# Patient Record
Sex: MALE | Race: WHITE | NOT HISPANIC OR LATINO | Employment: OTHER | URBAN - METROPOLITAN AREA
[De-identification: names, ages, dates, MRNs, and addresses within clinical notes are randomized per-mention and may not be internally consistent; named-entity substitution may affect disease eponyms.]

---

## 2017-07-02 ENCOUNTER — HOSPITAL ENCOUNTER (EMERGENCY)
Facility: HOSPITAL | Age: 67
Discharge: HOME | End: 2017-07-02
Admitting: EMERGENCY MEDICINE
Payer: MEDICARE

## 2017-07-02 VITALS
OXYGEN SATURATION: 96 % | HEART RATE: 89 BPM | WEIGHT: 167 LBS | SYSTOLIC BLOOD PRESSURE: 136 MMHG | DIASTOLIC BLOOD PRESSURE: 78 MMHG | BODY MASS INDEX: 23.91 KG/M2 | HEIGHT: 70 IN | RESPIRATION RATE: 18 BRPM

## 2017-07-02 DIAGNOSIS — L20.9 ATOPIC DERMATITIS: Primary | ICD-10-CM

## 2017-07-02 PROCEDURE — 99282 EMERGENCY DEPT VISIT SF MDM: CPT

## 2017-07-02 RX ORDER — DIAPER,BRIEF,INFANT-TODD,DISP
1 EACH MISCELLANEOUS 3 TIMES DAILY PRN
Qty: 30 G | Refills: 0 | Status: SHIPPED | OUTPATIENT
Start: 2017-07-02 | End: 2018-08-31

## 2018-08-31 ENCOUNTER — HOSPITAL ENCOUNTER (EMERGENCY)
Facility: HOSPITAL | Age: 68
Discharge: HOME | End: 2018-08-31
Attending: EMERGENCY MEDICINE | Admitting: EMERGENCY MEDICINE
Payer: MEDICARE

## 2018-08-31 VITALS
BODY MASS INDEX: 26.83 KG/M2 | SYSTOLIC BLOOD PRESSURE: 112 MMHG | DIASTOLIC BLOOD PRESSURE: 71 MMHG | HEIGHT: 70 IN | HEART RATE: 76 BPM | OXYGEN SATURATION: 94 % | RESPIRATION RATE: 18 BRPM | TEMPERATURE: 97.6 F | WEIGHT: 187.39 LBS

## 2018-08-31 DIAGNOSIS — L72.0 RUPTURED SEBACEOUS CYST: Primary | ICD-10-CM

## 2018-08-31 PROCEDURE — 87186 SC STD MICRODIL/AGAR DIL: CPT | Performed by: PHYSICIAN ASSISTANT

## 2018-08-31 PROCEDURE — 87147 CULTURE TYPE IMMUNOLOGIC: CPT | Performed by: PHYSICIAN ASSISTANT

## 2018-08-31 PROCEDURE — 87205 SMEAR GRAM STAIN: CPT | Performed by: PHYSICIAN ASSISTANT

## 2018-08-31 PROCEDURE — 87070 CULTURE OTHR SPECIMN AEROBIC: CPT | Performed by: PHYSICIAN ASSISTANT

## 2018-08-31 PROCEDURE — 99283 EMERGENCY DEPT VISIT LOW MDM: CPT

## 2018-08-31 RX ORDER — OMEPRAZOLE 40 MG/1
40 CAPSULE, DELAYED RELEASE ORAL 2 TIMES DAILY
COMMUNITY
End: 2021-12-02 | Stop reason: SDUPTHER

## 2018-08-31 RX ORDER — ROSUVASTATIN CALCIUM 40 MG/1
45 TABLET, COATED ORAL EVERY EVENING
COMMUNITY
End: 2021-12-02 | Stop reason: SDUPTHER

## 2018-08-31 RX ORDER — FERROUS SULFATE 325(65) MG
325 TABLET ORAL 2 TIMES DAILY WITH MEALS
COMMUNITY

## 2018-08-31 RX ORDER — LEFLUNOMIDE 20 MG/1
20 TABLET ORAL DAILY
COMMUNITY
End: 2019-04-03 | Stop reason: HOSPADM

## 2018-08-31 RX ORDER — CEPHALEXIN 500 MG/1
500 CAPSULE ORAL 3 TIMES DAILY
Qty: 21 CAPSULE | Refills: 0 | Status: SHIPPED | OUTPATIENT
Start: 2018-08-31 | End: 2018-09-07

## 2018-08-31 RX ORDER — FOLIC ACID 1 MG/1
TABLET ORAL DAILY
COMMUNITY
End: 2021-05-13

## 2018-08-31 RX ORDER — ISOSORBIDE DINITRATE 30 MG/1
30 TABLET ORAL EVERY EVENING
COMMUNITY
End: 2019-04-03 | Stop reason: HOSPADM

## 2018-08-31 RX ORDER — BACITRACIN, NEOMYCIN, POLYMYXIN B 400; 3.5; 5 [USP'U]/G; MG/G; [USP'U]/G
1 OINTMENT TOPICAL ONCE
Status: COMPLETED | OUTPATIENT
Start: 2018-08-31 | End: 2018-08-31

## 2018-08-31 RX ORDER — MULTIVIT WITH MINERALS/LUTEIN
250 TABLET ORAL 2 TIMES DAILY
COMMUNITY

## 2018-08-31 RX ORDER — FENOFIBRATE 48 MG/1
45 TABLET, COATED ORAL EVERY EVENING
COMMUNITY
End: 2021-10-26

## 2018-08-31 RX ORDER — TAMSULOSIN HYDROCHLORIDE 0.4 MG/1
0.4 CAPSULE ORAL
COMMUNITY
End: 2021-05-13

## 2018-08-31 RX ORDER — ASPIRIN 81 MG/1
81 TABLET, CHEWABLE ORAL DAILY
COMMUNITY
End: 2019-04-03 | Stop reason: HOSPADM

## 2018-08-31 RX ORDER — ATENOLOL 50 MG/1
50 TABLET ORAL 2 TIMES DAILY
Status: ON HOLD | COMMUNITY
End: 2019-04-03 | Stop reason: SDUPTHER

## 2018-08-31 RX ORDER — ISOSORBIDE DINITRATE 10 MG/1
120 TABLET ORAL
COMMUNITY
End: 2018-11-21 | Stop reason: HOSPADM

## 2018-08-31 RX ORDER — AMLODIPINE BESYLATE 5 MG/1
5 TABLET ORAL DAILY
COMMUNITY
End: 2019-04-03 | Stop reason: HOSPADM

## 2018-08-31 RX ADMIN — BACITRACIN, NEOMYCIN, POLYMYXIN B 1 SMALL APPLICATION: 400; 3.5; 5 OINTMENT TOPICAL at 11:34

## 2018-08-31 NOTE — DISCHARGE INSTRUCTIONS
Epidermal Inclusion Cysts   WHAT YOU NEED TO KNOW:   Epidermal inclusion cysts are the most common skin cysts in adults  These cysts are usually round, firm lumps filled with a cheese-like material called keratin  They are also called epidermoid, keratin, or sebaceous cysts  They can be found almost anywhere on your body  The cysts are most common on the face, back, neck, chest, and around your ears  They can be caused by blocked hair follicle and oil gland ducts in your skin  Epidermal inclusion cysts may grow slowly but are not cancerous  DISCHARGE INSTRUCTIONS:   Follow up with your healthcare provider as directed:  Write down your questions so you remember to ask them at your visits  Medicines:   · Antibiotics  may be given to treat or prevent an infection  · Take your medicine as directed  Contact your healthcare provider if you think your medicine is not helping or if you have side effects  Tell him of her if you are allergic to any medicine  Keep a list of the medicines, vitamins, and herbs you take  Include the amounts, and when and why you take them  Bring the list or the pill bottles to follow-up visits  Carry your medicine list with you in case of an emergency  Contact your healthcare provider if:   · Your cyst becomes swollen, red, and painful  · Your cyst is large and leads to trouble moving or a deformed area  · You have questions or concerns about your condition or care  © 2017 [facility] Information is for End User's use only and may not be sold, redistributed or otherwise used for commercial purposes  All illustrations and images included in CareNotes® are the copyrighted property of A D A M [employer] Inc  or [employer]  The above information is an  only  It is not intended as medical advice for individual conditions or treatments   Talk to your doctor, nurse or pharmacist before following any medical regimen to see if it is safe and effective for you

## 2018-08-31 NOTE — ED PROVIDER NOTES
History  Chief Complaint   Patient presents with    Abscess     Pt reports to ED w open abscess  79year old male with past medical history significant for hypertension, diabetes and rheumatoid arthritis presents to ED with chief complaint of draining abscess  Onset of symptoms reported as 2 day ago  Location of symptoms reported as right arm  Quality is reported as draining abscess  Severity is reported as moderate  Associated symptoms:  Denies paralysis, paraesthesias or weakness to right upper extremity, denies fevers or chills, denies nausea or vomiting, denies right shoulder, wrist or hand pain  Modifiers: nothing has been tried for treatment of symptoms  Context: patient reports he has had a "cyst" on his arm for many years  He reports recently the area became red and eventually opened up and started draining purulent material   Medical summary: reviewed past visits via [facility], no prior visit to this ED  History provided by:  Patient   used: No    Abscess   Associated symptoms: no fatigue, no fever, no headaches, no nausea and no vomiting        Prior to Admission Medications   Prescriptions Last Dose Informant Patient Reported? Taking?    amLODIPine (NORVASC) 5 mg tablet  Self Yes Yes   Sig: Take 5 mg by mouth daily   ascorbic acid (VITAMIN C) 250 mg tablet  Self Yes Yes   Sig: Take 250 mg by mouth 2 (two) times a day   aspirin 81 mg chewable tablet  Self Yes Yes   Sig: Chew 81 mg daily   atenolol (TENORMIN) 50 mg tablet  Self Yes Yes   Sig: Take 50 mg by mouth 2 (two) times a day   fenofibrate (TRICOR) 48 mg tablet  Self Yes Yes   Sig: Take 45 mg by mouth every evening   ferrous sulfate 325 (65 Fe) mg tablet  Self Yes Yes   Sig: Take 325 mg by mouth 2 (two) times a day with meals   folic acid (FOLVITE) 1 mg tablet  Self Yes Yes   Sig: Take by mouth daily   insulin degludec (TRESIBA FLEXTOUCH) 100 units/mL injection pen  Self Yes Yes   Sig: Inject 46 Units under the skin daily in the early morning   isosorbide dinitrate (ISORDIL) 10 mg tablet  Self Yes Yes   Sig: Take 120 mg by mouth daily in the early morning   isosorbide dinitrate (ISORDIL) 30 mg tablet  Self Yes Yes   Sig: Take 30 mg by mouth every evening   leflunomide (ARAVA) 20 MG tablet  Self Yes Yes   Sig: Take 20 mg by mouth daily   metFORMIN (GLUCOPHAGE) 1000 MG tablet  Self Yes Yes   Sig: Take 1,000 mg by mouth 2 (two) times a day with meals   methotrexate 2 5 mg tablet  Self Yes Yes   Sig: Take 12 5 mg by mouth once a week   omeprazole (PriLOSEC) 40 MG capsule  Self Yes Yes   Sig: Take 40 mg by mouth 2 (two) times a day   rosuvastatin (CRESTOR) 40 MG tablet  Self Yes Yes   Sig: Take 45 mg by mouth every evening   sitaGLIPtin (JANUVIA) 50 mg tablet  Self Yes Yes   Sig: Take 50 mg by mouth daily   tamsulosin (FLOMAX) 0 4 mg  Self Yes Yes   Sig: Take 0 4 mg by mouth daily in the early morning   umeclidinium-vilanterol (ANORO ELLIPTA) 62 5-25 MCG/INH inhaler  Self Yes Yes   Sig: Inhale 1 puff daily      Facility-Administered Medications: None       Past Medical History:   Diagnosis Date    Diabetes mellitus ([facility] )     Hypertension     Lyme disease     Rheumatoid arthritis ([facility] )        History reviewed  No pertinent surgical history  History reviewed  No pertinent family history  I have reviewed and agree with the history as documented  Social History   Substance Use Topics    Smoking status: Current Every Day Smoker     Packs/day: 0 50     Years: 50 00     Types: Cigarettes    Smokeless tobacco: Not on file    Alcohol use No        Review of Systems   Constitutional: Negative for activity change, appetite change, chills, diaphoresis, fatigue, fever and unexpected weight change     HENT: Negative for congestion, dental problem, drooling, ear discharge, ear pain, facial swelling, hearing loss, mouth sores, nosebleeds, postnasal drip, rhinorrhea, sinus pain, sinus pressure, sneezing, sore throat, tinnitus, trouble swallowing and voice change  Eyes: Negative for photophobia, pain, discharge, redness, itching and visual disturbance  Respiratory: Negative for cough, chest tightness, shortness of breath and wheezing  Cardiovascular: Negative for chest pain, palpitations and leg swelling  Gastrointestinal: Negative for abdominal distention, abdominal pain, anal bleeding, blood in stool, constipation, diarrhea, nausea and vomiting  Endocrine: Negative for cold intolerance, heat intolerance, polydipsia, polyphagia and polyuria  Genitourinary: Negative for difficulty urinating, dysuria, flank pain, frequency, hematuria and urgency  Musculoskeletal: Negative for arthralgias, back pain, gait problem, joint swelling, myalgias, neck pain and neck stiffness  Skin: Positive for wound  Negative for color change, pallor and rash  Allergic/Immunologic: Negative for environmental allergies, food allergies and immunocompromised state  Neurological: Negative for dizziness, tremors, seizures, syncope, facial asymmetry, speech difficulty, weakness, light-headedness, numbness and headaches  Hematological: Negative for adenopathy  Does not bruise/bleed easily  Psychiatric/Behavioral: Negative for agitation, confusion and hallucinations  The patient is not nervous/anxious  All other systems reviewed and are negative  Physical Exam  Physical Exam   Constitutional: He is oriented to person, place, and time  He appears well-developed and well-nourished  No distress  /71 (BP Location: Left arm)   Pulse 76   Temp 97 6 °F (36 4 °C) (Oral)   Resp 18   Ht 5' 10" (1 778 m)   Wt 85 kg (187 lb 6 3 oz)   SpO2 94%   BMI 26 89 kg/m²    HENT:   Head: Normocephalic and atraumatic  Right Ear: External ear normal    Left Ear: External ear normal    Nose: Nose normal    Mouth/Throat: Oropharynx is clear and moist  No oropharyngeal exudate     Eyes: Conjunctivae and EOM are normal  Pupils are equal, round, and reactive to light  Right eye exhibits no discharge  Left eye exhibits no discharge  No scleral icterus  Neck: Normal range of motion  Neck supple  No tracheal deviation present  No thyromegaly present  Cardiovascular: Normal rate, regular rhythm and intact distal pulses  Pulmonary/Chest: Effort normal and breath sounds normal  No stridor  No respiratory distress  He has no wheezes  He has no rales  He exhibits no tenderness  Abdominal: Soft  Bowel sounds are normal  He exhibits no distension and no mass  There is no tenderness  There is no rebound and no guarding  Musculoskeletal: Normal range of motion  He exhibits no edema, tenderness or deformity  Lymphadenopathy:     He has no cervical adenopathy  Neurological: He is alert and oriented to person, place, and time  He displays normal reflexes  No cranial nerve deficit or sensory deficit  He exhibits normal muscle tone  Coordination normal    Skin: Skin is warm and dry  Capillary refill takes less than 2 seconds  No rash noted  He is not diaphoretic  No erythema  No pallor  There is a 1 5 cm diameter circular area of raised erythema, fluctuant, draining purulent material   No linear or lymphangitic streaking  Appears consistent with ruptured sebaceous cyst      Psychiatric: He has a normal mood and affect  His behavior is normal  Judgment and thought content normal    Nursing note and vitals reviewed        Vital Signs  ED Triage Vitals [08/31/18 0950]   Temperature Pulse Respirations Blood Pressure SpO2   97 6 °F (36 4 °C) 76 18 112/71 94 %      Temp Source Heart Rate Source Patient Position - Orthostatic VS BP Location FiO2 (%)   Oral Monitor Lying Left arm --      Pain Score       --           Vitals:    08/31/18 0950   BP: 112/71   Pulse: 76   Patient Position - Orthostatic VS: Lying       Visual Acuity      ED Medications  Medications   neomycin-bacitracin-polymyxin b (NEOSPORIN) ointment 1 small application (1 small application Topical Given 8/31/18 1134)       Diagnostic Studies  Results Reviewed     Procedure Component Value Units Date/Time    Wound culture and Gram stain [[id number]]  (Abnormal)  (Susceptibility) Collected:  08/31/18 1135    Lab Status:  Final result Specimen:  Wound from Arm, Right Updated:  09/03/18 0816     Wound Culture 1+ Growth of Staphylococcus aureus (A)      1+ Growth of      Gram Stain Result No Polys or Bacteria seen    Susceptibility      Staphylococcus aureus     JEFFERY    Ampicillin ($$) >8 00 ug/ml Resistant    Cefazolin ($) <=4 00 ug/ml Susceptible    Clindamycin ($) 0 50 ug/ml Susceptible    Erythromycin ($$$$) 0 50 ug/ml Susceptible    Gentamicin ($$) <=1 ug/ml Susceptible    Oxacillin 0 50 ug/ml Susceptible    Tetracycline 8 ug/ml Intermediate    Trimethoprim + Sulfamethoxazole ($$$) <=0 5/9 5 ug/ml Susceptible    Vancomycin ($) 2 00 ug/ml Susceptible                                No orders to display              Procedures  Procedures       Phone Contacts  ED Phone Contact    ED Course           Identification of Seniors at Risk      Most Recent Value   (ISAR) Identification of Seniors at Risk   Before the illness or injury that brought you to the Emergency, did you need someone to help you on a regular basis? 0 Filed at: 08/31/2018 0955   In the last 24 hours, have you needed more help than usual?  0 Filed at: 08/31/2018 7820   Have you been hospitalized for one or more nights during the past 6 months? 0 Filed at: 08/31/2018 0955   In general, do you see well?  0 Filed at: 08/31/2018 0955   In general, do you have serious problems with your memory? 0 Filed at: 08/31/2018 6245   Do you take more than three different medications every day? 1 Filed at: 08/31/2018 0955   ISAR Score  1 Filed at: 08/31/2018 4394                          MDM  Number of Diagnoses or Management Options  Ruptured sebaceous cyst: new and requires workup  Diagnosis management comments: Patient with draining abscess present to right arm    Small amount of purulent material from wound sent for wound culture  Wound is already draining  Patient has appointment next week with general surgeon for drainage of cyst   Will cover with antibiotics for mrsa  Discussed follow up with pcp and general surgery as previously scheduled  Discussed antibiotics as directed  Reviewed reasons to return to ed  Patient verbalized understanding of diagnosis and agreement with discharge plan of care as well as understanding of reasons to return to ed  Amount and/or Complexity of Data Reviewed  Obtain history from someone other than the patient: yes (Family member)  Review and summarize past medical records: yes    Patient Progress  Patient progress: stable    CritCare Time    Disposition  Final diagnoses:   Ruptured sebaceous cyst     Time reflects when diagnosis was documented in both MDM as applicable and the Disposition within this note     Time User Action Codes Description Comment    8/31/2018 11:18 AM Fiona Silva Add [L72 0] Ruptured sebaceous cyst       ED Disposition     ED Disposition Condition Comment    Discharge  [facility] Hand discharge to home/self care      Condition at discharge: Stable        Follow-up Information     Follow up With Specialties Details Why Contact Info Additional Information    Ted Bernal MD Family Medicine Call in 1 day for further evaluation of symptoms [address]  [address] [facility]        Orly Ledesma MD  Call in 1 day for further evaluation of symptoms [address]  [address] [facility] Emergency Department Emergency Medicine Go to If symptoms worsen [address] [facility] [address] ED, [address] [place], [place], [address]          Discharge Medication List as of 8/31/2018 11:22 AM      START taking these medications    Details   cephalexin (KEFLEX) 500 mg capsule Take 1 capsule (500 mg total) by mouth 3 (three) times a day for 7 days, Starting Fri 8/31/2018, Until Fri 9/7/2018, Print      mupirocin (BACTROBAN) 2 % ointment Apply topically 3 (three) times a day, Starting Fri 8/31/2018, Print         CONTINUE these medications which have NOT CHANGED    Details   amLODIPine (NORVASC) 5 mg tablet Take 5 mg by mouth daily, Historical Med      ascorbic acid (VITAMIN C) 250 mg tablet Take 250 mg by mouth 2 (two) times a day, Historical Med      aspirin 81 mg chewable tablet Chew 81 mg daily, Historical Med      atenolol (TENORMIN) 50 mg tablet Take 50 mg by mouth 2 (two) times a day, Historical Med      fenofibrate (TRICOR) 48 mg tablet Take 45 mg by mouth every evening, Historical Med      ferrous sulfate 325 (65 Fe) mg tablet Take 325 mg by mouth 2 (two) times a day with meals, Historical Med      folic acid (FOLVITE) 1 mg tablet Take by mouth daily, Historical Med      insulin degludec (TRESIBA FLEXTOUCH) 100 units/mL injection pen Inject 46 Units under the skin daily in the early morning, Historical Med      !! isosorbide dinitrate (ISORDIL) 10 mg tablet Take 120 mg by mouth daily in the early morning, Historical Med      !! isosorbide dinitrate (ISORDIL) 30 mg tablet Take 30 mg by mouth every evening, Historical Med      leflunomide (ARAVA) 20 MG tablet Take 20 mg by mouth daily, Historical Med      metFORMIN (GLUCOPHAGE) 1000 MG tablet Take 1,000 mg by mouth 2 (two) times a day with meals, Historical Med      methotrexate 2 5 mg tablet Take 12 5 mg by mouth once a week, Historical Med      omeprazole (PriLOSEC) 40 MG capsule Take 40 mg by mouth 2 (two) times a day, Historical Med      rosuvastatin (CRESTOR) 40 MG tablet Take 45 mg by mouth every evening, Historical Med      sitaGLIPtin (JANUVIA) 50 mg tablet Take 50 mg by mouth daily, Historical Med      tamsulosin (FLOMAX) 0 4 mg Take 0 4 mg by mouth daily in the early morning, Historical Med umeclidinium-vilanterol (ANORO ELLIPTA) 62 5-25 MCG/INH inhaler Inhale 1 puff daily, Historical Med       !! - Potential duplicate medications found  Please discuss with provider  No discharge procedures on file      ED Provider  Electronically Signed by           Stephanie Boyer PA-C  09/03/18 0981

## 2018-09-03 LAB
BACTERIA WND AEROBE CULT: ABNORMAL
BACTERIA WND AEROBE CULT: ABNORMAL
GRAM STN SPEC: ABNORMAL

## 2018-11-16 ENCOUNTER — HOSPITAL ENCOUNTER (INPATIENT)
Facility: HOSPITAL | Age: 68
LOS: 5 days | Discharge: HOME | DRG: 617 | End: 2018-11-21
Attending: EMERGENCY MEDICINE | Admitting: INTERNAL MEDICINE
Payer: MEDICARE

## 2018-11-16 ENCOUNTER — APPOINTMENT (EMERGENCY)
Dept: RADIOLOGY | Facility: HOSPITAL | Age: 68
DRG: 617 | End: 2018-11-16
Payer: MEDICARE

## 2018-11-16 DIAGNOSIS — E86.0 DEHYDRATION: ICD-10-CM

## 2018-11-16 DIAGNOSIS — N17.9 ACUTE KIDNEY INJURY (HCC): ICD-10-CM

## 2018-11-16 DIAGNOSIS — C34.90 LUNG CANCER (HCC): ICD-10-CM

## 2018-11-16 DIAGNOSIS — R19.7 DIARRHEA: ICD-10-CM

## 2018-11-16 DIAGNOSIS — L03.039 CELLULITIS, TOE: ICD-10-CM

## 2018-11-16 DIAGNOSIS — I73.9 PAD (PERIPHERAL ARTERY DISEASE) (HCC): ICD-10-CM

## 2018-11-16 DIAGNOSIS — I95.9 HYPOTENSION: ICD-10-CM

## 2018-11-16 DIAGNOSIS — L03.032 CELLULITIS OF TOE OF LEFT FOOT: ICD-10-CM

## 2018-11-16 DIAGNOSIS — A41.9 SEPSIS (HCC): Primary | ICD-10-CM

## 2018-11-16 LAB
ALBUMIN SERPL BCP-MCNC: 2.5 G/DL (ref 3.5–5)
ALP SERPL-CCNC: 79 U/L (ref 46–116)
ALT SERPL W P-5'-P-CCNC: 19 U/L (ref 12–78)
ANION GAP SERPL CALCULATED.3IONS-SCNC: 17 MMOL/L (ref 4–13)
APTT PPP: 41 SECONDS (ref 26–38)
AST SERPL W P-5'-P-CCNC: 22 U/L (ref 5–45)
BASOPHILS # BLD MANUAL: 0 THOUSAND/UL (ref 0–0.1)
BASOPHILS NFR MAR MANUAL: 0 % (ref 0–1)
BILIRUB SERPL-MCNC: 0.2 MG/DL (ref 0.2–1)
BUN SERPL-MCNC: 22 MG/DL (ref 5–25)
CALCIUM SERPL-MCNC: 8 MG/DL (ref 8.3–10.1)
CHLORIDE SERPL-SCNC: 107 MMOL/L (ref 100–108)
CO2 SERPL-SCNC: 17 MMOL/L (ref 21–32)
CREAT SERPL-MCNC: 1.85 MG/DL (ref 0.6–1.3)
EOSINOPHIL # BLD MANUAL: 0 THOUSAND/UL (ref 0–0.4)
EOSINOPHIL NFR BLD MANUAL: 0 % (ref 0–6)
ERYTHROCYTE [DISTWIDTH] IN BLOOD BY AUTOMATED COUNT: 17.2 % (ref 11.6–15.1)
GFR SERPL CREATININE-BSD FRML MDRD: 37 ML/MIN/1.73SQ M
GLUCOSE SERPL-MCNC: 175 MG/DL (ref 65–140)
HCT VFR BLD AUTO: 32.8 % (ref 36.5–49.3)
HGB BLD-MCNC: 10.4 G/DL (ref 12–17)
INR PPP: 1.79 (ref 0.86–1.17)
LACTATE SERPL-SCNC: 2.3 MMOL/L (ref 0.5–2)
LYMPHOCYTES # BLD AUTO: 0.32 THOUSAND/UL (ref 0.6–4.47)
LYMPHOCYTES # BLD AUTO: 3 % (ref 14–44)
MCH RBC QN AUTO: 30.5 PG (ref 26.8–34.3)
MCHC RBC AUTO-ENTMCNC: 31.7 G/DL (ref 31.4–37.4)
MCV RBC AUTO: 96 FL (ref 82–98)
METAMYELOCYTES NFR BLD MANUAL: 1 % (ref 0–1)
MONOCYTES # BLD AUTO: 0.86 THOUSAND/UL (ref 0–1.22)
MONOCYTES NFR BLD: 8 % (ref 4–12)
NEUTROPHILS # BLD MANUAL: 9.47 THOUSAND/UL (ref 1.85–7.62)
NEUTS BAND NFR BLD MANUAL: 9 % (ref 0–8)
NEUTS SEG NFR BLD AUTO: 79 % (ref 43–75)
NRBC BLD AUTO-RTO: 0 /100 WBCS
OVALOCYTES BLD QL SMEAR: PRESENT
PLATELET # BLD AUTO: 106 THOUSANDS/UL (ref 149–390)
PLATELET BLD QL SMEAR: ABNORMAL
PMV BLD AUTO: 11.4 FL (ref 8.9–12.7)
POTASSIUM SERPL-SCNC: 3.7 MMOL/L (ref 3.5–5.3)
PROT SERPL-MCNC: 7 G/DL (ref 6.4–8.2)
PROTHROMBIN TIME: 20.5 SECONDS (ref 11.8–14.2)
RBC # BLD AUTO: 3.41 MILLION/UL (ref 3.88–5.62)
SODIUM SERPL-SCNC: 141 MMOL/L (ref 136–145)
TOTAL CELLS COUNTED SPEC: 100
TROPONIN I SERPL-MCNC: <0.02 NG/ML
WBC # BLD AUTO: 10.76 THOUSAND/UL (ref 4.31–10.16)

## 2018-11-16 PROCEDURE — 83605 ASSAY OF LACTIC ACID: CPT | Performed by: EMERGENCY MEDICINE

## 2018-11-16 PROCEDURE — 84484 ASSAY OF TROPONIN QUANT: CPT | Performed by: EMERGENCY MEDICINE

## 2018-11-16 PROCEDURE — 1123F ACP DISCUSS/DSCN MKR DOCD: CPT | Performed by: PATHOLOGY

## 2018-11-16 PROCEDURE — 85730 THROMBOPLASTIN TIME PARTIAL: CPT | Performed by: EMERGENCY MEDICINE

## 2018-11-16 PROCEDURE — 71046 X-RAY EXAM CHEST 2 VIEWS: CPT

## 2018-11-16 PROCEDURE — 99285 EMERGENCY DEPT VISIT HI MDM: CPT

## 2018-11-16 PROCEDURE — 85007 BL SMEAR W/DIFF WBC COUNT: CPT | Performed by: EMERGENCY MEDICINE

## 2018-11-16 PROCEDURE — 85027 COMPLETE CBC AUTOMATED: CPT | Performed by: EMERGENCY MEDICINE

## 2018-11-16 PROCEDURE — 93005 ELECTROCARDIOGRAM TRACING: CPT

## 2018-11-16 PROCEDURE — 73660 X-RAY EXAM OF TOE(S): CPT

## 2018-11-16 PROCEDURE — 96360 HYDRATION IV INFUSION INIT: CPT

## 2018-11-16 PROCEDURE — 80053 COMPREHEN METABOLIC PANEL: CPT | Performed by: EMERGENCY MEDICINE

## 2018-11-16 PROCEDURE — 87040 BLOOD CULTURE FOR BACTERIA: CPT | Performed by: EMERGENCY MEDICINE

## 2018-11-16 PROCEDURE — 36415 COLL VENOUS BLD VENIPUNCTURE: CPT | Performed by: EMERGENCY MEDICINE

## 2018-11-16 PROCEDURE — 85610 PROTHROMBIN TIME: CPT | Performed by: EMERGENCY MEDICINE

## 2018-11-16 PROCEDURE — 96365 THER/PROPH/DIAG IV INF INIT: CPT

## 2018-11-16 RX ADMIN — SODIUM CHLORIDE 1000 ML: 0.9 INJECTION, SOLUTION INTRAVENOUS at 22:55

## 2018-11-16 RX ADMIN — CEFEPIME HYDROCHLORIDE 2000 MG: 2 INJECTION, POWDER, FOR SOLUTION INTRAVENOUS at 23:27

## 2018-11-17 ENCOUNTER — APPOINTMENT (INPATIENT)
Dept: ULTRASOUND IMAGING | Facility: HOSPITAL | Age: 68
DRG: 617 | End: 2018-11-17
Payer: MEDICARE

## 2018-11-17 ENCOUNTER — TELEPHONE (OUTPATIENT)
Dept: OTHER | Facility: OTHER | Age: 68
End: 2018-11-17

## 2018-11-17 PROBLEM — L03.90 CELLULITIS: Status: ACTIVE | Noted: 2018-11-17

## 2018-11-17 PROBLEM — I10 HTN (HYPERTENSION): Status: ACTIVE | Noted: 2018-11-17

## 2018-11-17 PROBLEM — N17.9 AKI (ACUTE KIDNEY INJURY) (HCC): Status: ACTIVE | Noted: 2018-11-17

## 2018-11-17 PROBLEM — E87.29 HIGH ANION GAP METABOLIC ACIDOSIS: Status: ACTIVE | Noted: 2018-11-17

## 2018-11-17 PROBLEM — E83.42 HYPOMAGNESEMIA: Status: ACTIVE | Noted: 2018-11-17

## 2018-11-17 PROBLEM — E11.9 DIABETES MELLITUS (HCC): Status: ACTIVE | Noted: 2018-11-17

## 2018-11-17 PROBLEM — D64.9 ANEMIA: Status: ACTIVE | Noted: 2018-11-17

## 2018-11-17 PROBLEM — E87.2 HIGH ANION GAP METABOLIC ACIDOSIS: Status: ACTIVE | Noted: 2018-11-17

## 2018-11-17 LAB
ALBUMIN SERPL BCP-MCNC: 2.2 G/DL (ref 3.5–5)
ALP SERPL-CCNC: 64 U/L (ref 46–116)
ALT SERPL W P-5'-P-CCNC: 14 U/L (ref 12–78)
AMORPH URATE CRY URNS QL MICRO: ABNORMAL /HPF
ANION GAP SERPL CALCULATED.3IONS-SCNC: 14 MMOL/L (ref 4–13)
ANION GAP SERPL CALCULATED.3IONS-SCNC: 14 MMOL/L (ref 4–13)
AST SERPL W P-5'-P-CCNC: 19 U/L (ref 5–45)
BACTERIA UR QL AUTO: ABNORMAL /HPF
BILIRUB SERPL-MCNC: 0.3 MG/DL (ref 0.2–1)
BILIRUB UR QL STRIP: NEGATIVE
BUN SERPL-MCNC: 21 MG/DL (ref 5–25)
BUN SERPL-MCNC: 22 MG/DL (ref 5–25)
CALCIUM SERPL-MCNC: 7.1 MG/DL (ref 8.3–10.1)
CALCIUM SERPL-MCNC: 7.4 MG/DL (ref 8.3–10.1)
CHLORIDE SERPL-SCNC: 106 MMOL/L (ref 100–108)
CHLORIDE SERPL-SCNC: 109 MMOL/L (ref 100–108)
CLARITY UR: ABNORMAL
CO2 SERPL-SCNC: 17 MMOL/L (ref 21–32)
CO2 SERPL-SCNC: 17 MMOL/L (ref 21–32)
COLOR UR: YELLOW
CREAT SERPL-MCNC: 1.8 MG/DL (ref 0.6–1.3)
CREAT SERPL-MCNC: 1.95 MG/DL (ref 0.6–1.3)
CREAT UR-MCNC: 106 MG/DL
ERYTHROCYTE [DISTWIDTH] IN BLOOD BY AUTOMATED COUNT: 17.1 % (ref 11.6–15.1)
FINE GRAN CASTS URNS QL MICRO: ABNORMAL /LPF
GFR SERPL CREATININE-BSD FRML MDRD: 35 ML/MIN/1.73SQ M
GFR SERPL CREATININE-BSD FRML MDRD: 38 ML/MIN/1.73SQ M
GLUCOSE SERPL-MCNC: 106 MG/DL (ref 65–140)
GLUCOSE SERPL-MCNC: 109 MG/DL (ref 65–140)
GLUCOSE SERPL-MCNC: 150 MG/DL (ref 65–140)
GLUCOSE SERPL-MCNC: 62 MG/DL (ref 65–140)
GLUCOSE SERPL-MCNC: 81 MG/DL (ref 65–140)
GLUCOSE SERPL-MCNC: 92 MG/DL (ref 65–140)
GLUCOSE UR STRIP-MCNC: NEGATIVE MG/DL
HCT VFR BLD AUTO: 31.3 % (ref 36.5–49.3)
HGB BLD-MCNC: 9.8 G/DL (ref 12–17)
HGB UR QL STRIP.AUTO: NEGATIVE
HYALINE CASTS #/AREA URNS LPF: ABNORMAL /LPF
KETONES UR STRIP-MCNC: ABNORMAL MG/DL
LACTATE SERPL-SCNC: 0.8 MMOL/L (ref 0.5–2)
LACTATE SERPL-SCNC: 1.6 MMOL/L (ref 0.5–2)
LEUKOCYTE ESTERASE UR QL STRIP: NEGATIVE
MAGNESIUM SERPL-MCNC: 1 MG/DL (ref 1.6–2.6)
MCH RBC QN AUTO: 30.4 PG (ref 26.8–34.3)
MCHC RBC AUTO-ENTMCNC: 31.3 G/DL (ref 31.4–37.4)
MCV RBC AUTO: 97 FL (ref 82–98)
MICROALBUMIN UR-MCNC: 64.9 MG/L (ref 0–20)
MICROALBUMIN/CREAT 24H UR: 61 MG/G CREATININE (ref 0–30)
MUCOUS THREADS UR QL AUTO: ABNORMAL
NITRITE UR QL STRIP: NEGATIVE
NON-SQ EPI CELLS URNS QL MICRO: ABNORMAL /HPF
PH UR STRIP.AUTO: 5.5 [PH] (ref 4.5–8)
PLATELET # BLD AUTO: 84 THOUSANDS/UL (ref 149–390)
PLATELET # BLD AUTO: 98 THOUSANDS/UL (ref 149–390)
PMV BLD AUTO: 11 FL (ref 8.9–12.7)
PMV BLD AUTO: 11.2 FL (ref 8.9–12.7)
POTASSIUM SERPL-SCNC: 3.5 MMOL/L (ref 3.5–5.3)
POTASSIUM SERPL-SCNC: 4 MMOL/L (ref 3.5–5.3)
PROCALCITONIN SERPL-MCNC: 0.22 NG/ML
PROT SERPL-MCNC: 6.2 G/DL (ref 6.4–8.2)
PROT UR STRIP-MCNC: ABNORMAL MG/DL
RBC # BLD AUTO: 3.22 MILLION/UL (ref 3.88–5.62)
RBC #/AREA URNS AUTO: ABNORMAL /HPF
SODIUM 24H UR-SCNC: 52 MOL/L
SODIUM SERPL-SCNC: 137 MMOL/L (ref 136–145)
SODIUM SERPL-SCNC: 140 MMOL/L (ref 136–145)
SP GR UR STRIP.AUTO: >=1.03 (ref 1–1.03)
UROBILINOGEN UR QL STRIP.AUTO: 0.2 E.U./DL
UUN 24H UR-MCNC: 490 MG/DL
WBC # BLD AUTO: 7.71 THOUSAND/UL (ref 4.31–10.16)
WBC #/AREA URNS AUTO: ABNORMAL /HPF

## 2018-11-17 PROCEDURE — 85049 AUTOMATED PLATELET COUNT: CPT | Performed by: INTERNAL MEDICINE

## 2018-11-17 PROCEDURE — 87186 SC STD MICRODIL/AGAR DIL: CPT | Performed by: INTERNAL MEDICINE

## 2018-11-17 PROCEDURE — 85027 COMPLETE CBC AUTOMATED: CPT | Performed by: INTERNAL MEDICINE

## 2018-11-17 PROCEDURE — 99223 1ST HOSP IP/OBS HIGH 75: CPT | Performed by: INTERNAL MEDICINE

## 2018-11-17 PROCEDURE — 83735 ASSAY OF MAGNESIUM: CPT | Performed by: INTERNAL MEDICINE

## 2018-11-17 PROCEDURE — 87205 SMEAR GRAM STAIN: CPT | Performed by: INTERNAL MEDICINE

## 2018-11-17 PROCEDURE — 82570 ASSAY OF URINE CREATININE: CPT | Performed by: INTERNAL MEDICINE

## 2018-11-17 PROCEDURE — 84300 ASSAY OF URINE SODIUM: CPT | Performed by: INTERNAL MEDICINE

## 2018-11-17 PROCEDURE — 80053 COMPREHEN METABOLIC PANEL: CPT | Performed by: INTERNAL MEDICINE

## 2018-11-17 PROCEDURE — 84540 ASSAY OF URINE/UREA-N: CPT | Performed by: INTERNAL MEDICINE

## 2018-11-17 PROCEDURE — 99222 1ST HOSP IP/OBS MODERATE 55: CPT | Performed by: NURSE PRACTITIONER

## 2018-11-17 PROCEDURE — 81001 URINALYSIS AUTO W/SCOPE: CPT | Performed by: EMERGENCY MEDICINE

## 2018-11-17 PROCEDURE — 84145 PROCALCITONIN (PCT): CPT | Performed by: INTERNAL MEDICINE

## 2018-11-17 PROCEDURE — 93923 UPR/LXTR ART STDY 3+ LVLS: CPT

## 2018-11-17 PROCEDURE — 87147 CULTURE TYPE IMMUNOLOGIC: CPT | Performed by: INTERNAL MEDICINE

## 2018-11-17 PROCEDURE — 80048 BASIC METABOLIC PNL TOTAL CA: CPT | Performed by: INTERNAL MEDICINE

## 2018-11-17 PROCEDURE — 82948 REAGENT STRIP/BLOOD GLUCOSE: CPT

## 2018-11-17 PROCEDURE — 83605 ASSAY OF LACTIC ACID: CPT | Performed by: INTERNAL MEDICINE

## 2018-11-17 PROCEDURE — 87070 CULTURE OTHR SPECIMN AEROBIC: CPT | Performed by: INTERNAL MEDICINE

## 2018-11-17 PROCEDURE — 82043 UR ALBUMIN QUANTITATIVE: CPT | Performed by: INTERNAL MEDICINE

## 2018-11-17 PROCEDURE — 87493 C DIFF AMPLIFIED PROBE: CPT | Performed by: INTERNAL MEDICINE

## 2018-11-17 RX ORDER — FERROUS SULFATE 325(65) MG
325 TABLET ORAL 2 TIMES DAILY WITH MEALS
Status: DISCONTINUED | OUTPATIENT
Start: 2018-11-17 | End: 2018-11-21 | Stop reason: HOSPADM

## 2018-11-17 RX ORDER — ISOSORBIDE DINITRATE 10 MG/1
30 TABLET ORAL EVERY EVENING
Status: DISCONTINUED | OUTPATIENT
Start: 2018-11-17 | End: 2018-11-21 | Stop reason: HOSPADM

## 2018-11-17 RX ORDER — HEPARIN SODIUM 5000 [USP'U]/ML
5000 INJECTION, SOLUTION INTRAVENOUS; SUBCUTANEOUS EVERY 8 HOURS SCHEDULED
Status: DISCONTINUED | OUTPATIENT
Start: 2018-11-17 | End: 2018-11-20

## 2018-11-17 RX ORDER — ASCORBIC ACID 500 MG
250 TABLET ORAL DAILY
Status: DISCONTINUED | OUTPATIENT
Start: 2018-11-17 | End: 2018-11-21 | Stop reason: HOSPADM

## 2018-11-17 RX ORDER — ATENOLOL 50 MG/1
50 TABLET ORAL 2 TIMES DAILY
Status: DISCONTINUED | OUTPATIENT
Start: 2018-11-17 | End: 2018-11-17

## 2018-11-17 RX ORDER — FENOFIBRATE 48 MG/1
45 TABLET, COATED ORAL EVERY EVENING
Status: DISCONTINUED | OUTPATIENT
Start: 2018-11-17 | End: 2018-11-21 | Stop reason: HOSPADM

## 2018-11-17 RX ORDER — FENOFIBRATE 48 MG/1
45 TABLET, COATED ORAL EVERY EVENING
Status: DISCONTINUED | OUTPATIENT
Start: 2018-11-17 | End: 2018-11-17

## 2018-11-17 RX ORDER — AMLODIPINE BESYLATE 5 MG/1
5 TABLET ORAL DAILY
Status: DISCONTINUED | OUTPATIENT
Start: 2018-11-17 | End: 2018-11-17

## 2018-11-17 RX ORDER — MAGNESIUM SULFATE HEPTAHYDRATE 40 MG/ML
2 INJECTION, SOLUTION INTRAVENOUS
Status: COMPLETED | OUTPATIENT
Start: 2018-11-17 | End: 2018-11-17

## 2018-11-17 RX ORDER — FOLIC ACID 1 MG/1
1 TABLET ORAL DAILY
Status: DISCONTINUED | OUTPATIENT
Start: 2018-11-17 | End: 2018-11-21 | Stop reason: HOSPADM

## 2018-11-17 RX ORDER — LEFLUNOMIDE 20 MG/1
20 TABLET ORAL DAILY
Status: DISCONTINUED | OUTPATIENT
Start: 2018-11-17 | End: 2018-11-21 | Stop reason: HOSPADM

## 2018-11-17 RX ORDER — ATORVASTATIN CALCIUM 40 MG/1
40 TABLET, FILM COATED ORAL
Status: DISCONTINUED | OUTPATIENT
Start: 2018-11-17 | End: 2018-11-21 | Stop reason: HOSPADM

## 2018-11-17 RX ORDER — TAMSULOSIN HYDROCHLORIDE 0.4 MG/1
0.4 CAPSULE ORAL
Status: DISCONTINUED | OUTPATIENT
Start: 2018-11-17 | End: 2018-11-21 | Stop reason: HOSPADM

## 2018-11-17 RX ORDER — ATENOLOL 25 MG/1
25 TABLET ORAL 2 TIMES DAILY
Status: DISCONTINUED | OUTPATIENT
Start: 2018-11-17 | End: 2018-11-21 | Stop reason: HOSPADM

## 2018-11-17 RX ORDER — PANTOPRAZOLE SODIUM 40 MG/1
40 TABLET, DELAYED RELEASE ORAL
Status: DISCONTINUED | OUTPATIENT
Start: 2018-11-17 | End: 2018-11-21 | Stop reason: HOSPADM

## 2018-11-17 RX ORDER — ISOSORBIDE DINITRATE 10 MG/1
30 TABLET ORAL EVERY EVENING
Status: DISCONTINUED | OUTPATIENT
Start: 2018-11-17 | End: 2018-11-17

## 2018-11-17 RX ORDER — ASPIRIN 81 MG/1
81 TABLET, CHEWABLE ORAL DAILY
Status: DISCONTINUED | OUTPATIENT
Start: 2018-11-17 | End: 2018-11-21 | Stop reason: HOSPADM

## 2018-11-17 RX ORDER — SODIUM CHLORIDE 9 MG/ML
75 INJECTION, SOLUTION INTRAVENOUS CONTINUOUS
Status: DISCONTINUED | OUTPATIENT
Start: 2018-11-17 | End: 2018-11-18

## 2018-11-17 RX ADMIN — OXYCODONE HYDROCHLORIDE AND ACETAMINOPHEN 250 MG: 500 TABLET ORAL at 09:05

## 2018-11-17 RX ADMIN — MAGNESIUM SULFATE HEPTAHYDRATE 2 G: 40 INJECTION, SOLUTION INTRAVENOUS at 16:11

## 2018-11-17 RX ADMIN — VANCOMYCIN HYDROCHLORIDE 750 MG: 750 INJECTION, SOLUTION INTRAVENOUS at 14:12

## 2018-11-17 RX ADMIN — ASPIRIN 81 MG 81 MG: 81 TABLET ORAL at 09:07

## 2018-11-17 RX ADMIN — ATENOLOL 25 MG: 25 TABLET ORAL at 09:03

## 2018-11-17 RX ADMIN — FERROUS SULFATE TAB 325 MG (65 MG ELEMENTAL FE) 325 MG: 325 (65 FE) TAB at 09:07

## 2018-11-17 RX ADMIN — VANCOMYCIN HYDROCHLORIDE 750 MG: 750 INJECTION, SOLUTION INTRAVENOUS at 23:08

## 2018-11-17 RX ADMIN — HEPARIN SODIUM 5000 UNITS: 5000 INJECTION, SOLUTION INTRAVENOUS; SUBCUTANEOUS at 14:12

## 2018-11-17 RX ADMIN — TAMSULOSIN HYDROCHLORIDE 0.4 MG: 0.4 CAPSULE ORAL at 05:52

## 2018-11-17 RX ADMIN — PANTOPRAZOLE SODIUM 40 MG: 40 TABLET, DELAYED RELEASE ORAL at 05:52

## 2018-11-17 RX ADMIN — FERROUS SULFATE TAB 325 MG (65 MG ELEMENTAL FE) 325 MG: 325 (65 FE) TAB at 17:41

## 2018-11-17 RX ADMIN — ATORVASTATIN CALCIUM 40 MG: 40 TABLET, FILM COATED ORAL at 17:42

## 2018-11-17 RX ADMIN — SODIUM CHLORIDE 75 ML/HR: 0.9 INJECTION, SOLUTION INTRAVENOUS at 02:25

## 2018-11-17 RX ADMIN — FENOFIBRATE 48 MG: 48 TABLET, FILM COATED ORAL at 20:40

## 2018-11-17 RX ADMIN — HEPARIN SODIUM 5000 UNITS: 5000 INJECTION, SOLUTION INTRAVENOUS; SUBCUTANEOUS at 23:00

## 2018-11-17 RX ADMIN — HEPARIN SODIUM 5000 UNITS: 5000 INJECTION, SOLUTION INTRAVENOUS; SUBCUTANEOUS at 05:52

## 2018-11-17 RX ADMIN — MAGNESIUM SULFATE HEPTAHYDRATE 2 G: 40 INJECTION, SOLUTION INTRAVENOUS at 14:30

## 2018-11-17 RX ADMIN — SODIUM CHLORIDE 75 ML/HR: 0.9 INJECTION, SOLUTION INTRAVENOUS at 14:37

## 2018-11-17 RX ADMIN — ISOSORBIDE DINITRATE 30 MG: 10 TABLET ORAL at 20:40

## 2018-11-17 RX ADMIN — VANCOMYCIN HYDROCHLORIDE 1250 MG: 1 INJECTION, POWDER, LYOPHILIZED, FOR SOLUTION INTRAVENOUS at 00:25

## 2018-11-17 NOTE — ASSESSMENT & PLAN NOTE
PRADEEP creatinine 1 85 with baseline 1 0  -lactic acidosis and metabolic anion gap on admission likely related to cellulitis/sepsis being managed by primary team  -Cr 1 8 today, primary team managing

## 2018-11-17 NOTE — CONSULTS
NEPHROLOGY CONSULTATION NOTE    Patient: Sheri Roque               Sex: male          DOA: 11/16/2018 10:12 PM   YOB: 1950        Age:  79 y o         LOS:  LOS: 1 day     REFERRING PHYSICIAN: Lauren Alberto MD      REASON FOR THE REFERRAL / CONSULTATION:  Further management of PRADEEP    DATE OF CONSULTATION / SERVICE: 11/17/2018    ADMISSION DIAGNOSIS: Cellulitis     CHIEF COMPLAINT     I have infection on my left foot    HPI     This is a 42-year-old male with a past medical history of diabetes type 2, hypertension, came into the ER with swollen left 1st toe  During admission patient was also noticed to have elevated creatinine of 1 8 and Nephrology were consulted for further management of PRADEEP  Upon review of old medical record patient's previously known baseline creatinine is around 0 9-1 0  Patient said that he has noticed to have swollen left 1st toe 1 week back and has seen PCP will has given him Augmentin and clindamycin but patient has developed diarrhea and decided to come to ER for further evaluation  Currently patient is receiving vancomycin and cefepime  Patient also have underlying diabetes type 2 and his last known Hb A1c was 7 5 (9/25/2018) currently patient is taking insulin  Patient also have hypertension which seems to be under well control on current antihypertensive regimen  Patient was also earlier seen by vascular surgical team and I have personally reviewed their note with the recommendations  Currently patient denies nausea, vomiting, headache, dizziness, abdominal pain, constipation or rash  PAST MEDICAL HISTORY     Past Medical History:   Diagnosis Date    Diabetes mellitus ([facility] [address] )     Hypertension     Lyme disease     [facility]        PAST SURGICAL HISTORY     History reviewed  No pertinent surgical history      ALLERGIES     Allergies   Allergen Reactions    Ace Inhibitors Swelling    Angiotensin Receptor Blockers Other (See Comments)     Elevated K+    Plaquenil [Hydroxychloroquine] Swelling     Tongue swelling       SOCIAL HISTORY     History   Alcohol Use No     History   Drug Use No     History   Smoking Status    Former Smoker    Packs/day: 0 50    Years: 50 00    Types: Cigarettes   Smokeless Tobacco    Never Used       FAMILY HISTORY     History reviewed  No pertinent family history      CURRENT MEDICATIONS       Current Facility-Administered Medications:     ascorbic acid (VITAMIN C) tablet 250 mg, 250 mg, Oral, Daily, Clarisa Lau MD, 250 mg at 11/17/18 0905    aspirin chewable tablet 81 mg, 81 mg, Oral, Daily, Clarisa Lau MD, 81 mg at 11/17/18 0907    atenolol (TENORMIN) tablet 25 mg, 25 mg, Oral, BID, Clarisa Lau MD, 25 mg at 11/17/18 0464    atorvastatin (LIPITOR) tablet 40 mg, 40 mg, Oral, Daily With Lissett MD Lia    fenofibrate (TRICOR) tablet 48 mg, 48 mg, Oral, QPM, Clarisa Lau MD    ferrous sulfate tablet 325 mg, 325 mg, Oral, BID With Meals, Clarisa Lau MD, 325 mg at 24/42/78 5393    folic acid (FOLVITE) tablet 1 mg, 1 mg, Oral, Daily, Clarisa Lau MD    heparin (porcine) subcutaneous injection 5,000 Units, 5,000 Units, Subcutaneous, Q8H [address], 5,000 Units at 11/17/18 0552 **AND** Platelet count, , , Once, Clarisa Lau MD    insulin lispro (HumaLOG) 100 units/mL subcutaneous injection 1-5 Units, 1-5 Units, Subcutaneous, TID AC **AND** Fingerstick Glucose (POCT), , , TID AC, Clarisa Lau MD    insulin lispro (HumaLOG) 100 units/mL subcutaneous injection 1-5 Units, 1-5 Units, Subcutaneous, HS, Clarisa Lau MD    isosorbide dinitrate (ISORDIL) tablet 30 mg, 30 mg, Oral, QPM, Clarisa Lau MD    leflunomide (ARAVA) tablet 20 mg, 20 mg, Oral, Daily, Clarisa Lau MD    pantoprazole (PROTONIX) EC tablet 40 mg, 40 mg, Oral, Early Morning, Clarisa Lau MD, 40 mg at 11/17/18 0552    sodium chloride 0 9 % infusion, 75 mL/hr, Intravenous, Continuous, Yasmine Mulling Karel Cortez MD, Last Rate: 75 mL/hr at 11/17/18 0225, 75 mL/hr at 11/17/18 0225    tamsulosin [facility]) capsule 0 4 mg, 0 4 mg, Oral, Early Morning, Luisa Ramírez MD, 0 4 mg at 11/17/18 0552    umeclidinium-vilanterol (ANORO ELLIPTA) 62 5-25 MCG/INH inhaler 1 puff, 1 puff, Inhalation, Daily, Luisa Ramírez MD    vancomycin (VANCOCIN) IVPB (premix) 750 mg, 10 mg/kg, Intravenous, Q12H, Luisa Ramírez MD    REVIEW OF SYSTEMS     Complete 10 points of review of systems were obtained and discussed in length with patient today  Complete 10 points of review of systems were negative/unremarkable except mentioned in the HPI section  OBJECTIVE     Current Weight: Weight - Scale: 81 7 kg (180 lb 1 9 oz)  Vitals:    11/17/18 1100   BP: 98/50   Pulse: 95   Resp: 18   Temp: 98 3 °F (36 8 °C)   SpO2: 90%     Body mass index is 25 48 kg/m²  Intake/Output Summary (Last 24 hours) at 11/17/18 1358  Last data filed at 11/17/18 0807   Gross per 24 hour   Intake             1010 ml   Output              150 ml   Net              860 ml       PHYSICAL EXAMINATION     Physical Exam   Constitutional: He is oriented to person, place, and time  No distress  HENT:   Head: Atraumatic  Eyes: Conjunctivae are normal  No scleral icterus  Neck: Normal range of motion  Neck supple  No JVD present  No tracheal deviation present  No thyromegaly present  Cardiovascular: Normal rate  Pulmonary/Chest: No respiratory distress  He has no wheezes  Abdominal: Soft  He exhibits no distension  Lymphadenopathy:     He has no cervical adenopathy  Neurological: He is alert and oriented to person, place, and time  Skin: Skin is warm  No cyanosis  Psychiatric: He has a normal mood and affect   His behavior is normal          LAB RESULTS          Results from last 7 days  Lab Units 11/17/18  0517 11/17/18  0213 11/16/18  2254   WBC Thousand/uL 7 71  --  10 76*   HEMOGLOBIN g/dL 9 8*  --  10 4*   HEMATOCRIT % 31 3*  --  32 8*   PLATELETS Thousands/uL 84* 98* 106*   POTASSIUM mmol/L 4 0 3 5 3 7   CHLORIDE mmol/L 109* 106 107   CO2 mmol/L 17* 17* 17*   BUN mg/dL 21 22 22   CREATININE mg/dL 1 80* 1 95* 1 85*   EGFR ml/min/1 73sq m 38 35 37   CALCIUM mg/dL 7 1* 7 4* 8 0*   MAGNESIUM mg/dL 1 0*  --   --        I have personally reviewed the old medical records and patient's previously known baseline creatinine level is ~ 0 9-1 0    RADIOLOGY RESULTS       Results for orders placed during the hospital encounter of 11/16/18   XR chest 2 views    Narrative CHEST     INDICATION:   hypotension  Chemotherapy for lung cancer    COMPARISON:  CT thorax dated 10/12/2010    EXAM PERFORMED/VIEWS:  XR CHEST PA & LATERAL      FINDINGS:    Right-sided Mediport is seen which terminates in the distal SVC  No pneumothorax is seen  Several pulmonary nodules are seen in the right lower lung field, the largest measures approximately 8 mm in size  Additional pulmonary nodules are seen scattered in the bilateral lungs more prominent in the upper lung fields,   the largest measures approximately 1 4 cm on the left and 1 0 cm on the right  A linear opacity in the right middle lobe best appreciated on the lateral view could represent atelectasis versus airspace disease such as pneumonia  The lungs otherwise   appear grossly clear  Heart is not enlarged  The aorta is calcified and tortuous  Mediastinal contours otherwise appear unremarkable  Old appearing right-sided rib fractures noted superiorly  Old fracture of the midshaft of the right clavicle redemonstrated  Visualized bones otherwise appear intact  [place] screws of the right humeral head are incidentally noted and appear intact as   well      Impression Multiple pulmonary nodules as described, correlates with the patient's history of lung cancer  Right-sided Mediport in place      Linear opacity in the right middle lobe best appreciated on the lateral view could represent atelectasis versus airspace disease such as pneumonia depending on the clinical setting  Other findings as above  Workstation performed: [id number]       12 lead EKG done on 11/16/2018 showed sinus rhythm with ventricular rate 97/min  PLAN / RECOMMENDATIONS      1  PRADEEP  Present on admission, multifactorial and suspected due to intravascular volume depletion on top of ATN in the setting of foot infection  Upon review of old medical record patient's baseline creatinine level is around 0 9-1 0  Current creatinine is 1 8  Urine analysis done on admission showed specific gravity > 1 030 with hyaline and fine granular casts  Patient is not in volume overload state and will plan to continue IV fluid today  Plan to check urine lytes for further evaluation  Plan to recheck renal function with AM labs  Consider avoidance of IV contrast and NSAIDs  Plan to check serial bladder scan to rule out urinary retention  2  Hypertension  Essential, currently blood pressure is under well control and plan to monitor hypertension with atenolol 25 mg PO BID along with isosorbide 30 mg PO daily  3  Hypomagnesemia  Current magnesium is 1 0 which is below the goal   Plan to give magnesium sulfate 4 g IV today  Recheck magnesium level with AM labs  3  Anemia  Multifactorial with current hemoglobin of 9 8  No active signs of bleeding seen  Monitor hemoglobin level today and consider blood transfusion if hemoglobin level drops below 7  Thank you for the consultation to participate in patient's care  I have personally discussed my plan with the referring physician  Andrew Cristobal MD  Nephrology  11/17/2018        Portions of the record may have been created with voice recognition software  Occasional wrong word or "sound a like" substitutions may have occurred due to the inherent limitations of voice recognition software  Read the chart carefully and recognize, using context, where substitutions have occurred

## 2018-11-17 NOTE — SEPSIS NOTE
Sepsis Note   Sandie Izaguirre 79 y o  male MRN: [id number]  Unit/Bed#: ED 21 Encounter: [id number]            Initial Sepsis Screening     Row Name 11/16/18 1220                Is the patient's history suggestive of a new or worsening infection? (!)  Yes (Proceed)  -TC        Suspected source of infection wound infection  -TC        Are two or more of the following signs & symptoms of infection both present and new to the patient? No  -TC        Indicate SIRS criteria          If the answer is yes to both questions, suspicion of sepsis is present          If severe sepsis is present AND tissue hypoperfusion perists in the hour after fluid resuscitation or lactate > 4, the patient meets criteria for SEPTIC SHOCK          Are any of the following organ dysfunction criteria present within 6 hours of suspected infection and SIRS criteria that are NOT considered to be chronic conditions?         Organ dysfunction          Date of presentation of severe sepsis          Time of presentation of severe sepsis          Tissue hypoperfusion persists in the hour after crystalloid fluid administration, evidenced, by either:          Was hypotension present within one hour of the conclusion of crystalloid fluid administration?           Date of presentation of septic shock          Time of presentation of septic shock            User Key  (r) = Recorded By, (t) = Taken By, (c) = Cosigned By    [address] Name Provider Jamal Culver MD Physician

## 2018-11-17 NOTE — ASSESSMENT & PLAN NOTE
Continue with vancomycin  Carl area of erythema  Discussed with nursing   Consult podiatry  Arterial dopplers

## 2018-11-17 NOTE — ED PROVIDER NOTES
History  Chief Complaint   Patient presents with    Diarrhea     pt c/o diarrhea for the past three days after being on an antibiotic for a foot infection  Diarrhea and weakness x 1d  Started clindamycin 2d ago for L toe redness and presumed infection, on chemo for lung CA, last dose last week  No fever  Weakness generalized, worsened w diarrhea, no alleviating factors  No other symptoms; denies chest pain, cough, abd pain, rash, and chills  L toe redness unchanged since initiation of clinda  EMS reports pt with hypotension on scene, SBP in 80's, improved to 90's during transport after approx 500cc normal saline  Prior to Admission Medications   Prescriptions Last Dose Informant Patient Reported? Taking?    amLODIPine (NORVASC) 5 mg tablet  Self Yes No   Sig: Take 5 mg by mouth daily   ascorbic acid (VITAMIN C) 250 mg tablet  Self Yes No   Sig: Take 250 mg by mouth 2 (two) times a day   aspirin 81 mg chewable tablet  Self Yes No   Sig: Chew 81 mg daily   atenolol (TENORMIN) 50 mg tablet  Self Yes No   Sig: Take 50 mg by mouth 2 (two) times a day   fenofibrate (TRICOR) 48 mg tablet  Self Yes No   Sig: Take 45 mg by mouth every evening   ferrous sulfate 325 (65 Fe) mg tablet  Self Yes No   Sig: Take 325 mg by mouth 2 (two) times a day with meals   folic acid (FOLVITE) 1 mg tablet  Self Yes No   Sig: Take by mouth daily   insulin degludec (TRESIBA FLEXTOUCH) 100 units/mL injection pen  Self Yes No   Sig: Inject 46 Units under the skin daily in the early morning   isosorbide dinitrate (ISORDIL) 10 mg tablet  Self Yes No   Sig: Take 120 mg by mouth daily in the early morning   isosorbide dinitrate (ISORDIL) 30 mg tablet  Self Yes No   Sig: Take 30 mg by mouth every evening   leflunomide (ARAVA) 20 MG tablet  Self Yes No   Sig: Take 20 mg by mouth daily   metFORMIN (GLUCOPHAGE) 1000 MG tablet  Self Yes No   Sig: Take 1,000 mg by mouth 2 (two) times a day with meals   methotrexate 2 5 mg tablet  Self Yes No   Sig: Take 12 5 mg by mouth once a week   mupirocin (BACTROBAN) 2 % ointment   No No   Sig: Apply topically 3 (three) times a day   omeprazole (PriLOSEC) 40 MG capsule  Self Yes No   Sig: Take 40 mg by mouth 2 (two) times a day   rosuvastatin (CRESTOR) 40 MG tablet  Self Yes No   Sig: Take 45 mg by mouth every evening   sitaGLIPtin (JANUVIA) 50 mg tablet  Self Yes No   Sig: Take 50 mg by mouth daily   tamsulosin (FLOMAX) 0 4 mg  Self Yes No   Sig: Take 0 4 mg by mouth daily in the early morning   umeclidinium-vilanterol (ANORO ELLIPTA) 62 5-25 MCG/INH inhaler  Self Yes No   Sig: Inhale 1 puff daily      Facility-Administered Medications: None       Past Medical History:   Diagnosis Date    Diabetes mellitus ([facility] )     Hypertension     Lyme disease     Rheumatoid arthritis ([facility] )        History reviewed  No pertinent surgical history  History reviewed  No pertinent family history  I have reviewed and agree with the history as documented  Social History   Substance Use Topics    Smoking status: Former Smoker     Packs/day: 0 50     Years: 50 00     Types: Cigarettes    Smokeless tobacco: Never Used    Alcohol use No        Review of Systems   Constitutional: Positive for fatigue  Negative for fever  HENT: Negative  Eyes: Negative  Respiratory: Negative  Cardiovascular: Negative  Gastrointestinal: Negative  Genitourinary: Negative  Musculoskeletal: Negative  Skin: Positive for wound  Negative for color change and pallor  Allergic/Immunologic: Negative  Neurological: Negative  Hematological: Negative  Physical Exam  Physical Exam   Constitutional: He is oriented to person, place, and time  He appears well-developed and well-nourished  No distress  HENT:   Head: Normocephalic and atraumatic  Eyes: Pupils are equal, round, and reactive to light  EOM are normal    Neck: Normal range of motion  Neck supple  No JVD present     Cardiovascular: Normal rate, regular rhythm, normal heart sounds and intact distal pulses  Exam reveals no gallop and no friction rub  No murmur heard  Pulmonary/Chest: No stridor  Abdominal: Soft  He exhibits no distension and no mass  There is no tenderness  There is no rebound and no guarding  Musculoskeletal: Normal range of motion  He exhibits no edema, tenderness or deformity  Neurological: He is oriented to person, place, and time  No cranial nerve deficit or sensory deficit  He exhibits normal muscle tone  Coordination normal    Skin: Skin is warm and dry  Capillary refill takes less than 2 seconds  He is not diaphoretic  There is pallor  Nursing note and vitals reviewed        Vital Signs  ED Triage Vitals [11/16/18 2217]   Temperature Pulse Respirations Blood Pressure SpO2   97 9 °F (36 6 °C) 104 15 140/90 92 %      Temp Source Heart Rate Source Patient Position - Orthostatic VS BP Location FiO2 (%)   Oral Monitor Sitting Right arm --      Pain Score       No Pain           Vitals:    11/16/18 2345 11/17/18 0000 11/17/18 0015 11/17/18 0110   BP: 96/57 96/60 90/55 101/60   Pulse: 97 96 93 95   Patient Position - Orthostatic VS:    Lying       Visual Acuity      ED Medications  Medications   ascorbic acid (VITAMIN C) tablet 250 mg (not administered)   aspirin chewable tablet 81 mg (not administered)   fenofibrate (TRICOR) tablet 48 mg (not administered)   ferrous sulfate tablet 325 mg (not administered)   folic acid (FOLVITE) tablet 1 mg (not administered)   isosorbide dinitrate (ISORDIL) tablet 30 mg (not administered)   leflunomide (ARAVA) tablet 20 mg (not administered)   atorvastatin (LIPITOR) tablet 40 mg (not administered)   pantoprazole (PROTONIX) EC tablet 40 mg (not administered)   tamsulosin (FLOMAX) capsule 0 4 mg (not administered)   umeclidinium-vilanterol (ANORO ELLIPTA) 62 5-25 MCG/INH inhaler 1 puff (not administered)   heparin (porcine) subcutaneous injection 5,000 Units (not administered)   vancomycin (VANCOCIN) IVPB (premix) 750 mg (not administered)   insulin lispro (HumaLOG) 100 units/mL subcutaneous injection 1-5 Units (not administered)   insulin lispro (HumaLOG) 100 units/mL subcutaneous injection 1-5 Units (not administered)   sodium chloride 0 9 % infusion (not administered)   atenolol (TENORMIN) tablet 25 mg (not administered)   sodium chloride 0 9 % bolus 1,000 mL (0 mL Intravenous Stopped 11/17/18 0020)   cefepime (MAXIPIME) 2,000 mg in dextrose 5 % 50 mL IVPB (0 mg Intravenous Stopped 11/17/18 0020)   vancomycin (VANCOCIN) 1,250 mg in sodium chloride 0 9 % 250 mL IVPB (1,250 mg Intravenous New Bag 11/17/18 0025)       Diagnostic Studies  Results Reviewed     Procedure Component Value Units Date/Time    Procalcitonin [[id number]]     Lab Status:  No result Specimen:  Blood     Basic metabolic panel [[id number]]     Lab Status:  No result Specimen:  Blood     Lactic acid, plasma [[id number]]     Lab Status:  No result Specimen:  Blood     Lactic acid, plasma [[id number]]     Lab Status:  No result Specimen:  Blood     CBC and differential [[id number]]  (Abnormal) Collected:  11/16/18 2254    Lab Status:  Final result Specimen:  Blood from Arm, Left Updated:  11/16/18 2325     WBC 10 76 (H) Thousand/uL      RBC 3 41 (L) Million/uL      Hemoglobin 10 4 (L) g/dL      Hematocrit 32 8 (L) %      MCV 96 fL      MCH 30 5 pg      MCHC 31 7 g/dL      RDW 17 2 (H) %      MPV 11 4 fL      Platelets 271 (L) Thousands/uL      nRBC 0 /100 WBCs     Narrative: This is an appended report  These results have been appended to a previously verified report  Lactic acid, plasma [[id number]]  (Abnormal) Collected:  11/16/18 2254    Lab Status:  Final result Specimen:  Blood from Arm, Left Updated:  11/16/18 2324     LACTIC ACID 2 3 (HH) mmol/L     Narrative:         Result may be elevated if tourniquet was used during collection      Troponin I [[id number]]  (Normal) Collected:  11/16/18 2254    Lab Status:  Final result Specimen:  Blood from Arm, Left Updated:  11/16/18 2322     Troponin I <0 02 ng/mL     Comprehensive metabolic panel [[id number]]  (Abnormal) Collected:  11/16/18 2254    Lab Status:  Final result Specimen:  Blood from Arm, Left Updated:  11/16/18 2318     Sodium 141 mmol/L      Potassium 3 7 mmol/L      Chloride 107 mmol/L      CO2 17 (L) mmol/L      ANION GAP 17 (H) mmol/L      BUN 22 mg/dL      Creatinine 1 85 (H) mg/dL      Glucose 175 (H) mg/dL      Calcium 8 0 (L) mg/dL      AST 22 U/L      ALT 19 U/L      Alkaline Phosphatase 79 U/L      Total Protein 7 0 g/dL      Albumin 2 5 (L) g/dL      Total Bilirubin 0 20 mg/dL      eGFR 37 ml/min/1 73sq m     Narrative:         National Kid[employer] Disease Education Program recommendations are as follows:  GFR calculation is accurate only with a steady state creatinine  Chronic Kidney disease less than 60 ml/min/1 73 sq  meters  Kidney failure less than 15 ml/min/1 73 sq  meters  Protime-INR [[id number]]  (Abnormal) Collected:  11/16/18 2254    Lab Status:  Final result Specimen:  Blood from Arm, Left Updated:  11/16/18 2316     Protime 20 5 (H) seconds      INR 1 79 (H)    APTT [[id number]]  (Abnormal) Collected:  11/16/18 2254    Lab Status:  Final result Specimen:  Blood from Arm, Left Updated:  11/16/18 2316     PTT 41 (H) seconds     Blood culture #1 [[id number]] Collected:  11/16/18 2244    Lab Status: In process Specimen:  Blood from Arm, Left Updated:  11/16/18 2259    Blood culture #2 [[id number]] Collected:  11/16/18 2254    Lab Status: In process Specimen:  Blood from Arm, Left Updated:  11/16/18 2259    UA w Reflex to Microscopic w Reflex to Culture [[id number]]     Lab Status:  No result Specimen:  Urine from Urine, Clean Catch                  XR chest 2 views   Final Result by Minda Duenas DO (11/16 2313)      Multiple pulmonary nodules as described, correlates with the patient's history of lung cancer  Right-sided Mediport in place        Linear opacity in the right middle lobe best appreciated on the lateral view could represent atelectasis versus airspace disease such as pneumonia depending on the clinical setting  Other findings as above  Workstation performed: [id number]         XR toe great min 2 views LEFT   Final Result by Niki Zuniga DO (11/16 0806)   FINDINGS/IMPRESSION:      There is no acute fracture or dislocation  Bones appear intact  Soft tissue swelling of the 1st digit, consider edema versus cellulitis depending on the clinical setting            No acute osseous abnormality              Workstation performed: [id number]         VAS lower limb arterial duplex, complete bilateral    (Results Pending)              Procedures  ECG 12 Lead Documentation  Date/Time: 11/16/2018 11:08 PM  Performed by: Jamey Baker  Authorized by: Carleen HERNANDEZ     Indications / Diagnosis:  Hypotension  ECG reviewed by me, the ED Provider: yes    Patient location:  ED  Previous ECG:     Previous ECG:  Compared to current    Comparison ECG info:  Feb 28 1994    Similarity:  No change  Interpretation:     Interpretation: normal    Rate:     ECG rate:  97    ECG rate assessment: normal    Rhythm:     Rhythm: sinus rhythm    Ectopy:     Ectopy: none    QRS:     QRS axis:  Normal  ST segments:     ST segments:  Non-specific    CriticalCare Time  Performed by: Jamey Baker  Authorized by: Carleen HERNANDEZ     Critical care provider statement:     Critical care time (minutes):  35    Critical care time was exclusive of:  Separately billable procedures and treating other patients    Critical care was necessary to treat or prevent imminent or life-threatening deterioration of the following conditions:  Renal failure, sepsis and shock    Critical care was time spent personally by me on the following activities:  Blood draw for specimens, obtaining history from patient or surrogate, development of treatment plan with patient or surrogate, evaluation of patient's response to treatment, examination of patient, review of old charts, re-evaluation of patient's condition, ordering and review of radiographic studies, ordering and review of laboratory studies and ordering and performing treatments and interventions           Phone Contacts  ED Phone Contact    ED Course  ED Course as of Nov 17 0156 Fri Nov 16, 2018   2303 11:04 PM reexamined, subjectively feels well but now BP 90s over 50's  HR remains in 90s  Will empirically give abx over concern for possible sepsis  1394 11:56 PM reexamined, SBP 94, remains well appearing  Lactic 2 3  Appears appropriate for floor admission at this time given improvement in BP after fluids  Initial Sepsis Screening     Row Name 11/17/18 0008 11/16/18 8619             Is the patient's history suggestive of a new or worsening infection? (!)  Yes (Proceed)  -EP (!)  Yes (Proceed)  -TC       Suspected source of infection soft tissue  -EP wound infection  -TC       Are two or more of the following signs & symptoms of infection both present and new to the patient? No  -EP No  -TC       Indicate SIRS criteria           If the answer is yes to both questions, suspicion of sepsis is present  [de-identified]         If severe sepsis is present AND tissue hypoperfusion perists in the hour after fluid resuscitation or lactate > 4, the patient meets criteria for SEPTIC SHOCK           Are any of the following organ dysfunction criteria present within 6 hours of suspected infection and SIRS criteria that are NOT considered to be chronic conditions? (!)  Yes  -EP         Organ dysfunction Lactate > 2 0 mmol/L  -EP         Date of presentation of severe sepsis           Time of presentation of severe sepsis           Tissue hypoperfusion persists in the hour after crystalloid fluid administration, evidenced, by either:           Was hypotension present within one hour of the conclusion of crystalloid fluid administration?   [facility]       Date of presentation of septic shock           Time of presentation of septic shock             User Key  (r) = Recorded By, (t) = Taken By, (c) = Cosigned By    [address] Name Provider Rui Hardwick MD Physician    Neftali Naidu MD Physician                  [facility]  The patient presented with a condition in which there was a high probability of imminent or life-threatening deterioration, and critical care services (excluding separately billable procedures) totalled 30-74 minutes  Disposition  Final diagnoses:   Sepsis ([facility] 75 )   Cellulitis, toe   Hypotension   Lung cancer ([facility] 75 )   Acute kidney injury ([place] [address] )   Dehydration   Diarrhea     Time reflects when diagnosis was documented in both MDM as applicable and the Disposition within this note     Time User Action Codes Description Comment    11/17/2018 12:00 AM Kyle Sago Add [A41 9] Sepsis ([facility] 75 )     11/17/2018 12:01 AM Turner, Shekhar Add [L03 039] Cellulitis, toe     11/17/2018 12:01 AM Turner, Shekhar Add [I95 9] Hypotension     11/17/2018 12:01 AM Turner, Shekhar Add [C34 90] Lung cancer ([place] [address] )     11/17/2018 12:01 AM Turner, Laren Boaz Add [N17 9] Acute kidney injury ([place] [address] )     11/17/2018 12:01 AM Turner, Shekhar Add [E86 0] Dehydration     11/17/2018 12:01 AM Turner, Laren Boaz Add [R19 7] Diarrhea       ED Disposition     ED Disposition Condition Comment    Admit  Case was discussed with Dr Don Carnes and the patient's admission status was agreed to be Admission Status: inpatient status to the service of Dr Don Carnes           Follow-up Information    None         Current Discharge Medication List      CONTINUE these medications which have NOT CHANGED    Details   amLODIPine (NORVASC) 5 mg tablet Take 5 mg by mouth daily      ascorbic acid (VITAMIN C) 250 mg tablet Take 250 mg by mouth 2 (two) times a day      aspirin 81 mg chewable tablet Chew 81 mg daily      atenolol (TENORMIN) 50 mg tablet Take 50 mg by mouth 2 (two) times a day      fenofibrate (TRICOR) 48 mg tablet Take 45 mg by mouth every evening      ferrous sulfate 325 (65 Fe) mg tablet Take 325 mg by mouth 2 (two) times a day with meals      folic acid (FOLVITE) 1 mg tablet Take by mouth daily      insulin degludec (TRESIBA FLEXTOUCH) 100 units/mL injection pen Inject 46 Units under the skin daily in the early morning      !! isosorbide dinitrate (ISORDIL) 10 mg tablet Take 120 mg by mouth daily in the early morning      !! isosorbide dinitrate (ISORDIL) 30 mg tablet Take 30 mg by mouth every evening      leflunomide (ARAVA) 20 MG tablet Take 20 mg by mouth daily      metFORMIN (GLUCOPHAGE) 1000 MG tablet Take 1,000 mg by mouth 2 (two) times a day with meals      methotrexate 2 5 mg tablet Take 12 5 mg by mouth once a week      mupirocin (BACTROBAN) 2 % ointment Apply topically 3 (three) times a day  Qty: 22 g, Refills: 0    Associated Diagnoses: Ruptured sebaceous cyst      omeprazole (PriLOSEC) 40 MG capsule Take 40 mg by mouth 2 (two) times a day      rosuvastatin (CRESTOR) 40 MG tablet Take 45 mg by mouth every evening      sitaGLIPtin (JANUVIA) 50 mg tablet Take 50 mg by mouth daily      tamsulosin (FLOMAX) 0 4 mg Take 0 4 mg by mouth daily in the early morning      umeclidinium-vilanterol (ANORO ELLIPTA) 62 5-25 MCG/INH inhaler Inhale 1 puff daily       ! ! - Potential duplicate medications found  Please discuss with provider  No discharge procedures on file      ED Provider  Electronically Signed by           Bobbi Omer MD  11/17/18 6122

## 2018-11-17 NOTE — TREATMENT PLAN
51-year-old male with past medical history of lung cancer status post chemo 2 weeks ago, diabetes, hypertension, Chronic obstructive pulmonary disease presenting with swelling and erythema around his left big toe  Also has diarrhea  C diff ordered and pending  Lower extremity arterial Dopplers ordered and pending

## 2018-11-17 NOTE — CONSULTS
ConsultTabby Corona 1950, 79 y o  male MRN: [id number]    Unit/Bed#: -01 Encounter: [id number]    Primary Care Provider: Kendrick Su MD   Date and time admitted to hospital: 11/16/2018 10:12 PM      Inpatient consult to Vascular Surgery  Consult performed by: Crow Mustafa ordered by: Martin Rausch          * Cellulitis   Assessment & Plan    Left hallux cellulitis  -patient reports swelling, redness x3 days; he was seen at [facility] placed on doxycycline and clindamycin now with associated diarrhea  Patient currently undergoing chemotherapy for lung cancer, also history of rheumatoid arthritis, HTN, diabetes   -there was concern for ulceration due to diabetic foot ulcer, vascular disease process, therefore vascular surgery was consulted  -patient reports he had 1 coronary artery balloon angioplasty but otherwise has no other atherosclerotic disease/interventions  -he has been on aspirin and Lipitor    Recommendations:  -SLIM ordered LEAD for baseline  -this is unlikely arterial in nature as patient has positive palpable 2+ pulses to DP/PT/femoral bilaterally  He denies any atherosclerotic disease to his lower extremities  He denies any claudication symptoms or rest pain  He is at high risk for infection due to his chemotherapy and immunosuppressive medications; patient denies any wounds or trauma to the left hallux     -Will need to be followed by Podiatry     -Primary team managing his antibiotics with improvement to his lactic acidosis and WBC count 7 71 today  Patient denies any fever, chills, chest pain, shortness of breath   -continue aspirin and statin  -will assess lower extremity arterial duplex and if there is any significant arterial disease will continue to follow  Otherwise thank you for allowing us to participate in the care of Mr Kaela Valle        Diabetes mellitus ([facility] )   Assessment & Plan    No results found for: HGBA1C    Recent Labs      11/17/18 6482   POCGLU  109       Blood Sugar Average: Last 72 hrs:  (P) 109     -continue tight glycemic control to aid in wound healing  -management per primary team       PRADEEP (acute kidney injury) ([facility] )   Assessment & Plan    PRADEEP creatinine 1 85 with baseline 1 0  -lactic acidosis and metabolic anion gap on admission likely related to cellulitis/sepsis being managed by primary team  -Cr 1 8 today, primary team managing       Consult Note - Vascular Surgery     Consulting Service: SLIM    Chief Complaint: left hallux ulceration    HPI: Toña Lopez is a 79 y o  male who has past medical history of HTN, RA, DM, lung cancer currently undergoing chemotherapy, former smoker who quit less than 1 year ago, presented to [facility] 3 days ago for left toe swelling and erythema who was placed on doxycycline and clindamycin  He now presents to [facility] with continued ulceration to the left hallux, swelling, redness, drainage as well as diarrhea from his antibiotic therapy  Patient denies any fever, chills, chest pain, shortness of breath  He denies any pain to the leg and foot  He denies any atherosclerotic disease other than 1 event with a coronary angioplasty many years ago  He is at risk for atherosclerotic disease due to his smoking history and hypertension  He has been maintained on aspirin and Lipitor  Patient is motor and sensory intact, denies any neuropathy, denies any trauma to his foot  He denies any symptoms of claudication or rest pain        Review of Systems:  General: positive for  - fatigue  Cardiovascular: no chest pain or dyspnea on exertion  Respiratory: no cough, shortness of breath, or wheezing  Gastrointestinal: no abdominal pain, change in bowel habits, or black or bloody stools  Genitourinary ROS: no dysuria, trouble voiding, or hematuria  Musculoskeletal ROS: negative  Neurological ROS: no TIA or stroke symptoms  Hematological and Lymphatic ROS: negative  Dermatological ROS: positive for Left hallux red, swollen, drainage  Psychological ROS: negative  Ophthalmic ROS: negative  ENT ROS: negative    Past Medical History:  Past Medical History:   Diagnosis Date    Diabetes mellitus ([facility] )     Hypertension     Lyme disease     Rheumatoid arthritis ([facility] )        Past Surgical History:  History reviewed  No pertinent surgical history  Social History:  History   Alcohol Use No     History   Drug Use No     History   Smoking Status    Former Smoker    Packs/day: 0 50    Years: 50 00    Types: Cigarettes   Smokeless Tobacco    Never Used       Family History:  History reviewed  No pertinent family history  Allergies:   Allergies   Allergen Reactions    Ace Inhibitors Swelling    Angiotensin Receptor Blockers Other (See Comments)     Elevated K+    Plaquenil [Hydroxychloroquine] Swelling     Tongue swelling       Medications:  Current Facility-Administered Medications   Medication Dose Route Frequency    ascorbic acid (VITAMIN C) tablet 250 mg  250 mg Oral Daily    aspirin chewable tablet 81 mg  81 mg Oral Daily    atenolol (TENORMIN) tablet 25 mg  25 mg Oral BID    atorvastatin (LIPITOR) tablet 40 mg  40 mg Oral Daily With Dinner    fenofibrate (TRICOR) tablet 48 mg  48 mg Oral QPM    ferrous sulfate tablet 325 mg  325 mg Oral BID With Meals    folic acid (FOLVITE) tablet 1 mg  1 mg Oral Daily    heparin (porcine) subcutaneous injection 5,000 Units  5,000 Units Subcutaneous Q8H [address]    insulin lispro (HumaLOG) 100 units/mL subcutaneous injection 1-5 Units  1-5 Units Subcutaneous TID AC    insulin lispro (HumaLOG) 100 units/mL subcutaneous injection 1-5 Units  1-5 Units Subcutaneous HS    isosorbide dinitrate (ISORDIL) tablet 30 mg  30 mg Oral QPM    leflunomide (ARAVA) tablet 20 mg  20 mg Oral Daily    pantoprazole (PROTONIX) EC tablet 40 mg  40 mg Oral Early Morning    sodium chloride 0 9 % infusion  75 mL/hr Intravenous Continuous    tamsulosin (FLOMAX) capsule 0 4 mg  0 4 mg Oral Early Morning    umeclidinium-vilanterol (ANORO ELLIPTA) 62 5-25 MCG/INH inhaler 1 puff  1 puff Inhalation Daily    vancomycin (VANCOCIN) IVPB (premix) 750 mg  10 mg/kg Intravenous Q12H       Vitals:  Vitals:    11/17/18 0000 11/17/18 0015 11/17/18 0110 11/17/18 0700   BP: 96/60 90/55 101/60 100/54   BP Location:   Left arm Left arm   Pulse: 96 93 95 87   Resp: 18 18 18 18   Temp:   98 °F (36 7 °C) 98 °F (36 7 °C)   TempSrc:   Oral Oral   SpO2: 95% 96% 95% 90%   Weight:       Height:   5' 10 5" (1 791 m)        I/Os:  I/O last 3 completed shifts:   In: 1000 [I V :1000]  Out: -   I/O this shift:  In: 10 [I V :10]  Out: 150 [Urine:150]    Lab Results and Cultures:   CBC with diff:   Lab Results   Component Value Date    WBC 7 71 11/17/2018    HGB 9 8 (L) 11/17/2018    HCT 31 3 (L) 11/17/2018    MCV 97 11/17/2018    PLT 84 (L) 11/17/2018    MCH 30 4 11/17/2018    MCHC 31 3 (L) 11/17/2018    RDW 17 1 (H) 11/17/2018    MPV 11 0 11/17/2018    NRBC 0 11/16/2018   ,   BMP/CMP:  Lab Results   Component Value Date    K 4 0 11/17/2018     (H) 11/17/2018    CO2 17 (L) 11/17/2018    BUN 21 11/17/2018    CREATININE 1 80 (H) 11/17/2018    CALCIUM 7 1 (L) 11/17/2018    AST 19 11/17/2018    ALT 14 11/17/2018    ALKPHOS 64 11/17/2018    EGFR 38 11/17/2018   ,   Lipid Panel: No results found for: CHOL,   Coags:   Lab Results   Component Value Date    PTT 41 (H) 11/16/2018    INR 1 79 (H) 11/16/2018   ,     Blood Culture: No results found for: BLOODCX,   Urinalysis:   Lab Results   Component Value Date    COLORU Yellow 11/17/2018    CLARITYU Slightly Cloudy 11/17/2018    SPECGRAV >=1 030 11/17/2018    PHUR 5 5 11/17/2018    LEUKOCYTESUR Negative 11/17/2018    NITRITE Negative 11/17/2018    GLUCOSEU Negative 11/17/2018    KETONESU Trace (A) 11/17/2018    BILIRUBINUR Negative 11/17/2018    BLOODU Negative 11/17/2018   ,   Urine Culture: No results found for: URINECX,   Wound Culure:   Lab Results   Component Value Date    WOUNDCULT 1+ Growth of Staphylococcus aureus (A) 08/31/2018    WOUNDCULT 1+ Growth of  08/31/2018       Imaging:  LEADs pending    11/16/18 Xray Left Toe:  FINDINGS/IMPRESSION:     There is no acute fracture or dislocation  Bones appear intact      Soft tissue swelling of the 1st digit, consider edema versus cellulitis depending on the clinical setting    Physical Exam:    General appearance: alert and oriented, in no acute distress, cooperative, fatigued and no distress  Head: Normocephalic, without obvious abnormality, atraumatic  Eyes: PERRL, EOMIs  Neck: no adenopathy, no carotid bruit, no JVD, supple, symmetrical, trachea midline and thyroid not enlarged, symmetric, no tenderness/mass/nodules  Lungs: diminished breath sounds and rhonchi  Chest wall: no tenderness  Heart: regular rate and rhythm, S1, S2 normal, no murmur, click, rub or gallop  Abdomen: soft, non-tender; bowel sounds normal; no masses,  no organomegaly  Extremities: extremities normal, warm and well-perfused; no cyanosis, clubbing, or edema  Skin: Left hallux edema, erythema, drainage  He also has multiple scabs to the left lower extremity  He has positive palpable 2+ pulses to the left and right DP/PT/femoral   No edema  Motor and sensory intact    Neurologic: Grossly normal    Wound/Incision:  Left hallux with erythema, swelling, dressing intact    Pulse exam:  Radial: Right: 2+ Left[de-identified] 2+  Femoral: Right: 2+ Left: 2+  Popliteal: Right: 1+ Left: 1+  DP: Right: 2+ Left: 2+  PT: Right: 2+ Left: 2+        JEROME Cuevas  11/17/2018  [facility]  [phone]

## 2018-11-17 NOTE — H&P
H&P- Azeb Pinedo 1950, 79 y o  male MRN: [id number]    Unit/Bed#: -01 Encounter: [id number]    Primary Care Provider: Tavon Bradley MD   Date and time admitted to hospital: 11/16/2018 10:12 PM        High anion gap metabolic acidosis   Assessment & Plan    Lactic acid is 2 8  Placed order for repeat and q2h  Still pending  Also with PRADEEP  Will follow up on repeat BMP  PRADEEP (acute kidney injury) ([facility] )   Assessment & Plan    Creatinine is 1 85  Baseline appears to be 1 0  Likely 2/2 sepsis/cellulitis  Will continue to follow  IVF  * Cellulitis   Assessment & Plan    Continue with vancomycin  Carl area of erythema  Discussed with nursing   Consult podiatry  Arterial dopplers  VTE Prophylaxis: Heparin  / sequential compression device   Code Status: Full Code  POLST: There is no POLST form on file for this patient (pre-hospital)  Discussion with family: Not discussed with family  Discussed with family  He would like them updated in am      Anticipated Length of Stay:  Patient will be admitted on an Inpatient basis with an anticipated length of stay of  More than 2 midnights  Justification for Hospital Stay: cellulitis, failed outpatient treatment  Total Time for Visit, including Counseling / Coordination of Care: 45 minutes  Greater than 50% of this total time spent on direct patient counseling and coordination of care  Chief Complaint:     Red left big toe  History of Present Illness:    Azeb Pinedo is a 79 y o  male who presents with he read swollen bake left toe with weeping wounds  The patient 1st noticed swelling and erythema around his left big toe 3 days ago  At that time he went into the emergency department and [facility] and was given doxycycline and clindamycin  He did not notice any improvement with this and so he comes into the hospital today      Furthermore the patient notes diarrhea which started 2 days ago after he started taking the antibiotics provided in the ED  The patient specifically denies any fevers chills and at this point even denies any pain in his left lower extremity  He is a lifelong smoker who quit less than a year ago but is not aware of any peripheral vascular disease  He does have type 2 diabetes and is currently undergoing chemotherapy for lung cancer  Of note the patient is not neutropenic and not febrile at this time  He does present to the emergency department with an elevated lactate as well as an anion gap metabolic acidosis and acute kidney injury  His creatinine currently is 1 85 with a baseline around 1  Review of Systems:    Review of Systems   Constitutional: Positive for fatigue (since chemotherapy  )  Negative for activity change, appetite change, chills, diaphoresis, fever and unexpected weight change  HENT: Negative  Eyes: Negative  Respiratory: Negative  Gastrointestinal: Positive for diarrhea  Negative for abdominal distention, abdominal pain, anal bleeding, blood in stool, constipation, nausea, rectal pain and vomiting  Endocrine: Negative  Genitourinary: Negative  Negative for difficulty urinating, dysuria, enuresis, flank pain, frequency and genital sores  Musculoskeletal: Positive for joint swelling (left big toe  )  Negative for arthralgias, back pain, gait problem, myalgias, neck pain and neck stiffness  Skin: Positive for color change and wound  Negative for pallor and rash  Allergic/Immunologic: Negative  Neurological: Negative for dizziness, tremors, seizures, syncope, facial asymmetry, speech difficulty, weakness, light-headedness, numbness and headaches  Hematological: Negative for adenopathy  Does not bruise/bleed easily  Psychiatric/Behavioral: Negative for agitation, behavioral problems, confusion, decreased concentration, dysphoric mood, hallucinations, self-injury, sleep disturbance and suicidal ideas   The patient is not nervous/anxious and is not hyperactive  Past Medical and Surgical History:     Past Medical History:   Diagnosis Date    Diabetes mellitus ([facility] )     Hypertension     Lyme disease     Rheumatoid arthritis ([facility] )        History reviewed  No pertinent surgical history  Meds/Allergies:    Prior to Admission medications    Medication Sig Start Date End Date Taking?  Authorizing Provider   amLODIPine (NORVASC) 5 mg tablet Take 5 mg by mouth daily    Historical Provider, MD   ascorbic acid (VITAMIN C) 250 mg tablet Take 250 mg by mouth 2 (two) times a day    Historical Provider, MD   aspirin 81 mg chewable tablet Chew 81 mg daily    Historical Provider, MD   atenolol (TENORMIN) 50 mg tablet Take 50 mg by mouth 2 (two) times a day    Historical Provider, MD   fenofibrate (TRICOR) 48 mg tablet Take 45 mg by mouth every evening    Historical Provider, MD   ferrous sulfate 325 (65 Fe) mg tablet Take 325 mg by mouth 2 (two) times a day with meals    Historical Provider, MD   folic acid (FOLVITE) 1 mg tablet Take by mouth daily    Historical Provider, MD   insulin degludec (TRESIBA FLEXTOUCH) 100 units/mL injection pen Inject 46 Units under the skin daily in the early morning    Historical Provider, MD   isosorbide dinitrate (ISORDIL) 10 mg tablet Take 120 mg by mouth daily in the early morning    Historical Provider, MD   isosorbide dinitrate (ISORDIL) 30 mg tablet Take 30 mg by mouth every evening    Historical Provider, MD   leflunomide (ARAVA) 20 MG tablet Take 20 mg by mouth daily    Historical Provider, MD   metFORMIN (GLUCOPHAGE) 1000 MG tablet Take 1,000 mg by mouth 2 (two) times a day with meals    Historical Provider, MD   methotrexate 2 5 mg tablet Take 12 5 mg by mouth once a week    Historical Provider, MD   mupirocin (BACTROBAN) 2 % ointment Apply topically 3 (three) times a day 8/31/18   George Mann PA-C   omeprazole (PriLOSEC) 40 MG capsule Take 40 mg by mouth 2 (two) times a day    Historical Provider, MD   rosuvastatin (CRESTOR) 40 MG tablet Take 45 mg by mouth every evening    Historical Provider, MD   sitaGLIPtin (JANUVIA) 50 mg tablet Take 50 mg by mouth daily    Historical Provider, MD   tamsulosin (FLOMAX) 0 4 mg Take 0 4 mg by mouth daily in the early morning    Historical Provider, MD   umeclidinium-vilanterol (ANORO ELLIPTA) 62 5-25 MCG/INH inhaler Inhale 1 puff daily    Historical Provider, MD     I have reviewed home medications with patient personally  Allergies: Allergies   Allergen Reactions    Ace Inhibitors Swelling    Angiotensin Receptor Blockers Other (See Comments)     Elevated K+    Plaquenil [Hydroxychloroquine] Swelling     Tongue swelling       Social History:     Marital Status: /Civil Union   Occupation: retired  Patient Pre-hospital Living Situation: lives with wife and daughter  Patient Pre-hospital Level of Mobility: fully mobile  Patient Pre-hospital Diet Restrictions: none  Substance Use History:   History   Alcohol Use No     History   Smoking Status    Former Smoker    Packs/day: 0 50    Years: 50 00    Types: Cigarettes   Smokeless Tobacco    Never Used     History   Drug Use No       Family History:    History reviewed  No pertinent family history  Physical Exam:     Vitals:   Blood Pressure: 101/60 (11/17/18 0110)  Pulse: 95 (11/17/18 0110)  Temperature: 98 °F (36 7 °C) (11/17/18 0110)  Temp Source: Oral (11/17/18 0110)  Respirations: 18 (11/17/18 0110)  Height: 5' 10 5" (179 1 cm) (11/17/18 0110)  Weight - Scale: 81 7 kg (180 lb 1 9 oz) (11/16/18 2217)  SpO2: 95 % (11/17/18 0110)    Physical Exam   Constitutional: He is oriented to person, place, and time  No distress  Elderly frail  HENT:   Mouth/Throat: No oropharyngeal exudate  Eyes: Pupils are equal, round, and reactive to light  Right eye exhibits no discharge  Left eye exhibits no discharge  No scleral icterus  Neck: Normal range of motion  No JVD present   No tracheal deviation present  No thyromegaly present  Cardiovascular: Normal rate  Exam reveals no gallop and no friction rub  No murmur heard  Pulmonary/Chest: No respiratory distress  He has no wheezes  He has no rales  He exhibits no tenderness  Abdominal: Soft  He exhibits no distension and no mass  There is no tenderness  There is no rebound and no guarding  Musculoskeletal: He exhibits no edema, tenderness or deformity  Erythema of left big toe  DP and TP pulse present and palpable in both lower extremities  Lymphadenopathy:     He has no cervical adenopathy  Neurological: He is alert and oriented to person, place, and time  No cranial nerve deficit  Coordination normal    Skin: Skin is warm  No rash noted  He is not diaphoretic  No erythema  No pallor  Psychiatric: He has a normal mood and affect  Additional Data:     Lab Results: I have personally reviewed pertinent reports  Results from last 7 days  Lab Units 11/16/18  2254   WBC Thousand/uL 10 76*   HEMOGLOBIN g/dL 10 4*   HEMATOCRIT % 32 8*   PLATELETS Thousands/uL 106*   BANDS PCT % 9*   LYMPHO PCT % 3*   MONO PCT % 8   EOS PCT % 0       Results from last 7 days  Lab Units 11/16/18  2254   SODIUM mmol/L 141   POTASSIUM mmol/L 3 7   CHLORIDE mmol/L 107   CO2 mmol/L 17*   BUN mg/dL 22   CREATININE mg/dL 1 85*   ANION GAP mmol/L 17*   CALCIUM mg/dL 8 0*   ALBUMIN g/dL 2 5*   TOTAL BILIRUBIN mg/dL 0 20   ALK PHOS U/L 79   ALT U/L 19   AST U/L 22   GLUCOSE RANDOM mg/dL 175*       Results from last 7 days  Lab Units 11/16/18  2254   INR  1 79*               Results from last 7 days  Lab Units 11/16/18  2254   LACTIC ACID mmol/L 2 3*       Imaging: I have personally reviewed pertinent reports  XR chest 2 views   Final Result by Boby Milligan DO (11/16 9095)      Multiple pulmonary nodules as described, correlates with the patient's history of lung cancer  Right-sided Mediport in place        Linear opacity in the right middle lobe best appreciated on the lateral view could represent atelectasis versus airspace disease such as pneumonia depending on the clinical setting  Other findings as above  Workstation performed: [id number]         XR toe great min 2 views LEFT   Final Result by Deanna Ozuna DO (11/16 5753)   FINDINGS/IMPRESSION:      There is no acute fracture or dislocation  Bones appear intact  Soft tissue swelling of the 1st digit, consider edema versus cellulitis depending on the clinical setting            No acute osseous abnormality  Workstation performed: [id number]         VAS lower limb arterial duplex, complete bilateral    (Results Pending)       EKG, Pathology, and Other Studies Reviewed on Admission:   · EKG: sinus rhythm  No acute ischemia  Allscripts / Epic Records Reviewed: Yes     ** Please Note: This note has been constructed using a voice recognition system   **

## 2018-11-17 NOTE — ASSESSMENT & PLAN NOTE
Lactic acid is 2 8  Placed order for repeat and q2h  Still pending  Also with PRADEEP  Will follow up on repeat BMP

## 2018-11-17 NOTE — ASSESSMENT & PLAN NOTE
Creatinine is 1 85  Baseline appears to be 1 0  Likely 2/2 sepsis/cellulitis  Will continue to follow  IVF

## 2018-11-17 NOTE — TELEPHONE ENCOUNTER
[facility] ([facility]  CONFIDENTIALTY NOTICE: This fax transmission is intended only for the addressee  It contains information that is legally privileged,  confidential or otherwise protected from use or disclosure  If you are not the intended recipient, you are strictly prohibited from reviewing,  disclosing, copying using or disseminating any of this information or taking any action in reliance on or regarding this information  If you have  received this fax in error, please notify us immediately by telephone so that we can arrange for its return to us  Page: 1  2  Call Id: [id number]  Health Call  Standard Call Report  Health Call  [facility]  [facility]  Gender: Male  : (approximated)  Age:  Return Phone  Number: [phone] (Home)  Address:  City/State/Zip:  Practice Name: [address] [place] [address] St:  Physician:  [facility] Name:  Relationship To  Patient:  Return Phone Number: Unavailable  Presenting Problem: ROUTINE / Mirian Dark /  Sherre Goon  Service Type: Consults  Charged Service 1: [address] [facility] [address] Name and  Number:  Nurse Assessment  Nurse: Farhat Malave Date/Time: 2018 10:30:13  Type of assessment required:  ---Consult  DateTime called Ayaz Escudero Name  ---[phone] @ [phone] / Parvez Greene of appointment:  ---Routine  Facility/Unit/Room Number/Unit Phone Number  ---[facility] / [facility] / 246 / [phone]  Practice consulted:  ---[facility]  Requesting physician:  ---Dr Elinor Vergara  Patient's name//Medical Record Number  ---[place] / [id number] [phone] /[id number]  Admitting diagnosis/Reason for consult  ---Cellulitis / Peripheral Artery Disease  Physician Notified/DateTime:  ---Dr Verito Rebollar / 2018 @ [phone]  [facility]  CONFIDENTIALTY NOTICE: This fax transmission is intended only for the addressee   It contains information that is legally privileged,  confidential or otherwise protected from use or disclosure  If you are not the intended recipient, you are strictly prohibited from reviewing,  disclosing, copying using or disseminating any of this information or taking any action in reliance on or regarding this information  If you have  received this fax in error, please notify us immediately by telephone so that we can arrange for its return to us  Page: 2 of 2  Call Id: [id number]  Protocols  Protocol Title Nurse Date/Time  Disp   Time Disposition Final User  11/17/2018 10:37:14 Close Yes Jose Nevarez

## 2018-11-17 NOTE — ASSESSMENT & PLAN NOTE
Left hallux cellulitis  -patient reports swelling, redness x3 days; he was seen at [facility] placed on doxycycline and clindamycin now with associated diarrhea  Patient currently undergoing chemotherapy for lung cancer, also history of rheumatoid arthritis, HTN, diabetes   -there was concern for ulceration due to diabetic foot ulcer, vascular disease process, therefore vascular surgery was consulted  -patient reports he had 1 coronary artery balloon angioplasty but otherwise has no other atherosclerotic disease/interventions  -he has been on aspirin and Lipitor    Recommendations:  -SLIM ordered LEAD for baseline  -this is unlikely arterial in nature as patient has positive palpable 2+ pulses to DP/PT/femoral bilaterally  He denies any atherosclerotic disease to his lower extremities  He denies any claudication symptoms or rest pain  He is at high risk for infection due to his chemotherapy and immunosuppressive medications; patient denies any wounds or trauma to the left hallux     -Will need to be followed by Podiatry     -Primary team managing his antibiotics with improvement to his lactic acidosis and WBC count 7 71 today  Patient denies any fever, chills, chest pain, shortness of breath   -continue aspirin and statin  -will assess lower extremity arterial duplex and if there is any significant arterial disease will continue to follow  Otherwise thank you for allowing us to participate in the care of Mr Luan Leggett

## 2018-11-17 NOTE — PLAN OF CARE
DISCHARGE PLANNING     Discharge to home or other facility with appropriate resources Progressing        INFECTION - ADULT     Absence or prevention of progression during hospitalization Progressing     Absence of fever/infection during neutropenic period Progressing        METABOLIC, FLUID AND ELECTROLYTES - ADULT     Electrolytes maintained within normal limits Progressing     Fluid balance maintained Progressing     Glucose maintained within target range Progressing        PAIN - ADULT     Verbalizes/displays adequate comfort level or baseline comfort level Progressing        Potential for Falls     Patient will remain free of falls Progressing        SAFETY ADULT     Maintain or return to baseline ADL function Progressing     Maintain or return mobility status to optimal level Progressing        SKIN/TISSUE INTEGRITY - ADULT     Skin integrity remains intact Progressing     Incision(s), wounds(s) or drain site(s) healing without S/S of infection Progressing     Oral mucous membranes remain intact Progressing

## 2018-11-17 NOTE — ASSESSMENT & PLAN NOTE
No results found for: HGBA1C    Recent Labs      11/17/18   0623   POCGLU  109       Blood Sugar Average: Last 72 hrs:  (P) 109     -continue tight glycemic control to aid in wound healing  -management per primary team

## 2018-11-18 ENCOUNTER — APPOINTMENT (INPATIENT)
Dept: MRI IMAGING | Facility: HOSPITAL | Age: 68
DRG: 617 | End: 2018-11-18
Payer: MEDICARE

## 2018-11-18 LAB
ANION GAP SERPL CALCULATED.3IONS-SCNC: 14 MMOL/L (ref 4–13)
ANISOCYTOSIS BLD QL SMEAR: PRESENT
ATRIAL RATE: 97 BPM
BASOPHILS # BLD MANUAL: 0.07 THOUSAND/UL (ref 0–0.1)
BASOPHILS NFR MAR MANUAL: 1 % (ref 0–1)
BUN SERPL-MCNC: 17 MG/DL (ref 5–25)
C DIFF TOX GENS STL QL NAA+PROBE: NORMAL
CALCIUM SERPL-MCNC: 7.1 MG/DL (ref 8.3–10.1)
CHLORIDE SERPL-SCNC: 110 MMOL/L (ref 100–108)
CK SERPL-CCNC: 62 U/L (ref 39–308)
CO2 SERPL-SCNC: 18 MMOL/L (ref 21–32)
CREAT SERPL-MCNC: 1.04 MG/DL (ref 0.6–1.3)
EOSINOPHIL # BLD MANUAL: 0.07 THOUSAND/UL (ref 0–0.4)
EOSINOPHIL NFR BLD MANUAL: 1 % (ref 0–6)
ERYTHROCYTE [DISTWIDTH] IN BLOOD BY AUTOMATED COUNT: 17.2 % (ref 11.6–15.1)
GFR SERPL CREATININE-BSD FRML MDRD: 74 ML/MIN/1.73SQ M
GLUCOSE SERPL-MCNC: 158 MG/DL (ref 65–140)
GLUCOSE SERPL-MCNC: 174 MG/DL (ref 65–140)
GLUCOSE SERPL-MCNC: 180 MG/DL (ref 65–140)
GLUCOSE SERPL-MCNC: 88 MG/DL (ref 65–140)
GLUCOSE SERPL-MCNC: 97 MG/DL (ref 65–140)
HCT VFR BLD AUTO: 27.9 % (ref 36.5–49.3)
HGB BLD-MCNC: 9.1 G/DL (ref 12–17)
LYMPHOCYTES # BLD AUTO: 0.66 THOUSAND/UL (ref 0.6–4.47)
LYMPHOCYTES # BLD AUTO: 10 % (ref 14–44)
MAGNESIUM SERPL-MCNC: 1.7 MG/DL (ref 1.6–2.6)
MCH RBC QN AUTO: 30.6 PG (ref 26.8–34.3)
MCHC RBC AUTO-ENTMCNC: 32.6 G/DL (ref 31.4–37.4)
MCV RBC AUTO: 94 FL (ref 82–98)
MONOCYTES # BLD AUTO: 0.39 THOUSAND/UL (ref 0–1.22)
MONOCYTES NFR BLD: 6 % (ref 4–12)
NEUTROPHILS # BLD MANUAL: 5.4 THOUSAND/UL (ref 1.85–7.62)
NEUTS BAND NFR BLD MANUAL: 4 % (ref 0–8)
NEUTS SEG NFR BLD AUTO: 78 % (ref 43–75)
NRBC BLD AUTO-RTO: 0 /100 WBCS
NT-PROBNP SERPL-MCNC: 482 PG/ML
P AXIS: 53 DEGREES
PLATELET # BLD AUTO: 53 THOUSANDS/UL (ref 149–390)
PLATELET BLD QL SMEAR: ABNORMAL
PMV BLD AUTO: 10.6 FL (ref 8.9–12.7)
POTASSIUM SERPL-SCNC: 2.7 MMOL/L (ref 3.5–5.3)
PR INTERVAL: 164 MS
QRS AXIS: 54 DEGREES
QRSD INTERVAL: 92 MS
QT INTERVAL: 346 MS
QTC INTERVAL: 439 MS
RBC # BLD AUTO: 2.97 MILLION/UL (ref 3.88–5.62)
SODIUM SERPL-SCNC: 142 MMOL/L (ref 136–145)
T WAVE AXIS: 7 DEGREES
TOTAL CELLS COUNTED SPEC: 100
VANCOMYCIN TROUGH SERPL-MCNC: 13 UG/ML (ref 10–20)
VENTRICULAR RATE: 97 BPM
WBC # BLD AUTO: 6.58 THOUSAND/UL (ref 4.31–10.16)

## 2018-11-18 PROCEDURE — 0HTRXZZ RESECTION OF TOE NAIL, EXTERNAL APPROACH: ICD-10-PCS | Performed by: PODIATRIST

## 2018-11-18 PROCEDURE — 80202 ASSAY OF VANCOMYCIN: CPT | Performed by: INTERNAL MEDICINE

## 2018-11-18 PROCEDURE — 73718 MRI LOWER EXTREMITY W/O DYE: CPT

## 2018-11-18 PROCEDURE — 85007 BL SMEAR W/DIFF WBC COUNT: CPT | Performed by: INTERNAL MEDICINE

## 2018-11-18 PROCEDURE — 93010 ELECTROCARDIOGRAM REPORT: CPT | Performed by: INTERNAL MEDICINE

## 2018-11-18 PROCEDURE — 83880 ASSAY OF NATRIURETIC PEPTIDE: CPT | Performed by: INTERNAL MEDICINE

## 2018-11-18 PROCEDURE — 82550 ASSAY OF CK (CPK): CPT | Performed by: INTERNAL MEDICINE

## 2018-11-18 PROCEDURE — 82948 REAGENT STRIP/BLOOD GLUCOSE: CPT

## 2018-11-18 PROCEDURE — 0HBNXZZ EXCISION OF LEFT FOOT SKIN, EXTERNAL APPROACH: ICD-10-PCS | Performed by: PODIATRIST

## 2018-11-18 PROCEDURE — 99232 SBSQ HOSP IP/OBS MODERATE 35: CPT | Performed by: INTERNAL MEDICINE

## 2018-11-18 PROCEDURE — 85027 COMPLETE CBC AUTOMATED: CPT | Performed by: INTERNAL MEDICINE

## 2018-11-18 PROCEDURE — 93923 UPR/LXTR ART STDY 3+ LVLS: CPT | Performed by: SURGERY

## 2018-11-18 PROCEDURE — 80048 BASIC METABOLIC PNL TOTAL CA: CPT | Performed by: INTERNAL MEDICINE

## 2018-11-18 PROCEDURE — 83735 ASSAY OF MAGNESIUM: CPT | Performed by: INTERNAL MEDICINE

## 2018-11-18 RX ORDER — POTASSIUM CHLORIDE 14.9 MG/ML
20 INJECTION INTRAVENOUS ONCE
Status: COMPLETED | OUTPATIENT
Start: 2018-11-18 | End: 2018-11-18

## 2018-11-18 RX ORDER — METRONIDAZOLE 500 MG/1
500 TABLET ORAL EVERY 8 HOURS SCHEDULED
Status: DISCONTINUED | OUTPATIENT
Start: 2018-11-18 | End: 2018-11-19

## 2018-11-18 RX ORDER — POTASSIUM CHLORIDE 20 MEQ/1
20 TABLET, EXTENDED RELEASE ORAL ONCE
Status: COMPLETED | OUTPATIENT
Start: 2018-11-18 | End: 2018-11-18

## 2018-11-18 RX ORDER — POTASSIUM CHLORIDE 20 MEQ/1
40 TABLET, EXTENDED RELEASE ORAL ONCE
Status: COMPLETED | OUTPATIENT
Start: 2018-11-18 | End: 2018-11-18

## 2018-11-18 RX ADMIN — INSULIN LISPRO 1 UNITS: 100 INJECTION, SOLUTION INTRAVENOUS; SUBCUTANEOUS at 13:06

## 2018-11-18 RX ADMIN — FOLIC ACID 1 MG: 1 TABLET ORAL at 08:15

## 2018-11-18 RX ADMIN — FERROUS SULFATE TAB 325 MG (65 MG ELEMENTAL FE) 325 MG: 325 (65 FE) TAB at 17:58

## 2018-11-18 RX ADMIN — HEPARIN SODIUM 5000 UNITS: 5000 INJECTION, SOLUTION INTRAVENOUS; SUBCUTANEOUS at 05:50

## 2018-11-18 RX ADMIN — INSULIN LISPRO 1 UNITS: 100 INJECTION, SOLUTION INTRAVENOUS; SUBCUTANEOUS at 22:54

## 2018-11-18 RX ADMIN — OXYCODONE HYDROCHLORIDE AND ACETAMINOPHEN 250 MG: 500 TABLET ORAL at 08:15

## 2018-11-18 RX ADMIN — POTASSIUM CHLORIDE 20 MEQ: 200 INJECTION, SOLUTION INTRAVENOUS at 06:43

## 2018-11-18 RX ADMIN — ISOSORBIDE DINITRATE 30 MG: 10 TABLET ORAL at 20:12

## 2018-11-18 RX ADMIN — ATORVASTATIN CALCIUM 40 MG: 40 TABLET, FILM COATED ORAL at 17:58

## 2018-11-18 RX ADMIN — ATENOLOL 25 MG: 25 TABLET ORAL at 17:58

## 2018-11-18 RX ADMIN — FENOFIBRATE 48 MG: 48 TABLET, FILM COATED ORAL at 17:58

## 2018-11-18 RX ADMIN — METRONIDAZOLE 500 MG: 500 TABLET ORAL at 22:53

## 2018-11-18 RX ADMIN — TAMSULOSIN HYDROCHLORIDE 0.4 MG: 0.4 CAPSULE ORAL at 05:50

## 2018-11-18 RX ADMIN — METRONIDAZOLE 500 MG: 500 TABLET ORAL at 11:09

## 2018-11-18 RX ADMIN — VANCOMYCIN HYDROCHLORIDE 750 MG: 750 INJECTION, SOLUTION INTRAVENOUS at 23:25

## 2018-11-18 RX ADMIN — VANCOMYCIN HYDROCHLORIDE 750 MG: 750 INJECTION, SOLUTION INTRAVENOUS at 12:08

## 2018-11-18 RX ADMIN — PANTOPRAZOLE SODIUM 40 MG: 40 TABLET, DELAYED RELEASE ORAL at 05:50

## 2018-11-18 RX ADMIN — FERROUS SULFATE TAB 325 MG (65 MG ELEMENTAL FE) 325 MG: 325 (65 FE) TAB at 08:15

## 2018-11-18 RX ADMIN — ATENOLOL 25 MG: 25 TABLET ORAL at 08:16

## 2018-11-18 RX ADMIN — ASPIRIN 81 MG 81 MG: 81 TABLET ORAL at 08:16

## 2018-11-18 RX ADMIN — POTASSIUM CHLORIDE 40 MEQ: 1500 TABLET, EXTENDED RELEASE ORAL at 12:08

## 2018-11-18 RX ADMIN — POTASSIUM CHLORIDE 20 MEQ: 1500 TABLET, EXTENDED RELEASE ORAL at 06:42

## 2018-11-18 RX ADMIN — INSULIN LISPRO 1 UNITS: 100 INJECTION, SOLUTION INTRAVENOUS; SUBCUTANEOUS at 18:00

## 2018-11-18 RX ADMIN — SODIUM CHLORIDE 75 ML/HR: 0.9 INJECTION, SOLUTION INTRAVENOUS at 05:49

## 2018-11-18 NOTE — CONSULTS
Consultation - [address] [place] [address] 79 y o  male MRN: [id number]  Unit/Bed#: -01 Encounter: [id number]    Assessment/Plan     Assessment:  1) Left foot abscess of great toe  2) Left foot wound great toe  3) Cellulitis of the left great toe    Plan:  1) IV antibiotics as per Medicine and ID  2) The left great toenail is removed, patient tolerated well all VS stable and NVS intact to pre-exam status  3) The left great toe wound is debrided, patient tolerated well all VS stable and NVS intact to pre-exam status  4) Reviewed arterial duplex- good healing potential  5) Ordered MRI to rule out osteomyelitis  6) Dressed with purachol Ag and DSD  7) Will follow     History of Present Illness   HPI:  Martha Ospina is a 79 y o  male who presents with a wound located at left great toe  Patient states that he has been on chemotherapy, developed this cellulitic toe and went to [facility], they gave him antibiotics that he then failed as an outpatient so he came here for admission  Consults    Review of Systems   No f/c/n/v/s  No GERD  No dysuria  No hallucination  No SOB  No CP  No anemia  No weakness  No headache  No post nasal drip  No earache  No diplopia  No sore throat    Historical Information   Past Medical History:   Diagnosis Date    Diabetes mellitus ([facility] )     Hypertension     Lyme disease     Rheumatoid arthritis ([facility] )      History reviewed  No pertinent surgical history  Social History   History   Alcohol Use No     History   Drug Use No     History   Smoking Status    Former Smoker    Packs/day: 0 50    Years: 50 00    Types: Cigarettes   Smokeless Tobacco    Never Used     Family History: History reviewed  No pertinent family history      Meds/Allergies   all current active meds have been reviewed, current meds:   Current Facility-Administered Medications   Medication Dose Route Frequency    ascorbic acid (VITAMIN C) tablet 250 mg  250 mg Oral Daily    aspirin chewable tablet 81 mg  81 mg Oral Daily    atenolol (TENORMIN) tablet 25 mg  25 mg Oral BID    atorvastatin (LIPITOR) tablet 40 mg  40 mg Oral Daily With Dinner    fenofibrate (TRICOR) tablet 48 mg  48 mg Oral QPM    ferrous sulfate tablet 325 mg  325 mg Oral BID With Meals    folic acid (FOLVITE) tablet 1 mg  1 mg Oral Daily    heparin (porcine) subcutaneous injection 5,000 Units  5,000 Units Subcutaneous Q8H [address]    insulin lispro (HumaLOG) 100 units/mL subcutaneous injection 1-5 Units  1-5 Units Subcutaneous TID AC    insulin lispro (HumaLOG) 100 units/mL subcutaneous injection 1-5 Units  1-5 Units Subcutaneous HS    isosorbide dinitrate (ISORDIL) tablet 30 mg  30 mg Oral QPM    leflunomide (ARAVA) tablet 20 mg  20 mg Oral Daily    metroNIDAZOLE (FLAGYL) tablet 500 mg  500 mg Oral Q8H [address]    pantoprazole (PROTONIX) EC tablet 40 mg  40 mg Oral Early Morning    potassium chloride (K-DUR,KLOR-CON) CR tablet 40 mEq  40 mEq Oral Once    tamsulosin (FLOMAX) capsule 0 4 mg  0 4 mg Oral Early Morning    umeclidinium-vilanterol (ANORO ELLIPTA) 62 5-25 MCG/INH inhaler 1 puff  1 puff Inhalation Daily    vancomycin (VANCOCIN) IVPB (premix) 750 mg  10 mg/kg Intravenous Q12H    and PTA meds:   Prior to Admission Medications   Prescriptions Last Dose Informant Patient Reported? Taking?    amLODIPine (NORVASC) 5 mg tablet  Self Yes No   Sig: Take 5 mg by mouth daily   ascorbic acid (VITAMIN C) 250 mg tablet  Self Yes No   Sig: Take 250 mg by mouth 2 (two) times a day   aspirin 81 mg chewable tablet  Self Yes No   Sig: Chew 81 mg daily   atenolol (TENORMIN) 50 mg tablet  Self Yes No   Sig: Take 50 mg by mouth 2 (two) times a day   fenofibrate (TRICOR) 48 mg tablet  Self Yes No   Sig: Take 45 mg by mouth every evening   ferrous sulfate 325 (65 Fe) mg tablet  Self Yes No   Sig: Take 325 mg by mouth 2 (two) times a day with meals   folic acid (FOLVITE) 1 mg tablet  Self Yes No   Sig: Take by mouth daily   insulin degludec (TRESIBA FLEXTOUCH) 100 units/mL injection pen  Self Yes No   Sig: Inject 46 Units under the skin daily in the early morning   isosorbide dinitrate (ISORDIL) 10 mg tablet  Self Yes No   Sig: Take 120 mg by mouth daily in the early morning   isosorbide dinitrate (ISORDIL) 30 mg tablet  Self Yes No   Sig: Take 30 mg by mouth every evening   leflunomide (ARAVA) 20 MG tablet  Self Yes No   Sig: Take 20 mg by mouth daily   metFORMIN (GLUCOPHAGE) 1000 MG tablet  Self Yes No   Sig: Take 1,000 mg by mouth 2 (two) times a day with meals   methotrexate 2 5 mg tablet Not Taking at Unknown time Self Yes No   Sig: Take 12 5 mg by mouth once a week   mupirocin (BACTROBAN) 2 % ointment   No No   Sig: Apply topically 3 (three) times a day   omeprazole (PriLOSEC) 40 MG capsule  Self Yes No   Sig: Take 40 mg by mouth 2 (two) times a day   rosuvastatin (CRESTOR) 40 MG tablet  Self Yes No   Sig: Take 45 mg by mouth every evening   sitaGLIPtin (JANUVIA) 50 mg tablet  Self Yes No   Sig: Take 50 mg by mouth daily   tamsulosin (FLOMAX) 0 4 mg  Self Yes No   Sig: Take 0 4 mg by mouth daily in the early morning   umeclidinium-vilanterol (ANORO ELLIPTA) 62 5-25 MCG/INH inhaler  Self Yes No   Sig: Inhale 1 puff daily      Facility-Administered Medications: None     Allergies   Allergen Reactions    Ace Inhibitors Swelling    Angiotensin Receptor Blockers Other (See Comments)     Elevated K+    Plaquenil [Hydroxychloroquine] Swelling     Tongue swelling       Objective   Vitals: Blood pressure 120/64, pulse 88, temperature 97 8 °F (36 6 °C), temperature source Oral, resp  rate 18, height 5' 10 5" (1 791 m), weight 81 7 kg (180 lb 1 9 oz), SpO2 94 %       Wounds:            Physical Exam   Open wound to the left great toe 0 2 cm x 0 3 cm x 0 1 cm, purulence and fluctuence is present, yellow thickened elongated nails x 10, purulence from medial toenail edge, no POP, no sensation, pulses palpable, pedal hair is present, no malodor, cellulitis to the MPJ, contractures to lesser digits, MMT 5/5, ROM WNL         Arterial Duplex Bilateral Complete    CONCLUSION:  Impression  RIGHT LOWER LIMB  Ankle/Brachial Index: 1 2 which is in the normal range  PPG/PVR Tracings are normal   Triphasic waveforms at the ankle  Metatarsal Pressure 145 mmHg  Great Toe Pressure: 87 mmHg, within normal limits for healing potential  LEFT LOWER LIMB  Ankle/Brachial Index: 1 2  which is in the normal range  PPG/PVR Tracings are normal   Triphasic waveforms at the ankle  Metatarsal Chksopyz317 mmHg  Great Toe Pressure: 97 mmHg, normal limits for healing potential    Xray left great toe   IMPRESSION:  FINDINGS/IMPRESSION:     There is no acute fracture or dislocation    Bones appear intact      Soft tissue swelling of the 1st digit, consider edema versus cellulitis depending on the clinical setting

## 2018-11-18 NOTE — PROGRESS NOTES
NEPHROLOGY PROGRESS NOTE    Patient: Alexandria Darnell               Sex: male          DOA: 11/16/2018 10:12 PM   YOB: 1950        Age:  79 y o         LOS:  LOS: 2 days   11/18/2018    REASON FOR THE CONSULTATION:  Further management of PRADEEP    HPI     This is a 79 y o  male admitted for Cellulitis     SUBJECTIVE     - breathing has remained stable overnight and also found to be nonoliguric  No signs of urinary retention seen based on bladder scan in last 24 hours  Patient denies nausea / vomiting / headache / dizziness / SOB / chest pain today    - Reviewed last 24 hrs events     CURRENT MEDICATIONS       Current Facility-Administered Medications:     ascorbic acid (VITAMIN C) tablet 250 mg, 250 mg, Oral, Daily, Mert Goodman MD, 250 mg at 11/18/18 0815    aspirin chewable tablet 81 mg, 81 mg, Oral, Daily, Mert Goodman MD, 81 mg at 11/18/18 0816    atenolol (TENORMIN) tablet 25 mg, 25 mg, Oral, BID, Mert Goodman MD, 25 mg at 11/18/18 0816    atorvastatin (LIPITOR) tablet 40 mg, 40 mg, Oral, Daily With Yumikogracie Mcgill MD, 40 mg at 11/17/18 1742    fenofibrate (TRICOR) tablet 48 mg, 48 mg, Oral, QPM, Mert Goodman MD, 48 mg at 11/17/18 2040    ferrous sulfate tablet 325 mg, 325 mg, Oral, BID With Meals, Mert Goodman MD, 325 mg at 85/30/53 1851    folic acid (FOLVITE) tablet 1 mg, 1 mg, Oral, Daily, Mert Goodman MD, 1 mg at 11/18/18 0815    heparin (porcine) subcutaneous injection 5,000 Units, 5,000 Units, Subcutaneous, Q8H [facility] intermediate, 5,000 Units at 11/18/18 0550 **AND** Platelet count, , , Once, Mert Goodman MD    insulin lispro (HumaLOG) 100 units/mL subcutaneous injection 1-5 Units, 1-5 Units, Subcutaneous, TID AC, 1 Units at 11/18/18 1306 **AND** Fingerstick Glucose (POCT), , , TID AC, Mert Goodman MD    insulin lispro (HumaLOG) 100 units/mL subcutaneous injection 1-5 Units, 1-5 Units, Subcutaneous, HS, Mert Goodman MD    isosorbide dinitrate (ISORDIL) tablet 30 mg, 30 mg, Oral, QPM, Jennyfer Rivas MD, 30 mg at 11/17/18 2040    leflunomide (ARAVA) tablet 20 mg, 20 mg, Oral, Daily, Jennyfer Rivas MD    metroNIDAZOLE (FLAGYL) tablet 500 mg, 500 mg, Oral, Q8H [facility], Ciro Green MD, 500 mg at 11/18/18 1109    pantoprazole (PROTONIX) EC tablet 40 mg, 40 mg, Oral, Early Morning, Jennyfer Rivas MD, 40 mg at 11/18/18 0550    tamsulosin (FLOMAX) capsule 0 4 mg, 0 4 mg, Oral, Early Morning, Jennyfer Rivas MD, 0 4 mg at 11/18/18 0550    umeclidinium-vilanterol (ANORO ELLIPTA) 62 5-25 MCG/INH inhaler 1 puff, 1 puff, Inhalation, Daily, Jennyfer Rivas MD    vancomycin (VANCOCIN) IVPB (premix) 750 mg, 10 mg/kg, Intravenous, Q12H, Jennyfer Rivas MD, Last Rate: 150 mL/hr at 11/18/18 1208, 750 mg at 11/18/18 1208    OBJECTIVE     Current Weight: Weight - Scale: 81 7 kg (180 lb 1 9 oz)  Vitals:    11/18/18 1100   BP: 119/83   Pulse: 105   Resp: 18   Temp: 98 °F (36 7 °C)   SpO2: 96%     Body mass index is 25 48 kg/m²  Intake/Output Summary (Last 24 hours) at 11/18/18 1306  Last data filed at 11/18/18 0350   Gross per 24 hour   Intake             1065 ml   Output             1425 ml   Net             -360 ml       PHYSICAL EXAMINATION     Physical Exam   Constitutional: He is oriented to person, place, and time  No distress  HENT:   Head: Atraumatic  Eyes: No scleral icterus  Neck: Normal range of motion  Neck supple  No JVD present  No tracheal deviation present  No thyromegaly present  Cardiovascular: Normal rate  Pulmonary/Chest: No respiratory distress  He has no wheezes  Abdominal: Soft  He exhibits no distension  Lymphadenopathy:     He has no cervical adenopathy  Neurological: He is alert and oriented to person, place, and time  Skin: Skin is warm  Psychiatric: He has a normal mood and affect   His behavior is normal          LAB RESULTS       Results from last 7 days  Lab Units 11/18/18  0455 11/17/18  0517 11/17/18  0213 11/16/18  2254   WBC Thousand/uL 6 58 7 71  --  10 76*   HEMOGLOBIN g/dL 9 1* 9 8*  --  10 4*   HEMATOCRIT % 27 9* 31 3*  --  32 8*   PLATELETS Thousands/uL 53* 84* 98* 106*   POTASSIUM mmol/L 2 7* 4 0 3 5 3 7   CHLORIDE mmol/L 110* 109* 106 107   CO2 mmol/L 18* 17* 17* 17*   BUN mg/dL 17 21 22 22   CREATININE mg/dL 1 04 1 80* 1 95* 1 85*   EGFR ml/min/1 73sq m 74 38 35 37   CALCIUM mg/dL 7 1* 7 1* 7 4* 8 0*   MAGNESIUM mg/dL 1 7 1 0*  --   --        I have personally reviewed the old medical records and patient's previously known baseline creatinine level is ~ 0 9-1 0    RADIOLOGY RESULTS      Results for orders placed during the hospital encounter of 11/16/18   XR chest 2 views    Narrative CHEST     INDICATION:   hypotension  Chemotherapy for lung cancer    COMPARISON:  CT thorax dated 10/12/2010    EXAM PERFORMED/VIEWS:  XR CHEST PA & LATERAL      FINDINGS:    Right-sided Mediport is seen which terminates in the distal SVC  No pneumothorax is seen  Several pulmonary nodules are seen in the right lower lung field, the largest measures approximately 8 mm in size  Additional pulmonary nodules are seen scattered in the bilateral lungs more prominent in the upper lung fields,   the largest measures approximately 1 4 cm on the left and 1 0 cm on the right  A linear opacity in the right middle lobe best appreciated on the lateral view could represent atelectasis versus airspace disease such as pneumonia  The lungs otherwise   appear grossly clear  Heart is not enlarged  The aorta is calcified and tortuous  Mediastinal contours otherwise appear unremarkable  Old appearing right-sided rib fractures noted superiorly  Old fracture of the midshaft of the right clavicle redemonstrated  Visualized bones otherwise appear intact  [place] screws of the right humeral head are incidentally noted and appear intact as   well      Impression Multiple pulmonary nodules as described, correlates with the patient's history of lung cancer  Right-sided Mediport in place  Linear opacity in the right middle lobe best appreciated on the lateral view could represent atelectasis versus airspace disease such as pneumonia depending on the clinical setting  Other findings as above  Workstation performed: [id number]         PLAN / RECOMMENDATIONS      1  PRADEEP  Present on admission  Multifactorial and suspected due to intravascular volume depletion on top of ATN in the setting of foot infection  Upon review of old medical record patient's previously known baseline creatinine is between 0 9-1 0  Overnight patient has received IV fluid and his breathing has remained stable and currently no signs of volume overload  Overnight patient's renal function has also improved to current creatinine is 1 04 which is now back to baseline  Advised patient to Encouraged oral fluid intake to ensure his stays well hydrated  2  Hypertension  Essential, overnight patient's blood pressure has remained under well control and continue to monitor hypertension with isosorbide 30 mg PO daily along with atenolol 25 mg PO BID  3  Hypomagnesemia  Multifactorial, received IV magnesium sulfate yesterday and magnesium level has improved to 1 7 which is at goal     4  Anemia  Multifactorial   Overnight hemoglobin level has worsened to 9 1 which can be dilutional in the setting of use of IV fluid  No active signs of bleeding seen  Consider monitoring hemoglobin level while in the hospital     Will sign off today  Defer further management of IV fluid to primary team     Td Barnhart MD  Nephrology  11/18/2018        Portions of the record may have been created with voice recognition software  Occasional wrong word or "sound a like" substitutions may have occurred due to the inherent limitations of voice recognition software  Read the chart carefully and recognize, using context, where substitutions have occurred

## 2018-11-18 NOTE — PLAN OF CARE
Problem: DISCHARGE PLANNING - CARE MANAGEMENT  Goal: Discharge to post-acute care or home with appropriate resources  INTERVENTIONS:  - Conduct assessment to determine patient/family and health care team treatment goals, and need for post-acute services based on payer coverage, community resources, and patient preferences, and barriers to discharge  - Address psychosocial, clinical, and financial barriers to discharge as identified in assessment in conjunction with the patient/family and health care team  - Arrange appropriate level of post-acute services according to patients   needs and preference and payer coverage in collaboration with the physician and health care team  - Communicate with and update the patient/family, physician, and health care team regarding progress on the discharge plan  - Arrange appropriate transportation to post-acute venues  Outcome: Progressing  CM met with pt at bedside  Pt lives with his wife Juan Luis Juárez and daughter in a 2 story house with no JAGDEEP  Pt's bedroom could be upstairs or downstairs, but he currently has no problem navigating steps  He uses no DME's and is independent with ADL's  He has used [facility] for OP/PT  He has never used [facility] services  Dr Edwige Patrick is his PCP  Denies substance abuse or mental health issues  Pt quit smoking 1 month ago  Pt was recently dx with lung cancer and is receiving chemo at the [facility]  He uses [facility] and has no problem with his co-pays  He has an Advanced Directive and his POA is his wife  He is retired and still drives  His wife will transport home when medically cleared  CM discussed d/c needs including [facility] services, but pt does not feel this will be needed at the present time  CM department will continue to follow through hospitalization      CM reviewed discharge planning process including the following: identifying help at home, patient preference for discharge planning needs, pharmacy preference, and availability of treatment team to discuss questions or concerns patient and/or family may have regarding understanding medications and recognizing signs and symptoms once discharged  CM also encouraged patient to follow up with all recommended appointments after discharge  Patient advised of importance for patient and family to participate in managing patients medical well being  CM name and role reviewed  Discharge Checklist reviewed and CM will continue to monitor for progress toward discharge goals in nursing and provider rounds

## 2018-11-18 NOTE — PROGRESS NOTES
[facility] Progress Note  Patient: Lenita Bernheim 79 y o  male   MRN: [id number]  PCP: Xavi Butt MD  Unit/Bed#: [facility] Encounter: [id number]  Date Of Visit: 18    Assessment:    Principal Problem:    Cellulitis  Active Problems:    PRADEEP (acute kidney injury) ([facility] [address] )    High anion gap metabolic acidosis    Diabetes mellitus ([facility] )    HTN (hypertension)    Hypomagnesemia    Anemia      Plan:  Left foot abscess of great toe/cellulitis of great  Continue antibiotics  Monitor wound cultures  Podiatry recommendations appreciated  LEADS- good healing potential  MRI of the foot ordered to rule out osteomyelitis  Continue local wound care    A KI on CKD  Creatinine at baseline    Squamous cell carcinoma of right lung status post chemo  Continue to monitor clinically  Outpatient chemotherapy/oncology follow-up      VTE Pharmacologic Prophylaxis:   Pharmacologic: Heparin  Mechanical VTE Prophylaxis in Place: Yes    Patient Centered Rounds: I have performed bedside rounds with nursing staff today  Discussions with Specialists or Other Care Team Provider:  Nephrology, Podiatry    Education and Discussions with Family / Patient:  Patient    Time Spent for Care: 20 minutes  More than 50% of total time spent on counseling and coordination of care as described above  Current Length of Stay: 2 day(s)    Current Patient Status: Inpatient   Certification Statement: The patient will continue to require additional inpatient hospital stay due to MRI pending    Discharge Plan:  Follow up on MRI    Code Status: Level 1 - Full Code      Subjective:   Patient seen and examined  Subjectively feels better  His pain is better in the foot      Objective:     Vitals:   Temp (24hrs), Av 1 °F (36 7 °C), Min:97 6 °F (36 4 °C), Max:98 7 °F (37 1 °C)    Temp:  [97 6 °F (36 4 °C)-98 7 °F (37 1 °C)] 97 8 °F (36 6 °C)  HR:  [85-95] 88  Resp:  [18] 18  BP: ()/(48-80) 120/64  SpO2:  [90 %-94 %] 94 %  Body mass index is 25 48 kg/m²  Input and Output Summary (last 24 hours): Intake/Output Summary (Last 24 hours) at 11/18/18 0931  Last data filed at 11/18/18 0350   Gross per 24 hour   Intake             1305 ml   Output             1425 ml   Net             -120 ml       Physical Exam:     Alert, oriented x3  Mucous membranes are moist  Lungs are clear to auscultation  Heart sounds are regular S1-S2  Abdomen soft nontender  Extremities right great toe wrapped in surgical bandage    Additional Data:     Labs:      Results from last 7 days  Lab Units 11/18/18  0455   WBC Thousand/uL 6 58   HEMOGLOBIN g/dL 9 1*   HEMATOCRIT % 27 9*   PLATELETS Thousands/uL 53*   LYMPHO PCT % 10*   MONO PCT % 6   EOS PCT % 1       Results from last 7 days  Lab Units 11/18/18  0455 11/17/18  0517   POTASSIUM mmol/L 2 7* 4 0   CHLORIDE mmol/L 110* 109*   CO2 mmol/L 18* 17*   BUN mg/dL 17 21   CREATININE mg/dL 1 04 1 80*   CALCIUM mg/dL 7 1* 7 1*   ALK PHOS U/L  --  64   ALT U/L  --  14   AST U/L  --  19       Results from last 7 days  Lab Units 11/16/18  2254   INR  1 79*       * I Have Reviewed All Lab Data Listed Above  * Additional Pertinent Lab Tests Reviewed:  [facility] Admission Reviewed    Imaging:    Imaging Reports Reviewed Today Include:   Imaging Personally Reviewed by Myself Includes:     Recent Cultures (last 7 days):       Results from last 7 days  Lab Units 11/17/18  0215   GRAM STAIN RESULT  1+ Polys  2+ Gram positive cocci in pairs  Rare Gram negative rods       Last 24 Hours Medication List:     Current Facility-Administered Medications:  ascorbic acid 250 mg Oral Daily Felix Abbott MD    aspirin 81 mg Oral Daily Felix Abbott MD    atenolol 25 mg Oral BID Felix Abbott MD    atorvastatin 40 mg Oral Daily With Vincent MD Finn    fenofibrate 48 mg Oral QPM Felix Abbott MD    ferrous sulfate 325 mg Oral BID With Meals Felix Abbott MD    folic acid 1 mg Oral Daily Zaida Capps Homero Morrison MD    heparin (porcine) 5,000 Units Subcutaneous [facility] Felix Abbott MD    insulin lispro 1-5 Units Subcutaneous TID [facility] Felix Abbott MD    insulin lispro 1-5 Units Subcutaneous HS Felix Abbott MD    isosorbide dinitrate 30 mg Oral QPM Felix Abbott MD    leflunomide 20 mg Oral Daily Felix Abbott MD    metroNIDAZOLE 500 mg Oral [facility] Umu Mccauley MD    pantoprazole 40 mg Oral Early Morning Felix Abbott MD    potassium chloride 40 mEq Oral Once Umu Mccauley MD    tamsulosin 0 4 mg Oral Early Morning Felix bAbott MD    umeclidinium-vilanterol 1 puff Inhalation Daily Felix Abbott MD    vancomycin 10 mg/kg Intravenous Q12H Felix Abbott MD Last Rate: 750 mg (11/17/18 5705)        Today, Patient Was Seen By: Umu Mccauley MD    ** Please Note: Dragon 360 Dictation voice to text software may have been used in the creation of this document   **

## 2018-11-18 NOTE — SOCIAL WORK
CM met with pt at bedside  Pt lives with his wife Courtney White and daughter in a 2 story house with no JAGDEEP  Pt's bedroom could be upstairs or downstairs, but he currently has no problem navigating steps  He uses no DME's and is independent with ADL's  He has used [facility] for [facility]/PT  He [facility] never used [facility]  Dr Kathleen Santana is his PCP  Denies substance abuse or mental health issues  Pt quit smoking 1 month ago  Pt was recently dx with lung cancer and is receiving chemo at the [facility]  He uses [facility] and has no problem with his co-pays  He has an Advanced Directive and his POA is his wife  He is retired and still drives  His wife will transport home when medically cleared  CM discussed d/c needs including [facility] services, but pt does not feel this will be needed at the present time  CM department will continue to follow through hospitalization  CM reviewed discharge planning process including the following: identifying help at home, patient preference for discharge planning needs, pharmacy preference, and availability of treatment team to discuss questions or concerns patient and/or family may have regarding understanding medications and recognizing signs and symptoms once discharged  CM also encouraged patient to follow up with all recommended appointments after discharge  Patient advised of importance for patient and family to participate in managing patients medical well being  CM name and role reviewed  Discharge Checklist reviewed and CM will continue to monitor for progress toward discharge goals in nursing and provider rounds

## 2018-11-19 ENCOUNTER — ANESTHESIA EVENT (INPATIENT)
Dept: PERIOP | Facility: HOSPITAL | Age: 68
DRG: 617 | End: 2018-11-19
Payer: MEDICARE

## 2018-11-19 PROBLEM — L03.039 CELLULITIS, TOE: Status: ACTIVE | Noted: 2018-11-16

## 2018-11-19 LAB
ANION GAP SERPL CALCULATED.3IONS-SCNC: 14 MMOL/L (ref 4–13)
BACTERIA WND AEROBE CULT: ABNORMAL
BASOPHILS # BLD AUTO: 0.03 THOUSANDS/ΜL (ref 0–0.1)
BASOPHILS NFR BLD AUTO: 0 % (ref 0–1)
BUN SERPL-MCNC: 8 MG/DL (ref 5–25)
CALCIUM SERPL-MCNC: 7.1 MG/DL (ref 8.3–10.1)
CHLORIDE SERPL-SCNC: 109 MMOL/L (ref 100–108)
CO2 SERPL-SCNC: 21 MMOL/L (ref 21–32)
CREAT SERPL-MCNC: 0.8 MG/DL (ref 0.6–1.3)
EOSINOPHIL # BLD AUTO: 0.08 THOUSAND/ΜL (ref 0–0.61)
EOSINOPHIL NFR BLD AUTO: 1 % (ref 0–6)
ERYTHROCYTE [DISTWIDTH] IN BLOOD BY AUTOMATED COUNT: 17.1 % (ref 11.6–15.1)
GFR SERPL CREATININE-BSD FRML MDRD: 93 ML/MIN/1.73SQ M
GLUCOSE SERPL-MCNC: 149 MG/DL (ref 65–140)
GLUCOSE SERPL-MCNC: 194 MG/DL (ref 65–140)
GLUCOSE SERPL-MCNC: 197 MG/DL (ref 65–140)
GLUCOSE SERPL-MCNC: 99 MG/DL (ref 65–140)
GLUCOSE SERPL-MCNC: 99 MG/DL (ref 65–140)
GRAM STN SPEC: ABNORMAL
HCT VFR BLD AUTO: 28.3 % (ref 36.5–49.3)
HGB BLD-MCNC: 9.3 G/DL (ref 12–17)
IMM GRANULOCYTES # BLD AUTO: 0.23 THOUSAND/UL (ref 0–0.2)
IMM GRANULOCYTES NFR BLD AUTO: 2 % (ref 0–2)
LYMPHOCYTES # BLD AUTO: 0.56 THOUSANDS/ΜL (ref 0.6–4.47)
LYMPHOCYTES NFR BLD AUTO: 6 % (ref 14–44)
MCH RBC QN AUTO: 30.4 PG (ref 26.8–34.3)
MCHC RBC AUTO-ENTMCNC: 32.9 G/DL (ref 31.4–37.4)
MCV RBC AUTO: 93 FL (ref 82–98)
MONOCYTES # BLD AUTO: 0.8 THOUSAND/ΜL (ref 0.17–1.22)
MONOCYTES NFR BLD AUTO: 8 % (ref 4–12)
NEUTROPHILS # BLD AUTO: 7.81 THOUSANDS/ΜL (ref 1.85–7.62)
NEUTS SEG NFR BLD AUTO: 83 % (ref 43–75)
NRBC BLD AUTO-RTO: 0 /100 WBCS
PLATELET # BLD AUTO: 48 THOUSANDS/UL (ref 149–390)
PMV BLD AUTO: 11.2 FL (ref 8.9–12.7)
POTASSIUM SERPL-SCNC: 2.6 MMOL/L (ref 3.5–5.3)
RBC # BLD AUTO: 3.06 MILLION/UL (ref 3.88–5.62)
SODIUM SERPL-SCNC: 144 MMOL/L (ref 136–145)
WBC # BLD AUTO: 9.51 THOUSAND/UL (ref 4.31–10.16)

## 2018-11-19 PROCEDURE — 99232 SBSQ HOSP IP/OBS MODERATE 35: CPT | Performed by: INTERNAL MEDICINE

## 2018-11-19 PROCEDURE — 85025 COMPLETE CBC W/AUTO DIFF WBC: CPT | Performed by: INTERNAL MEDICINE

## 2018-11-19 PROCEDURE — 97163 PT EVAL HIGH COMPLEX 45 MIN: CPT

## 2018-11-19 PROCEDURE — 99223 1ST HOSP IP/OBS HIGH 75: CPT | Performed by: INTERNAL MEDICINE

## 2018-11-19 PROCEDURE — G8978 MOBILITY CURRENT STATUS: HCPCS

## 2018-11-19 PROCEDURE — 82948 REAGENT STRIP/BLOOD GLUCOSE: CPT

## 2018-11-19 PROCEDURE — G8979 MOBILITY GOAL STATUS: HCPCS

## 2018-11-19 PROCEDURE — 80048 BASIC METABOLIC PNL TOTAL CA: CPT | Performed by: INTERNAL MEDICINE

## 2018-11-19 RX ORDER — LANOLIN ALCOHOL/MO/W.PET/CERES
6 CREAM (GRAM) TOPICAL
Status: DISCONTINUED | OUTPATIENT
Start: 2018-11-19 | End: 2018-11-21 | Stop reason: HOSPADM

## 2018-11-19 RX ORDER — POTASSIUM CHLORIDE 14.9 MG/ML
20 INJECTION INTRAVENOUS
Status: COMPLETED | OUTPATIENT
Start: 2018-11-19 | End: 2018-11-19

## 2018-11-19 RX ORDER — POTASSIUM CHLORIDE 20 MEQ/1
40 TABLET, EXTENDED RELEASE ORAL ONCE
Status: COMPLETED | OUTPATIENT
Start: 2018-11-19 | End: 2018-11-19

## 2018-11-19 RX ORDER — SACCHAROMYCES BOULARDII 250 MG
250 CAPSULE ORAL 2 TIMES DAILY
Status: DISCONTINUED | OUTPATIENT
Start: 2018-11-19 | End: 2018-11-21 | Stop reason: HOSPADM

## 2018-11-19 RX ORDER — POTASSIUM CHLORIDE 29.8 MG/ML
40 INJECTION INTRAVENOUS ONCE
Status: DISCONTINUED | OUTPATIENT
Start: 2018-11-19 | End: 2018-11-19

## 2018-11-19 RX ADMIN — INSULIN LISPRO 1 UNITS: 100 INJECTION, SOLUTION INTRAVENOUS; SUBCUTANEOUS at 17:32

## 2018-11-19 RX ADMIN — INSULIN LISPRO 1 UNITS: 100 INJECTION, SOLUTION INTRAVENOUS; SUBCUTANEOUS at 13:11

## 2018-11-19 RX ADMIN — Medication 250 MG: at 11:17

## 2018-11-19 RX ADMIN — METRONIDAZOLE 500 MG: 500 TABLET ORAL at 13:42

## 2018-11-19 RX ADMIN — FERROUS SULFATE TAB 325 MG (65 MG ELEMENTAL FE) 325 MG: 325 (65 FE) TAB at 17:28

## 2018-11-19 RX ADMIN — FOLIC ACID 1 MG: 1 TABLET ORAL at 08:08

## 2018-11-19 RX ADMIN — MELATONIN TAB 3 MG 6 MG: 3 TAB at 23:04

## 2018-11-19 RX ADMIN — METRONIDAZOLE 500 MG: 500 TABLET ORAL at 06:02

## 2018-11-19 RX ADMIN — FENOFIBRATE 48 MG: 48 TABLET, FILM COATED ORAL at 17:28

## 2018-11-19 RX ADMIN — FERROUS SULFATE TAB 325 MG (65 MG ELEMENTAL FE) 325 MG: 325 (65 FE) TAB at 08:08

## 2018-11-19 RX ADMIN — TAMSULOSIN HYDROCHLORIDE 0.4 MG: 0.4 CAPSULE ORAL at 06:02

## 2018-11-19 RX ADMIN — PANTOPRAZOLE SODIUM 40 MG: 40 TABLET, DELAYED RELEASE ORAL at 06:02

## 2018-11-19 RX ADMIN — ASPIRIN 81 MG 81 MG: 81 TABLET ORAL at 08:08

## 2018-11-19 RX ADMIN — CEFAZOLIN SODIUM 1000 MG: 1 SOLUTION INTRAVENOUS at 23:04

## 2018-11-19 RX ADMIN — OXYCODONE HYDROCHLORIDE AND ACETAMINOPHEN 250 MG: 500 TABLET ORAL at 08:08

## 2018-11-19 RX ADMIN — POTASSIUM CHLORIDE 40 MEQ: 1500 TABLET, EXTENDED RELEASE ORAL at 08:07

## 2018-11-19 RX ADMIN — ATENOLOL 25 MG: 25 TABLET ORAL at 08:08

## 2018-11-19 RX ADMIN — POTASSIUM CHLORIDE 20 MEQ: 14.9 INJECTION, SOLUTION INTRAVENOUS at 11:16

## 2018-11-19 RX ADMIN — VANCOMYCIN HYDROCHLORIDE 750 MG: 750 INJECTION, SOLUTION INTRAVENOUS at 13:41

## 2018-11-19 RX ADMIN — ATENOLOL 25 MG: 25 TABLET ORAL at 17:28

## 2018-11-19 RX ADMIN — ISOSORBIDE DINITRATE 30 MG: 10 TABLET ORAL at 18:26

## 2018-11-19 RX ADMIN — POTASSIUM CHLORIDE 20 MEQ: 14.9 INJECTION, SOLUTION INTRAVENOUS at 08:04

## 2018-11-19 RX ADMIN — ATORVASTATIN CALCIUM 40 MG: 40 TABLET, FILM COATED ORAL at 17:28

## 2018-11-19 RX ADMIN — Medication 250 MG: at 17:28

## 2018-11-19 NOTE — UTILIZATION REVIEW
Initial Clinical Review    Admission: Date/Time/Statement: 11/16/18 @ 2359     Orders Placed This Encounter   Procedures    Inpatient Admission (expected length of stay for this patient is greater than two midnights)     Standing Status:   Standing     Number of Occurrences:   1     Order Specific Question:   Admitting Physician     Answer:   Alphonse Raman     Order Specific Question:   Level of Care     Answer:   Med Surg [16]     Order Specific Question:   Estimated length of stay     Answer:   More than 2 Midnights     Order Specific Question:   Certification     Answer:   I certify that inpatient services are medically necessary for this patient for a duration of greater than two midnights  See H&P and MD Progress Notes for additional information about the patient's course of treatment  ED: Date/Time/Mode of Arrival:   ED Arrival Information     Expected Arrival Acuity Means of Arrival Escorted By Service Admission Type    - 11/16/2018 22:12 Urgent Ambulance [address] [facility] [place] [place] Urgent    Arrival Complaint    -          Chief Complaint:   Chief Complaint   Patient presents with    Diarrhea     pt c/o diarrhea for the past three days after being on an antibiotic for a foot infection  History of Illness: 78 yo male to ED  w/ c/o swelling and erythema l toe w/ weeping wounds x3 days    Seen at [facility] and given doxycyline and clindamycin   + diarrhea since starting abx     ED Vital Signs:   ED Triage Vitals [11/16/18 2217]   Temperature Pulse Respirations Blood Pressure SpO2   97 9 °F (36 6 °C) 104 15 140/90 92 %      Temp Source Heart Rate Source Patient Position - Orthostatic VS BP Location FiO2 (%)   Oral Monitor Sitting Right arm --      Pain Score       No Pain        Wt Readings from Last 1 Encounters:   11/19/18 81 8 kg (180 lb 5 4 oz)       Vital Signs (abnormal): wnl     Abnormal Labs/Diagnostic Test Results: wbc 10 76, H&H  10 4 32 8, plt  106, lactic acid  2 3, co2 17, an gap 17, BUN creat  22 1 85, gluc 175, christi 8 0, pt inr  20 5 1 79, ptt  41  L great toe xray - Soft tissue swelling of the 1st digit, consider edema versus cellulitis depending on the clinical setting  CXR - Multiple pulmonary nodules as described, correlates with the patient's history of lung cancer   Right-sided Mediport in place  EKg- NSR     ED Treatment:   Medication Administration from 11/16/2018 2212 to 11/17/2018 0056       Date/Time Order Dose Route Action Action by Comments     11/17/2018 0020 sodium chloride 0 9 % bolus 1,000 mL 0 mL Intravenous Stopped Madeline Desai RN      11/16/2018 2255 sodium chloride 0 9 % bolus 1,000 mL 1,000 mL Intravenous [address] [facility], [address] [place] [address]      11/17/2018 0020 cefepime (MAXIPIME) 2,000 mg in dextrose 5 % 50 mL IVPB 0 mg Intravenous Stopped Madeline Desai RN      11/16/2018 2327 cefepime (MAXIPIME) 2,000 mg in dextrose 5 % 50 mL IVPB 2,000 mg Intravenous New Bag Madeline Desai RN      11/17/2018 0025 vancomycin (VANCOCIN) 1,250 mg in sodium chloride 0 9 % 250 mL IVPB 1,250 mg Intravenous New Bag Madeline Desai RN           Past Medical/Surgical History: Active Ambulatory Problems     Diagnosis Date Noted    No Active Ambulatory Problems     Past Medical History:   Diagnosis Date    [facility] ([email or web address]     Hypertension     Lyme disease     Rheumatoid arthritis ([place] [address] )        Admitting Diagnosis: Diarrhea [R19 7]  Dehydration [E86 0]  Lung cancer ([place] [address] ) [C34 90]  Cellulitis, toe [B91 188]  Hypotension [I95 9]  [facility] ) [N17 9]  Sepsis ([email or web address] ) [A41 9]    Age/Sex: 79 y o  male    Assessment/Plan: 78 yo male admitted from home w/ cellulitis  Cont vanco , dayami area of erythema , get arterial dopplers / High an gap metabolic acidosis lactic acid 2 8, repeat q2hr , still pending , also PRADEEP  F/u BMP    PRADEEP creat 1 85, baseline 1 0, likely sec to sepsis /cellulitis will f//u and give IVF     Admission Orders:  Scheduled Meds: Current Facility-Administered Medications:  ascorbic acid 250 mg Oral Daily     aspirin 81 mg Oral Daily     atenolol 25 mg Oral BID     atorvastatin 40 mg Oral Daily With Dinner     fenofibrate 48 mg Oral QPM     ferrous sulfate 325 mg Oral BID With Meals     folic acid 1 mg Oral Daily     heparin (porcine) 5,000 Units Subcutaneous Q8H [address]     insulin lispro 1-5 Units Subcutaneous TID AC     insulin lispro 1-5 Units Subcutaneous HS     isosorbide dinitrate 30 mg Oral QPM     leflunomide 20 mg Oral Daily     metroNIDAZOLE 500 mg Oral Q8H TAYLOR     pantoprazole 40 mg Oral Early Morning     potassium chloride 20 mEq Intravenous Q2H  Last Rate: 20 mEq (11/19/18 1116)   saccharomyces boulardii 250 mg Oral BID     tamsulosin 0 4 mg Oral Early Morning     umeclidinium-vilanterol 1 puff Inhalation Daily     vancomycin 10 mg/kg Intravenous Q12H  Last Rate: 750 mg (11/18/18 2045)       NPO   Fingerstick ac and hs   Tele   nephrology , vascular consult   Contact isolation   reg diet   SCD  I&O   Tele   OT PT eval   Podiatry /ID consult   11/19 bmp, cbc  plt  48, H&H 9 3 28 3, K  2 6, cl  109, an gap  14    Podiatry note 11/19  Assessment/Plan:  1) Osteomyelitis left great toe  2) Cellulitis left great toe  3) Plan for amputation of left great toe in the OR tomorrow AM  4) Consent is signed and placed in the chart  - All risks, benefits, complications, alternatives discussed  - No guarantees given or implied      vascular consult  11/17       * Cellulitis   Assessment & Plan     Left hallux cellulitis  -patient reports swelling, redness x3 days; he was seen at [facility] placed on doxycycline and clindamycin now with associated diarrhea    Patient currently undergoing chemotherapy for lung cancer, also history of rheumatoid arthritis, HTN, diabetes   -there was concern for ulceration due to diabetic foot ulcer, vascular disease process, therefore vascular surgery was consulted  -patient reports he had 1 coronary artery balloon angioplasty but otherwise has no other atherosclerotic disease/interventions  -he has been on aspirin and Lipitor     Recommendations:  -SLIM ordered LEAD for baseline  -this is unlikely arterial in nature as patient has positive palpable 2+ pulses to DP/PT/femoral bilaterally  He denies any atherosclerotic disease to his lower extremities  He denies any claudication symptoms or rest pain  He is at high risk for infection due to his chemotherapy and immunosuppressive medications; patient denies any wounds or trauma to the left hallux     -Will need to be followed by Podiatry     -Primary team managing his antibiotics with improvement to his lactic acidosis and WBC count 7 71 today  Patient denies any fever, chills, chest pain, shortness of breath   -continue aspirin and statin  -will assess lower extremity arterial duplex and if there is any significant arterial disease will continue to follow  Otherwise thank you for allowing us to participate in the care of Mr Jessica Marly        Diabetes mellitus [facility])   Assessment & Plan     No results found for: HGBA1C         Recent Labs      11/17/18   0623   POCGLU  109         Blood Sugar Average: Last 72 hrs:  (P) 109      -continue tight glycemic control to aid in wound healing  -management per primary team         PRADEEP (acute kidney injury) ([facility] [address] )   Assessment & Plan     PRADEEP creatinine 1 85 with baseline 1 0  -lactic acidosis and metabolic anion gap on admission likely related to cellulitis/sepsis being managed by primary team  -Cr 1 8 today, primary team managing     Nephrology consult  11/17  1  PRADEEP  Present on admission, multifactorial and suspected due to intravascular volume depletion on top of ATN in the setting of foot infection    Upon review of old medical record patient's baseline creatinine level is around 0 9-1 0  Current creatinine is 1 8  Urine analysis done on admission showed specific gravity > 1 030 with hyaline and fine granular casts  Patient is not in volume overload state and will plan to continue IV fluid today  Plan to check urine lytes for further evaluation  Plan to recheck renal function with AM labs  Consider avoidance of IV contrast and NSAIDs  Plan to check serial bladder scan to rule out urinary retention    2  Hypertension  Essential, currently blood pressure is under well control and plan to monitor hypertension with atenolol 25 mg PO BID along with isosorbide 30 mg PO daily    3  Hypomagnesemia  Current magnesium is 1 0 which is below the goal   Plan to give magnesium sulfate 4 g IV today  Recheck magnesium level with AM labs    3  Anemia  Multifactorial with current hemoglobin of 9 8  No active signs of bleeding seen  Monitor hemoglobin level today and consider blood transfusion if hemoglobin level drops below 7       IM note  11/18  Plan:  Left foot abscess of great toe/cellulitis of great  Continue antibiotics  Monitor wound cultures  Podiatry recommendations appreciated  LEADS- good healing potential  MRI of the foot ordered to rule out osteomyelitis  Continue local wound care   A KI on CKD  Creatinine at baseline   Squamous cell carcinoma of right lung status post chemo  Continue to monitor clinically  Outpatient chemotherapy/oncology follow-up   Current Patient Status: Inpatient   Certification Statement: The patient will continue to require additional inpatient hospital stay due to MRI pending  [address]:  Follow up on MRI    Wound care consult  11/18  1) IV antibiotics as per Medicine and ID  2) The left great toenail is removed, patient tolerated well all VS stable and NVS intact to pre-exam status  3) The left great toe wound is debrided, patient tolerated well all VS stable and NVS intact to pre-exam status  4) Reviewed arterial duplex- good healing potential  5) Ordered MRI to rule out osteomyelitis  6) Dressed with purachol Ag and DSD  7) Will follow

## 2018-11-19 NOTE — PROGRESS NOTES
Progress Note - Flor Laughlin 79 y o  male MRN: [id number]    Unit/Bed#: -Linda Encounter: [id number]      Assessment/Plan:  1) Osteomyelitis left great toe  2) Cellulitis left great toe  3) Plan for amputation of left great toe in the OR tomorrow AM  4) Consent is signed and placed in the chart  - All risks, benefits, complications, alternatives discussed  - No guarantees given or implied      Subjective:   Patient is seen bedside resting comfortably  NAD  Objective:     Vitals: Blood pressure 115/64, pulse 86, temperature 97 6 °F (36 4 °C), temperature source Oral, resp  rate 18, height 5' 10 5" (1 791 m), weight 81 8 kg (180 lb 5 4 oz), SpO2 95 %  ,Body mass index is 25 88 kg/m²        Intake/Output Summary (Last 24 hours) at 11/19/18 1251  Last data filed at 11/18/18 1300   Gross per 24 hour   Intake              240 ml   Output                0 ml   Net              240 ml       Physical Exam:   Open wound to the left great toe 0 2 cm x 0 3 cm x 0 1 cm, purulence and fluctuence is present, yellow thickened elongated nails x 10, purulence from medial toenail edge, pulses palpable, pedal hair is present, no malodor, cellulitis to the MPJ, contractures to lesser digits, MMT 5/5, ROM WNL     Invasive Devices     Peripheral Intravenous Line            Peripheral IV 11/16/18 Left Wrist 2 days              MRI left foot    Marrow edema seen in the distal aspect of the 1st proximal phalanx with associated soft tissue edema at the dorsum suggest osteomyelitis     Dorsal erosive changes seen at the proximal and distal aspect of the 1st distal phalanx along with the erosive and arthritic changes in the midfoot, evaluate for gout     Mild edema seen in the distal aspect of the 1st proximal phalanx likely reactive

## 2018-11-19 NOTE — PHYSICAL THERAPY NOTE
PHYSICAL THERAPY EVALUATION           Patient Name: Marcus Vinson  [id number] Date: 11/19/2018 11/19/18 7817   Note Type   Note type Eval only   Pain Assessment   Pain Assessment No/denies pain   Home Living   Type of [address] Two level  (bedroom on 1st floor; no JAGDEEP; 9 steps to 2nd level)   Bathroom Shower/Tub Tub/shower unit   [employer] Raised   Bathroom Equipment Grab bars in shower;Hand-held shower   [address] (none as per pt report )   Additional Comments bathroom on 1st floor   Prior Function   Level of [place] Independent with ADLs and functional mobility   Lives With Spouse;Daughter  (and grandson)   [address] Help From Family   ADL Assistance Independent   IADLs Independent   Falls in the last 6 months 0   Vocational Retired   Comments (+); I med mgmt; 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    Restrictions/Precautions   Wells Rachid Bearing Precautions Per Order No   Braces or Orthoses (none as per pt report )   Other Precautions Bed Alarm; Fall Risk;Telemetry;Multiple lines   General   Additional Pertinent History Pt reports, "I came in with diarrhea and side effects from chemotherapy " As per medical chart, patient had presented with swelling and erythema in L great toe      Family/Caregiver Present No   Cognition   Overall Cognitive Status WFL   Arousal/Participation Alert   Orientation Level Oriented X4   Memory Within functional limits   Following Commands Follows all commands and directions without difficulty   RUE Assessment   RUE Assessment WFL   LUE Assessment   LUE Assessment WFL   RLE Assessment   RLE Assessment WFL   Strength RLE   R Hip Flexion 4/5   R Knee Extension 4/5   R Ankle Dorsiflexion 4/5   LLE Assessment   LLE Assessment WFL   Strength LLE   L Hip Flexion 4/5   L Knee Extension 4/5   L Ankle Dorsiflexion 4/5   Coordination   Movements are Fluid and Coordinated 1   Sensation WFL   Light Touch   RLE Light Touch Grossly intact   LLE Light Touch Grossly intact   Bed Mobility   Rolling L 6  Modified independent   Supine to Sit 5  Supervision   Additional items HOB elevated; Bedrails; Increased time required   Transfers   Sit to Stand 4  Minimal assistance  (CGA)   Additional items Assist x 1;Bedrails; Increased time required;Verbal cues   Stand to Sit 4  Minimal assistance  (CGA)   Additional items Assist x 1;Bedrails; Increased time required;Verbal cues   Additional Comments Pt utilized side rail in burrell and IV pole periodically for unilateral UE support    Ambulation/Elevation   Gait pattern Short stride;Decreased foot clearance; Inconsistent nuvia; Excessively slow  (slight unsteady gait pattern)   Gait Assistance 4  Minimal assist  (CGA)   Additional items Assist x 1;Verbal cues; Tactile cues   Assistive Device None   Distance 85 feet    Stair Management Assistance Not tested   Balance   Static Sitting Good   Dynamic Sitting Good   Static Standing Fair   Dynamic Standing Fair -   Ambulatory Fair -   Endurance Deficit   Endurance Deficit Yes   Activity Tolerance   Activity Tolerance Patient limited by fatigue   Medical Staff Made Aware Yes, KRYSTAL Watson made aware of therapy outcome/recs    Nurse Made Aware Yes, RN Raymond Lopes verbalized patient appropriate for PT evaluation; made aware of therapy outcome/recs    Assessment   Prognosis Good   Problem List Decreased strength;Decreased endurance; Impaired balance;Decreased mobility   Assessment Pt is 79 y o  male seen for PT evaluation on 11/19/2018 s/p admit to [facility] on 11/16/2018 w/ Cellulitis  PT consulted to assess pt's functional mobility and d/c needs  Order placed for PT eval and tx  Performed at least 2 patient identifiers during session: Name and wristband   Comorbidities affecting pt's physical performance at time of assessment include: cellulitis, PRADEEP, high anion gap metabolic acidosis, diabetes mellitus, HTN, hypomagnesemia and anemia   PTA, pt was independent w/ all functional mobility w/ no assistive device, ambulates unrestricted distances and all terrain, ambulates household distances, has 0 JAGDEEP with 9 steps to 2nd level, lives w/ spouse, daughter and grandson in two  level home and retired  Personal factors affecting pt at time of IE include: lives in two story house, inability to navigate community distances, inability to navigate level surfaces w/o external assistance, unable to perform dynamic tasks in community, inability to perform IADLs and inability to perform ADLs  Please find objective findings from PT assessment regarding body systems outlined above with impairments and limitations including weakness, impaired balance, decreased endurance, gait deviations, decreased activity tolerance, decreased functional mobility tolerance and fall risk, as well as mobility assessment (need for CG assist w/ all phases of mobility when usually ambulating independently, tolerance to only 85 feet of ambulation and need for cueing for mobility technique)  The following objective measures performed on IE also reveal limitations: Barthel Index: 55/100 and Modified [place]: 4 (moderate/severe disability)  Pt's clinical presentation is currently unstable/unpredictable seen in pt's presentation of need for CG assist w/ all phases of mobility when usually mobilizing independently, tolerance to only 85 feet of ambulation, on telemetry monitoring and abnormal lab values   Pt to benefit from continued PT tx to address deficits as defined above and maximize level of functional independent mobility and consistency  From PT/mobility standpoint, recommendation at time of d/c would be Home PT vs  STR pending progress in order to facilitate return to PLOF     Barriers to Discharge Decreased caregiver support   Goals   Patient Goals to return home   STG Expiration Date 11/29/18   Short Term Goal #1 In 7-10 days: Increase bilateral LE strength 1/2 grade to facilitate independent mobility, Perform all bed mobility tasks modified independent to decrease caregiver burden, Perform all transfers modified independent to improve independence, Ambulate > 150 ft  with no assistive device  modified independent w/o LOB and w/ normalized gait pattern 100% of the time, Navigate 9-12 stairs modified independent with unilateral handrail to facilitate return to previous living environment, Increase all balance 1/2 grade to decrease risk for falls, Tolerate 4 hr OOB to faciliate upright tolerance and Improve Barthel Index score to 70 or greater to facilitate independence   Plan   Treatment/Interventions Functional transfer training;LE strengthening/ROM; Elevations; Therapeutic exercise; Endurance training;Patient/family training;Equipment eval/education; Bed mobility;Gait training;Spoke to case management;Spoke to nursing   PT Frequency 5x/wk   Recommendation   Recommendation Other (Comment)  (STR vs  HHPT pending patient progress )   Equipment Recommended (to be determined )   PT - OK to Discharge No   Additional Comments Pt to demonstrate increased consistency and independence with level surface ambulation and navigate stairs prior to discharge    Modified [place] Scale   Modified [place] Scale 4   Barthel Index   Feeding 10   Bathing 0   Grooming Score 5   Dressing Score 5   Bladder Score 10   Bowels Score 10   Toilet Use Score 5   Transfers (Bed/Chair) Score 10   Mobility (Level Surface) Score 0   Stairs Score 0   Barthel Index Score 55   Allison Rojo, PT

## 2018-11-19 NOTE — PLAN OF CARE
Problem: PHYSICAL THERAPY ADULT  Goal: Performs mobility at highest level of function for planned discharge setting  See evaluation for individualized goals  Treatment/Interventions: Functional transfer training, LE strengthening/ROM, Elevations, Therapeutic exercise, Endurance training, Patient/family training, Equipment eval/education, Bed mobility, Gait training, Spoke to case management, Spoke to nursing  Equipment Recommended:  (to be determined )       See flowsheet documentation for full assessment, interventions and recommendations  Prognosis: Good  Problem List: Decreased strength, Decreased endurance, Impaired balance, Decreased mobility  Assessment: Pt is 79 y o  male seen for PT evaluation on 11/19/2018 s/p admit to [facility] on 11/16/2018 w/ Cellulitis  PT consulted to assess pt's functional mobility and d/c needs  Order placed for PT eval and tx  Performed at least 2 patient identifiers during session: Name and wristband  Comorbidities affecting pt's physical performance at time of assessment include: cellulitis, PRADEEP, high anion gap metabolic acidosis, diabetes mellitus, HTN, hypomagnesemia and anemia   PTA, pt was independent w/ all functional mobility w/ no assistive device, ambulates unrestricted distances and all terrain, ambulates household distances, has 0 JAGDEEP with 9 steps to 2nd level, lives w/ spouse, daughter and grandson in two  level home and retired  Personal factors affecting pt at time of IE include: lives in two story house, inability to navigate community distances, inability to navigate level surfaces w/o external assistance, unable to perform dynamic tasks in community, inability to perform IADLs and inability to perform ADLs   Please find objective findings from PT assessment regarding body systems outlined above with impairments and limitations including weakness, impaired balance, decreased endurance, gait deviations, decreased activity tolerance, decreased functional mobility tolerance and fall risk, as well as mobility assessment (need for CG assist w/ all phases of mobility when usually ambulating independently, tolerance to only 85 feet of ambulation and need for cueing for mobility technique)  The following objective measures performed on IE also reveal limitations: Barthel Index: 55/100 and Modified [place]: 4 (moderate/severe disability)  Pt's clinical presentation is currently unstable/unpredictable seen in pt's presentation of need for CG assist w/ all phases of mobility when usually mobilizing independently, tolerance to only 85 feet of ambulation, on telemetry monitoring and abnormal lab values   Pt to benefit from continued PT tx to address deficits as defined above and maximize level of functional independent mobility and consistency  From PT/mobility standpoint, recommendation at time of d/c would be Home PT vs  STR pending progress in order to facilitate return to PLOF  Barriers to Discharge: Decreased caregiver support     Recommendation: Other (Comment) (STR vs  HHPT pending patient progress )     PT - OK to Discharge: No    See flowsheet documentation for full assessment

## 2018-11-19 NOTE — CONSULTS
Consultation - Infectious Disease   Kraolina Ayala 79 y o  male MRN: [id number]  Unit/Bed#: -01 Encounter: [id number]    IMPRESSION & RECOMMENDATIONS:   1  Left great toe cellulitis  Secondary to left great toe diabetic foot ulcer with suspected osteomyelitis  Wound culture was obtained and final results showed MSSA  Patient's blood cultures are negative after 24 hr  The patient is following closely with Podiatry will proceed with a left great toe amputation in the OR tomorrow  Hopefully this will provide surgical cure  Fortunately the patient has remained clinically stable and nontoxic  He is afebrile without leukocytosis  Patient has been on IV vancomycin and Flagyl  Given the results of his culture and sensitivity I recommend we deescalate the patient to IV cefazolin today    -stop IV Flagyl  -stop IV vancomycin  -start IV cefazolin, 2 g q 8 hours  -monitor CBC and BMP  -followup blood cultures  -monitor vitals  -serial skin exams  -continue close follow-up with Podiatry    2  Left great toe diabetic foot ulcer  With surrounding cellulitis  Now with suspected osteomyelitis  I reviewed patient's x-ray of the left great toe from 11/16/2018 which showed soft tissue swelling of the 1st digit representing edema versus cellulitis  Patient follow-up MRI of the left foot on 11/19/2018 which showed marrow edema in the 1st proximal phalanx with associated soft tissue edema at the dorsum which was suggestive of osteomyelitis  There were also erosive changes and arthritic changes in the midfoot  Patient is following with Podiatry and will proceed to have a left great toe amputation in the OR tomorrow   -antibiotic as above  -monitor CBC and BMP  -monitor vitals  -serial skin exams  -care per Podiatry  -continue close follow-up with Podiatry    3  Squamous cell carcinoma of the right lung    Patient with abnormal chest CT in June 2018 which was followed up by a PET scan in August 2018 with confirmed malignancy  Patient's pulmonologist's has referred him to the [facility] Hematology/Oncology office  Patient underwent fine-needle aspiration which showed moderately differentiated squamous cell carcinoma  Patient follows with Dr Raul Parnell and Dr Raz Schroeder  He is currently undergoing chemotherapy, most recent dose was last week  -continue outpatient follow-up at [facility] Hematology/Oncology    4  Acute kidney injury  On chronic CKD stage 3  Possibly pre renal related to patient's sepsis  Patient's creatinine was 1 85 upon admission; this has improved today to 0 8  Patient is following closely with Nephrology  -monitor BMP  -dose adjust antibiotics for renal function as needed  -continue close follow-up with Nephrology    5  Type 2 diabetes mellitus with retinopathy  No hemoglobin A1c available in patient's current medical record  Patient's glucose since admission has ranged between 81 and 194    -blood glucose management per primary service    6  Lactic acidosis  Now resolved  7  Diarrhea  This was likely a side effect to the PO Clindamycin he was using to treat his left great toe  Patient's stool C diff PCR was negative  His diarrhea has now resolved  No additional ID workup at this time  HISTORY OF PRESENT ILLNESS:  Reason for Consult:  Cellulitis of toe  HPI: Fernanda Gray is a 79y o  year old male who presented to the [facility] Emergency Department on 11/16/2018 with diarrhea  He reports he has been taking clindamycin for infection in his left great toe  Now he is experiencing constant diarrhea  Patient is also currently undergoing chemotherapy for lung cancer  Upon arrival to the ED the patient's temperature was 97 9° with a heart rate of 104  Patient's white blood cell count was 10 76  His lactic acid was 2 3  Patient's creatinine was 1 85 with a GFR of 37  Blood cultures were sent    He was taken for chest x-ray which showed pulmonary nodules and a right middle lobe linear opacity representing atelectasis versus pneumonia  He was taken for an x-ray of his left great toe which showed soft tissue swelling which could represent edema versus cellulitis  There was no osseous abnormality seen  Patient was started on IV cefepime and vancomycin  He was admitted for additional medical management  Since his admission the patient has been maintained on IV vancomycin  Flagyl was added as well  Patient's blood cultures have remained negative for over 24 hours  A wound culture was obtained  It is showing MSSA  Patient was assessed by vascular who does not believe there is an arterial component at this time  An arteriogram will be completed to confirm this  Podiatry has been consulted and are concerned for bony involvement  They ordered an MRI of the left foot which showed 1st proximal phalanx edema which is possibly representative of osteomyelitis  There was also erosive arthritic changes throughout the midfoot  Patient's stool was sent for C diff PCR which was negative  We have been asked for formal consult for cellulitis of the toe  Patient's past medical and surgical history significant for hypertension, Lyme disease, type 2 diabetes mellitus, rheumatoid arthritis, lung cancer, atherosclerotic heart disease, CKD stage 3, COPD, carpal tunnel syndrome, thrush, chemotherapy, iron deficiency anemia, elevated PSA, hyperlipidemia, gallbladder stones, Raynaud's disease, diverticulitis, diabetic retinopathy, prostate cancer, right arm mass, abdominal aortic aneurysm, and port placement  Patient has allergy to Ace inhibitors, ARBs, and Plaquenil  REVIEW OF SYSTEMS:  Patient has no acute complaints today but states he is frustrated that tomorrow he needs to have surgery  Reports they are going to amputate his toe  Patient denies nausea, vomiting, diarrhea, dysuria, shortness of breath, cough, chest pain, headaches, dizziness    He is currently not having any pain   A complete 12 point system-based review of systems is otherwise negative  PAST MEDICAL HISTORY:  Past Medical History:   Diagnosis Date    Diabetes mellitus ([facility] )     Hypertension     Lyme disease     Rheumatoid arthritis ([facility] )      History reviewed  No pertinent surgical history  FAMILY HISTORY:  Non-contributory    SOCIAL HISTORY:  Social History   History   Alcohol Use No     History   Drug Use No     History   Smoking Status    Former Smoker    Packs/day: 0 50    Years: 50 00    Types: Cigarettes   Smokeless Tobacco    Never Used     ALLERGIES:  Allergies   Allergen Reactions    Ace Inhibitors Swelling    Angiotensin Receptor Blockers Other (See Comments)     Elevated K+    Plaquenil [Hydroxychloroquine] Swelling     Tongue swelling     MEDICATIONS:  All current active medications have been reviewed      ANTIBIOTICS:  Flagyl 2  Vancomycin 4  Antibiotics 4    PHYSICAL EXAM:  Temp:  [97 6 °F (36 4 °C)-97 7 °F (36 5 °C)] 97 6 °F (36 4 °C)  HR:  [81-97] 86  Resp:  [18] 18  BP: (105-132)/(64-76) 115/64  SpO2:  [94 %-96 %] 95 %  Temp (24hrs), Av 6 °F (36 4 °C), Min:97 6 °F (36 4 °C), Max:97 7 °F (36 5 °C)  Current: Temperature: 97 6 °F (36 4 °C)    Intake/Output Summary (Last 24 hours) at 18 1135  Last data filed at 18 1300   Gross per 24 hour   Intake              240 ml   Output                0 ml   Net              240 ml     General Appearance:  Appearing elderly, nontoxic, and in no distress   Head:  Normocephalic, without obvious abnormality, atraumatic   Eyes:  Conjunctiva pink and sclera anicteric, both eyes   Nose: Nares normal, mucosa normal, no drainage   Throat: Oropharynx moist without lesions   Neck: Supple, symmetrical, no adenopathy, no tenderness/mass/nodules   Lungs:   Clear to auscultation bilaterally, respirations unlabored   Chest Wall:  No tenderness or deformity   Heart:  RRR; no murmur, rub or gallop   Abdomen:   Soft, non-tender, non-distended, positive bowel sounds    Extremities: No cyanosis or clubbing; swelling in the left distal foot, left DP and PT pulses are palpable   Skin: No rashes or lesions  Bulky dressing to the left distal foot is intact, mild breakthrough of yellow drainage   Lymph nodes: Cervical, supraclavicular nodes normal   Neurologic: Alert and oriented times 3, extremity strength 5/5 and symmetric     LABS, IMAGING, & OTHER STUDIES:  Lab Results:  I have personally reviewed pertinent labs  Results from last 7 days  Lab Units 11/19/18  0459 11/18/18 0455 11/17/18  0517   WBC Thousand/uL 9 51 6 58 7 71   HEMOGLOBIN g/dL 9 3* 9 1* 9 8*   PLATELETS Thousands/uL 48* 53* 84*       Results from last 7 days  Lab Units 11/19/18  0459  11/17/18  0517  11/16/18  2254   POTASSIUM mmol/L 2 6*  < > 4 0  < > 3 7   CHLORIDE mmol/L 109*  < > 109*  < > 107   CO2 mmol/L 21  < > 17*  < > 17*   BUN mg/dL 8  < > 21  < > 22   CREATININE mg/dL 0 80  < > 1 80*  < > 1 85*   EGFR ml/min/1 73sq m 93  < > 38  < > 37   CALCIUM mg/dL 7 1*  < > 7 1*  < > 8 0*   AST U/L  --   --  19  --  22   ALT U/L  --   --  14  --  19   ALK PHOS U/L  --   --  64  --  79   < > = values in this interval not displayed  Results from last 7 days  Lab Units 11/17/18  0950 11/17/18  0215 11/16/18  2254 11/16/18  2244   BLOOD CULTURE   --   --  No Growth at 24 hrs  No Growth at 24 hrs  GRAM STAIN RESULT   --  1+ Polys  2+ Gram positive cocci in pairs  Rare Gram negative rods  --   --    WOUND CULTURE   --  3+ Growth of Staphylococcus aureus*  --   --    C DIFF TOXIN B  NEGATIVE for C difficle toxin by PCR    --   --   --      Imaging Studies:   I have personally reviewed pertinent imaging study reports and images in PACS  MRI FOOT/FOREHEAD TOES LEFT WO CONTRAST 11/19/18  FINDINGS:  SUBCUTANEOUS TISSUES: Soft tissue edema seen at the dorsal aspect of the 1st toe    BONES: Flavia Patch is medullary marrow edema seen in the 1st distal phalanx   With associated mild expansion and thinning of the cord   Erosive changes seen at the dorsal and proximal aspect of the 1st proximal phalanx  FIRST MTP JOINT:  Degenerative changes seen at the 1st metatarsophalangeal joint     1st interphalangeal joint; joint effusion noted   T2 hyperintensity seen around the proximal aspect of the 1st distal phalanx adjacent to the area of foot dorsal erosive changes, as seen in image 10 series 5  SESAMOID BONES:  Intact  OTHER ARTICULAR SURFACE:  Arthritic changes are seen in the midfoot  Erosive changes seen in the midfoot in the lateral cuneiform, middle cuneiform and in the cuboid and at the base of the 5th metatarsal  Mild edema seen in the distal aspect of the 1st proximal phalanx probably reactive  PLANTAR FASCIA:  Intact  LISFRANC LIGAMENT:  Intact  FOREFOOT TENDONS: Intact  INTERMETATARSAL REGIONS: No bursitis or Flaherty's neuroma  MUSCULATURE: Intact  Impression:     Marrow edema seen in the distal aspect of the 1st proximal phalanx with associated soft tissue edema at the dorsum suggest osteomyelitis  Dorsal erosive changes seen at the proximal and distal aspect of the 1st distal phalanx along with the erosive and arthritic changes in the midfoot, evaluate for gout     XR TOE GREAT MIN 2 VIEWS 11/16/18  FINDINGS/IMPRESSION:  There is no acute fracture or dislocation   Bones appear intact  Soft tissue swelling of the 1st digit, consider edema versus cellulitis depending on the clinical setting    XR CHEST 2 VIEWS 11/16/18  FINDINGS:    Right-sided Mediport is seen which terminates in the distal SVC    No pneumothorax is seen   Several pulmonary nodules are seen in the right lower lung field, the largest measures approximately 8 mm in size   Additional pulmonary nodules are seen scattered in the bilateral lungs more prominent in the upper lung fields,   the largest measures approximately 1 4 cm on the left and 1 0 cm on the right   A linear opacity in the right middle lobe best appreciated on the lateral view could represent atelectasis versus airspace disease such as pneumonia   The lungs otherwise   appear grossly clear  Heart is not enlarged   The aorta is calcified and tortuous   Mediastinal contours otherwise appear unremarkable  Old appearing right-sided rib fractures noted superiorly   Old fracture of the midshaft of the right clavicle redemonstrated   Visualized bones otherwise appear intact   Cunningham screws of the right humeral head are incidentally noted and appear intact as   well   Impression:     Multiple pulmonary nodules as described, correlates with the patient's history of lung cancer  Right-sided Mediport in place  Linear opacity in the right middle lobe best appreciated on the lateral view could represent atelectasis versus airspace disease such as pneumonia depending on the clinical setting

## 2018-11-19 NOTE — PROGRESS NOTES
[facility] Progress Note  Patient: Aris Camacho 79 y o  male   MRN: [id number]  PCP: Tan Abdullahi MD  Unit/Bed#: [facility] Encounter: [id number]  Date Of Visit: 18    Assessment:    Principal Problem:    Cellulitis  Active Problems:    PRADEEP (acute kidney injury) ([facility] [address] )    High anion gap metabolic acidosis    Diabetes mellitus ([facility] )    HTN (hypertension)    Hypomagnesemia    Anemia      Plan:  Osteomyelitis of left great toe, left toe cellulitis  MRI of the foot reviewed  It showed toe osteomyelitis  Podiatry recommendations appreciated  Patient will be scheduled for toe amputation in OR tomorrow  Continue vancomycin and Flagyl at this time  Id consult pending  Monitor CBC and temps    Chronic kidney disease  Creatinine at baseline  Continue to monitor    Squamous cell carcinoma of right lung status post chemo  Stable  Continue outpatient chemo/oncology follow-up    Diarrhea  C diff negative  Will add probiotics  Replete electrolytes    Hypokalemia Likely secondary to diarrhea  Replete and monitor    VTE Pharmacologic Prophylaxis:   Pharmacologic: Heparin  Mechanical VTE Prophylaxis in Place: Yes    Patient Centered Rounds: I have performed bedside rounds with nursing staff today  Discussions with Specialists or Other Care Team Provider: podiatry, ID    Education and Discussions with Family / Patient: patient    Time Spent for Care: 30 minutes  More than 50% of total time spent on counseling and coordination of care as described above  Current Length of Stay: 3 day(s)    Current Patient Status: Inpatient   Certification Statement: The patient will continue to require additional inpatient hospital stay due to Surgery tomorrow    Discharge Plan:  He will be scheduled for surgery tomorrow    Code Status: Level 1 - Full Code      Subjective:   Patient seen and examined  Denies any acute complaints at this time      Objective:     Vitals:   Temp (24hrs), Av 7 °F (36 5 °C), Min:97 6 °F (36 4 °C), Max:98 °F (36 7 °C)    Temp:  [97 6 °F (36 4 °C)-98 °F (36 7 °C)] 97 6 °F (36 4 °C)  HR:  [] 86  Resp:  [18] 18  BP: (105-132)/(64-83) 115/64  SpO2:  [94 %-96 %] 95 %  Body mass index is 25 88 kg/m²  Input and Output Summary (last 24 hours): Intake/Output Summary (Last 24 hours) at 11/19/18 1004  Last data filed at 11/18/18 1300   Gross per 24 hour   Intake              240 ml   Output                0 ml   Net              240 ml       Physical Exam:     Alert oriented x3  Mucous membranes are moist  Lungs are clear to auscultation  Heart sounds are regular S1-S2  Abdomen soft nontender  Extremities right toe wound wrapped in surgical bandage    Additional Data:     Labs:      Results from last 7 days  Lab Units 11/19/18  0459   WBC Thousand/uL 9 51   HEMOGLOBIN g/dL 9 3*   HEMATOCRIT % 28 3*   PLATELETS Thousands/uL 48*   NEUTROS PCT % 83*   LYMPHS PCT % 6*   MONOS PCT % 8   EOS PCT % 1       Results from last 7 days  Lab Units 11/19/18  0459  11/17/18  0517   POTASSIUM mmol/L 2 6*  < > 4 0   CHLORIDE mmol/L 109*  < > 109*   CO2 mmol/L 21  < > 17*   BUN mg/dL 8  < > 21   CREATININE mg/dL 0 80  < > 1 80*   CALCIUM mg/dL 7 1*  < > 7 1*   ALK PHOS U/L  --   --  64   ALT U/L  --   --  14   AST U/L  --   --  19   < > = values in this interval not displayed  Results from last 7 days  Lab Units 11/16/18  2254   INR  1 79*       * I Have Reviewed All Lab Data Listed Above  * Additional Pertinent Lab Tests Reviewed:  All [place] Admission Reviewed    Imaging:    Imaging Reports Reviewed Today Include:  MRI of the foot  Imaging Personally Reviewed by Myself Includes:     Recent Cultures (last 7 days):      MRI of the    Last 24 Hours Medication List:     Current Facility-Administered Medications:  ascorbic acid 250 mg Oral Daily Juli Mchugh MD    aspirin 81 mg Oral Daily Juli Mchugh MD    atenolol 25 mg Oral BID Juli Mchugh MD    atorvastatin 40 mg Oral Daily With [place] MD Yuliana    fenofibrate 48 mg Oral QPM Bud Marte MD    ferrous sulfate 325 mg Oral BID With Meals Bud Marte MD    folic acid 1 mg Oral Daily uBd Marte MD    heparin (porcine) 5,000 Units Subcutaneous [facility] Bud Marte MD    insulin lispro 1-5 Units Subcutaneous TID Manoj Alberto MD    insulin lispro 1-5 Units Subcutaneous HS Bud Marte MD    isosorbide dinitrate 30 mg Oral QPM Bud Marte MD    leflunomide 20 mg Oral Daily Bud Marte MD    metroNIDAZOLE 500 mg Oral [facility] Jenelle Waddell MD    pantoprazole 40 mg Oral Early Morning Bud aMrte MD    potassium chloride 20 mEq Intravenous Q2H Lori Limon MD Last Rate: 20 mEq (11/19/18 0804)   tamsulosin 0 4 mg Oral Early Morning Bud Marte MD    umeclidinium-vilanterol 1 puff Inhalation Daily Bud Marte MD    vancomycin 10 mg/kg Intravenous Q12H Bud Marte MD Last Rate: 750 mg (11/18/18 2325)        Today, Patient Was Seen By: Jenelle Waddell MD    ** Please Note: Dragon 360 Dictation voice to text software may have been used in the creation of this document   **

## 2018-11-20 ENCOUNTER — APPOINTMENT (INPATIENT)
Dept: RADIOLOGY | Facility: HOSPITAL | Age: 68
DRG: 617 | End: 2018-11-20
Payer: MEDICARE

## 2018-11-20 ENCOUNTER — ANESTHESIA (INPATIENT)
Dept: PERIOP | Facility: HOSPITAL | Age: 68
DRG: 617 | End: 2018-11-20
Payer: MEDICARE

## 2018-11-20 LAB
ABO GROUP BLD: NORMAL
ANION GAP SERPL CALCULATED.3IONS-SCNC: 11 MMOL/L (ref 4–13)
BASOPHILS # BLD AUTO: 0.02 THOUSANDS/ΜL (ref 0–0.1)
BASOPHILS NFR BLD AUTO: 0 % (ref 0–1)
BLD GP AB SCN SERPL QL: NEGATIVE
BUN SERPL-MCNC: 7 MG/DL (ref 5–25)
CALCIUM SERPL-MCNC: 7.4 MG/DL (ref 8.3–10.1)
CHLORIDE SERPL-SCNC: 111 MMOL/L (ref 100–108)
CO2 SERPL-SCNC: 24 MMOL/L (ref 21–32)
CREAT SERPL-MCNC: 0.76 MG/DL (ref 0.6–1.3)
EOSINOPHIL # BLD AUTO: 0.08 THOUSAND/ΜL (ref 0–0.61)
EOSINOPHIL NFR BLD AUTO: 1 % (ref 0–6)
ERYTHROCYTE [DISTWIDTH] IN BLOOD BY AUTOMATED COUNT: 17.2 % (ref 11.6–15.1)
GFR SERPL CREATININE-BSD FRML MDRD: 95 ML/MIN/1.73SQ M
GLUCOSE SERPL-MCNC: 111 MG/DL (ref 65–140)
GLUCOSE SERPL-MCNC: 125 MG/DL (ref 65–140)
GLUCOSE SERPL-MCNC: 334 MG/DL (ref 65–140)
GLUCOSE SERPL-MCNC: 411 MG/DL (ref 65–140)
HCT VFR BLD AUTO: 27.9 % (ref 36.5–49.3)
HGB BLD-MCNC: 9.3 G/DL (ref 12–17)
IMM GRANULOCYTES # BLD AUTO: 0.06 THOUSAND/UL (ref 0–0.2)
IMM GRANULOCYTES NFR BLD AUTO: 1 % (ref 0–2)
LYMPHOCYTES # BLD AUTO: 0.62 THOUSANDS/ΜL (ref 0.6–4.47)
LYMPHOCYTES NFR BLD AUTO: 7 % (ref 14–44)
MCH RBC QN AUTO: 30.7 PG (ref 26.8–34.3)
MCHC RBC AUTO-ENTMCNC: 33.3 G/DL (ref 31.4–37.4)
MCV RBC AUTO: 92 FL (ref 82–98)
MONOCYTES # BLD AUTO: 0.74 THOUSAND/ΜL (ref 0.17–1.22)
MONOCYTES NFR BLD AUTO: 8 % (ref 4–12)
NEUTROPHILS # BLD AUTO: 7.69 THOUSANDS/ΜL (ref 1.85–7.62)
NEUTS SEG NFR BLD AUTO: 83 % (ref 43–75)
NRBC BLD AUTO-RTO: 0 /100 WBCS
PLATELET # BLD AUTO: 37 THOUSANDS/UL (ref 149–390)
PMV BLD AUTO: 9.4 FL (ref 8.9–12.7)
POTASSIUM SERPL-SCNC: 3.1 MMOL/L (ref 3.5–5.3)
RBC # BLD AUTO: 3.03 MILLION/UL (ref 3.88–5.62)
RH BLD: POSITIVE
SODIUM SERPL-SCNC: 146 MMOL/L (ref 136–145)
SPECIMEN EXPIRATION DATE: NORMAL
WBC # BLD AUTO: 9.21 THOUSAND/UL (ref 4.31–10.16)

## 2018-11-20 PROCEDURE — 88300 SURGICAL PATH GROSS: CPT | Performed by: PATHOLOGY

## 2018-11-20 PROCEDURE — 82948 REAGENT STRIP/BLOOD GLUCOSE: CPT

## 2018-11-20 PROCEDURE — 86850 RBC ANTIBODY SCREEN: CPT | Performed by: STUDENT IN AN ORGANIZED HEALTH CARE EDUCATION/TRAINING PROGRAM

## 2018-11-20 PROCEDURE — 99232 SBSQ HOSP IP/OBS MODERATE 35: CPT | Performed by: STUDENT IN AN ORGANIZED HEALTH CARE EDUCATION/TRAINING PROGRAM

## 2018-11-20 PROCEDURE — P9037 PLATE PHERES LEUKOREDU IRRAD: HCPCS

## 2018-11-20 PROCEDURE — 85025 COMPLETE CBC W/AUTO DIFF WBC: CPT | Performed by: INTERNAL MEDICINE

## 2018-11-20 PROCEDURE — 99232 SBSQ HOSP IP/OBS MODERATE 35: CPT | Performed by: INTERNAL MEDICINE

## 2018-11-20 PROCEDURE — 30233R1 TRANSFUSION OF NONAUTOLOGOUS PLATELETS INTO PERIPHERAL VEIN, PERCUTANEOUS APPROACH: ICD-10-PCS | Performed by: STUDENT IN AN ORGANIZED HEALTH CARE EDUCATION/TRAINING PROGRAM

## 2018-11-20 PROCEDURE — 0Y6Q0Z0 DETACHMENT AT LEFT 1ST TOE, COMPLETE, OPEN APPROACH: ICD-10-PCS | Performed by: PODIATRIST

## 2018-11-20 PROCEDURE — 86901 BLOOD TYPING SEROLOGIC RH(D): CPT | Performed by: STUDENT IN AN ORGANIZED HEALTH CARE EDUCATION/TRAINING PROGRAM

## 2018-11-20 PROCEDURE — 86900 BLOOD TYPING SEROLOGIC ABO: CPT | Performed by: STUDENT IN AN ORGANIZED HEALTH CARE EDUCATION/TRAINING PROGRAM

## 2018-11-20 PROCEDURE — 73630 X-RAY EXAM OF FOOT: CPT

## 2018-11-20 PROCEDURE — 80048 BASIC METABOLIC PNL TOTAL CA: CPT | Performed by: INTERNAL MEDICINE

## 2018-11-20 RX ORDER — FENTANYL CITRATE/PF 50 MCG/ML
25 SYRINGE (ML) INJECTION
Status: DISCONTINUED | OUTPATIENT
Start: 2018-11-20 | End: 2018-11-20 | Stop reason: HOSPADM

## 2018-11-20 RX ORDER — POTASSIUM CHLORIDE 14.9 MG/ML
20 INJECTION INTRAVENOUS
Status: ACTIVE | OUTPATIENT
Start: 2018-11-20 | End: 2018-11-20

## 2018-11-20 RX ORDER — SODIUM CHLORIDE, SODIUM LACTATE, POTASSIUM CHLORIDE, CALCIUM CHLORIDE 600; 310; 30; 20 MG/100ML; MG/100ML; MG/100ML; MG/100ML
INJECTION, SOLUTION INTRAVENOUS CONTINUOUS PRN
Status: DISCONTINUED | OUTPATIENT
Start: 2018-11-20 | End: 2018-11-20 | Stop reason: SURG

## 2018-11-20 RX ORDER — PROPOFOL 10 MG/ML
INJECTION, EMULSION INTRAVENOUS CONTINUOUS PRN
Status: DISCONTINUED | OUTPATIENT
Start: 2018-11-20 | End: 2018-11-20 | Stop reason: SURG

## 2018-11-20 RX ORDER — SODIUM CHLORIDE, SODIUM LACTATE, POTASSIUM CHLORIDE, CALCIUM CHLORIDE 600; 310; 30; 20 MG/100ML; MG/100ML; MG/100ML; MG/100ML
50 INJECTION, SOLUTION INTRAVENOUS CONTINUOUS
Status: DISCONTINUED | OUTPATIENT
Start: 2018-11-20 | End: 2018-11-21 | Stop reason: HOSPADM

## 2018-11-20 RX ORDER — PROPOFOL 10 MG/ML
INJECTION, EMULSION INTRAVENOUS AS NEEDED
Status: DISCONTINUED | OUTPATIENT
Start: 2018-11-20 | End: 2018-11-20 | Stop reason: SURG

## 2018-11-20 RX ORDER — FENTANYL CITRATE 50 UG/ML
INJECTION, SOLUTION INTRAMUSCULAR; INTRAVENOUS AS NEEDED
Status: DISCONTINUED | OUTPATIENT
Start: 2018-11-20 | End: 2018-11-20 | Stop reason: SURG

## 2018-11-20 RX ORDER — ZOLPIDEM TARTRATE 5 MG/1
5 TABLET ORAL
Status: DISCONTINUED | OUTPATIENT
Start: 2018-11-20 | End: 2018-11-21 | Stop reason: HOSPADM

## 2018-11-20 RX ADMIN — OXYCODONE HYDROCHLORIDE AND ACETAMINOPHEN 250 MG: 500 TABLET ORAL at 08:13

## 2018-11-20 RX ADMIN — ATENOLOL 25 MG: 25 TABLET ORAL at 18:07

## 2018-11-20 RX ADMIN — FENTANYL CITRATE 50 MCG: 50 INJECTION, SOLUTION INTRAMUSCULAR; INTRAVENOUS at 12:45

## 2018-11-20 RX ADMIN — Medication 250 MG: at 08:12

## 2018-11-20 RX ADMIN — ISOSORBIDE DINITRATE 30 MG: 10 TABLET ORAL at 18:03

## 2018-11-20 RX ADMIN — CEFAZOLIN SODIUM 1000 MG: 1 SOLUTION INTRAVENOUS at 18:01

## 2018-11-20 RX ADMIN — INSULIN LISPRO 4 UNITS: 100 INJECTION, SOLUTION INTRAVENOUS; SUBCUTANEOUS at 19:24

## 2018-11-20 RX ADMIN — CEFAZOLIN SODIUM 1000 MG: 1 SOLUTION INTRAVENOUS at 08:08

## 2018-11-20 RX ADMIN — TAMSULOSIN HYDROCHLORIDE 0.4 MG: 0.4 CAPSULE ORAL at 05:16

## 2018-11-20 RX ADMIN — ATENOLOL 25 MG: 25 TABLET ORAL at 08:12

## 2018-11-20 RX ADMIN — INSULIN LISPRO 3 UNITS: 100 INJECTION, SOLUTION INTRAVENOUS; SUBCUTANEOUS at 22:27

## 2018-11-20 RX ADMIN — Medication 250 MG: at 18:04

## 2018-11-20 RX ADMIN — FOLIC ACID 1 MG: 1 TABLET ORAL at 08:12

## 2018-11-20 RX ADMIN — PROPOFOL 50 MCG/KG/MIN: 10 INJECTION, EMULSION INTRAVENOUS at 12:39

## 2018-11-20 RX ADMIN — PROPOFOL 30 MG: 10 INJECTION, EMULSION INTRAVENOUS at 12:39

## 2018-11-20 RX ADMIN — FENOFIBRATE 48 MG: 48 TABLET, FILM COATED ORAL at 18:04

## 2018-11-20 RX ADMIN — PANTOPRAZOLE SODIUM 40 MG: 40 TABLET, DELAYED RELEASE ORAL at 05:16

## 2018-11-20 RX ADMIN — FERROUS SULFATE TAB 325 MG (65 MG ELEMENTAL FE) 325 MG: 325 (65 FE) TAB at 08:13

## 2018-11-20 RX ADMIN — SODIUM CHLORIDE, SODIUM LACTATE, POTASSIUM CHLORIDE, AND CALCIUM CHLORIDE: .6; .31; .03; .02 INJECTION, SOLUTION INTRAVENOUS at 12:35

## 2018-11-20 RX ADMIN — ATORVASTATIN CALCIUM 40 MG: 40 TABLET, FILM COATED ORAL at 18:03

## 2018-11-20 RX ADMIN — MELATONIN TAB 3 MG 6 MG: 3 TAB at 21:57

## 2018-11-20 RX ADMIN — FERROUS SULFATE TAB 325 MG (65 MG ELEMENTAL FE) 325 MG: 325 (65 FE) TAB at 18:04

## 2018-11-20 RX ADMIN — FENTANYL CITRATE 50 MCG: 50 INJECTION, SOLUTION INTRAMUSCULAR; INTRAVENOUS at 12:39

## 2018-11-20 RX ADMIN — SODIUM CHLORIDE, SODIUM LACTATE, POTASSIUM CHLORIDE, AND CALCIUM CHLORIDE 50 ML/HR: .6; .31; .03; .02 INJECTION, SOLUTION INTRAVENOUS at 17:59

## 2018-11-20 NOTE — UTILIZATION REVIEW
Continued Stay Review    Date: 11/20    Vital Signs: /88 (BP Location: Left arm)   Pulse 88   Temp 98 7 °F (37 1 °C) (Oral)   Resp 18   Ht 5' 10 5" (1 791 m)   Wt 81 6 kg (179 lb 14 3 oz)   SpO2 93%   BMI 25 81 kg/m²     Medications:   Scheduled Meds:   Current Facility-Administered Medications:  ascorbic acid 250 mg Oral Daily     aspirin 81 mg Oral Daily     atenolol 25 mg Oral BID     atorvastatin 40 mg Oral Daily With Dinner     cefazolin 1,000 mg Intravenous Q8H  Last Rate: 1,000 mg (11/20/18 0808)   fenofibrate 48 mg Oral QPM     ferrous sulfate 325 mg Oral BID With Meals     folic acid 1 mg Oral Daily     insulin lispro 1-5 Units Subcutaneous TID AC     insulin lispro 1-5 Units Subcutaneous HS     isosorbide dinitrate 30 mg Oral QPM     lactated ringers 50 mL/hr Intravenous Continuous     leflunomide 20 mg Oral Daily     melatonin 6 mg Oral HS     pantoprazole 40 mg Oral Early Morning     saccharomyces boulardii 250 mg Oral BID     tamsulosin 0 4 mg Oral Early Morning     umeclidinium-vilanterol 1 puff Inhalation Daily       Abnormal Labs/Diagnostic Results: Na   146, K  3 1, cl 111, christi  7 4, H&H   9 3  27 9, plt  37    Age/Sex: 79 y o  male     Discharge Plan: TBD     ID note 11/20  Impression/Plan:  1  Left great toe cellulitis  Secondary to left great toe diabetic foot ulcer with osteomyelitis  Wound culture was obtained and final results showed MSSA   Patient's blood cultures are negative after 48 hr   The patient is following closely with Podiatry will proceed with a left great toe amputation in the OR later today   Hopefully this will provide surgical cure   Fortunately the patient has remained clinically stable and nontoxic   He is afebrile without leukocytosis  Patient is currently on IV cefazolin and is tolerating without difficulty      -continue IV cefazolin  -anticipate short postop course of antibiotics  -monitor CBC and BMP  -followup blood cultures  -monitor vitals  -serial skin exams  -continue close follow-up with Podiatry   2  Left great toe diabetic foot ulcer   With surrounding cellulitis and osteomyelitis  MRI of the left foot on 11/19/2018 showed marrow edema in the 1st proximal phalanx with associated soft tissue edema at the dorsum which was suggestive of osteomyelitis   There were also erosive changes and arthritic changes in the midfoot   Patient is following with Podiatry and will be going to the OR later today for amputation of the left great toe   -antibiotic as above  -monitor CBC and BMP  -monitor vitals  -serial skin exams  -wound care per Podiatry  -continue close follow-up with Podiatry   3  Squamous cell carcinoma of the right lung   Patient with abnormal chest CT in June 2018 which was followed up by a PET scan in August 2018 with confirmed malignancy   Patient's pulmonologist's [facility]  Patient underwent fine-needle aspiration which showed moderately differentiated squamous cell carcinoma   Patient follows with Dr Orin Phillips and Dr Carmen King is currently undergoing chemotherapy, most recent dose was last week  Future chemo sessions are on hold given his acute infection   -[facility]   4  Acute kidney injury  On chronic CKD stage 3  Possibly pre renal related to patient's sepsis  Patient's creatinine was 1 85 upon admission; this has improved today to 0 76   Patient is following closely with Nephrology    -monitor BMP  -dose adjust antibiotics for renal function as needed  -continue close follow-up with Nephrology   5  Type 2 diabetes mellitus with retinopathy   No hemoglobin A1c available in patient's current medical record   Patient's glucose since admission has ranged between 81 and 197    -blood glucose management per primary service   6  Lactic acidosis   Now resolved    7  Diarrhea   This was likely a side effect to the PO Clindamycin he was using to treat his left great toe   Patient's stool C diff PCR was negative   His diarrhea has now resolved   No additional ID workup at this time      Antibiotics:  Cefazolin 2  Antibiotics 5    OP note  11/20  AMPUTATION GREAT TOE (Left)

## 2018-11-20 NOTE — PLAN OF CARE
DISCHARGE PLANNING     Discharge to home or other facility with appropriate resources Progressing        DISCHARGE PLANNING - CARE MANAGEMENT     Discharge to post-acute care or home with appropriate resources Progressing        INFECTION - ADULT     Absence or prevention of progression during hospitalization Progressing     Absence of fever/infection during neutropenic period Progressing        METABOLIC, FLUID AND ELECTROLYTES - ADULT     Electrolytes maintained within normal limits Progressing     Fluid balance maintained Progressing     Glucose maintained within target range Progressing        PAIN - ADULT     Verbalizes/displays adequate comfort level or baseline comfort level Progressing        Potential for Falls     Patient will remain free of falls Progressing        SAFETY ADULT     Maintain or return to baseline ADL function Progressing     Maintain or return mobility status to optimal level Progressing        SKIN/TISSUE INTEGRITY - ADULT     Skin integrity remains intact Progressing     Incision(s), wounds(s) or drain site(s) healing without S/S of infection Progressing     Oral mucous membranes remain intact Progressing

## 2018-11-20 NOTE — PROGRESS NOTES
Progress Note - Infectious Disease   Nahid [place] 79 y o  male MRN: [id number]  Unit/Bed#: -01 Encounter: [id number]      Impression/Plan:  1  Left great toe cellulitis  Secondary to left great toe diabetic foot ulcer with osteomyelitis  Wound culture was obtained and final results showed MSSA  Patient's blood cultures are negative after 48 hr  The patient is following closely with Podiatry will proceed with a left great toe amputation in the OR later today  Hopefully this will provide surgical cure  Fortunately the patient has remained clinically stable and nontoxic  He is afebrile without leukocytosis  Patient is currently on IV cefazolin and is tolerating without difficulty  -continue IV cefazolin  -anticipate short postop course of antibiotics  -monitor CBC and BMP  -followup blood cultures  -monitor vitals  -serial skin exams  -continue close follow-up with Podiatry     2  Left great toe diabetic foot ulcer  With surrounding cellulitis and osteomyelitis  MRI of the left foot on 11/19/2018 showed marrow edema in the 1st proximal phalanx with associated soft tissue edema at the dorsum which was suggestive of osteomyelitis  There were also erosive changes and arthritic changes in the midfoot  Patient is following with Podiatry and will be going to the OR later today for amputation of the left great toe   -antibiotic as above  -monitor CBC and BMP  -monitor vitals  -serial skin exams  -wound care per Podiatry  -continue close follow-up with Podiatry     3  Squamous cell carcinoma of the right lung  Patient with abnormal chest CT in June 2018 which was followed up by a PET scan in August 2018 with confirmed malignancy  Patient's pulmonologist's has referred him to the [facility] Hematology/Oncology office  Patient underwent fine-needle aspiration which showed moderately differentiated squamous cell carcinoma  Patient follows with Dr Richard Hernandez and Dr Luis Enrique Recio    He is currently undergoing chemotherapy, most recent dose was last week  Future chemo sessions are on hold given his acute infection   -continue outpatient follow-up at [facility] Hematology/Oncology     4  Acute kidney injury  On chronic CKD stage 3  Possibly pre renal related to patient's sepsis  Patient's creatinine was 1 85 upon admission; this has improved today to 0 76  Patient is following closely with Nephrology  -monitor BMP  -dose adjust antibiotics for renal function as needed  -continue close follow-up with Nephrology     5  Type 2 diabetes mellitus with retinopathy  No hemoglobin A1c available in patient's current medical record  Patient's glucose since admission has ranged between 81 and 197    -blood glucose management per primary service     6  Lactic acidosis  Now resolved      7  Diarrhea  This was likely a side effect to the PO Clindamycin he was using to treat his left great toe  Patient's stool C diff PCR was negative  His diarrhea has now resolved  No additional ID workup at this time  Antibiotics:  Cefazolin 2  Antibiotics 5    Subjective:  Patient has no acute complaints today  He denies fever, chills, sweats; no nausea, vomiting; reports his diarrhea is better today; no cough, shortness of breath; no pain  Reports that his toe amputation has been scheduled for later today  No new symptoms  Objective:  Vitals:  Temp:  [97 1 °F (36 2 °C)-98 7 °F (37 1 °C)] 97 1 °F (36 2 °C)  HR:  [65-91] 65  Resp:  [17-22] 17  BP: (110-152)/(57-79) 152/79  SpO2:  [93 %-96 %] 93 %  Temp (24hrs), Av 8 °F (36 6 °C), Min:97 1 °F (36 2 °C), Max:98 7 °F (37 1 °C)  Current: Temperature: (!) 97 1 °F (36 2 °C)    Physical Exam:   General Appearance:  Alert, interactive, nontoxic, no acute distress  Eyes: Erythema to the right lower lid   Throat: Oropharynx moist without lesions      Lungs:   Clear to auscultation bilaterally; no wheezes, rhonchi or rales; respirations unlabored   Heart:  RRR; no murmur, rub or gallop   Abdomen:   Soft, non-tender, non-distended, positive bowel sounds  Extremities: No clubbing or cyanosis; trace edema of the left distal foot; left DP and PT pulses remained palpable   Skin: No new rashes or lesions  Bulky dressing intact to left foot, small area of breakthrough yellow drainage noted     Labs, Imaging, & Other studies:   All pertinent labs and imaging studies were personally reviewed    Results from last 7 days  Lab Units 11/20/18  0453 11/19/18  0459 11/18/18  0455   WBC Thousand/uL 9 21 9 51 6 58   HEMOGLOBIN g/dL 9 3* 9 3* 9 1*   PLATELETS Thousands/uL 37* 48* 53*       Results from last 7 days  Lab Units 11/20/18  0453  11/17/18  0517  11/16/18  2254   POTASSIUM mmol/L 3 1*  < > 4 0  < > 3 7   CHLORIDE mmol/L 111*  < > 109*  < > 107   CO2 mmol/L 24  < > 17*  < > 17*   BUN mg/dL 7  < > 21  < > 22   CREATININE mg/dL 0 76  < > 1 80*  < > 1 85*   EGFR ml/min/1 73sq m 95  < > 38  < > 37   CALCIUM mg/dL 7 4*  < > 7 1*  < > 8 0*   AST U/L  --   --  19  --  22   ALT U/L  --   --  14  --  19   ALK PHOS U/L  --   --  64  --  79   < > = values in this interval not displayed  Results from last 7 days  Lab Units 11/17/18  0950 11/17/18  0215 11/16/18  2254 11/16/18  2244   BLOOD CULTURE   --   --  No Growth at 72 hrs  No Growth at 72 hrs     GRAM STAIN RESULT   --  1+ Polys  2+ Gram positive cocci in pairs  Rare Gram negative rods  --   --    WOUND CULTURE   --  3+ Growth of Staphylococcus aureus*  --   --    C DIFF TOXIN B  NEGATIVE for C difficle toxin by PCR    --   --   --

## 2018-11-20 NOTE — ANESTHESIA PREPROCEDURE EVALUATION
Review of Systems/Medical History  Patient summary reviewed  Chart reviewed  No history of anesthetic complications     Cardiovascular  Exercise tolerance (METS): >4,  Hypertension ,    Pulmonary  Smoker ex-smoker  , COPD moderate- medication dependent , No recent URI ,   Comment: Squamous cell Ca of lung, currently receiving chemo (last Thursday)     GI/Hepatic      Comment: Confirmed NPO appropriate          Endo/Other  Diabetes type 2 Insulin,      GYN       Hematology  Anemia ,  Thrombocytopenia (suspect consumptive versus drug induced, s/p platelet transfusion prior to procedure),    Musculoskeletal  Rheumatoid arthritis ,   Arthritis     Neurology      Comment: 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 Psychology   Negative psychology ROS              Physical Exam    Airway    Mallampati score: III  TM Distance: >3 FB  Neck ROM: full     Dental   upper dentures and lower dentures,     Cardiovascular  Rhythm: regular, Rate: abnormal,     Pulmonary  Decreased breath sounds, No wheezes,     Other Findings        Anesthesia Plan  ASA Score- 3     Anesthesia Type- IV sedation with anesthesia with ASA Monitors  Additional Monitors:   Airway Plan:     Comment: I discussed the risks and benefits of IV sedation anesthesia including the possibility of the need to convert to general anesthesia and the potential risk of awareness  The patient was given the opportunity to ask questions, which were answered        Plan Factors-    Induction- intravenous  Postoperative Plan-     Informed Consent- Anesthetic plan and risks discussed with patient and spouse              Lab Results   Component Value Date    WBC 9 21 11/20/2018    HGB 9 3 (L) 11/20/2018    HCT 27 9 (L) 11/20/2018    MCV 92 11/20/2018    PLT 37 (LL) 11/20/2018     Lab Results   Component Value Date    CALCIUM 7 4 (L) 11/20/2018    K 3 1 (L) 11/20/2018    CO2 24 11/20/2018     (H) 11/20/2018    BUN 7 11/20/2018    CREATININE 0 76 11/20/2018     Lab Results Component Value Date    ALT 14 11/17/2018    AST 19 11/17/2018    ALKPHOS 64 11/17/2018     Lab Results   Component Value Date    INR 1 79 (H) 11/16/2018    PROTIME 20 5 (H) 11/16/2018     No results found for: HGBA1C

## 2018-11-21 VITALS
WEIGHT: 179.9 LBS | TEMPERATURE: 98.8 F | BODY MASS INDEX: 25.19 KG/M2 | OXYGEN SATURATION: 94 % | DIASTOLIC BLOOD PRESSURE: 78 MMHG | RESPIRATION RATE: 18 BRPM | HEART RATE: 84 BPM | SYSTOLIC BLOOD PRESSURE: 163 MMHG | HEIGHT: 71 IN

## 2018-11-21 PROBLEM — E83.42 HYPOMAGNESEMIA: Status: RESOLVED | Noted: 2018-11-17 | Resolved: 2018-11-21

## 2018-11-21 PROBLEM — E87.29 HIGH ANION GAP METABOLIC ACIDOSIS: Status: RESOLVED | Noted: 2018-11-17 | Resolved: 2018-11-21

## 2018-11-21 PROBLEM — E87.2 HIGH ANION GAP METABOLIC ACIDOSIS: Status: RESOLVED | Noted: 2018-11-17 | Resolved: 2018-11-21

## 2018-11-21 PROBLEM — N17.9 AKI (ACUTE KIDNEY INJURY) (HCC): Status: RESOLVED | Noted: 2018-11-17 | Resolved: 2018-11-21

## 2018-11-21 LAB
ABO GROUP BLD BPU: NORMAL
ABO GROUP BLD BPU: NORMAL
ANION GAP SERPL CALCULATED.3IONS-SCNC: 13 MMOL/L (ref 4–13)
ANION GAP SERPL CALCULATED.3IONS-SCNC: 13 MMOL/L (ref 4–13)
BASOPHILS # BLD AUTO: 0.02 THOUSANDS/ΜL (ref 0–0.1)
BASOPHILS NFR BLD AUTO: 0 % (ref 0–1)
BPU ID: NORMAL
BPU ID: NORMAL
BUN SERPL-MCNC: 7 MG/DL (ref 5–25)
BUN SERPL-MCNC: 8 MG/DL (ref 5–25)
CALCIUM SERPL-MCNC: 7.7 MG/DL (ref 8.3–10.1)
CALCIUM SERPL-MCNC: 7.8 MG/DL (ref 8.3–10.1)
CHLORIDE SERPL-SCNC: 107 MMOL/L (ref 100–108)
CHLORIDE SERPL-SCNC: 108 MMOL/L (ref 100–108)
CO2 SERPL-SCNC: 24 MMOL/L (ref 21–32)
CO2 SERPL-SCNC: 25 MMOL/L (ref 21–32)
CREAT SERPL-MCNC: 0.75 MG/DL (ref 0.6–1.3)
CREAT SERPL-MCNC: 0.82 MG/DL (ref 0.6–1.3)
EOSINOPHIL # BLD AUTO: 0.08 THOUSAND/ΜL (ref 0–0.61)
EOSINOPHIL NFR BLD AUTO: 1 % (ref 0–6)
ERYTHROCYTE [DISTWIDTH] IN BLOOD BY AUTOMATED COUNT: 17.6 % (ref 11.6–15.1)
GFR SERPL CREATININE-BSD FRML MDRD: 92 ML/MIN/1.73SQ M
GFR SERPL CREATININE-BSD FRML MDRD: 96 ML/MIN/1.73SQ M
GLUCOSE SERPL-MCNC: 153 MG/DL (ref 65–140)
GLUCOSE SERPL-MCNC: 174 MG/DL (ref 65–140)
GLUCOSE SERPL-MCNC: 200 MG/DL (ref 65–140)
GLUCOSE SERPL-MCNC: 311 MG/DL (ref 65–140)
HCT VFR BLD AUTO: 27.8 % (ref 36.5–49.3)
HGB BLD-MCNC: 9.2 G/DL (ref 12–17)
IMM GRANULOCYTES # BLD AUTO: 0.02 THOUSAND/UL (ref 0–0.2)
IMM GRANULOCYTES NFR BLD AUTO: 0 % (ref 0–2)
LYMPHOCYTES # BLD AUTO: 0.66 THOUSANDS/ΜL (ref 0.6–4.47)
LYMPHOCYTES NFR BLD AUTO: 10 % (ref 14–44)
MCH RBC QN AUTO: 30.3 PG (ref 26.8–34.3)
MCHC RBC AUTO-ENTMCNC: 33.1 G/DL (ref 31.4–37.4)
MCV RBC AUTO: 91 FL (ref 82–98)
MONOCYTES # BLD AUTO: 0.82 THOUSAND/ΜL (ref 0.17–1.22)
MONOCYTES NFR BLD AUTO: 13 % (ref 4–12)
NEUTROPHILS # BLD AUTO: 4.93 THOUSANDS/ΜL (ref 1.85–7.62)
NEUTS SEG NFR BLD AUTO: 76 % (ref 43–75)
NRBC BLD AUTO-RTO: 0 /100 WBCS
PLATELET # BLD AUTO: 102 THOUSANDS/UL (ref 149–390)
PMV BLD AUTO: 10.6 FL (ref 8.9–12.7)
POTASSIUM SERPL-SCNC: 2.8 MMOL/L (ref 3.5–5.3)
POTASSIUM SERPL-SCNC: 3.3 MMOL/L (ref 3.5–5.3)
RBC # BLD AUTO: 3.04 MILLION/UL (ref 3.88–5.62)
SODIUM SERPL-SCNC: 145 MMOL/L (ref 136–145)
SODIUM SERPL-SCNC: 145 MMOL/L (ref 136–145)
UNIT DISPENSE STATUS: NORMAL
UNIT DISPENSE STATUS: NORMAL
UNIT PRODUCT CODE: NORMAL
UNIT PRODUCT CODE: NORMAL
UNIT RH: NORMAL
UNIT RH: NORMAL
WBC # BLD AUTO: 6.53 THOUSAND/UL (ref 4.31–10.16)

## 2018-11-21 PROCEDURE — 99239 HOSP IP/OBS DSCHRG MGMT >30: CPT | Performed by: STUDENT IN AN ORGANIZED HEALTH CARE EDUCATION/TRAINING PROGRAM

## 2018-11-21 PROCEDURE — 80048 BASIC METABOLIC PNL TOTAL CA: CPT | Performed by: INTERNAL MEDICINE

## 2018-11-21 PROCEDURE — 80048 BASIC METABOLIC PNL TOTAL CA: CPT | Performed by: STUDENT IN AN ORGANIZED HEALTH CARE EDUCATION/TRAINING PROGRAM

## 2018-11-21 PROCEDURE — 99232 SBSQ HOSP IP/OBS MODERATE 35: CPT | Performed by: INTERNAL MEDICINE

## 2018-11-21 PROCEDURE — 85025 COMPLETE CBC W/AUTO DIFF WBC: CPT | Performed by: INTERNAL MEDICINE

## 2018-11-21 PROCEDURE — 82948 REAGENT STRIP/BLOOD GLUCOSE: CPT

## 2018-11-21 RX ORDER — CEPHALEXIN 500 MG/1
500 CAPSULE ORAL EVERY 6 HOURS SCHEDULED
Qty: 28 CAPSULE | Refills: 0 | Status: SHIPPED | OUTPATIENT
Start: 2018-11-21 | End: 2018-11-28

## 2018-11-21 RX ORDER — POTASSIUM CHLORIDE 20 MEQ/1
40 TABLET, EXTENDED RELEASE ORAL ONCE
Status: COMPLETED | OUTPATIENT
Start: 2018-11-21 | End: 2018-11-21

## 2018-11-21 RX ORDER — POTASSIUM CHLORIDE 14.9 MG/ML
20 INJECTION INTRAVENOUS
Status: COMPLETED | OUTPATIENT
Start: 2018-11-21 | End: 2018-11-21

## 2018-11-21 RX ORDER — POTASSIUM CHLORIDE 20MEQ/15ML
20 LIQUID (ML) ORAL ONCE
Status: COMPLETED | OUTPATIENT
Start: 2018-11-21 | End: 2018-11-21

## 2018-11-21 RX ORDER — POTASSIUM CHLORIDE 29.8 MG/ML
40 INJECTION INTRAVENOUS ONCE
Status: DISCONTINUED | OUTPATIENT
Start: 2018-11-21 | End: 2018-11-21

## 2018-11-21 RX ORDER — POTASSIUM CHLORIDE 20 MEQ/1
20 TABLET, EXTENDED RELEASE ORAL 2 TIMES DAILY
Qty: 5 TABLET | Refills: 0 | Status: SHIPPED | OUTPATIENT
Start: 2018-11-21 | End: 2021-05-13

## 2018-11-21 RX ADMIN — ATORVASTATIN CALCIUM 40 MG: 40 TABLET, FILM COATED ORAL at 16:37

## 2018-11-21 RX ADMIN — PANTOPRAZOLE SODIUM 40 MG: 40 TABLET, DELAYED RELEASE ORAL at 05:20

## 2018-11-21 RX ADMIN — OXYCODONE HYDROCHLORIDE AND ACETAMINOPHEN 250 MG: 500 TABLET ORAL at 08:19

## 2018-11-21 RX ADMIN — INSULIN LISPRO 3 UNITS: 100 INJECTION, SOLUTION INTRAVENOUS; SUBCUTANEOUS at 11:41

## 2018-11-21 RX ADMIN — FERROUS SULFATE TAB 325 MG (65 MG ELEMENTAL FE) 325 MG: 325 (65 FE) TAB at 08:20

## 2018-11-21 RX ADMIN — ZOLPIDEM TARTRATE 5 MG: 5 TABLET, FILM COATED ORAL at 00:06

## 2018-11-21 RX ADMIN — CEFAZOLIN SODIUM 1000 MG: 1 SOLUTION INTRAVENOUS at 00:06

## 2018-11-21 RX ADMIN — POTASSIUM CHLORIDE 20 MEQ: 20 SOLUTION ORAL at 15:25

## 2018-11-21 RX ADMIN — POTASSIUM CHLORIDE 20 MEQ: 14.9 INJECTION, SOLUTION INTRAVENOUS at 08:17

## 2018-11-21 RX ADMIN — ATENOLOL 25 MG: 25 TABLET ORAL at 08:19

## 2018-11-21 RX ADMIN — POTASSIUM CHLORIDE 20 MEQ: 14.9 INJECTION, SOLUTION INTRAVENOUS at 06:30

## 2018-11-21 RX ADMIN — TAMSULOSIN HYDROCHLORIDE 0.4 MG: 0.4 CAPSULE ORAL at 05:20

## 2018-11-21 RX ADMIN — ASPIRIN 81 MG 81 MG: 81 TABLET ORAL at 08:20

## 2018-11-21 RX ADMIN — CEFAZOLIN SODIUM 1000 MG: 1 SOLUTION INTRAVENOUS at 08:18

## 2018-11-21 RX ADMIN — FERROUS SULFATE TAB 325 MG (65 MG ELEMENTAL FE) 325 MG: 325 (65 FE) TAB at 16:37

## 2018-11-21 RX ADMIN — Medication 250 MG: at 08:20

## 2018-11-21 RX ADMIN — INSULIN LISPRO 1 UNITS: 100 INJECTION, SOLUTION INTRAVENOUS; SUBCUTANEOUS at 08:21

## 2018-11-21 RX ADMIN — FOLIC ACID 1 MG: 1 TABLET ORAL at 08:20

## 2018-11-21 RX ADMIN — POTASSIUM CHLORIDE 40 MEQ: 1500 TABLET, EXTENDED RELEASE ORAL at 06:29

## 2018-11-21 NOTE — ASSESSMENT & PLAN NOTE
No results found for: HGBA1C    Recent Labs      11/20/18   0637  11/20/18   1119  11/20/18   1308  11/20/18   1914   POCGLU  125  125  111  411*       Blood Sugar Average: Last 72 hrs:  (P) 147 75     Continue current meds

## 2018-11-21 NOTE — ASSESSMENT & PLAN NOTE
No results found for: HGBA1C    Recent Labs      11/20/18   1914  11/20/18   2205  11/21/18   0622  11/21/18   1118   POCGLU  411*  334*  200*  311*       Blood Sugar Average: Last 72 hrs:  (P) 191     Continue current meds

## 2018-11-21 NOTE — DISCHARGE SUMMARY
Discharge- Sheri Roque 1950, 79 y o  male MRN: [id number]    Unit/Bed#: -Linda Encounter: [id number]    Primary Care Provider: Madhavi Jordan MD   Date and time admitted to hospital: 11/16/2018 10:12 PM        * Cellulitis   Assessment & Plan    Left great toe osteomyelitis status post amputation 11/20/18  Podiatry and ID recommendations noted  Continue Keflex to complete 7 day course as per ID recs  Close follow-up with Podiatry after discharge as outpatient  Plan has been discussed with the patient and daughter was present at the bedside at the time of discharge  Both agrees understand the plan  Cellulitis, toe   Assessment & Plan    Plan is above     Anemia   Assessment & Plan    History of anemia  Patient is on iron supplement at home  Currently H&H stable at his baseline  No signs of active bleeding noted  Remained hemodynamically stable  Recommended follow-up with primary care provider as outpatient  HTN (hypertension)   Assessment & Plan    Stable continue current meds     Diabetes mellitus [facility])   Assessment & Plan    No results found for: HGBA1C    Recent Labs      11/20/18   1914  11/20/18   2205  11/21/18   0622  11/21/18   1118   POCGLU  411*  334*  200*  311*       Blood Sugar Average: Last 72 hrs:  (P) 191     Sugar were noted to be uncontrolled palpation  Patient states that as home with his home meds sugars are better controlled  Denies any abdominal pain, nausea, vomiting  Tolerating p o  diet well  Patient states that he has all the supplies to check his sugars at home  Recommended to continue to check fingersticks at home and resume his home diabetes medication  Hypomagnesemiaresolved as of 11/21/2018   Assessment & Plan    Magnesium 1 7  Potassium noted 3 3  Telemonitor noted for normal sinus rhythm  No other events reported  Discharge on p o  supplements for potassium    And recommended follow-up with primary care provider on Friday for BMP and magnesium level      PRADEEP:  Earlier Patient was noted to have elevated creatinine to 1 95 from his baseline  Appears to be PRADEEP in the settings of volume depletion on top of ATN as per Nephrology  Nephrology recommendation noted  Creatinine improved to 0 82 after hydration  Discharging Physician / Practitioner: Asya Haley MD  PCP: Armani Sánchez MD  Admission Date:   Admission Orders     Ordered        11/16/18 2359  Inpatient Admission (expected length of stay for this patient is greater than two midnights)  Once             Discharge Date: 11/21/18    Resolved Problems  Date Reviewed: 11/21/2018          Resolved    PRADEEP (acute kidney injury) ([facility] [address] ) 11/21/2018     Resolved by  Asya Haley MD    High anion gap metabolic acidosis 56/65/0806     Resolved by  Asya Haley MD    Hypomagnesemia 11/21/2018     Resolved by  Asya Haley MD          Consultations During Hospital Stay:  · Infectious Disease, Podiatry, Nephrology  Procedures Performed:     · Status post left great toe amputation  Significant Findings / Test Results:     · Left great to osteomyelitis    Incidental Findings:   · Potassium 3 3, magnesium 1 7, thrombocytopenia    Test Results Pending at Discharge (will require follow up): · None     Outpatient Tests Requested:  · None    Complications:  None    Reason for Admission:  Left 1st toe osteomyelitis    Hospital Course:     Darryl Antony is a 79 y o  male patient who originally presented to the hospital on 11/16/2018 due to her red swollen left toe with weeping wounds  Patient 1st noticed swelling and erythema around his left big toe couple days ago prior to presenting to the ER  He went to [facility] and was treated with doxy and clindamycin  He did not notice any improvement and decided to come to ER at [facility]  Patient denied any fever, chills at the day of the presentation  Patient was a lifelong smoker had quit about 1 year ago    Patient had left big toe amputation on 11/20/2018  Patient was also noted to have thrombocytopenia possibly consumptive in nature prior to procedure Was given 2 units of platelets and platelet has remained stable since  Patient had remained hemodynamically stable  Id recommended to DC on Keflex to complete 7 more days course since procedure  Recommended close follow-up with Podiatry in 1 week after discharge  Patient was also discharged on potassium supplement and strongly encouraged follow-up with primary care provider on Friday to follow up on BMP  Patient's daughter at the bedside at the time of the discharge both understands the follow-up plan and agrees with it  Please see above list of diagnoses and related plan for additional information  Condition at Discharge: good     Discharge Day Visit / Exam:     Subjective:  Patient seen noted acute distress  Patient is eager to go home  Denies fever, chills, abdominal pain, nausea, vomiting, diarrhea  Denies any new complaints  Vitals: Blood Pressure: 163/78 (11/21/18 1100)  Pulse: 84 (11/21/18 1100)  Temperature: 98 8 °F (37 1 °C) (11/21/18 1100)  Temp Source: Oral (11/21/18 1100)  Respirations: 18 (11/21/18 1100)  Height: 5' 10 5" (179 1 cm) (11/17/18 0110)  Weight - Scale: 81 6 kg (179 lb 14 3 oz) (11/20/18 0600)  SpO2: 94 % (11/21/18 1100)  Exam:   Physical Exam   Constitutional: He is oriented to person, place, and time  He appears well-developed and well-nourished  No distress  HENT:   Head: Normocephalic and atraumatic  Eyes: Pupils are equal, round, and reactive to light  EOM are normal  Right eye exhibits no discharge  Left eye exhibits no discharge  Neck: Normal range of motion  Neck supple  No JVD present  Cardiovascular: Normal rate and regular rhythm  Pulmonary/Chest: Effort normal and breath sounds normal  No respiratory distress  He has no wheezes  Abdominal: Soft  Bowel sounds are normal  He exhibits no distension  There is no tenderness  Musculoskeletal: Normal range of motion  He exhibits no edema  Left foot in dressing clean, intact  Neurological: He is alert and oriented to person, place, and time  No cranial nerve deficit  Coordination normal    Skin: No rash noted  Psychiatric: He has a normal mood and affect  His behavior is normal        Discussion with Family:  Daughter at the bedside at the time of the discharge  Understand and agrees with the plan  All questions answered appropriate  Discharge instructions/Information to patient and family:   See after visit summary for information provided to patient and family  Provisions for Follow-Up Care:  See after visit summary for information related to follow-up care and any pertinent home health orders  Disposition:     Home    For Discharges to [facility]:   · Not Applicable to this Patient - Not Applicable to this Patient    Planned Readmission:  None planned  Discharge Statement:  I spent 32 minutes discharging the patient  This time was spent on the day of discharge  I had direct contact with the patient on the day of discharge  Greater than 50% of the total time was spent examining patient, answering all patient questions, arranging and discussing plan of care with patient as well as directly providing post-discharge instructions  Additional time then spent on discharge activities  Discharge Medications:  See after visit summary for reconciled discharge medications provided to patient and family        ** Please Note: This note has been constructed using a voice recognition system **

## 2018-11-21 NOTE — PROGRESS NOTES
Progress Note Daivd Paredes 1950, 79 y o  male MRN: [id number]    Unit/Bed#: -Linda Encounter: [id number]    Primary Care Provider: Rosa Elena Dimas MD   Date and time admitted to hospital: 11/16/2018 10:12 PM        * Cellulitis   Assessment & Plan    Left great toe osteomyelitis status post amputation 11/20/18  Continue current antibiotics  Continue follow with Podiatry recommendations  Continue follow-up with ID recommendations     Cellulitis, toe   Assessment & Plan    Plan is above     Anemia   Assessment & Plan    History of anemia  Patient is on iron supplement at home  Currently H&H stable at his baseline  No signs of active bleeding noted  Remained hemodynamically stable  Continue monitor CBC  Hypomagnesemia   Assessment & Plan    Resolved     HTN (hypertension)   Assessment & Plan    Stable continue current meds     Diabetes mellitus [facility])   Assessment & Plan    No results found for: HGBA1C    Recent Labs      11/20/18   0637  11/20/18   1119  11/20/18   1308  11/20/18   1914   POCGLU  125  125  111  411*       Blood Sugar Average: Last 72 hrs:  (P) 147 75     Continue current meds     High anion gap metabolic acidosisresolved as of 11/21/2018   Assessment & Plan    Lactic acid is 2 8  Placed order for repeat and q2h  Still pending  Also with PRADEEP  Will follow up on repeat BMP  PRADEEP (acute kidney injury) (HCC)resolved as of 11/21/2018   Assessment & Plan    Resolved  Getting back to normal            VTE Pharmacologic Prophylaxis:   Pharmacologic: Heparin  Mechanical VTE Prophylaxis in Place: Yes    Patient Centered Rounds: I have evaluated patient without nursing staff present due to Discussed the plan with the nurse    Discussions with Specialists or Other Care Team Provider:  Podiatry recommendation noted, ID recommendations noted and Nephrology recommendations noted  Education and Discussions with Family / Patient:  Discussed the plan with the patient      Time Spent for Care: 20 minutes  Current Length of Stay: 5 day(s)    Current Patient Status: Inpatient   Certification Statement: The patient will continue to require additional inpatient hospital stay due to Hypokalemia, IV antibiotics    Discharge Plan: Within 24 hours    Code Status: Level 1 - Full Code      Subjective:   Patient seen not in acute distress  States that he is feeling better  Denies any chest pain, palpitation  fever, chills, abdominal pain, nausea, vomiting, diarrhea, hematuria, melena, hematochezia  Denies any new complaints  Objective:     Vitals:   Temp (24hrs), Av 7 °F (37 1 °C), Min:98 5 °F (36 9 °C), Max:98 9 °F (37 2 °C)    Temp:  [98 5 °F (36 9 °C)-98 9 °F (37 2 °C)] 98 8 °F (37 1 °C)  HR:  [84-94] 84  Resp:  [18] 18  BP: (123-163)/(71-81) 163/78  SpO2:  [92 %-94 %] 94 %  Body mass index is 25 81 kg/m²  Input and Output Summary (last 24 hours): Intake/Output Summary (Last 24 hours) at 18 1725  Last data filed at 18 0900   Gross per 24 hour   Intake             1150 ml   Output              900 ml   Net              250 ml       Physical Exam:     Physical Exam   Constitutional: He is oriented to person, place, and time  He appears well-developed and well-nourished  No distress  HENT:   Head: Normocephalic and atraumatic  Eyes: Pupils are equal, round, and reactive to light  EOM are normal  Right eye exhibits no discharge  Left eye exhibits no discharge  Neck: Normal range of motion  Neck supple  No JVD present  Cardiovascular: Normal rate and regular rhythm  Pulmonary/Chest: Effort normal and breath sounds normal  No respiratory distress  He has no wheezes  Abdominal: Soft  Bowel sounds are normal  He exhibits no distension  There is no tenderness  Musculoskeletal: Normal range of motion  He exhibits no edema or tenderness  Status post Left great toe amputation dressing noted intact, clean     Neurological: He is alert and oriented to person, place, and time  No cranial nerve deficit  Skin: No rash noted  Psychiatric: He has a normal mood and affect  His behavior is normal      Additional Data:     Labs:      Results from last 7 days  Lab Units 11/21/18  0515  11/18/18  0455   WBC Thousand/uL 6 53  < > 6 58   HEMOGLOBIN g/dL 9 2*  < > 9 1*   HEMATOCRIT % 27 8*  < > 27 9*   PLATELETS Thousands/uL 102*  < > 53*   BANDS PCT %  --   --  4   NEUTROS PCT % 76*  < >  --    LYMPHS PCT % 10*  < >  --    LYMPHO PCT %  --   --  10*   MONOS PCT % 13*  < >  --    MONO PCT %  --   --  6   EOS PCT % 1  < > 1   < > = values in this interval not displayed  Results from last 7 days  Lab Units 11/21/18  1334  11/17/18  0517   SODIUM mmol/L 145  < > 140   POTASSIUM mmol/L 3 3*  < > 4 0   CHLORIDE mmol/L 107  < > 109*   CO2 mmol/L 25  < > 17*   BUN mg/dL 8  < > 21   CREATININE mg/dL 0 82  < > 1 80*   ANION GAP mmol/L 13  < > 14*   CALCIUM mg/dL 7 8*  < > 7 1*   ALBUMIN g/dL  --   --  2 2*   TOTAL BILIRUBIN mg/dL  --   --  0 30   ALK PHOS U/L  --   --  64   ALT U/L  --   --  14   AST U/L  --   --  19   GLUCOSE RANDOM mg/dL 153*  < > 106   < > = values in this interval not displayed  Results from last 7 days  Lab Units 11/16/18  2254   INR  1 79*       Results from last 7 days  Lab Units 11/21/18  1118 11/21/18  0622 11/20/18  2205 11/20/18  1914 11/20/18  1308 11/20/18  1119 11/20/18  0329 11/19/18  2123 11/19/18  1602 11/19/18  1205 11/19/18  0620 11/18/18  2133   POC GLUCOSE mg/dl 311* 200* 334* 411* 111 125 125 149* 197* 194* 99 158*           Results from last 7 days  Lab Units 11/17/18  0517 11/17/18  0213 11/16/18  2254   LACTIC ACID mmol/L 0 8 1 6 2 3*   PROCALCITONIN ng/ml  --  0 22  --            * I Have Reviewed All Lab Data Listed Above  * Additional Pertinent Lab Tests Reviewed:  All Labs Within Last 24 Hours Reviewed      Recent Cultures (last 7 days):       Results from last 7 days  Lab Units 11/17/18  0950 11/17/18  0215 11/16/18  2254 11/16/18  9401 BLOOD CULTURE   --   --  No Growth After 4 Days  No Growth After 4 Days  GRAM STAIN RESULT   --  1+ Polys  2+ Gram positive cocci in pairs  Rare Gram negative rods  --   --    WOUND CULTURE   --  3+ Growth of Staphylococcus aureus*  --   --    C DIFF TOXIN B  NEGATIVE for C difficle toxin by PCR    --   --   --        Last 24 Hours Medication List:     Current Facility-Administered Medications:  ascorbic acid 250 mg Oral Daily Nila Perez MD    aspirin 81 mg Oral Daily Nila Perez MD    atenolol 25 mg Oral BID Nila Perez MD    atorvastatin 40 mg Oral Daily With Yamini Hodges MD    cefazolin 1,000 mg Intravenous Q8H Jennifer Webster DO Last Rate: 1,000 mg (11/21/18 0818)   fenofibrate 48 mg Oral QPM Nila Perez MD    ferrous sulfate 325 mg Oral BID With Meals Nila Perez MD    folic acid 1 mg Oral Daily Nila Perez MD    insulin lispro 1-5 Units Subcutaneous TID Long Monroy MD    insulin lispro 1-5 Units Subcutaneous HS Nila Perez MD    isosorbide dinitrate 30 mg Oral QPM Nila Perez MD    lactated ringers 50 mL/hr Intravenous Continuous Shirlean Riedel, MD Last Rate: 50 mL/hr (11/20/18 0629)   leflunomide 20 mg Oral Daily iNla Perez MD    melatonin 6 mg Oral HS Jessica Fernandez PA-C    pantoprazole 40 mg Oral Early Morning Nila Perez MD    saccharomyces boulardii 250 mg Oral BID Maurilio Trevino MD    tamsulosin 0 4 mg Oral Early Morning Nila Perez MD    umeclidinium-vilanterol 1 puff Inhalation Daily Nila Perez MD    zolpidem 5 mg Oral HS PRN Randy Winchester MD         Today, Patient Was Seen By: Ladarius Zapata MD    ** Please Note: Dictation voice to text software may have been used in the creation of this document   **

## 2018-11-21 NOTE — DISCHARGE INSTR - AVS FIRST PAGE
Strongly recommended to your primary care doctor in 3 days from discharge  Follow-up with podiatrist Dr Jaskaran Daigle in 1 week  Continue to take antibiotics as prescribed  Potassium was noted to be 3 3  Start taking p o  potassium supplement starting tonight and follow up with BMP to monitor potassium level on Friday with primary care doctor

## 2018-11-21 NOTE — ASSESSMENT & PLAN NOTE
History of anemia  Patient is on iron supplement at home  Currently H&H stable at his baseline  No signs of active bleeding noted  Remained hemodynamically stable  Continue monitor CBC

## 2018-11-21 NOTE — ASSESSMENT & PLAN NOTE
Left great toe osteomyelitis status post amputation 11/20/18  Continue current antibiotics    Continue follow with Podiatry recommendations  Continue follow-up with ID recommendations

## 2018-11-21 NOTE — PROGRESS NOTES
Progress Note - Infectious Disease   Patrick Salmeron 79 y o  male MRN: [id number]  Unit/Bed#: -01 Encounter: [id number]      Impression/Plan:  1  Left great toe cellulitis  Secondary to left great toe diabetic foot ulcer with osteomyelitis  Wound culture was obtained and final results showed MSSA   Patient's blood cultures are negative after 72 hrs   Patient is now status post left great toe amputation on 11/20/2018  Romero Pedraza remains clinically stable and nontoxic   He is afebrile without leukocytosis  He is tolerating antibiotics without difficulty  -continue IV cefazolin for now  -for discharge we will transition to p o  Keflex, 500 mg q 6 hours through 11/27/2018 to complete seven days of postop antibiotic treatment  -monitor CBC and BMP  -followup blood cultures  -monitor vitals  -serial skin exams  -continue close follow-up with Podiatry     2  Left great toe diabetic foot ulcer   With surrounding cellulitis and osteomyelitis  MRI of the left foot on 11/19/2018 showed marrow edema in the 1st proximal phalanx with associated soft tissue edema at the dorsum which was suggestive of osteomyelitis  There were also erosive changes and arthritic changes in the midfoot   Patient is now status post amputation of the left great toe on 11/20/2018 for surgical cure  -serial postop exams  -continue close follow-up with Podiatry     3  Squamous cell carcinoma of the right lung   Patient with abnormal chest CT in June 2018 which was followed up by a PET scan in August 2018 with confirmed malignancy  Patient's pulmonologist's has referred him to the [facility] Hematology/Oncology office  Patient underwent fine-needle aspiration which showed moderately differentiated squamous cell carcinoma   Patient follows with Dr Linda Celaya and Dr Soumya Rain is currently undergoing chemotherapy, most recent dose was last week    Future chemo sessions are on hold given his acute infection   -continue outpatient follow-up at [facility] Hematology/Oncology     4  Acute kidney injury  On chronic CKD stage 3  Possibly pre renal related to patient's sepsis  Patient's creatinine was 1 85 upon admission; this has improved today to 0 75   Patient is following closely with Nephrology  -monitor BMP  -dose adjust antibiotics for renal function as needed  -continue close follow-up with Nephrology     5  Type 2 diabetes mellitus with retinopathy   No hemoglobin A1c available in patient's current medical record   Patient's glucose since admission has ranged between 81 and 200 with elevations noted last night of 411 and 334    -blood glucose management per primary service     6  Lactic acidosis   Now resolved      7  Diarrhea   This was likely a side effect to the PO Clindamycin he was using to treat his left great toe   Patient's stool C diff PCR was negative   His diarrhea has now resolved   No additional ID workup at this time  Antibiotics:  Cefazolin 3  Antibiotics 6  Post op 1    Subjective:  Patient reports that his toe amputation surgery well yesterday  He has no acute complaints and is hoping to go home soon  He reports his dressing has remained intact since surgery  Patient  has no fever, chills, sweats; no nausea, vomiting, diarrhea; no cough, shortness of breath; No new symptoms  Objective:  Vitals:  Temp:  [97 1 °F (36 2 °C)-98 9 °F (37 2 °C)] 98 9 °F (37 2 °C)  HR:  [65-94] 85  Resp:  [17-22] 18  BP: (123-171)/(71-93) 134/79  SpO2:  [92 %-96 %] 92 %  Temp (24hrs), Av 4 °F (36 9 °C), Min:97 1 °F (36 2 °C), Max:98 9 °F (37 2 °C)  Current: Temperature: 98 9 °F (37 2 °C)    Physical Exam:   General Appearance:  Alert, interactive, nontoxic, no acute distress  Eyes: Erythema to right lower lid   Throat: Oropharynx moist without lesions      Lungs:   Clear to auscultation bilaterally; no wheezes, rhonchi or rales; respirations unlabored   Heart:  RRR; no murmur, rub or gallop   Abdomen:   Soft, non-tender, non-distended, positive bowel sounds  Extremities: No clubbing or cyanosis, no edema   Skin: No new rashes or lesions  Bulky left foot surgical dressing is intact, no breakthrough drainage over the surgical site, small area soaked with Betadine on medial portion dressing     Labs, Imaging, & Other studies:   All pertinent labs and imaging studies were personally reviewed    Results from last 7 days  Lab Units 11/21/18  0515 11/20/18  0453 11/19/18  0459   WBC Thousand/uL 6 53 9 21 9 51   HEMOGLOBIN g/dL 9 2* 9 3* 9 3*   PLATELETS Thousands/uL 102* 37* 48*       Results from last 7 days  Lab Units 11/21/18  0515  11/17/18  0517  11/16/18  2254   POTASSIUM mmol/L 2 8*  < > 4 0  < > 3 7   CHLORIDE mmol/L 108  < > 109*  < > 107   CO2 mmol/L 24  < > 17*  < > 17*   BUN mg/dL 7  < > 21  < > 22   CREATININE mg/dL 0 75  < > 1 80*  < > 1 85*   EGFR ml/min/1 73sq m 96  < > 38  < > 37   CALCIUM mg/dL 7 7*  < > 7 1*  < > 8 0*   AST U/L  --   --  19  --  22   ALT U/L  --   --  14  --  19   ALK PHOS U/L  --   --  64  --  79   < > = values in this interval not displayed  Results from last 7 days  Lab Units 11/17/18  0950 11/17/18  0215 11/16/18  2254 11/16/18  2244   BLOOD CULTURE   --   --  No Growth at 72 hrs  No Growth at 72 hrs     GRAM STAIN RESULT   --  1+ Polys  2+ Gram positive cocci in pairs  Rare Gram negative rods  --   --    WOUND CULTURE   --  3+ Growth of Staphylococcus aureus*  --   --    C DIFF TOXIN B  NEGATIVE for C difficle toxin by PCR    --   --   --

## 2018-11-21 NOTE — DISCHARGE INSTRUCTIONS
Diabetes and Your Skin   WHAT YOU NEED TO KNOW:   Diabetes can affect every part of your body, including your skin  Diabetes that is not well controlled can damage blood vessels and nerves  Damage to blood vessels can make it hard for blood to flow to tissues and organs  A lack of blood flow to your skin can cause ulcers that are difficult to heal  Skin ulcers are also called sores  People with diabetes can also have more bacterial skin infections than other people  Most skin conditions can be prevented with good blood sugar control  Skin sores can be hard to heal, or become life or limb-threatening, if not treated early  DISCHARGE INSTRUCTIONS:   Contact your healthcare provider if:   · You get a severe burn or cut  · You have a sore that is painful, warm to the touch, or has redness around it  · Your sore does not get better or seems to get worse  · You have a fever  · Your blood sugar levels continue to be higher than they should be  Prevent skin sores:   · Keep your blood sugars within target range  Your healthcare provider will tell you what your blood sugar levels should be  High blood sugar levels increase your risk for skin infections and poor wound healing  · Keep your skin clean  Do not take hot baths or showers  They can cause your skin to get dry  Do not take a bubble bath if you have dry skin  Use moisturizing soaps and lotion after baths or showers  Do not put lotion between your toes  · Keep your skin from becoming too dry,  especially in cold, dry weather  When you scratch dry, itchy skin, you can cause your skin to be open to infection  Bathe less during cold weather and use lotion to moisturize  Use a humidifier to keep air in your home from being dry  · Keep areas where skin touches skin dry  Use talcum powder in areas such as armpits and groin  You may also need it under your breasts, and between your toes   Moisture in these areas can cause a fungal infection  · Treat cuts immediately  Clean minor cuts with soap and water  Cover them with sterile gauze  © 2017 [employer] [address]  Information is for End User's use only and may not be sold, redistributed or otherwise used for commercial purposes  All illustrations and images included in CareNotes® are the copyrighted property of A D A M , Inc  or [employer]  The above information is an  only  It is not intended as medical advice for individual conditions or treatments  Talk to your doctor, nurse or pharmacist before following any medical regimen to see if it is safe and effective for you  Diabetes and Your Skin   WHAT YOU NEED TO KNOW:   Diabetes can affect every part of your body, including your skin  Diabetes that is not well controlled can damage blood vessels and nerves  Damage to blood vessels can make it hard for blood to flow to tissues and organs  A lack of blood flow to your skin can cause ulcers that are difficult to heal  Skin ulcers are also called sores  People with diabetes can also have more bacterial skin infections than other people  Most skin conditions can be prevented with good blood sugar control  Skin sores can be hard to heal, or become life or limb-threatening, if not treated early  DISCHARGE INSTRUCTIONS:   Contact your healthcare provider if:   · You get a severe burn or cut  · You have a sore that is painful, warm to the touch, or has redness around it  · Your sore does not get better or seems to get worse  · You have a fever  · Your blood sugar levels continue to be higher than they should be  Prevent skin sores:   · Keep your blood sugars within target range  Your healthcare provider will tell you what your blood sugar levels should be  High blood sugar levels increase your risk for skin infections and poor wound healing  · Keep your skin clean  Do not take hot baths or showers   They can cause your skin to get dry  Do not take a bubble bath if you have dry skin  Use moisturizing soaps and lotion after baths or showers  Do not put lotion between your toes  · Keep your skin from becoming too dry,  especially in cold, dry weather  When you scratch dry, itchy skin, you can cause your skin to be open to infection  Bathe less during cold weather and use lotion to moisturize  Use a humidifier to keep air in your home from being dry  · Keep areas where skin touches skin dry  Use talcum powder in areas such as armpits and groin  You may also need it under your breasts, and between your toes  Moisture in these areas can cause a fungal infection  · Treat cuts immediately  Clean minor cuts with soap and water  Cover them with sterile gauze  © 2017 [employer] [place] Information is for End User's use only and may not be sold, redistributed or otherwise used for commercial purposes  All illustrations and images included in CareNotes® are the copyrighted property of [employer]  or Ziggy Smith  The above information is an  only  It is not intended as medical advice for individual conditions or treatments  Talk to your doctor, nurse or pharmacist before following any medical regimen to see if it is safe and effective for you

## 2018-11-22 LAB
BACTERIA BLD CULT: NORMAL
BACTERIA BLD CULT: NORMAL

## 2019-01-25 ENCOUNTER — APPOINTMENT (EMERGENCY)
Dept: RADIOLOGY | Facility: HOSPITAL | Age: 69
End: 2019-01-25
Payer: MEDICARE

## 2019-01-25 ENCOUNTER — HOSPITAL ENCOUNTER (EMERGENCY)
Facility: HOSPITAL | Age: 69
Discharge: HOME | End: 2019-01-25
Attending: EMERGENCY MEDICINE | Admitting: EMERGENCY MEDICINE
Payer: MEDICARE

## 2019-01-25 VITALS
DIASTOLIC BLOOD PRESSURE: 63 MMHG | TEMPERATURE: 98.7 F | HEART RATE: 89 BPM | SYSTOLIC BLOOD PRESSURE: 103 MMHG | OXYGEN SATURATION: 94 % | RESPIRATION RATE: 16 BRPM

## 2019-01-25 DIAGNOSIS — S99.922A INJURY OF LEFT TOE, INITIAL ENCOUNTER: Primary | ICD-10-CM

## 2019-01-25 LAB
ANISOCYTOSIS BLD QL SMEAR: PRESENT
BASOPHILS # BLD MANUAL: 0 THOUSAND/UL (ref 0–0.1)
BASOPHILS NFR MAR MANUAL: 0 % (ref 0–1)
EOSINOPHIL # BLD MANUAL: 0 THOUSAND/UL (ref 0–0.4)
EOSINOPHIL NFR BLD MANUAL: 0 % (ref 0–6)
ERYTHROCYTE [DISTWIDTH] IN BLOOD BY AUTOMATED COUNT: 14.5 % (ref 11.6–15.1)
HCT VFR BLD AUTO: 34.4 % (ref 36.5–49.3)
HGB BLD-MCNC: 11.3 G/DL (ref 12–17)
LYMPHOCYTES # BLD AUTO: 0.31 THOUSAND/UL (ref 0.6–4.47)
LYMPHOCYTES # BLD AUTO: 9 % (ref 14–44)
MCH RBC QN AUTO: 31.7 PG (ref 26.8–34.3)
MCHC RBC AUTO-ENTMCNC: 32.8 G/DL (ref 31.4–37.4)
MCV RBC AUTO: 96 FL (ref 82–98)
MONOCYTES # BLD AUTO: 0.03 THOUSAND/UL (ref 0–1.22)
MONOCYTES NFR BLD: 1 % (ref 4–12)
NEUTROPHILS # BLD MANUAL: 3.11 THOUSAND/UL (ref 1.85–7.62)
NEUTS SEG NFR BLD AUTO: 90 % (ref 43–75)
NRBC BLD AUTO-RTO: 0 /100 WBCS
PLATELET # BLD AUTO: 75 THOUSANDS/UL (ref 149–390)
PLATELET BLD QL SMEAR: ABNORMAL
PMV BLD AUTO: 10.6 FL (ref 8.9–12.7)
POIKILOCYTOSIS BLD QL SMEAR: PRESENT
RBC # BLD AUTO: 3.57 MILLION/UL (ref 3.88–5.62)
TOTAL CELLS COUNTED SPEC: 100
WBC # BLD AUTO: 3.45 THOUSAND/UL (ref 4.31–10.16)

## 2019-01-25 PROCEDURE — 85007 BL SMEAR W/DIFF WBC COUNT: CPT | Performed by: EMERGENCY MEDICINE

## 2019-01-25 PROCEDURE — 85027 COMPLETE CBC AUTOMATED: CPT | Performed by: EMERGENCY MEDICINE

## 2019-01-25 PROCEDURE — 36415 COLL VENOUS BLD VENIPUNCTURE: CPT | Performed by: EMERGENCY MEDICINE

## 2019-01-25 PROCEDURE — 73660 X-RAY EXAM OF TOE(S): CPT

## 2019-01-25 PROCEDURE — 99284 EMERGENCY DEPT VISIT MOD MDM: CPT

## 2019-01-26 NOTE — ED NOTES
Discharged reviewed with pt  Pt verbalized understanding and has no further questions at this time  Assisted patient to vehicle with wheelchair        Ankit Jung RN  01/25/19 7142

## 2019-03-31 ENCOUNTER — HOSPITAL ENCOUNTER (EMERGENCY)
Facility: HOSPITAL | Age: 69
End: 2019-03-31
Attending: EMERGENCY MEDICINE | Admitting: EMERGENCY MEDICINE
Payer: MEDICARE

## 2019-03-31 ENCOUNTER — HOSPITAL ENCOUNTER (INPATIENT)
Facility: HOSPITAL | Age: 69
LOS: 3 days | Discharge: HOME | DRG: 809 | End: 2019-04-03
Attending: INTERNAL MEDICINE | Admitting: INTERNAL MEDICINE
Payer: MEDICARE

## 2019-03-31 ENCOUNTER — APPOINTMENT (EMERGENCY)
Dept: CT IMAGING | Facility: HOSPITAL | Age: 69
End: 2019-03-31
Payer: MEDICARE

## 2019-03-31 VITALS
BODY MASS INDEX: 24.33 KG/M2 | OXYGEN SATURATION: 96 % | TEMPERATURE: 98.2 F | SYSTOLIC BLOOD PRESSURE: 111 MMHG | RESPIRATION RATE: 16 BRPM | WEIGHT: 169.53 LBS | HEART RATE: 94 BPM | DIASTOLIC BLOOD PRESSURE: 57 MMHG

## 2019-03-31 DIAGNOSIS — R55 SYNCOPE: Primary | ICD-10-CM

## 2019-03-31 DIAGNOSIS — C34.91 SQUAMOUS CELL LUNG CANCER, RIGHT (HCC): ICD-10-CM

## 2019-03-31 DIAGNOSIS — D61.818 PANCYTOPENIA (HCC): ICD-10-CM

## 2019-03-31 DIAGNOSIS — B37.0 THRUSH: ICD-10-CM

## 2019-03-31 DIAGNOSIS — I25.10 CAD (CORONARY ARTERY DISEASE): ICD-10-CM

## 2019-03-31 DIAGNOSIS — D61.818 PANCYTOPENIA (HCC): Primary | ICD-10-CM

## 2019-03-31 DIAGNOSIS — E11.9 TYPE 2 DIABETES MELLITUS WITHOUT COMPLICATION, WITH LONG-TERM CURRENT USE OF INSULIN (HCC): ICD-10-CM

## 2019-03-31 DIAGNOSIS — C34.90 LUNG CANCER (HCC): ICD-10-CM

## 2019-03-31 DIAGNOSIS — Z79.4 TYPE 2 DIABETES MELLITUS WITHOUT COMPLICATION, WITH LONG-TERM CURRENT USE OF INSULIN (HCC): ICD-10-CM

## 2019-03-31 PROBLEM — J44.9 COPD (CHRONIC OBSTRUCTIVE PULMONARY DISEASE) (HCC): Chronic | Status: ACTIVE | Noted: 2019-03-31

## 2019-03-31 PROBLEM — D70.9 NEUTROPENIA (HCC): Status: ACTIVE | Noted: 2019-03-31

## 2019-03-31 PROBLEM — K21.9 GERD (GASTROESOPHAGEAL REFLUX DISEASE): Chronic | Status: ACTIVE | Noted: 2019-03-31

## 2019-03-31 PROBLEM — D70.9 NEUTROPENIA (HCC): Chronic | Status: ACTIVE | Noted: 2019-03-31

## 2019-03-31 PROBLEM — M06.9 RHEUMATOID ARTHRITIS (HCC): Status: ACTIVE | Noted: 2019-03-31

## 2019-03-31 PROBLEM — L03.039 CELLULITIS, TOE: Status: RESOLVED | Noted: 2018-11-16 | Resolved: 2019-03-31

## 2019-03-31 PROBLEM — K21.9 GERD (GASTROESOPHAGEAL REFLUX DISEASE): Status: ACTIVE | Noted: 2019-03-31

## 2019-03-31 PROBLEM — I95.9 HYPOTENSION: Status: ACTIVE | Noted: 2019-03-31

## 2019-03-31 PROBLEM — G62.9 NEUROPATHY: Chronic | Status: ACTIVE | Noted: 2019-03-31

## 2019-03-31 PROBLEM — J44.9 COPD (CHRONIC OBSTRUCTIVE PULMONARY DISEASE) (HCC): Status: ACTIVE | Noted: 2019-03-31

## 2019-03-31 PROBLEM — L03.90 CELLULITIS: Status: RESOLVED | Noted: 2018-11-17 | Resolved: 2019-03-31

## 2019-03-31 PROBLEM — M06.9 RHEUMATOID ARTHRITIS (HCC): Chronic | Status: ACTIVE | Noted: 2019-03-31

## 2019-03-31 PROBLEM — E78.5 HYPERLIPIDEMIA: Chronic | Status: ACTIVE | Noted: 2019-03-31

## 2019-03-31 PROBLEM — E78.5 HYPERLIPIDEMIA: Status: ACTIVE | Noted: 2019-03-31

## 2019-03-31 PROBLEM — E87.20 LACTIC ACIDOSIS: Status: ACTIVE | Noted: 2019-03-31

## 2019-03-31 PROBLEM — G62.9 NEUROPATHY: Status: ACTIVE | Noted: 2019-03-31

## 2019-03-31 PROBLEM — I10 HTN (HYPERTENSION): Chronic | Status: ACTIVE | Noted: 2018-11-17

## 2019-03-31 PROBLEM — E87.2 LACTIC ACIDOSIS: Status: ACTIVE | Noted: 2019-03-31

## 2019-03-31 LAB
ABO GROUP BLD: NORMAL
ABO GROUP BLD: NORMAL
ALBUMIN SERPL BCP-MCNC: 2.8 G/DL (ref 3.5–5)
ALP SERPL-CCNC: 48 U/L (ref 46–116)
ALT SERPL W P-5'-P-CCNC: 21 U/L (ref 12–78)
ANION GAP SERPL CALCULATED.3IONS-SCNC: 13 MMOL/L (ref 4–13)
ANISOCYTOSIS BLD QL SMEAR: PRESENT
APTT PPP: 33 SECONDS (ref 26–38)
AST SERPL W P-5'-P-CCNC: 15 U/L (ref 5–45)
ATRIAL RATE: 100 BPM
BASOPHILS # BLD MANUAL: 0 THOUSAND/UL (ref 0–0.1)
BASOPHILS NFR MAR MANUAL: 0 % (ref 0–1)
BILIRUB DIRECT SERPL-MCNC: 0.21 MG/DL (ref 0–0.2)
BILIRUB SERPL-MCNC: 0.5 MG/DL (ref 0.2–1)
BLD GP AB SCN SERPL QL: NEGATIVE
BLD GP AB SCN SERPL QL: NEGATIVE
BUN SERPL-MCNC: 45 MG/DL (ref 5–25)
CALCIUM SERPL-MCNC: 7.6 MG/DL (ref 8.3–10.1)
CHLORIDE SERPL-SCNC: 107 MMOL/L (ref 100–108)
CO2 SERPL-SCNC: 22 MMOL/L (ref 21–32)
CREAT SERPL-MCNC: 1.51 MG/DL (ref 0.6–1.3)
EOSINOPHIL # BLD MANUAL: 0 THOUSAND/UL (ref 0–0.4)
EOSINOPHIL NFR BLD MANUAL: 0 % (ref 0–6)
ERYTHROCYTE [DISTWIDTH] IN BLOOD BY AUTOMATED COUNT: 17.5 % (ref 11.6–15.1)
ERYTHROCYTE [DISTWIDTH] IN BLOOD BY AUTOMATED COUNT: 18.9 % (ref 11.6–15.1)
GFR SERPL CREATININE-BSD FRML MDRD: 47 ML/MIN/1.73SQ M
GLUCOSE SERPL-MCNC: 171 MG/DL (ref 65–140)
GLUCOSE SERPL-MCNC: 182 MG/DL (ref 65–140)
HCT VFR BLD AUTO: 14.1 % (ref 36.5–49.3)
HCT VFR BLD AUTO: 16.3 % (ref 36.5–49.3)
HGB BLD-MCNC: 4.5 G/DL (ref 12–17)
HGB BLD-MCNC: 5.3 G/DL (ref 12–17)
INR PPP: 1.41 (ref 0.86–1.17)
LACTATE SERPL-SCNC: 1.2 MMOL/L (ref 0.5–2)
LACTATE SERPL-SCNC: 1.9 MMOL/L (ref 0.5–2)
LACTATE SERPL-SCNC: 2.8 MMOL/L (ref 0.5–2)
LACTATE SERPL-SCNC: 4.5 MMOL/L (ref 0.5–2)
LYMPHOCYTES # BLD AUTO: 0.38 THOUSAND/UL (ref 0.6–4.47)
LYMPHOCYTES # BLD AUTO: 82 % (ref 14–44)
MCH RBC QN AUTO: 29 PG (ref 26.8–34.3)
MCH RBC QN AUTO: 31.3 PG (ref 26.8–34.3)
MCHC RBC AUTO-ENTMCNC: 31.9 G/DL (ref 31.4–37.4)
MCHC RBC AUTO-ENTMCNC: 32.5 G/DL (ref 31.4–37.4)
MCV RBC AUTO: 89 FL (ref 82–98)
MCV RBC AUTO: 98 FL (ref 82–98)
MONOCYTES # BLD AUTO: 0.02 THOUSAND/UL (ref 0–1.22)
MONOCYTES NFR BLD: 5 % (ref 4–12)
NEUTROPHILS # BLD MANUAL: 0.06 THOUSAND/UL (ref 1.85–7.62)
NEUTS SEG NFR BLD AUTO: 13 % (ref 43–75)
NRBC BLD AUTO-RTO: 0 /100 WBCS
NT-PROBNP SERPL-MCNC: 274 PG/ML
P AXIS: 40 DEGREES
PLATELET # BLD AUTO: 1 THOUSANDS/UL (ref 149–390)
PLATELET # BLD AUTO: 76 THOUSANDS/UL (ref 149–390)
PLATELET BLD QL SMEAR: ABNORMAL
PMV BLD AUTO: 9.9 FL (ref 8.9–12.7)
POIKILOCYTOSIS BLD QL SMEAR: PRESENT
POTASSIUM SERPL-SCNC: 4.1 MMOL/L (ref 3.5–5.3)
PR INTERVAL: 154 MS
PROT SERPL-MCNC: 5.9 G/DL (ref 6.4–8.2)
PROTHROMBIN TIME: 17.1 SECONDS (ref 11.8–14.2)
QRS AXIS: 50 DEGREES
QRSD INTERVAL: 88 MS
QT INTERVAL: 352 MS
QTC INTERVAL: 454 MS
RBC # BLD AUTO: 1.44 MILLION/UL (ref 3.88–5.62)
RBC # BLD AUTO: 1.83 MILLION/UL (ref 3.88–5.62)
RH BLD: POSITIVE
RH BLD: POSITIVE
SODIUM SERPL-SCNC: 142 MMOL/L (ref 136–145)
SPECIMEN EXPIRATION DATE: NORMAL
SPECIMEN EXPIRATION DATE: NORMAL
T WAVE AXIS: 45 DEGREES
TOTAL CELLS COUNTED SPEC: 100
TROPONIN I SERPL-MCNC: <0.02 NG/ML
VENTRICULAR RATE: 100 BPM
WBC # BLD AUTO: 0.46 THOUSAND/UL (ref 4.31–10.16)
WBC # BLD AUTO: 0.53 THOUSAND/UL (ref 4.31–10.16)

## 2019-03-31 PROCEDURE — 85027 COMPLETE CBC AUTOMATED: CPT | Performed by: EMERGENCY MEDICINE

## 2019-03-31 PROCEDURE — 82728 ASSAY OF FERRITIN: CPT | Performed by: INTERNAL MEDICINE

## 2019-03-31 PROCEDURE — 86901 BLOOD TYPING SEROLOGIC RH(D): CPT | Performed by: NURSE PRACTITIONER

## 2019-03-31 PROCEDURE — 83540 ASSAY OF IRON: CPT | Performed by: INTERNAL MEDICINE

## 2019-03-31 PROCEDURE — 80076 HEPATIC FUNCTION PANEL: CPT | Performed by: EMERGENCY MEDICINE

## 2019-03-31 PROCEDURE — 86901 BLOOD TYPING SEROLOGIC RH(D): CPT | Performed by: EMERGENCY MEDICINE

## 2019-03-31 PROCEDURE — 99221 1ST HOSP IP/OBS SF/LOW 40: CPT | Performed by: INTERNAL MEDICINE

## 2019-03-31 PROCEDURE — 87040 BLOOD CULTURE FOR BACTERIA: CPT | Performed by: EMERGENCY MEDICINE

## 2019-03-31 PROCEDURE — 86923 COMPATIBILITY TEST ELECTRIC: CPT

## 2019-03-31 PROCEDURE — 99222 1ST HOSP IP/OBS MODERATE 55: CPT | Performed by: INTERNAL MEDICINE

## 2019-03-31 PROCEDURE — 93005 ELECTROCARDIOGRAM TRACING: CPT

## 2019-03-31 PROCEDURE — 85007 BL SMEAR W/DIFF WBC COUNT: CPT | Performed by: EMERGENCY MEDICINE

## 2019-03-31 PROCEDURE — 36415 COLL VENOUS BLD VENIPUNCTURE: CPT | Performed by: EMERGENCY MEDICINE

## 2019-03-31 PROCEDURE — 85610 PROTHROMBIN TIME: CPT | Performed by: EMERGENCY MEDICINE

## 2019-03-31 PROCEDURE — 86850 RBC ANTIBODY SCREEN: CPT | Performed by: EMERGENCY MEDICINE

## 2019-03-31 PROCEDURE — 83605 ASSAY OF LACTIC ACID: CPT | Performed by: EMERGENCY MEDICINE

## 2019-03-31 PROCEDURE — P9040 RBC LEUKOREDUCED IRRADIATED: HCPCS

## 2019-03-31 PROCEDURE — 86900 BLOOD TYPING SEROLOGIC ABO: CPT | Performed by: NURSE PRACTITIONER

## 2019-03-31 PROCEDURE — 83880 ASSAY OF NATRIURETIC PEPTIDE: CPT | Performed by: EMERGENCY MEDICINE

## 2019-03-31 PROCEDURE — ERR1 ERRONEOUS ENCOUNTER-DISREGARD: Performed by: NURSE PRACTITIONER

## 2019-03-31 PROCEDURE — 82607 VITAMIN B-12: CPT | Performed by: INTERNAL MEDICINE

## 2019-03-31 PROCEDURE — 82948 REAGENT STRIP/BLOOD GLUCOSE: CPT

## 2019-03-31 PROCEDURE — 85730 THROMBOPLASTIN TIME PARTIAL: CPT | Performed by: EMERGENCY MEDICINE

## 2019-03-31 PROCEDURE — 84484 ASSAY OF TROPONIN QUANT: CPT | Performed by: EMERGENCY MEDICINE

## 2019-03-31 PROCEDURE — 86850 RBC ANTIBODY SCREEN: CPT | Performed by: NURSE PRACTITIONER

## 2019-03-31 PROCEDURE — 70450 CT HEAD/BRAIN W/O DYE: CPT

## 2019-03-31 PROCEDURE — 80048 BASIC METABOLIC PNL TOTAL CA: CPT | Performed by: EMERGENCY MEDICINE

## 2019-03-31 PROCEDURE — 96361 HYDRATE IV INFUSION ADD-ON: CPT

## 2019-03-31 PROCEDURE — 83605 ASSAY OF LACTIC ACID: CPT | Performed by: NURSE PRACTITIONER

## 2019-03-31 PROCEDURE — 30233R1 TRANSFUSION OF NONAUTOLOGOUS PLATELETS INTO PERIPHERAL VEIN, PERCUTANEOUS APPROACH: ICD-10-PCS | Performed by: INTERNAL MEDICINE

## 2019-03-31 PROCEDURE — 36430 TRANSFUSION BLD/BLD COMPNT: CPT

## 2019-03-31 PROCEDURE — 85027 COMPLETE CBC AUTOMATED: CPT | Performed by: NURSE PRACTITIONER

## 2019-03-31 PROCEDURE — P9016 RBC LEUKOCYTES REDUCED: HCPCS

## 2019-03-31 PROCEDURE — 86900 BLOOD TYPING SEROLOGIC ABO: CPT | Performed by: EMERGENCY MEDICINE

## 2019-03-31 PROCEDURE — 99291 CRITICAL CARE FIRST HOUR: CPT

## 2019-03-31 PROCEDURE — P9037 PLATE PHERES LEUKOREDU IRRAD: HCPCS

## 2019-03-31 PROCEDURE — 96360 HYDRATION IV INFUSION INIT: CPT

## 2019-03-31 PROCEDURE — 86920 COMPATIBILITY TEST SPIN: CPT

## 2019-03-31 PROCEDURE — 83550 IRON BINDING TEST: CPT | Performed by: INTERNAL MEDICINE

## 2019-03-31 PROCEDURE — 30233N1 TRANSFUSION OF NONAUTOLOGOUS RED BLOOD CELLS INTO PERIPHERAL VEIN, PERCUTANEOUS APPROACH: ICD-10-PCS | Performed by: INTERNAL MEDICINE

## 2019-03-31 PROCEDURE — 93010 ELECTROCARDIOGRAM REPORT: CPT | Performed by: INTERNAL MEDICINE

## 2019-03-31 RX ORDER — FENOFIBRATE 48 MG/1
48 TABLET, COATED ORAL EVERY EVENING
Status: DISCONTINUED | OUTPATIENT
Start: 2019-03-31 | End: 2019-04-03 | Stop reason: HOSPADM

## 2019-03-31 RX ORDER — ATORVASTATIN CALCIUM 40 MG/1
80 TABLET, FILM COATED ORAL
Status: DISCONTINUED | OUTPATIENT
Start: 2019-03-31 | End: 2019-04-03 | Stop reason: HOSPADM

## 2019-03-31 RX ORDER — GABAPENTIN 300 MG/1
600 CAPSULE ORAL 2 TIMES DAILY
COMMUNITY
Start: 2018-11-14 | End: 2021-09-20

## 2019-03-31 RX ORDER — INSULIN GLARGINE 100 [IU]/ML
23 INJECTION, SOLUTION SUBCUTANEOUS
Status: DISCONTINUED | OUTPATIENT
Start: 2019-03-31 | End: 2019-04-02

## 2019-03-31 RX ORDER — BUDESONIDE AND FORMOTEROL FUMARATE DIHYDRATE 160; 4.5 UG/1; UG/1
2 AEROSOL RESPIRATORY (INHALATION) 2 TIMES DAILY
Status: DISCONTINUED | OUTPATIENT
Start: 2019-03-31 | End: 2019-04-03 | Stop reason: HOSPADM

## 2019-03-31 RX ORDER — TAMSULOSIN HYDROCHLORIDE 0.4 MG/1
0.4 CAPSULE ORAL
Status: DISCONTINUED | OUTPATIENT
Start: 2019-04-01 | End: 2019-04-03 | Stop reason: HOSPADM

## 2019-03-31 RX ORDER — ACETAMINOPHEN 325 MG/1
650 TABLET ORAL EVERY 6 HOURS PRN
Status: DISCONTINUED | OUTPATIENT
Start: 2019-03-31 | End: 2019-04-03 | Stop reason: HOSPADM

## 2019-03-31 RX ORDER — ASCORBIC ACID 500 MG
250 TABLET ORAL 2 TIMES DAILY
Status: DISCONTINUED | OUTPATIENT
Start: 2019-03-31 | End: 2019-04-03 | Stop reason: HOSPADM

## 2019-03-31 RX ORDER — FOLIC ACID 1 MG/1
1 TABLET ORAL DAILY
Status: DISCONTINUED | OUTPATIENT
Start: 2019-03-31 | End: 2019-04-03 | Stop reason: HOSPADM

## 2019-03-31 RX ORDER — LEFLUNOMIDE 20 MG/1
20 TABLET ORAL DAILY
Status: DISCONTINUED | OUTPATIENT
Start: 2019-03-31 | End: 2019-04-02

## 2019-03-31 RX ORDER — PANTOPRAZOLE SODIUM 40 MG/1
40 TABLET, DELAYED RELEASE ORAL
Status: DISCONTINUED | OUTPATIENT
Start: 2019-04-01 | End: 2019-04-03 | Stop reason: HOSPADM

## 2019-03-31 RX ORDER — ACYCLOVIR 200 MG/1
200 CAPSULE ORAL EVERY 12 HOURS SCHEDULED
Status: DISCONTINUED | OUTPATIENT
Start: 2019-03-31 | End: 2019-04-03 | Stop reason: HOSPADM

## 2019-03-31 RX ORDER — CLOTRIMAZOLE 10 MG/1
10 LOZENGE ORAL; TOPICAL
Status: DISCONTINUED | OUTPATIENT
Start: 2019-03-31 | End: 2019-04-03 | Stop reason: HOSPADM

## 2019-03-31 RX ORDER — FERROUS SULFATE 325(65) MG
325 TABLET ORAL 2 TIMES DAILY WITH MEALS
Status: DISCONTINUED | OUTPATIENT
Start: 2019-03-31 | End: 2019-04-03 | Stop reason: HOSPADM

## 2019-03-31 RX ADMIN — FENOFIBRATE 48 MG: 48 TABLET ORAL at 17:09

## 2019-03-31 RX ADMIN — BUDESONIDE AND FORMOTEROL FUMARATE DIHYDRATE 2 PUFF: 160; 4.5 AEROSOL RESPIRATORY (INHALATION) at 17:00

## 2019-03-31 RX ADMIN — FOLIC ACID 1 MG: 1 TABLET ORAL at 15:00

## 2019-03-31 RX ADMIN — ATORVASTATIN CALCIUM 80 MG: 40 TABLET, FILM COATED ORAL at 17:10

## 2019-03-31 RX ADMIN — Medication 1 TABLET: at 15:00

## 2019-03-31 RX ADMIN — OXYCODONE HYDROCHLORIDE AND ACETAMINOPHEN 250 MG: 500 TABLET ORAL at 17:10

## 2019-03-31 RX ADMIN — MUPIROCIN: 20 OINTMENT TOPICAL at 16:00

## 2019-03-31 RX ADMIN — FERROUS SULFATE TAB 325 MG (65 MG ELEMENTAL FE) 325 MG: 325 (65 FE) TAB at 17:10

## 2019-03-31 RX ADMIN — MUPIROCIN: 20 OINTMENT TOPICAL at 21:34

## 2019-03-31 RX ADMIN — SODIUM CHLORIDE 1000 ML: 0.9 INJECTION, SOLUTION INTRAVENOUS at 11:39

## 2019-03-31 RX ADMIN — ACYCLOVIR 200 MG: 200 CAPSULE ORAL at 21:27

## 2019-04-01 LAB
ABO GROUP BLD BPU: NORMAL
ALBUMIN SERPL BCP-MCNC: 2.7 G/DL (ref 3.5–5)
ALP SERPL-CCNC: 46 U/L (ref 46–116)
ALT SERPL W P-5'-P-CCNC: 19 U/L (ref 12–78)
ANION GAP SERPL CALCULATED.3IONS-SCNC: 9 MMOL/L (ref 4–13)
AST SERPL W P-5'-P-CCNC: 21 U/L (ref 5–45)
BASOPHILS # BLD AUTO: 0 THOUSANDS/ΜL (ref 0–0.1)
BASOPHILS NFR BLD AUTO: 0 % (ref 0–1)
BILIRUB SERPL-MCNC: 2.6 MG/DL (ref 0.2–1)
BPU ID: NORMAL
BUN SERPL-MCNC: 43 MG/DL (ref 5–25)
CALCIUM SERPL-MCNC: 7.8 MG/DL (ref 8.3–10.1)
CHLORIDE SERPL-SCNC: 107 MMOL/L (ref 100–108)
CO2 SERPL-SCNC: 25 MMOL/L (ref 21–32)
CREAT SERPL-MCNC: 1.02 MG/DL (ref 0.6–1.3)
CROSSMATCH: NORMAL
EOSINOPHIL # BLD AUTO: 0.03 THOUSAND/ΜL (ref 0–0.61)
EOSINOPHIL NFR BLD AUTO: 6 % (ref 0–6)
ERYTHROCYTE [DISTWIDTH] IN BLOOD BY AUTOMATED COUNT: 17.3 % (ref 11.6–15.1)
ERYTHROCYTE [DISTWIDTH] IN BLOOD BY AUTOMATED COUNT: 17.3 % (ref 11.6–15.1)
ERYTHROCYTE [DISTWIDTH] IN BLOOD BY AUTOMATED COUNT: 17.8 % (ref 11.6–15.1)
FERRITIN SERPL-MCNC: 1102 NG/ML (ref 8–388)
GFR SERPL CREATININE-BSD FRML MDRD: 76 ML/MIN/1.73SQ M
GLUCOSE SERPL-MCNC: 136 MG/DL (ref 65–140)
GLUCOSE SERPL-MCNC: 138 MG/DL (ref 65–140)
GLUCOSE SERPL-MCNC: 139 MG/DL (ref 65–140)
GLUCOSE SERPL-MCNC: 202 MG/DL (ref 65–140)
GLUCOSE SERPL-MCNC: 273 MG/DL (ref 65–140)
GLUCOSE SERPL-MCNC: 275 MG/DL (ref 65–140)
HCT VFR BLD AUTO: 22.6 % (ref 36.5–49.3)
HCT VFR BLD AUTO: 22.6 % (ref 36.5–49.3)
HCT VFR BLD AUTO: 22.8 % (ref 36.5–49.3)
HGB BLD-MCNC: 7.9 G/DL (ref 12–17)
HGB BLD-MCNC: 7.9 G/DL (ref 12–17)
HGB BLD-MCNC: 8.1 G/DL (ref 12–17)
IMM GRANULOCYTES # BLD AUTO: 0 THOUSAND/UL (ref 0–0.2)
IMM GRANULOCYTES NFR BLD AUTO: 0 % (ref 0–2)
INR PPP: 1.36 (ref 0.86–1.17)
IRON SATN MFR SERPL: 91 %
IRON SERPL-MCNC: 262 UG/DL (ref 65–175)
LYMPHOCYTES # BLD AUTO: 0.36 THOUSANDS/ΜL (ref 0.6–4.47)
LYMPHOCYTES NFR BLD AUTO: 74 % (ref 14–44)
MCH RBC QN AUTO: 29.7 PG (ref 26.8–34.3)
MCH RBC QN AUTO: 30 PG (ref 26.8–34.3)
MCH RBC QN AUTO: 30.2 PG (ref 26.8–34.3)
MCHC RBC AUTO-ENTMCNC: 34.6 G/DL (ref 31.4–37.4)
MCHC RBC AUTO-ENTMCNC: 35 G/DL (ref 31.4–37.4)
MCHC RBC AUTO-ENTMCNC: 35.8 G/DL (ref 31.4–37.4)
MCV RBC AUTO: 84 FL (ref 82–98)
MCV RBC AUTO: 85 FL (ref 82–98)
MCV RBC AUTO: 87 FL (ref 82–98)
MONOCYTES # BLD AUTO: 0.02 THOUSAND/ΜL (ref 0.17–1.22)
MONOCYTES NFR BLD AUTO: 4 % (ref 4–12)
NEUTROPHILS # BLD AUTO: 0.08 THOUSANDS/ΜL (ref 1.85–7.62)
NEUTS SEG NFR BLD AUTO: 16 % (ref 43–75)
NRBC BLD AUTO-RTO: 0 /100 WBCS
PLATELET # BLD AUTO: 59 THOUSANDS/UL (ref 149–390)
PLATELET # BLD AUTO: 65 THOUSANDS/UL (ref 149–390)
PLATELET # BLD AUTO: 67 THOUSANDS/UL (ref 149–390)
PMV BLD AUTO: 10 FL (ref 8.9–12.7)
PMV BLD AUTO: 10.1 FL (ref 8.9–12.7)
PMV BLD AUTO: 9.9 FL (ref 8.9–12.7)
POTASSIUM SERPL-SCNC: 3.8 MMOL/L (ref 3.5–5.3)
PROT SERPL-MCNC: 5.6 G/DL (ref 6.4–8.2)
PROTHROMBIN TIME: 16.4 SECONDS (ref 11.8–14.2)
RBC # BLD AUTO: 2.62 MILLION/UL (ref 3.88–5.62)
RBC # BLD AUTO: 2.66 MILLION/UL (ref 3.88–5.62)
RBC # BLD AUTO: 2.7 MILLION/UL (ref 3.88–5.62)
SODIUM SERPL-SCNC: 141 MMOL/L (ref 136–145)
TIBC SERPL-MCNC: 287 UG/DL (ref 250–450)
UNIT DISPENSE STATUS: NORMAL
UNIT PRODUCT CODE: NORMAL
UNIT RH: NORMAL
VIT B12 SERPL-MCNC: 1855 PG/ML (ref 100–900)
WBC # BLD AUTO: 0.47 THOUSAND/UL (ref 4.31–10.16)
WBC # BLD AUTO: 0.49 THOUSAND/UL (ref 4.31–10.16)
WBC # BLD AUTO: 0.6 THOUSAND/UL (ref 4.31–10.16)

## 2019-04-01 PROCEDURE — 85025 COMPLETE CBC W/AUTO DIFF WBC: CPT | Performed by: NURSE PRACTITIONER

## 2019-04-01 PROCEDURE — ERR1 ERRONEOUS ENCOUNTER-DISREGARD: Performed by: NURSE PRACTITIONER

## 2019-04-01 PROCEDURE — 80053 COMPREHEN METABOLIC PANEL: CPT | Performed by: NURSE PRACTITIONER

## 2019-04-01 PROCEDURE — 99233 SBSQ HOSP IP/OBS HIGH 50: CPT | Performed by: INTERNAL MEDICINE

## 2019-04-01 PROCEDURE — 99232 SBSQ HOSP IP/OBS MODERATE 35: CPT | Performed by: PHYSICIAN ASSISTANT

## 2019-04-01 PROCEDURE — ERR1 ERRONEOUS ENCOUNTER-DISREGARD: Performed by: INTERNAL MEDICINE

## 2019-04-01 PROCEDURE — 85027 COMPLETE CBC AUTOMATED: CPT | Performed by: NURSE PRACTITIONER

## 2019-04-01 PROCEDURE — 82948 REAGENT STRIP/BLOOD GLUCOSE: CPT

## 2019-04-01 PROCEDURE — 85610 PROTHROMBIN TIME: CPT | Performed by: NURSE PRACTITIONER

## 2019-04-01 RX ADMIN — MUPIROCIN 1 APPLICATION: 20 OINTMENT TOPICAL at 15:57

## 2019-04-01 RX ADMIN — FERROUS SULFATE TAB 325 MG (65 MG ELEMENTAL FE) 325 MG: 325 (65 FE) TAB at 08:45

## 2019-04-01 RX ADMIN — ACYCLOVIR 200 MG: 200 CAPSULE ORAL at 21:07

## 2019-04-01 RX ADMIN — ACYCLOVIR 200 MG: 200 CAPSULE ORAL at 08:46

## 2019-04-01 RX ADMIN — CLOTRIMAZOLE 10 MG: 10 LOZENGE ORAL at 21:08

## 2019-04-01 RX ADMIN — PANTOPRAZOLE SODIUM 40 MG: 40 TABLET, DELAYED RELEASE ORAL at 05:39

## 2019-04-01 RX ADMIN — FENOFIBRATE 48 MG: 48 TABLET ORAL at 17:52

## 2019-04-01 RX ADMIN — CLOTRIMAZOLE 10 MG: 10 LOZENGE ORAL at 11:48

## 2019-04-01 RX ADMIN — OXYCODONE HYDROCHLORIDE AND ACETAMINOPHEN 250 MG: 500 TABLET ORAL at 08:45

## 2019-04-01 RX ADMIN — BUDESONIDE AND FORMOTEROL FUMARATE DIHYDRATE 2 PUFF: 160; 4.5 AEROSOL RESPIRATORY (INHALATION) at 08:45

## 2019-04-01 RX ADMIN — TAMSULOSIN HYDROCHLORIDE 0.4 MG: 0.4 CAPSULE ORAL at 05:39

## 2019-04-01 RX ADMIN — OXYCODONE HYDROCHLORIDE AND ACETAMINOPHEN 250 MG: 500 TABLET ORAL at 17:51

## 2019-04-01 RX ADMIN — BUDESONIDE AND FORMOTEROL FUMARATE DIHYDRATE 2 PUFF: 160; 4.5 AEROSOL RESPIRATORY (INHALATION) at 17:52

## 2019-04-01 RX ADMIN — MUPIROCIN 1 APPLICATION: 20 OINTMENT TOPICAL at 21:07

## 2019-04-01 RX ADMIN — INSULIN LISPRO 4 UNITS: 100 INJECTION, SOLUTION INTRAVENOUS; SUBCUTANEOUS at 21:06

## 2019-04-01 RX ADMIN — CLOTRIMAZOLE 10 MG: 10 LOZENGE ORAL at 08:45

## 2019-04-01 RX ADMIN — INSULIN LISPRO 2 UNITS: 100 INJECTION, SOLUTION INTRAVENOUS; SUBCUTANEOUS at 15:55

## 2019-04-01 RX ADMIN — MUPIROCIN: 20 OINTMENT TOPICAL at 08:45

## 2019-04-01 RX ADMIN — FOLIC ACID 1 MG: 1 TABLET ORAL at 08:45

## 2019-04-01 RX ADMIN — Medication 1 TABLET: at 08:45

## 2019-04-01 RX ADMIN — FERROUS SULFATE TAB 325 MG (65 MG ELEMENTAL FE) 325 MG: 325 (65 FE) TAB at 17:52

## 2019-04-01 RX ADMIN — CLOTRIMAZOLE 10 MG: 10 LOZENGE ORAL at 17:51

## 2019-04-01 RX ADMIN — INSULIN GLARGINE 23 UNITS: 100 INJECTION, SOLUTION SUBCUTANEOUS at 21:07

## 2019-04-02 PROBLEM — Z79.4 TYPE 2 DIABETES MELLITUS, WITH LONG-TERM CURRENT USE OF INSULIN (HCC): Status: ACTIVE | Noted: 2018-11-17

## 2019-04-02 LAB
ANION GAP SERPL CALCULATED.3IONS-SCNC: 11 MMOL/L (ref 4–13)
BASOPHILS # BLD AUTO: 0 THOUSANDS/ΜL (ref 0–0.1)
BASOPHILS NFR BLD AUTO: 0 % (ref 0–1)
BUN SERPL-MCNC: 24 MG/DL (ref 5–25)
CALCIUM SERPL-MCNC: 8 MG/DL (ref 8.3–10.1)
CHLORIDE SERPL-SCNC: 105 MMOL/L (ref 100–108)
CO2 SERPL-SCNC: 24 MMOL/L (ref 21–32)
CREAT SERPL-MCNC: 1.06 MG/DL (ref 0.6–1.3)
EOSINOPHIL # BLD AUTO: 0.04 THOUSAND/ΜL (ref 0–0.61)
EOSINOPHIL NFR BLD AUTO: 9 % (ref 0–6)
ERYTHROCYTE [DISTWIDTH] IN BLOOD BY AUTOMATED COUNT: 17.9 % (ref 11.6–15.1)
GFR SERPL CREATININE-BSD FRML MDRD: 72 ML/MIN/1.73SQ M
GLUCOSE SERPL-MCNC: 117 MG/DL (ref 65–140)
GLUCOSE SERPL-MCNC: 166 MG/DL (ref 65–140)
GLUCOSE SERPL-MCNC: 192 MG/DL (ref 65–140)
GLUCOSE SERPL-MCNC: 197 MG/DL (ref 65–140)
GLUCOSE SERPL-MCNC: 273 MG/DL (ref 65–140)
HCT VFR BLD AUTO: 22.1 % (ref 36.5–49.3)
HGB BLD-MCNC: 7.7 G/DL (ref 12–17)
IMM GRANULOCYTES # BLD AUTO: 0 THOUSAND/UL (ref 0–0.2)
IMM GRANULOCYTES NFR BLD AUTO: 0 % (ref 0–2)
LYMPHOCYTES # BLD AUTO: 0.33 THOUSANDS/ΜL (ref 0.6–4.47)
LYMPHOCYTES NFR BLD AUTO: 74 % (ref 14–44)
MCH RBC QN AUTO: 29.5 PG (ref 26.8–34.3)
MCHC RBC AUTO-ENTMCNC: 34.8 G/DL (ref 31.4–37.4)
MCV RBC AUTO: 85 FL (ref 82–98)
MONOCYTES # BLD AUTO: 0.02 THOUSAND/ΜL (ref 0.17–1.22)
MONOCYTES NFR BLD AUTO: 4 % (ref 4–12)
NEUTROPHILS # BLD AUTO: 0.06 THOUSANDS/ΜL (ref 1.85–7.62)
NEUTS SEG NFR BLD AUTO: 13 % (ref 43–75)
NRBC BLD AUTO-RTO: 0 /100 WBCS
PLATELET # BLD AUTO: 48 THOUSANDS/UL (ref 149–390)
PMV BLD AUTO: 10 FL (ref 8.9–12.7)
POTASSIUM SERPL-SCNC: 3.4 MMOL/L (ref 3.5–5.3)
RBC # BLD AUTO: 2.61 MILLION/UL (ref 3.88–5.62)
SODIUM SERPL-SCNC: 140 MMOL/L (ref 136–145)
WBC # BLD AUTO: 0.45 THOUSAND/UL (ref 4.31–10.16)

## 2019-04-02 PROCEDURE — G8979 MOBILITY GOAL STATUS: HCPCS

## 2019-04-02 PROCEDURE — 97163 PT EVAL HIGH COMPLEX 45 MIN: CPT

## 2019-04-02 PROCEDURE — 82948 REAGENT STRIP/BLOOD GLUCOSE: CPT

## 2019-04-02 PROCEDURE — G8978 MOBILITY CURRENT STATUS: HCPCS

## 2019-04-02 PROCEDURE — 85025 COMPLETE CBC W/AUTO DIFF WBC: CPT | Performed by: INTERNAL MEDICINE

## 2019-04-02 PROCEDURE — 99232 SBSQ HOSP IP/OBS MODERATE 35: CPT | Performed by: INTERNAL MEDICINE

## 2019-04-02 PROCEDURE — 80048 BASIC METABOLIC PNL TOTAL CA: CPT | Performed by: INTERNAL MEDICINE

## 2019-04-02 PROCEDURE — 97116 GAIT TRAINING THERAPY: CPT

## 2019-04-02 RX ORDER — POTASSIUM CHLORIDE 20 MEQ/1
40 TABLET, EXTENDED RELEASE ORAL ONCE
Status: COMPLETED | OUTPATIENT
Start: 2019-04-02 | End: 2019-04-02

## 2019-04-02 RX ORDER — INSULIN GLARGINE 100 [IU]/ML
30 INJECTION, SOLUTION SUBCUTANEOUS
Status: DISCONTINUED | OUTPATIENT
Start: 2019-04-02 | End: 2019-04-03 | Stop reason: HOSPADM

## 2019-04-02 RX ADMIN — PANTOPRAZOLE SODIUM 40 MG: 40 TABLET, DELAYED RELEASE ORAL at 05:21

## 2019-04-02 RX ADMIN — ATORVASTATIN CALCIUM 80 MG: 40 TABLET, FILM COATED ORAL at 17:08

## 2019-04-02 RX ADMIN — OXYCODONE HYDROCHLORIDE AND ACETAMINOPHEN 250 MG: 500 TABLET ORAL at 08:45

## 2019-04-02 RX ADMIN — ACYCLOVIR 200 MG: 200 CAPSULE ORAL at 20:59

## 2019-04-02 RX ADMIN — TAMSULOSIN HYDROCHLORIDE 0.4 MG: 0.4 CAPSULE ORAL at 05:21

## 2019-04-02 RX ADMIN — Medication 1 TABLET: at 08:45

## 2019-04-02 RX ADMIN — MUPIROCIN: 20 OINTMENT TOPICAL at 08:44

## 2019-04-02 RX ADMIN — INSULIN LISPRO 1 UNITS: 100 INJECTION, SOLUTION INTRAVENOUS; SUBCUTANEOUS at 08:44

## 2019-04-02 RX ADMIN — ACYCLOVIR 200 MG: 200 CAPSULE ORAL at 08:45

## 2019-04-02 RX ADMIN — CLOTRIMAZOLE 10 MG: 10 LOZENGE ORAL at 13:27

## 2019-04-02 RX ADMIN — MUPIROCIN: 20 OINTMENT TOPICAL at 21:01

## 2019-04-02 RX ADMIN — BUDESONIDE AND FORMOTEROL FUMARATE DIHYDRATE 2 PUFF: 160; 4.5 AEROSOL RESPIRATORY (INHALATION) at 17:09

## 2019-04-02 RX ADMIN — FERROUS SULFATE TAB 325 MG (65 MG ELEMENTAL FE) 325 MG: 325 (65 FE) TAB at 17:09

## 2019-04-02 RX ADMIN — FOLIC ACID 1 MG: 1 TABLET ORAL at 08:45

## 2019-04-02 RX ADMIN — CLOTRIMAZOLE 10 MG: 10 LOZENGE ORAL at 21:00

## 2019-04-02 RX ADMIN — OXYCODONE HYDROCHLORIDE AND ACETAMINOPHEN 250 MG: 500 TABLET ORAL at 17:08

## 2019-04-02 RX ADMIN — CLOTRIMAZOLE 10 MG: 10 LOZENGE ORAL at 17:08

## 2019-04-02 RX ADMIN — CLOTRIMAZOLE 10 MG: 10 LOZENGE ORAL at 08:45

## 2019-04-02 RX ADMIN — BUDESONIDE AND FORMOTEROL FUMARATE DIHYDRATE 2 PUFF: 160; 4.5 AEROSOL RESPIRATORY (INHALATION) at 08:44

## 2019-04-02 RX ADMIN — INSULIN LISPRO 4 UNITS: 100 INJECTION, SOLUTION INTRAVENOUS; SUBCUTANEOUS at 17:12

## 2019-04-02 RX ADMIN — POTASSIUM CHLORIDE 40 MEQ: 1500 TABLET, EXTENDED RELEASE ORAL at 05:14

## 2019-04-02 RX ADMIN — MUPIROCIN: 20 OINTMENT TOPICAL at 17:09

## 2019-04-02 RX ADMIN — FENOFIBRATE 48 MG: 48 TABLET ORAL at 17:09

## 2019-04-02 RX ADMIN — FERROUS SULFATE TAB 325 MG (65 MG ELEMENTAL FE) 325 MG: 325 (65 FE) TAB at 08:44

## 2019-04-02 RX ADMIN — INSULIN GLARGINE 30 UNITS: 100 INJECTION, SOLUTION SUBCUTANEOUS at 21:01

## 2019-04-03 VITALS
WEIGHT: 169.75 LBS | TEMPERATURE: 98.2 F | SYSTOLIC BLOOD PRESSURE: 129 MMHG | BODY MASS INDEX: 24.3 KG/M2 | OXYGEN SATURATION: 98 % | HEART RATE: 80 BPM | HEIGHT: 70 IN | RESPIRATION RATE: 19 BRPM | DIASTOLIC BLOOD PRESSURE: 78 MMHG

## 2019-04-03 LAB
ANION GAP SERPL CALCULATED.3IONS-SCNC: 11 MMOL/L (ref 4–13)
BASOPHILS # BLD AUTO: 0.01 THOUSANDS/ΜL (ref 0–0.1)
BASOPHILS NFR BLD AUTO: 2 % (ref 0–1)
BUN SERPL-MCNC: 13 MG/DL (ref 5–25)
CALCIUM SERPL-MCNC: 8.3 MG/DL (ref 8.3–10.1)
CHLORIDE SERPL-SCNC: 104 MMOL/L (ref 100–108)
CO2 SERPL-SCNC: 24 MMOL/L (ref 21–32)
CREAT SERPL-MCNC: 0.85 MG/DL (ref 0.6–1.3)
EOSINOPHIL # BLD AUTO: 0.05 THOUSAND/ΜL (ref 0–0.61)
EOSINOPHIL NFR BLD AUTO: 10 % (ref 0–6)
ERYTHROCYTE [DISTWIDTH] IN BLOOD BY AUTOMATED COUNT: 17.2 % (ref 11.6–15.1)
GFR SERPL CREATININE-BSD FRML MDRD: 90 ML/MIN/1.73SQ M
GLUCOSE SERPL-MCNC: 109 MG/DL (ref 65–140)
GLUCOSE SERPL-MCNC: 110 MG/DL (ref 65–140)
GLUCOSE SERPL-MCNC: 256 MG/DL (ref 65–140)
HCT VFR BLD AUTO: 24.3 % (ref 36.5–49.3)
HGB BLD-MCNC: 8.3 G/DL (ref 12–17)
IMM GRANULOCYTES # BLD AUTO: 0 THOUSAND/UL (ref 0–0.2)
IMM GRANULOCYTES NFR BLD AUTO: 0 % (ref 0–2)
LYMPHOCYTES # BLD AUTO: 0.36 THOUSANDS/ΜL (ref 0.6–4.47)
LYMPHOCYTES NFR BLD AUTO: 70 % (ref 14–44)
MCH RBC QN AUTO: 29.4 PG (ref 26.8–34.3)
MCHC RBC AUTO-ENTMCNC: 34.2 G/DL (ref 31.4–37.4)
MCV RBC AUTO: 86 FL (ref 82–98)
MONOCYTES # BLD AUTO: 0.04 THOUSAND/ΜL (ref 0.17–1.22)
MONOCYTES NFR BLD AUTO: 8 % (ref 4–12)
NEUTROPHILS # BLD AUTO: 0.05 THOUSANDS/ΜL (ref 1.85–7.62)
NEUTS SEG NFR BLD AUTO: 10 % (ref 43–75)
NRBC BLD AUTO-RTO: 0 /100 WBCS
PLATELET # BLD AUTO: 42 THOUSANDS/UL (ref 149–390)
PMV BLD AUTO: 9.9 FL (ref 8.9–12.7)
POTASSIUM SERPL-SCNC: 3.6 MMOL/L (ref 3.5–5.3)
RBC # BLD AUTO: 2.82 MILLION/UL (ref 3.88–5.62)
SODIUM SERPL-SCNC: 139 MMOL/L (ref 136–145)
WBC # BLD AUTO: 0.51 THOUSAND/UL (ref 4.31–10.16)

## 2019-04-03 PROCEDURE — 80048 BASIC METABOLIC PNL TOTAL CA: CPT | Performed by: NURSE PRACTITIONER

## 2019-04-03 PROCEDURE — 85025 COMPLETE CBC W/AUTO DIFF WBC: CPT | Performed by: NURSE PRACTITIONER

## 2019-04-03 PROCEDURE — 97116 GAIT TRAINING THERAPY: CPT

## 2019-04-03 PROCEDURE — 99239 HOSP IP/OBS DSCHRG MGMT >30: CPT | Performed by: INTERNAL MEDICINE

## 2019-04-03 PROCEDURE — 82948 REAGENT STRIP/BLOOD GLUCOSE: CPT

## 2019-04-03 PROCEDURE — 97530 THERAPEUTIC ACTIVITIES: CPT

## 2019-04-03 RX ORDER — ATENOLOL 25 MG/1
25 TABLET ORAL DAILY
Qty: 30 TABLET | Refills: 0 | Status: SHIPPED | OUTPATIENT
Start: 2019-04-04 | End: 2021-12-02 | Stop reason: SDUPTHER

## 2019-04-03 RX ORDER — CLOTRIMAZOLE 10 MG/1
10 LOZENGE ORAL; TOPICAL
Qty: 50 TABLET | Refills: 0 | Status: SHIPPED | OUTPATIENT
Start: 2019-04-03 | End: 2019-04-13

## 2019-04-03 RX ORDER — ACYCLOVIR 200 MG/1
200 CAPSULE ORAL EVERY 12 HOURS SCHEDULED
Qty: 60 CAPSULE | Refills: 0 | Status: SHIPPED | OUTPATIENT
Start: 2019-04-03 | End: 2020-02-10

## 2019-04-03 RX ADMIN — ACYCLOVIR 200 MG: 200 CAPSULE ORAL at 09:50

## 2019-04-03 RX ADMIN — OXYCODONE HYDROCHLORIDE AND ACETAMINOPHEN 250 MG: 500 TABLET ORAL at 09:52

## 2019-04-03 RX ADMIN — CLOTRIMAZOLE 10 MG: 10 LOZENGE ORAL at 10:36

## 2019-04-03 RX ADMIN — Medication 1 TABLET: at 09:52

## 2019-04-03 RX ADMIN — BUDESONIDE AND FORMOTEROL FUMARATE DIHYDRATE 2 PUFF: 160; 4.5 AEROSOL RESPIRATORY (INHALATION) at 09:51

## 2019-04-03 RX ADMIN — CLOTRIMAZOLE 10 MG: 10 LOZENGE ORAL at 14:06

## 2019-04-03 RX ADMIN — PANTOPRAZOLE SODIUM 40 MG: 40 TABLET, DELAYED RELEASE ORAL at 06:58

## 2019-04-03 RX ADMIN — CLOTRIMAZOLE 10 MG: 10 LOZENGE ORAL at 06:58

## 2019-04-03 RX ADMIN — TAMSULOSIN HYDROCHLORIDE 0.4 MG: 0.4 CAPSULE ORAL at 06:58

## 2019-04-03 RX ADMIN — FERROUS SULFATE TAB 325 MG (65 MG ELEMENTAL FE) 325 MG: 325 (65 FE) TAB at 06:58

## 2019-04-03 RX ADMIN — FOLIC ACID 1 MG: 1 TABLET ORAL at 09:50

## 2019-04-04 LAB
ABO GROUP BLD BPU: NORMAL
ABO GROUP BLD BPU: NORMAL
BPU ID: NORMAL
BPU ID: NORMAL
CROSSMATCH: NORMAL
CROSSMATCH: NORMAL
UNIT DISPENSE STATUS: NORMAL
UNIT DISPENSE STATUS: NORMAL
UNIT PRODUCT CODE: NORMAL
UNIT PRODUCT CODE: NORMAL
UNIT RH: NORMAL
UNIT RH: NORMAL

## 2019-04-05 LAB
BACTERIA BLD CULT: NORMAL
BACTERIA BLD CULT: NORMAL

## 2020-02-05 ENCOUNTER — HOSPITAL ENCOUNTER (EMERGENCY)
Facility: HOSPITAL | Age: 70
Discharge: HOME | End: 2020-02-05
Attending: EMERGENCY MEDICINE | Admitting: EMERGENCY MEDICINE
Payer: MEDICARE

## 2020-02-05 VITALS
TEMPERATURE: 98.2 F | WEIGHT: 195 LBS | RESPIRATION RATE: 19 BRPM | SYSTOLIC BLOOD PRESSURE: 119 MMHG | BODY MASS INDEX: 27.92 KG/M2 | DIASTOLIC BLOOD PRESSURE: 69 MMHG | HEIGHT: 70 IN | HEART RATE: 65 BPM | OXYGEN SATURATION: 93 %

## 2020-02-05 DIAGNOSIS — L50.9 URTICARIA: Primary | ICD-10-CM

## 2020-02-05 LAB
ALBUMIN SERPL BCP-MCNC: 3.4 G/DL (ref 3.5–5)
ALP SERPL-CCNC: 55 U/L (ref 46–116)
ALT SERPL W P-5'-P-CCNC: 14 U/L (ref 12–78)
ANION GAP SERPL CALCULATED.3IONS-SCNC: 10 MMOL/L (ref 4–13)
AST SERPL W P-5'-P-CCNC: 13 U/L (ref 5–45)
BASOPHILS # BLD AUTO: 0.05 THOUSANDS/ΜL (ref 0–0.1)
BASOPHILS NFR BLD AUTO: 1 % (ref 0–1)
BILIRUB SERPL-MCNC: 0.3 MG/DL (ref 0.2–1)
BUN SERPL-MCNC: 17 MG/DL (ref 5–25)
CALCIUM SERPL-MCNC: 8.7 MG/DL (ref 8.3–10.1)
CHLORIDE SERPL-SCNC: 104 MMOL/L (ref 100–108)
CO2 SERPL-SCNC: 27 MMOL/L (ref 21–32)
CREAT SERPL-MCNC: 1.36 MG/DL (ref 0.6–1.3)
EOSINOPHIL # BLD AUTO: 0.1 THOUSAND/ΜL (ref 0–0.61)
EOSINOPHIL NFR BLD AUTO: 2 % (ref 0–6)
ERYTHROCYTE [DISTWIDTH] IN BLOOD BY AUTOMATED COUNT: 16.8 % (ref 11.6–15.1)
GFR SERPL CREATININE-BSD FRML MDRD: 53 ML/MIN/1.73SQ M
GLUCOSE SERPL-MCNC: 123 MG/DL (ref 65–140)
HCT VFR BLD AUTO: 40.9 % (ref 36.5–49.3)
HGB BLD-MCNC: 13.3 G/DL (ref 12–17)
IMM GRANULOCYTES # BLD AUTO: 0.02 THOUSAND/UL (ref 0–0.2)
IMM GRANULOCYTES NFR BLD AUTO: 0 % (ref 0–2)
LYMPHOCYTES # BLD AUTO: 0.84 THOUSANDS/ΜL (ref 0.6–4.47)
LYMPHOCYTES NFR BLD AUTO: 15 % (ref 14–44)
MCH RBC QN AUTO: 29.3 PG (ref 26.8–34.3)
MCHC RBC AUTO-ENTMCNC: 32.5 G/DL (ref 31.4–37.4)
MCV RBC AUTO: 90 FL (ref 82–98)
MONOCYTES # BLD AUTO: 0.58 THOUSAND/ΜL (ref 0.17–1.22)
MONOCYTES NFR BLD AUTO: 10 % (ref 4–12)
NEUTROPHILS # BLD AUTO: 4.14 THOUSANDS/ΜL (ref 1.85–7.62)
NEUTS SEG NFR BLD AUTO: 72 % (ref 43–75)
NRBC BLD AUTO-RTO: 0 /100 WBCS
PLATELET # BLD AUTO: 172 THOUSANDS/UL (ref 149–390)
PMV BLD AUTO: 9.5 FL (ref 8.9–12.7)
POTASSIUM SERPL-SCNC: 4 MMOL/L (ref 3.5–5.3)
PROT SERPL-MCNC: 7.5 G/DL (ref 6.4–8.2)
RBC # BLD AUTO: 4.54 MILLION/UL (ref 3.88–5.62)
SODIUM SERPL-SCNC: 141 MMOL/L (ref 136–145)
WBC # BLD AUTO: 5.73 THOUSAND/UL (ref 4.31–10.16)

## 2020-02-05 PROCEDURE — 85025 COMPLETE CBC W/AUTO DIFF WBC: CPT | Performed by: PHYSICIAN ASSISTANT

## 2020-02-05 PROCEDURE — 99283 EMERGENCY DEPT VISIT LOW MDM: CPT

## 2020-02-05 PROCEDURE — 36415 COLL VENOUS BLD VENIPUNCTURE: CPT | Performed by: PHYSICIAN ASSISTANT

## 2020-02-05 PROCEDURE — 99283 EMERGENCY DEPT VISIT LOW MDM: CPT | Performed by: PHYSICIAN ASSISTANT

## 2020-02-05 PROCEDURE — 96375 TX/PRO/DX INJ NEW DRUG ADDON: CPT

## 2020-02-05 PROCEDURE — 96374 THER/PROPH/DIAG INJ IV PUSH: CPT

## 2020-02-05 PROCEDURE — 80053 COMPREHEN METABOLIC PANEL: CPT | Performed by: PHYSICIAN ASSISTANT

## 2020-02-05 RX ORDER — METHYLPREDNISOLONE SODIUM SUCCINATE 125 MG/2ML
125 INJECTION, POWDER, LYOPHILIZED, FOR SOLUTION INTRAMUSCULAR; INTRAVENOUS ONCE
Status: COMPLETED | OUTPATIENT
Start: 2020-02-05 | End: 2020-02-05

## 2020-02-05 RX ORDER — DIPHENHYDRAMINE HCL 25 MG
25 TABLET ORAL EVERY 6 HOURS PRN
Qty: 20 TABLET | Refills: 0 | Status: SHIPPED | OUTPATIENT
Start: 2020-02-05 | End: 2021-05-13

## 2020-02-05 RX ORDER — DIPHENHYDRAMINE HYDROCHLORIDE 50 MG/ML
25 INJECTION INTRAMUSCULAR; INTRAVENOUS ONCE
Status: COMPLETED | OUTPATIENT
Start: 2020-02-05 | End: 2020-02-05

## 2020-02-05 RX ORDER — PREDNISONE 1 MG/1
TABLET ORAL
Qty: 21 TABLET | Refills: 0 | Status: SHIPPED | OUTPATIENT
Start: 2020-02-05 | End: 2021-05-13

## 2020-02-05 RX ADMIN — DIPHENHYDRAMINE HYDROCHLORIDE 25 MG: 50 INJECTION, SOLUTION INTRAMUSCULAR; INTRAVENOUS at 08:04

## 2020-02-05 RX ADMIN — METHYLPREDNISOLONE SODIUM SUCCINATE 125 MG: 125 INJECTION, POWDER, FOR SOLUTION INTRAMUSCULAR; INTRAVENOUS at 09:06

## 2020-02-05 NOTE — ED PROVIDER NOTES
History  Chief Complaint   Patient presents with    Rash     Patient reports rash began at 3am this morning  Patient reports rash is on his legs, L arm and shoulder  Patient reports it is itchy  81 y o  male with past medical history significant for HTN, RA, COPD, DM2, and squamous cell lung cancer currently receiving chemotherapy presents to ED with chief complaint of rash  Onset of symptoms reported as 3 am this morning  Location of symptoms reported as arms and legs  Quality is reported as itchy red rash  Severity is reported as moderate  Associated symptoms: denies lip, tongue or facial swelling, denies vomiting, denies cough, sob or wheezing  Positive for itchy red rash  Modifying factors: no known aggravating or alleviating factors  Context:  Patient reports a history of Ace inhibitor angioedema in the past   He denies any new foods, soaps, shampoos, detergents  He denies any new medications  His last chemotherapy infusion was over 3 weeks ago  He has not had similar reactions with these infusions in the past   He denies any environmental exposures that are new  He states that he was in his normal state of health until this morning at approximately 3:00 a m  When he broke out in a itchy red rash to his arms and his legs  He does have a history of pancytopenia  He is scheduled for his next chemotherapy infusion in 2 days  Reviewed past medical history and visits via EPIC: patient last seen in ed on 3/31/2019 for evaluation of pancytopenia          History provided by:  Patient and relative   used: No        Prior to Admission Medications   Prescriptions Last Dose Informant Patient Reported?  Taking?   acyclovir (ZOVIRAX) 200 mg capsule   No No   Sig: Take 1 capsule (200 mg total) by mouth every 12 (twelve) hours for 30 days   ascorbic acid (VITAMIN C) 250 mg tablet  Self Yes No   Sig: Take 250 mg by mouth 2 (two) times a day   atenolol (TENORMIN) 25 mg tablet   No No Sig: Take 1 tablet (25 mg total) by mouth daily   fenofibrate (TRICOR) 48 mg tablet  Self Yes No   Sig: Take 45 mg by mouth every evening   ferrous sulfate 325 (65 Fe) mg tablet  Self Yes No   Sig: Take 325 mg by mouth 2 (two) times a day with meals   folic acid (FOLVITE) 1 mg tablet  Self Yes No   Sig: Take by mouth daily   gabapentin (NEURONTIN) 300 mg capsule   Yes No   Sig: Take 600 mg by mouth 2 (two) times a day   insulin degludec (TRESIBA FLEXTOUCH) 100 units/mL injection pen   No No   Sig: Inject 34 Units under the skin daily at bedtime   omeprazole (PriLOSEC) 40 MG capsule  Self Yes No   Sig: Take 40 mg by mouth 2 (two) times a day   potassium chloride (K-DUR,KLOR-CON) 20 mEq tablet   No No   Sig: Take 1 tablet (20 mEq total) by mouth 2 (two) times a day   rosuvastatin (CRESTOR) 40 MG tablet  Self Yes No   Sig: Take 45 mg by mouth every evening   sitaGLIPtin (JANUVIA) 50 mg tablet  Self Yes No   Sig: Take 50 mg by mouth daily   tamsulosin (FLOMAX) 0 4 mg  Self Yes No   Sig: Take 0 4 mg by mouth daily in the early morning   umeclidinium-vilanterol (ANORO ELLIPTA) 62 5-25 MCG/INH inhaler  Self Yes No   Sig: Inhale 1 puff daily      Facility-Administered Medications: None       Past Medical History:   Diagnosis Date    COPD (chronic obstructive pulmonary disease) (HCC)     Diabetes mellitus (HCC)     Hypertension     Iron deficiency anemia     Lung cancer (HCC)     Lyme disease     Rheumatoid arthritis (HCC)     Rheumatoid arthritis ([facility] [address] )        Past Surgical History:   Procedure Laterality Date    BALLOON ANGIOPLASTY, ARTERY      CLAVICLE SURGERY      TOE AMPUTATION Left 11/20/2018    Procedure: AMPUTATION GREAT TOE;  Surgeon: Niya Huerta DPM;  Location: [facility];  Service: Podiatry       History reviewed  No pertinent family history  I have reviewed and agree with the history as documented      Social History     Tobacco Use    Smoking status: Current Every Day Smoker     Packs/day: 0 20 Years: 50 00     Pack years: 10 00     Types: Cigarettes     Last attempt to quit: 10/20/2018     Years since quittin 2    Smokeless tobacco: Never Used   Substance Use Topics    Alcohol use: No    Drug use: No        Review of Systems   Constitutional: Negative for activity change, appetite change, chills, diaphoresis, fatigue, fever and unexpected weight change  HENT: Negative for congestion, dental problem, drooling, ear discharge, ear pain, facial swelling, hearing loss, mouth sores, nosebleeds, postnasal drip, rhinorrhea, sinus pressure, sinus pain, sneezing, sore throat, tinnitus, trouble swallowing and voice change  Eyes: Negative for photophobia, pain, discharge, redness, itching and visual disturbance  Respiratory: Negative for apnea, cough, choking, chest tightness, shortness of breath, wheezing and stridor  Cardiovascular: Negative for chest pain, palpitations and leg swelling  Gastrointestinal: Negative for abdominal distention, abdominal pain, anal bleeding, blood in stool, constipation, diarrhea, nausea and vomiting  Endocrine: Negative for cold intolerance, heat intolerance, polydipsia, polyphagia and polyuria  Genitourinary: Negative for difficulty urinating, dysuria, flank pain, frequency, hematuria and urgency  Musculoskeletal: Negative for arthralgias, back pain, gait problem, joint swelling, myalgias, neck pain and neck stiffness  Skin: Positive for rash  Negative for color change, pallor and wound  Allergic/Immunologic: Negative for environmental allergies, food allergies and immunocompromised state  Neurological: Negative for dizziness, tremors, seizures, syncope, facial asymmetry, speech difficulty, weakness, light-headedness, numbness and headaches  Hematological: Negative for adenopathy  Does not bruise/bleed easily  Psychiatric/Behavioral: Negative for agitation, confusion and hallucinations  The patient is not nervous/anxious      All other systems reviewed and are negative  Physical Exam  Physical Exam   Constitutional: He is oriented to person, place, and time  He appears well-developed and well-nourished  No distress  /68 (BP Location: Right arm)   Pulse 73   Temp 98 2 °F (36 8 °C) (Oral)   Resp 18   Ht 5' 10" (1 778 m)   Wt 88 5 kg (195 lb)   SpO2 95%   BMI 27 98 kg/m²    HENT:   Head: Normocephalic and atraumatic  Right Ear: External ear normal    Left Ear: External ear normal    Nose: Nose normal    Mouth/Throat: Oropharynx is clear and moist  No oropharyngeal exudate  There is no lip tongue or facial swelling  Mucous membranes are moist and pink  Posterior pharynx widely patent  Eyes: Pupils are equal, round, and reactive to light  Conjunctivae and EOM are normal  Right eye exhibits no discharge  Left eye exhibits no discharge  No scleral icterus  Neck: Normal range of motion  Neck supple  No tracheal deviation present  No thyromegaly present  Cardiovascular: Normal rate, regular rhythm and intact distal pulses  Pulmonary/Chest: Effort normal and breath sounds normal  No stridor  No respiratory distress  He has no wheezes  He has no rales  He exhibits no tenderness  There a subcutaneous port present to right upper chest - clean/dry/intact  Abdominal: Soft  Bowel sounds are normal  He exhibits no distension and no mass  There is no tenderness  There is no rebound and no guarding  Musculoskeletal: Normal range of motion  He exhibits no edema, tenderness or deformity  Lymphadenopathy:     He has no cervical adenopathy  Neurological: He is alert and oriented to person, place, and time  He displays normal reflexes  No cranial nerve deficit or sensory deficit  He exhibits normal muscle tone  Coordination normal    Skin: Skin is warm and dry  Capillary refill takes less than 2 seconds  Rash noted  He is not diaphoretic  No erythema  No pallor     There is a patchy, slightly raised, urticarial, pruritic red rash consisting of multiple small 0 5 cm to 1 5 cm roughly circular lesions that chad with local pressure present to the bilateral upper and lower extremities  Some have coalesced into larger lesions  No pustules, papules or vesicles  No petechiae  Psychiatric: He has a normal mood and affect  His behavior is normal  Judgment and thought content normal    Nursing note and vitals reviewed        Vital Signs  ED Triage Vitals [02/05/20 0636]   Temperature Pulse Respirations Blood Pressure SpO2   98 2 °F (36 8 °C) 73 18 112/68 95 %      Temp Source Heart Rate Source Patient Position - Orthostatic VS BP Location FiO2 (%)   Oral Monitor Lying Right arm --      Pain Score       No Pain           Vitals:    02/05/20 0636 02/05/20 0809 02/05/20 1030   BP: 112/68 117/72 119/69   Pulse: 73 70 65   Patient Position - Orthostatic VS: Lying Sitting Lying         Visual Acuity      ED Medications  Medications   diphenhydrAMINE (BENADRYL) injection 25 mg (25 mg Intravenous Given 2/5/20 0804)   methylPREDNISolone sodium succinate (Solu-MEDROL) injection 125 mg (125 mg Intravenous Given 2/5/20 0906)       Diagnostic Studies  Results Reviewed     Procedure Component Value Units Date/Time    Comprehensive metabolic panel [[id number]]  (Abnormal) Collected:  02/05/20 0808    Lab Status:  Final result Specimen:  Blood from Arm, Right Updated:  02/05/20 0840     Sodium 141 mmol/L      Potassium 4 0 mmol/L      Chloride 104 mmol/L      CO2 27 mmol/L      ANION GAP 10 mmol/L      BUN 17 mg/dL      Creatinine 1 36 mg/dL      Glucose 123 mg/dL      Calcium 8 7 mg/dL      AST 13 U/L      ALT 14 U/L      Alkaline Phosphatase 55 U/L      Total Protein 7 5 g/dL      Albumin 3 4 g/dL      Total Bilirubin 0 30 mg/dL      eGFR 53 ml/min/1 73sq m     Narrative:       [place] guidelines for Chronic Kidney Disease (CKD):     Stage 1 with normal or high GFR (GFR > 90 mL/min/1 73 square meters)    Stage 2 Mild CKD (GFR = 60-89 mL/min/1 73 square meters)    Stage 3A Moderate CKD (GFR = 45-59 mL/min/1 73 square meters)    Stage 3B Moderate CKD (GFR = 30-44 mL/min/1 73 square meters)    Stage 4 Severe CKD (GFR = 15-29 mL/min/1 73 square meters)    Stage 5 End Stage CKD (GFR <15 mL/min/1 73 square meters)  Note: GFR calculation is accurate only with a steady state creatinine    CBC and differential [[id number]]  (Abnormal) Collected:  02/05/20 0808    Lab Status:  Final result Specimen:  Blood from Arm, Right Updated:  02/05/20 0815     WBC 5 73 Thousand/uL      RBC 4 54 Million/uL      Hemoglobin 13 3 g/dL      Hematocrit 40 9 %      MCV 90 fL      MCH 29 3 pg      MCHC 32 5 g/dL      RDW 16 8 %      MPV 9 5 fL      Platelets 706 Thousands/uL      nRBC 0 /100 WBCs      Neutrophils Relative 72 %      Immat GRANS % 0 %      Lymphocytes Relative 15 %      Monocytes Relative 10 %      Eosinophils Relative 2 %      Basophils Relative 1 %      Neutrophils Absolute 4 14 Thousands/µL      Immature Grans Absolute 0 02 Thousand/uL      Lymphocytes Absolute 0 84 Thousands/µL      Monocytes Absolute 0 58 Thousand/µL      Eosinophils Absolute 0 10 Thousand/µL      Basophils Absolute 0 05 Thousands/µL                  No orders to display              Procedures  Procedures         ED Course  ED Course as of Feb 06 0924 Wed Feb 05, 2020   8250 Re-evaluation:  Patient with partial response to Benadryl  The redness is fading and he no longer reports itching but there are still a number of pruritic hives noted  Will add Solu-Medrol and reassess                                    MDM  Number of Diagnoses or Management Options  Urticaria: new and requires workup  Diagnosis management comments: ddx includes but is not limited to scabies, atopic dermatitis, strep infection, herpes zoster/shingles, fungal infection, allergic reaction, contact dermatitis, psoriasis, eczema, lice, flea bites, impetigo, pityriasis, seborrheic dermatitis, consider but doubt porphyria, vasculitis, chicken pox, measles  Rash appears most consistent with allergic or urticarial rash  Will trial Benadryl  Will check CBC and CMP to evaluate platelet count and make sure this is not a petechial rash due to low platelets and also because the patient is due to get blood work for his pending chemotherapy infusion on Friday and will need a complete metabolic panel  Trial Benadryl and if partially effective or ineffective add Solu-Medrol and reassessed  Lab results reviewed  CBC demonstrates normal white blood cell count of 5 7, hemoglobin of 13 3 and hematocrit of 40 9 are normal   No anemia  Platelet count of 738208 is normal no thrombocytopenia  Comprehensive metabolic panel was reviewed, BUN of 17 is normal, creatinine of 1 36 mildly elevated  This is consistent with patient's baseline  He was informed of these results  ED course:  Patient initially received Benadryl with partial relief of symptoms  He reports itching resolved but hives remain  He was given dose of IV Solu-Medrol here in the emergency department and after observation the hives resolved  He felt improved  There was no lip tongue or facial swelling  No wheezing cough or shortness of breath  His symptoms were only cutaneous  Discussed with patient unclear source of urticaria and will need follow-up with allergist for further evaluation  Currently symptoms only cutaneous on a resolved with Benadryl and Solu-Medrol  Will treat with outpatient course of prednisone taper and Benadryl every 6 hours as needed for symptoms  Discussed follow up with primary care physician and allergist in 3-5 days for recheck and further evaluation of symptoms  Instructed patient to contact his oncologist as he has a chemotherapy infusion pending this Friday to make him aware of his current course of prednisone therapy for hives and urticaria  Reviewed reasons to return to ed    Patient verbalized understanding of diagnosis and agreement with discharge plan of care as well as understanding of reasons to return to ed  I have reasonably determine that electronically prescribing a controlled substance would be impractical for the patient to obtain the controlled substance prescribed by electronic prescription or would cause an untimely delay resulting in an adverse impact on the patient's medical condition   Amount and/or Complexity of Data Reviewed  Clinical lab tests: ordered and reviewed  Obtain history from someone other than the patient: yes (family)  Review and summarize past medical records: yes    Patient Progress  Patient progress: improved        Disposition  Final diagnoses:   Urticaria     Time reflects when diagnosis was documented in both MDM as applicable and the Disposition within this note     Time User Action Codes Description Comment    2/5/2020 10:16 AM Beny Mcbride Add [L50 9] Urticaria       ED Disposition     ED Disposition Condition Date/Time Comment    Discharge Stable Wed Feb 5, 2020 10:16 AM Schuyler Urbano discharge to home/self care              Follow-up Information     Follow up With Specialties Details Why Contact Info Additional Information    Mariam Hansen MD Family Medicine Call in 1 day for further evaluation of symptoms [address] [facility] [address] [facility] Emergency Department Emergency Medicine Go to  If symptoms worsen [address] [facility] [address] [phone] [place] [facility], [address] [place], [place], [place] Cartagena MD Allergy Call in 1 day for further evaluation of symptoms [address] [place] [address] [address] [place]  [phone]             Discharge Medication List as of 2/5/2020 10:17 AM      START taking these medications    Details   diphenhydrAMINE (BENADRYL) 25 mg tablet Take 1 tablet (25 mg total) by mouth every 6 (six) hours as needed for itching or allergies for up to 20 doses, Starting Wed 2/5/2020, Print      predniSONE 5 mg tablet 40 mg PO day 1, then 30 mg PO day 2, 20 mg PO day 3, 10 mg PO day 4, 5 mg PO day 5 then stop, Print         CONTINUE these medications which have NOT CHANGED    Details   acyclovir (ZOVIRAX) 200 mg capsule Take 1 capsule (200 mg total) by mouth every 12 (twelve) hours for 30 days, Starting Wed 4/3/2019, Until Fri 5/3/2019, Print      ascorbic acid (VITAMIN C) 250 mg tablet Take 250 mg by mouth 2 (two) times a day, Historical Med      atenolol (TENORMIN) 25 mg tablet Take 1 tablet (25 mg total) by mouth daily, Starting Thu 4/4/2019, Print      fenofibrate (TRICOR) 48 mg tablet Take 45 mg by mouth every evening, Historical Med      ferrous sulfate 325 (65 Fe) mg tablet Take 325 mg by mouth 2 (two) times a day with meals, Historical Med      folic acid (FOLVITE) 1 mg tablet Take by mouth daily, Historical Med      gabapentin (NEURONTIN) 300 mg capsule Take 600 mg by mouth 2 (two) times a day, Starting Wed 11/14/2018, Historical Med      insulin degludec (TRESIBA FLEXTOUCH) 100 units/mL injection pen Inject 34 Units under the skin daily at bedtime, Starting Wed 4/3/2019, Normal      omeprazole (PriLOSEC) 40 MG capsule Take 40 mg by mouth 2 (two) times a day, Historical Med      potassium chloride (K-DUR,KLOR-CON) 20 mEq tablet Take 1 tablet (20 mEq total) by mouth 2 (two) times a day, Starting Wed 11/21/2018, Normal      rosuvastatin (CRESTOR) 40 MG tablet Take 45 mg by mouth every evening, Historical Med      sitaGLIPtin (JANUVIA) 50 mg tablet Take 50 mg by mouth daily, Historical Med      tamsulosin (FLOMAX) 0 4 mg Take 0 4 mg by mouth daily in the early morning, Historical Med      umeclidinium-vilanterol (ANORO ELLIPTA) 62 5-25 MCG/INH inhaler Inhale 1 puff daily, Historical Med           No discharge procedures on file      ED Provider  Electronically Signed by           Wisam Adams PA-C  02/06/20 1120

## 2020-02-10 ENCOUNTER — HOSPITAL ENCOUNTER (EMERGENCY)
Facility: HOSPITAL | Age: 70
Discharge: HOME | End: 2020-02-10
Attending: EMERGENCY MEDICINE
Payer: MEDICARE

## 2020-02-10 VITALS
RESPIRATION RATE: 18 BRPM | BODY MASS INDEX: 27.68 KG/M2 | WEIGHT: 192.9 LBS | DIASTOLIC BLOOD PRESSURE: 66 MMHG | SYSTOLIC BLOOD PRESSURE: 116 MMHG | HEART RATE: 71 BPM | OXYGEN SATURATION: 91 % | TEMPERATURE: 98.3 F

## 2020-02-10 DIAGNOSIS — L50.9 URTICARIA: Primary | ICD-10-CM

## 2020-02-10 PROCEDURE — 99284 EMERGENCY DEPT VISIT MOD MDM: CPT | Performed by: EMERGENCY MEDICINE

## 2020-02-10 PROCEDURE — 99283 EMERGENCY DEPT VISIT LOW MDM: CPT

## 2020-02-10 RX ORDER — HYDROXYZINE 50 MG/1
50 TABLET, FILM COATED ORAL EVERY 6 HOURS
Qty: 20 TABLET | Refills: 0 | Status: SHIPPED | OUTPATIENT
Start: 2020-02-10 | End: 2021-05-13

## 2020-02-10 NOTE — DISCHARGE INSTRUCTIONS
Discuss the addition of Singulair to your medications with your physician  Use atarax for itching instead of Benadryl  This could be a reaction to anything  I can not determine here what you may be allergic to

## 2020-02-10 NOTE — ED PROVIDER NOTES
History  Chief Complaint   Patient presents with    Rash     pt states "I have hives all over and a sore throat" pt was seen here last wednesday for the same, symptoms went away and returned friday  Pt taking prednisolone and benadryl with no relief  78year old male patient presents emergency department for evaluation of urticaria  Patient has been having worsening urticaria for the last several days  The patient has intermittent clearing of the urticaria but this morning when he woke up it was there and causing him discomfort so he came in for evaluation  In reviewing the patient's blood work the patient had no acute abnormalities which were seen  The patient will be started on Atarax rather than Benadryl encouraged follow-up with his primary care physician to review the medications that he is on  History provided by:  Patient   used: No    Rash   Location:  Full body  Severity:  Mild  Onset quality:  Gradual  Timing:  Intermittent  Chronicity:  New  Context: not chemical exposure, not exposure to similar rash, not insect bite/sting, not new detergent/soap and not pollen    Relieved by:  Nothing  Worsened by:  Nothing  Associated symptoms: no hoarse voice, no myalgias and no URI        Prior to Admission Medications   Prescriptions Last Dose Informant Patient Reported?  Taking?   ascorbic acid (VITAMIN C) 250 mg tablet  Self Yes Yes   Sig: Take 250 mg by mouth 2 (two) times a day   atenolol (TENORMIN) 25 mg tablet   No Yes   Sig: Take 1 tablet (25 mg total) by mouth daily   diphenhydrAMINE (BENADRYL) 25 mg tablet   No Yes   Sig: Take 1 tablet (25 mg total) by mouth every 6 (six) hours as needed for itching or allergies for up to 20 doses   fenofibrate (TRICOR) 48 mg tablet  Self Yes Yes   Sig: Take 45 mg by mouth every evening   ferrous sulfate 325 (65 Fe) mg tablet  Self Yes Yes   Sig: Take 325 mg by mouth 2 (two) times a day with meals   folic acid (FOLVITE) 1 mg tablet  Self Yes Yes   Sig: Take by mouth daily   gabapentin (NEURONTIN) 300 mg capsule   Yes Yes   Sig: Take 600 mg by mouth 2 (two) times a day   insulin degludec (TRESIBA FLEXTOUCH) 100 units/mL injection pen   No Yes   Sig: Inject 34 Units under the skin daily at bedtime   Patient taking differently: Inject 64 Units under the skin daily at bedtime    omeprazole (PriLOSEC) 40 MG capsule  Self Yes Yes   Sig: Take 40 mg by mouth 2 (two) times a day   potassium chloride (K-DUR,KLOR-CON) 20 mEq tablet   No Yes   Sig: Take 1 tablet (20 mEq total) by mouth 2 (two) times a day   predniSONE 5 mg tablet   No Yes   Si mg PO day 1, then 30 mg PO day 2, 20 mg PO day 3, 10 mg PO day 4, 5 mg PO day 5 then stop   rosuvastatin (CRESTOR) 40 MG tablet  Self Yes Yes   Sig: Take 45 mg by mouth every evening   sitaGLIPtin (JANUVIA) 50 mg tablet  Self Yes Yes   Sig: Take 50 mg by mouth daily   tamsulosin (FLOMAX) 0 4 mg  Self Yes Yes   Sig: Take 0 4 mg by mouth daily in the early morning   umeclidinium-vilanterol (ANORO ELLIPTA) 62 5-25 MCG/INH inhaler  Self Yes Yes   Sig: Inhale 1 puff daily      Facility-Administered Medications: None       Past Medical History:   Diagnosis Date    COPD (chronic obstructive pulmonary disease) (HCC)     Diabetes mellitus (HCC)     Hypertension     Iron deficiency anemia     [facility] )     Lung cancer ([facility] )     Lyme disease     Rheumatoid arthritis ([facility] )     Rheumatoid arthritis ([facility] )        Past Surgical History:   Procedure Laterality Date    BALLOON ANGIOPLASTY, ARTERY      CLAVICLE SURGERY      TOE AMPUTATION Left 2018    Procedure: AMPUTATION GREAT TOE;  Surgeon: Nery Chandra DPM;  Location: [facility] [place] [facility];  Service: Podiatry       History reviewed  No pertinent family history  I have reviewed and agree with the history as documented      Social History     Tobacco Use    Smoking status: Current Every Day Smoker     Packs/day: 0 20     Years: 50 00     Pack years: 10 00     Types: Cigarettes     Last attempt to quit: 10/20/2018     Years since quittin 3    Smokeless tobacco: Never Used   Substance Use Topics    Alcohol use: No    Drug use: No        Review of Systems   HENT: Negative for hoarse voice  Musculoskeletal: Negative for myalgias  Skin: Positive for rash  All other systems reviewed and are negative  Physical Exam  Physical Exam   Constitutional: He is oriented to person, place, and time  He appears well-developed and well-nourished  No distress  HENT:   Head: Normocephalic and atraumatic  Right Ear: External ear normal    Left Ear: External ear normal    Eyes: Conjunctivae and EOM are normal  Right eye exhibits no discharge  Left eye exhibits no discharge  No scleral icterus  Neck: Normal range of motion  Neck supple  No JVD present  No tracheal deviation present  No thyromegaly present  Cardiovascular: Normal rate and regular rhythm  Pulmonary/Chest: Effort normal and breath sounds normal  No stridor  No respiratory distress  He has no wheezes  He has no rales  Abdominal: Soft  Bowel sounds are normal  He exhibits no distension  There is no tenderness  Musculoskeletal: Normal range of motion  He exhibits no edema, tenderness or deformity  Neurological: He is alert and oriented to person, place, and time  No cranial nerve deficit  Coordination normal    Skin: Skin is warm and dry  Rash noted  He is not diaphoretic  Psychiatric: He has a normal mood and affect  His behavior is normal    Nursing note and vitals reviewed        Vital Signs  ED Triage Vitals [02/10/20 1136]   Temperature Pulse Respirations Blood Pressure SpO2   98 3 °F (36 8 °C) 84 18 102/61 94 %      Temp Source Heart Rate Source Patient Position - Orthostatic VS BP Location FiO2 (%)   Oral Monitor Sitting Left arm --      Pain Score       --           Vitals:    02/10/20 1136 02/10/20 1300 02/10/20 1315   BP: 102/61 116/66    Pulse: 84 73 71   Patient Position - Orthostatic VS: Sitting           Visual Acuity      ED Medications  Medications - No data to display    Diagnostic Studies  Results Reviewed     None                 No orders to display              Procedures  Procedures         ED Course           Identification of Seniors at Risk      Most Recent Value   (ISAR) Identification of Seniors at Risk   Before the illness or injury that brought you to the Emergency, did you need someone to help you on a regular basis? 0 Filed at: 02/10/2020 1140   In the last 24 hours, have you needed more help than usual?  0 Filed at: 02/10/2020 1140   Have you been hospitalized for one or more nights during the past 6 months? 0 Filed at: 02/10/2020 1140   In general, do you see well?  0 Filed at: 02/10/2020 1140   In general, do you have serious problems with your memory? 0 Filed at: 02/10/2020 1140   Do you take more than three different medications every day? 1 Filed at: 02/10/2020 1140   ISAR Score  1 Filed at: 02/10/2020 1140                          [facility]  Number of Diagnoses or Management Options  Urticaria: new and requires workup     Amount and/or Complexity of Data Reviewed  Clinical lab tests: ordered and reviewed  Tests in the radiology section of CPT®: reviewed and ordered  Decide to obtain previous medical records or to obtain history from someone other than the patient: yes  Review and summarize past medical records: yes    Patient Progress  Patient progress: stable        Disposition  Final diagnoses:   Urticaria     Time reflects when diagnosis was documented in both MDM as applicable and the Disposition within this note     Time User Action Codes Description Comment    2/10/2020  1:07 PM Nazia Apodaca Add [L50 9] Urticaria       ED Disposition     ED Disposition Condition Date/Time Comment    Discharge Stable Mon Feb 10, 2020  1:07 PM Kavitha Deluna discharge to home/self care              Follow-up Information     Follow up With Specialties Details Why [address] [facility] Gustavo Hobbs, [address] [place] [address]  [phone]            Discharge Medication List as of 2/10/2020  1:09 PM      START taking these medications    Details   hydrOXYzine HCL (ATARAX) 50 mg tablet Take 1 tablet (50 mg total) by mouth every 6 (six) hours for 10 days, Starting Mon 2/10/2020, Until Thu 2/20/2020, Normal         CONTINUE these medications which have NOT CHANGED    Details   ascorbic acid (VITAMIN C) 250 mg tablet Take 250 mg by mouth 2 (two) times a day, Historical Med      atenolol (TENORMIN) 25 mg tablet Take 1 tablet (25 mg total) by mouth daily, Starting Thu 4/4/2019, Print      diphenhydrAMINE (BENADRYL) 25 mg tablet Take 1 tablet (25 mg total) by mouth every 6 (six) hours as needed for itching or allergies for up to 20 doses, Starting Wed 2/5/2020, Print      fenofibrate (TRICOR) 48 mg tablet Take 45 mg by mouth every evening, Historical Med      ferrous sulfate 325 (65 Fe) mg tablet Take 325 mg by mouth 2 (two) times a day with meals, Historical Med      folic acid (FOLVITE) 1 mg tablet Take by mouth daily, Historical Med      gabapentin (NEURONTIN) 300 mg capsule Take 600 mg by mouth 2 (two) times a day, Starting Wed 11/14/2018, Historical Med      insulin degludec (TRESIBA FLEXTOUCH) 100 units/mL injection pen Inject 34 Units under the skin daily at bedtime, Starting Wed 4/3/2019, Normal      omeprazole (PriLOSEC) 40 MG capsule Take 40 mg by mouth 2 (two) times a day, Historical Med      potassium chloride (K-DUR,KLOR-CON) 20 mEq tablet Take 1 tablet (20 mEq total) by mouth 2 (two) times a day, Starting Wed 11/21/2018, Normal      predniSONE 5 mg tablet 40 mg PO day 1, then 30 mg PO day 2, 20 mg PO day 3, 10 mg PO day 4, 5 mg PO day 5 then stop, Print      rosuvastatin (CRESTOR) 40 MG tablet Take 45 mg by mouth every evening, Historical Med      sitaGLIPtin (JANUVIA) 50 mg tablet Take 50 mg by mouth daily, Historical Med      tamsulosin (FLOMAX) 0 4 mg Take 0 4 mg by mouth daily in the early morning, Historical Med      umeclidinium-vilanterol (ANORO ELLIPTA) 62 5-25 MCG/INH inhaler Inhale 1 puff daily, Historical Med           No discharge procedures on file      ED Provider  Electronically Signed by           Broderick Diaz DO  02/14/20 4791

## 2021-05-13 ENCOUNTER — OFFICE VISIT (OUTPATIENT)
Dept: FAMILY MEDICINE CLINIC | Facility: CLINIC | Age: 71
End: 2021-05-13
Payer: MEDICARE

## 2021-05-13 VITALS
SYSTOLIC BLOOD PRESSURE: 130 MMHG | HEIGHT: 70 IN | RESPIRATION RATE: 14 BRPM | HEART RATE: 94 BPM | DIASTOLIC BLOOD PRESSURE: 70 MMHG | WEIGHT: 180.2 LBS | OXYGEN SATURATION: 97 % | TEMPERATURE: 97.2 F | BODY MASS INDEX: 25.8 KG/M2

## 2021-05-13 DIAGNOSIS — M05.70 RHEUMATOID ARTHRITIS WITH RHEUMATOID FACTOR OF UNSPECIFIED SITE WITHOUT ORGAN OR SYSTEMS INVOLVEMENT (HCC): ICD-10-CM

## 2021-05-13 DIAGNOSIS — Z79.4 TYPE 2 DIABETES MELLITUS WITH STAGE 3A CHRONIC KIDNEY DISEASE, WITH LONG-TERM CURRENT USE OF INSULIN (HCC): ICD-10-CM

## 2021-05-13 DIAGNOSIS — N18.31 TYPE 2 DIABETES MELLITUS WITH STAGE 3A CHRONIC KIDNEY DISEASE, WITH LONG-TERM CURRENT USE OF INSULIN (HCC): ICD-10-CM

## 2021-05-13 DIAGNOSIS — E78.5 HYPERLIPIDEMIA, UNSPECIFIED HYPERLIPIDEMIA TYPE: Chronic | ICD-10-CM

## 2021-05-13 DIAGNOSIS — I10 ESSENTIAL HYPERTENSION: Chronic | ICD-10-CM

## 2021-05-13 DIAGNOSIS — J44.9 CHRONIC OBSTRUCTIVE PULMONARY DISEASE, UNSPECIFIED COPD TYPE (HCC): ICD-10-CM

## 2021-05-13 DIAGNOSIS — Z79.4 TYPE 2 DIABETES MELLITUS WITHOUT COMPLICATION, WITH LONG-TERM CURRENT USE OF INSULIN (HCC): Primary | ICD-10-CM

## 2021-05-13 DIAGNOSIS — C34.11 MALIGNANT NEOPLASM OF UPPER LOBE, RIGHT BRONCHUS OR LUNG (HCC): ICD-10-CM

## 2021-05-13 DIAGNOSIS — E11.22 TYPE 2 DIABETES MELLITUS WITH STAGE 3A CHRONIC KIDNEY DISEASE, WITH LONG-TERM CURRENT USE OF INSULIN (HCC): ICD-10-CM

## 2021-05-13 DIAGNOSIS — E11.9 TYPE 2 DIABETES MELLITUS WITHOUT COMPLICATION, WITH LONG-TERM CURRENT USE OF INSULIN (HCC): Primary | ICD-10-CM

## 2021-05-13 PROBLEM — R55 SYNCOPE: Status: RESOLVED | Noted: 2019-03-31 | Resolved: 2021-05-13

## 2021-05-13 PROCEDURE — 99204 OFFICE O/P NEW MOD 45 MIN: CPT | Performed by: FAMILY MEDICINE

## 2021-05-13 RX ORDER — PEN NEEDLE, DIABETIC 31 GX5/16"
NEEDLE, DISPOSABLE MISCELLANEOUS
COMMUNITY
Start: 2021-05-06 | End: 2022-07-21 | Stop reason: SDUPTHER

## 2021-05-13 RX ORDER — GABAPENTIN 400 MG/1
400 CAPSULE ORAL 2 TIMES DAILY
COMMUNITY
End: 2021-12-02 | Stop reason: SDUPTHER

## 2021-05-13 RX ORDER — ALBUTEROL SULFATE 90 UG/1
2 AEROSOL, METERED RESPIRATORY (INHALATION) EVERY 4 HOURS PRN
COMMUNITY
Start: 2021-02-24 | End: 2022-06-29 | Stop reason: SDUPTHER

## 2021-05-13 RX ORDER — AMLODIPINE BESYLATE 5 MG/1
5 TABLET ORAL DAILY
COMMUNITY
Start: 2021-02-24 | End: 2021-12-02 | Stop reason: SDUPTHER

## 2021-05-13 NOTE — ASSESSMENT & PLAN NOTE
Stable, within parameters to continue current medications, obtain updated chemistries continued care Cardiology

## 2021-05-13 NOTE — ASSESSMENT & PLAN NOTE
Lab Results   Component Value Date    HGBA1C 7 4 (H) 02/22/2021    stable, noted elevated home blood sugars on current device, will obtain updated laboratories chemistries to include hemoglobin A1c reviewed current diabetic regimen, with out reported episodes of hypoglycemia, bring home a journal with next visit   follow-up with Podiatry   schedule diabetic eye exam

## 2021-05-13 NOTE — PROGRESS NOTES
BMI Counseling: Body mass index is 25 86 kg/m²  The BMI is above normal  Nutrition recommendations include decreasing portion sizes, decreasing fast food intake, limiting drinks that contain sugar, moderation in carbohydrate intake, increasing intake of lean protein, reducing intake of saturated and trans fat and reducing intake of cholesterol  Exercise recommendations include moderate physical activity 150 minutes/week and exercising 3-5 times per week  No pharmacotherapy was ordered  Assessment/Plan:     Chronic Problems:  No problem-specific Assessment & Plan notes found for this encounter  Visit Diagnosis:  There are no diagnoses linked to this encounter  Subjective:    Patient ID: Azeb Pinedo is a 73 y o  male      Establish   No longer wishes to go to [place] , lvh   Dm sugars have been running high ,   Has not been taking meal time insulin    neuropathy, secondary to chemo/ radiation treatments , lost a toe on the left yrs ago , secondary to bone infection , podiatry = md hope    diabetic eye exam=  Checks bs qd , home blood sugar seem to be inconsistent with blood work   last home blood sugar 2 00s, last hemoglobin A1c 7 1, denies any hypoglycemic episodes    non-small cell lung cancer initial diagnosis 2018,Patient currently under care Dr Linsey Sosa, recent PET scan, hyper bothered right lower  Long mass, malignancy, multiple pulmonary nodules   Has upcoming follow-up with Oncology for review of continued care     , 4 children, ex-smoker 32 years quit 2003   social alcohol   retired    Chemo completed , radiation  Pet scan yesterday       Past med   Prostate cancer = md canales, psa range 4 5 -6 unchanged   Small cell lung = md sánchez   htn , carotid = md erazo   Chol         Colonoscopy 9/ 20            The following portions of the patient's history were reviewed and updated as appropriate: allergies, current medications, past family history, past medical history, past social history, past surgical history and problem list     Review of Systems   Constitutional: Negative for appetite change, chills, fever and unexpected weight change  HENT: Negative for congestion, dental problem, ear pain, hearing loss, postnasal drip, rhinorrhea, sinus pressure, sinus pain, sneezing, sore throat, tinnitus and voice change  Eyes: Negative for visual disturbance  Respiratory: Negative for apnea, cough, chest tightness and shortness of breath  Cardiovascular: Negative for chest pain, palpitations and leg swelling  Gastrointestinal: Negative for abdominal pain, blood in stool, constipation, diarrhea, nausea and vomiting  Endocrine: Negative for cold intolerance, heat intolerance, polydipsia, polyphagia and polyuria  Genitourinary: Negative for decreased urine volume, difficulty urinating, dysuria, frequency and hematuria  Musculoskeletal: Negative for arthralgias, back pain, gait problem, joint swelling and myalgias  Skin: Negative for color change, rash and wound  Allergic/Immunologic: Negative for environmental allergies and food allergies  Neurological: Negative for dizziness, syncope, weakness, light-headedness, numbness and headaches  Hematological: Negative for adenopathy  Does not bruise/bleed easily  Psychiatric/Behavioral: Negative for sleep disturbance and suicidal ideas  The patient is not nervous/anxious  Patient's shoes and socks removed  Right Foot/Ankle   Right Foot Inspection  Skin Exam: skin normal skin not intact, no dry skin, no warmth, no callus, no erythema, no maceration, no abnormal color, no pre-ulcer, no ulcer and no callus                          Toe Exam: ROM and strength within normal limits  Sensory       Monofilament testing: diminished  Vascular    The right DP pulse is 1+  The right PT pulse is 1+       Left Foot/Ankle  Left Foot Inspection  Skin Exam: skin not intact                                         Sensory Monofilament: absent  Vascular    The left DP pulse is 1+  The left PT pulse is 1+  Assign Risk Category:  Deformity present;  Loss of protective sensation; Weak pulses       Risk: 2    /70   Pulse 94   Temp (!) 97 2 °F (36 2 °C)   Resp 14   Ht 5' 10" (1 778 m)   Wt 81 7 kg (180 lb 3 2 oz)   SpO2 97%   BMI 25 86 kg/m²   Social History     Socioeconomic History    Marital status: /Civil Union     Spouse name: Not on file    Number of children: Not on file    Years of education: Not on file    Highest education level: Not on file   Occupational History    Not on file   Social Needs    Financial resource strain: Not on file    Food insecurity     Worry: Not on file     Inability: Not on file    Transportation needs     Medical: Not on file     Non-medical: Not on file   Tobacco Use    Smoking status: Current Every Day Smoker     Packs/day: 0 20     Years: 50 00     Pack years: 10 00     Types: Cigarettes     Last attempt to quit: 10/20/2018     Years since quittin 5    Smokeless tobacco: Never Used   Substance and Sexual Activity    Alcohol use: No    Drug use: No    Sexual activity: Not on file   Lifestyle    Physical activity     Days per week: Not on file     Minutes per session: Not on file    Stress: Not on file   Relationships    Social connections     Talks on phone: Not on file     Gets together: Not on file     Attends Bahai service: Not on file     Active member of club or organization: Not on file     Attends meetings of clubs or organizations: Not on file     Relationship status: Not on file    Intimate partner violence     Fear of current or ex partner: Not on file     Emotionally abused: Not on file     Physically abused: Not on file     Forced sexual activity: Not on file   Other Topics Concern    Not on file   Social History Narrative    Not on file     Past Medical History:   Diagnosis Date    [facility]     Diabetes mellitus ([facility] )     Hypertension     Iron deficiency anemia     Lung cancer ([facility] )     Lung cancer ([place] [address] )     Lyme disease     Rheumatoid arthritis ([place] [address] )     Rheumatoid arthritis ([place] [address] )      No family history on file    Past Surgical History:   Procedure Laterality Date    BALLOON ANGIOPLASTY, ARTERY      CLAVICLE SURGERY      TOE AMPUTATION Left 11/20/2018    Procedure: AMPUTATION GREAT TOE;  Surgeon: Rae Foster DPM;  Location: [facility] OR;  Service: Podiatry       Current Outpatient Medications:     albuterol (PROVENTIL HFA,VENTOLIN HFA) 90 mcg/act inhaler, Inhale 2 puffs every 4 (four) hours as needed, Disp: , Rfl:     amLODIPine (NORVASC) 5 mg tablet, Take 5 mg by mouth daily, Disp: , Rfl:     ascorbic acid (VITAMIN C) 250 mg tablet, Take 250 mg by mouth 2 (two) times a day, Disp: , Rfl:     atenolol (TENORMIN) 25 mg tablet, Take 1 tablet (25 mg total) by mouth daily, Disp: 30 tablet, Rfl: 0    fenofibrate (TRICOR) 48 mg tablet, Take 45 mg by mouth every evening, Disp: , Rfl:     ferrous sulfate 325 (65 Fe) mg tablet, Take 325 mg by mouth 2 (two) times a day with meals, Disp: , Rfl:     gabapentin (NEURONTIN) 400 mg capsule, Take 400 mg by mouth 2 (two) times a day, Disp: , Rfl:     insulin degludec (TRESIBA FLEXTOUCH) 100 units/mL injection pen, Inject 34 Units under the skin daily at bedtime (Patient taking differently: Inject 64 Units under the skin daily at bedtime ), Disp: 5 pen, Rfl: 0    metFORMIN (GLUCOPHAGE) 1000 MG tablet, Take 1,000 mg by mouth, Disp: , Rfl:     omeprazole (PriLOSEC) 40 MG capsule, Take 40 mg by mouth 2 (two) times a day, Disp: , Rfl:     rosuvastatin (CRESTOR) 40 MG tablet, Take 45 mg by mouth every evening, Disp: , Rfl:     sitaGLIPtin (JANUVIA) 50 mg tablet, Take 50 mg by mouth daily, Disp: , Rfl:     umeclidinium-vilanterol (ANORO ELLIPTA) 62 5-25 MCG/INH inhaler, Inhale 1 puff daily, Disp: , Rfl:     B-D ULTRAFINE III SHORT PEN 31G X 8 MM MISC, , Disp: , Rfl:     gabapentin (NEURONTIN) 300 mg capsule, Take 600 mg by mouth 2 (two) times a day, Disp: , Rfl:     Allergies   Allergen Reactions    Ace Inhibitors Swelling    Angiotensin Receptor Blockers Other (See Comments)     Elevated K+    Clindamycin Diarrhea and Nausea Only    Plaquenil [Hydroxychloroquine] Swelling     Tongue swelling          Lab Review   No visits with results within 6 Month(s) from this visit  Latest known visit with results is:   Admission on 02/05/2020, Discharged on 02/05/2020   Component Date Value    WBC 02/05/2020 5 73     RBC 02/05/2020 4 54     Hemoglobin 02/05/2020 13 3     Hematocrit 02/05/2020 40 9     MCV 02/05/2020 90     MCH 02/05/2020 29 3     MCHC 02/05/2020 32 5     RDW 02/05/2020 16 8*    MPV 02/05/2020 9 5     Platelets 35/93/5512 172     nRBC 02/05/2020 0     Neutrophils Relative 02/05/2020 72     Immat GRANS % 02/05/2020 0     Lymphocytes Relative 02/05/2020 15     Monocytes Relative 02/05/2020 10     Eosinophils Relative 02/05/2020 2     Basophils Relative 02/05/2020 1     Neutrophils Absolute 02/05/2020 4 14     Immature Grans Absolute 02/05/2020 0 02     Lymphocytes Absolute 02/05/2020 0 84     Monocytes Absolute 02/05/2020 0 58     Eosinophils Absolute 02/05/2020 0 10     Basophils Absolute 02/05/2020 0 05     Sodium 02/05/2020 141     Potassium 02/05/2020 4 0     Chloride 02/05/2020 104     CO2 02/05/2020 27     ANION GAP 02/05/2020 10     BUN 02/05/2020 17     Creatinine 02/05/2020 1 36*    Glucose 02/05/2020 123     Calcium 02/05/2020 8 7     AST 02/05/2020 13     ALT 02/05/2020 14     Alkaline Phosphatase 02/05/2020 55     Total Protein 02/05/2020 7 5     Albumin 02/05/2020 3 4*    Total Bilirubin 02/05/2020 0 30     eGFR 02/05/2020 53         Imaging: No results found  Objective:     Physical Exam  Constitutional:       General: He is not in acute distress  Appearance: He is well-developed   He is not ill-appearing  HENT:      Head: Normocephalic and atraumatic  Neck:      Musculoskeletal: Normal range of motion and neck supple  Cardiovascular:      Rate and Rhythm: Normal rate and regular rhythm  Pulses: Pulses are weak  Dorsalis pedis pulses are 1+ on the right side and 1+ on the left side  Posterior tibial pulses are 1+ on the right side and 1+ on the left side  Heart sounds: Normal heart sounds  Pulmonary:      Effort: Pulmonary effort is normal       Breath sounds: Normal breath sounds  Musculoskeletal: Normal range of motion  Feet:      Right foot:      Skin integrity: No ulcer, skin breakdown, erythema, warmth, callus or dry skin  Skin:     General: Skin is warm and dry  Neurological:      Mental Status: He is alert and oriented to person, place, and time  Deep Tendon Reflexes: Reflexes are normal and symmetric  Psychiatric:         Behavior: Behavior normal          Thought Content: Thought content normal          Judgment: Judgment normal            There are no Patient Instructions on file for this visit  JEROME Selby    Portions of the record may have been created with voice recognition software  Occasional wrong word or "sound a like" substitutions may have occurred due to the inherent limitations of voice recognition software  Read the chart carefully and recognize, using context, where substitutions have occurred

## 2021-05-19 ENCOUNTER — TELEPHONE (OUTPATIENT)
Dept: ADMINISTRATIVE | Facility: OTHER | Age: 71
End: 2021-05-19

## 2021-05-19 NOTE — LETTER
Procedure Request Form: Colonoscopy      Date Requested: 21  Patient: Bobo Raveling  Patient : 1950   Referring Provider: Megan Payanaser, CRNP        Date of Procedure ______________________________       The above patient has informed us that they have completed their   most recent Colonoscopy at your facility  Please complete   this form and attach all corresponding procedure reports/results  Comments __________________________________________________________  ____________________________________________________________________  ____________________________________________________________________  ____________________________________________________________________    Facility Completing Procedure _________________________________________    Form Completed By (print name) _______________________________________      Signature __________________________________________________________      These reports are needed for  compliance    Please fax this completed form and a copy of the procedure report to our office located at [place] [address] as soon as possible to [phone] attention Libia Rutledge: Phone [phone]    We thank you for your assistance in treating our mutual patient

## 2021-05-19 NOTE — TELEPHONE ENCOUNTER
----- Message from Michael Ruiz LPN sent at 7/29/9724  1:37 PM EDT -----  05/18/21 1:38 PM    Hello, our patient Lauren Fernandez has had CRC: Colonoscopy completed/performed  Please assist in updating the patient chart by making an External outreach to [address] [place] [address] located in  [facility]   The date of service is 2020    Thank you,  ANNETTE Christensen PG [address] [facility]

## 2021-05-19 NOTE — TELEPHONE ENCOUNTER
Upon review of the In Basket request and the patient's chart, initial outreach has been made via fax, please see Contacts section for details       Thank you  Ronel Villalpando

## 2021-05-24 NOTE — TELEPHONE ENCOUNTER
Upon review of the In Basket request we found the patient did not have the procedure at the specified location  Please reach out to the patient to confirm where he had the colonoscopy  Any additional questions or concerns should be emailed to the Practice Liaisons via [email or web address] email, please do not reply via In Basket      Thank you  Shari Mckeon

## 2021-05-24 NOTE — TELEPHONE ENCOUNTER
As a follow-up, a second attempt has been made for outreach via fax, please see Contacts section for details      Thank you  Moe Looney

## 2021-06-11 ENCOUNTER — APPOINTMENT (OUTPATIENT)
Dept: LAB | Facility: CLINIC | Age: 71
End: 2021-06-11
Payer: MEDICARE

## 2021-06-11 DIAGNOSIS — J44.9 CHRONIC OBSTRUCTIVE PULMONARY DISEASE, UNSPECIFIED COPD TYPE (HCC): ICD-10-CM

## 2021-06-11 DIAGNOSIS — N18.31 TYPE 2 DIABETES MELLITUS WITH STAGE 3A CHRONIC KIDNEY DISEASE, WITH LONG-TERM CURRENT USE OF INSULIN (HCC): ICD-10-CM

## 2021-06-11 DIAGNOSIS — E78.5 HYPERLIPIDEMIA, UNSPECIFIED HYPERLIPIDEMIA TYPE: Chronic | ICD-10-CM

## 2021-06-11 DIAGNOSIS — E11.22 TYPE 2 DIABETES MELLITUS WITH STAGE 3A CHRONIC KIDNEY DISEASE, WITH LONG-TERM CURRENT USE OF INSULIN (HCC): ICD-10-CM

## 2021-06-11 DIAGNOSIS — Z79.4 TYPE 2 DIABETES MELLITUS WITHOUT COMPLICATION, WITH LONG-TERM CURRENT USE OF INSULIN (HCC): ICD-10-CM

## 2021-06-11 DIAGNOSIS — C34.11 MALIGNANT NEOPLASM OF UPPER LOBE, RIGHT BRONCHUS OR LUNG (HCC): ICD-10-CM

## 2021-06-11 DIAGNOSIS — Z79.4 TYPE 2 DIABETES MELLITUS WITH STAGE 3A CHRONIC KIDNEY DISEASE, WITH LONG-TERM CURRENT USE OF INSULIN (HCC): ICD-10-CM

## 2021-06-11 DIAGNOSIS — M05.70 RHEUMATOID ARTHRITIS WITH RHEUMATOID FACTOR OF UNSPECIFIED SITE WITHOUT ORGAN OR SYSTEMS INVOLVEMENT (HCC): ICD-10-CM

## 2021-06-11 DIAGNOSIS — I10 ESSENTIAL HYPERTENSION: Chronic | ICD-10-CM

## 2021-06-11 DIAGNOSIS — E11.9 TYPE 2 DIABETES MELLITUS WITHOUT COMPLICATION, WITH LONG-TERM CURRENT USE OF INSULIN (HCC): ICD-10-CM

## 2021-06-11 LAB
ALBUMIN SERPL BCP-MCNC: 3.4 G/DL (ref 3.5–5)
ALP SERPL-CCNC: 90 U/L (ref 46–116)
ALT SERPL W P-5'-P-CCNC: 18 U/L (ref 12–78)
ANION GAP SERPL CALCULATED.3IONS-SCNC: 5 MMOL/L (ref 4–13)
AST SERPL W P-5'-P-CCNC: 15 U/L (ref 5–45)
BASOPHILS # BLD AUTO: 0.1 THOUSANDS/ΜL (ref 0–0.1)
BASOPHILS NFR BLD AUTO: 1 % (ref 0–1)
BILIRUB SERPL-MCNC: 0.45 MG/DL (ref 0.2–1)
BUN SERPL-MCNC: 25 MG/DL (ref 5–25)
CALCIUM ALBUM COR SERPL-MCNC: 9.9 MG/DL (ref 8.3–10.1)
CALCIUM SERPL-MCNC: 9.4 MG/DL (ref 8.3–10.1)
CHLORIDE SERPL-SCNC: 104 MMOL/L (ref 100–108)
CHOLEST SERPL-MCNC: 123 MG/DL (ref 50–200)
CO2 SERPL-SCNC: 28 MMOL/L (ref 21–32)
CREAT SERPL-MCNC: 1.23 MG/DL (ref 0.6–1.3)
CREAT UR-MCNC: 73.4 MG/DL
EOSINOPHIL # BLD AUTO: 0.67 THOUSAND/ΜL (ref 0–0.61)
EOSINOPHIL NFR BLD AUTO: 7 % (ref 0–6)
ERYTHROCYTE [DISTWIDTH] IN BLOOD BY AUTOMATED COUNT: 13.8 % (ref 11.6–15.1)
EST. AVERAGE GLUCOSE BLD GHB EST-MCNC: 180 MG/DL
GFR SERPL CREATININE-BSD FRML MDRD: 60 ML/MIN/1.73SQ M
GLUCOSE P FAST SERPL-MCNC: 178 MG/DL (ref 65–99)
HBA1C MFR BLD: 7.9 %
HCT VFR BLD AUTO: 42.4 % (ref 36.5–49.3)
HDLC SERPL-MCNC: 25 MG/DL
HGB BLD-MCNC: 13.9 G/DL (ref 12–17)
IMM GRANULOCYTES # BLD AUTO: 0.04 THOUSAND/UL (ref 0–0.2)
IMM GRANULOCYTES NFR BLD AUTO: 0 % (ref 0–2)
LDLC SERPL CALC-MCNC: 68 MG/DL (ref 0–100)
LYMPHOCYTES # BLD AUTO: 0.76 THOUSANDS/ΜL (ref 0.6–4.47)
LYMPHOCYTES NFR BLD AUTO: 8 % (ref 14–44)
MCH RBC QN AUTO: 30 PG (ref 26.8–34.3)
MCHC RBC AUTO-ENTMCNC: 32.8 G/DL (ref 31.4–37.4)
MCV RBC AUTO: 91 FL (ref 82–98)
MICROALBUMIN UR-MCNC: 21.2 MG/L (ref 0–20)
MICROALBUMIN/CREAT 24H UR: 29 MG/G CREATININE (ref 0–30)
MONOCYTES # BLD AUTO: 0.81 THOUSAND/ΜL (ref 0.17–1.22)
MONOCYTES NFR BLD AUTO: 9 % (ref 4–12)
NEUTROPHILS # BLD AUTO: 6.71 THOUSANDS/ΜL (ref 1.85–7.62)
NEUTS SEG NFR BLD AUTO: 75 % (ref 43–75)
NONHDLC SERPL-MCNC: 98 MG/DL
NRBC BLD AUTO-RTO: 0 /100 WBCS
PLATELET # BLD AUTO: 177 THOUSANDS/UL (ref 149–390)
PMV BLD AUTO: 10.1 FL (ref 8.9–12.7)
POTASSIUM SERPL-SCNC: 4.1 MMOL/L (ref 3.5–5.3)
PROT SERPL-MCNC: 7.8 G/DL (ref 6.4–8.2)
RBC # BLD AUTO: 4.64 MILLION/UL (ref 3.88–5.62)
SODIUM SERPL-SCNC: 137 MMOL/L (ref 136–145)
TRIGL SERPL-MCNC: 148 MG/DL
TSH SERPL DL<=0.05 MIU/L-ACNC: <0.007 UIU/ML (ref 0.36–3.74)
WBC # BLD AUTO: 9.09 THOUSAND/UL (ref 4.31–10.16)

## 2021-06-11 PROCEDURE — 85025 COMPLETE CBC W/AUTO DIFF WBC: CPT

## 2021-06-11 PROCEDURE — 83036 HEMOGLOBIN GLYCOSYLATED A1C: CPT

## 2021-06-11 PROCEDURE — 84443 ASSAY THYROID STIM HORMONE: CPT

## 2021-06-11 PROCEDURE — 80053 COMPREHEN METABOLIC PANEL: CPT

## 2021-06-11 PROCEDURE — 36415 COLL VENOUS BLD VENIPUNCTURE: CPT

## 2021-06-11 PROCEDURE — 82570 ASSAY OF URINE CREATININE: CPT

## 2021-06-11 PROCEDURE — 82043 UR ALBUMIN QUANTITATIVE: CPT

## 2021-06-11 PROCEDURE — 80061 LIPID PANEL: CPT

## 2021-06-14 ENCOUNTER — OFFICE VISIT (OUTPATIENT)
Dept: FAMILY MEDICINE CLINIC | Facility: CLINIC | Age: 71
End: 2021-06-14
Payer: MEDICARE

## 2021-06-14 VITALS
BODY MASS INDEX: 25.77 KG/M2 | HEIGHT: 70 IN | OXYGEN SATURATION: 97 % | WEIGHT: 180 LBS | SYSTOLIC BLOOD PRESSURE: 128 MMHG | HEART RATE: 87 BPM | DIASTOLIC BLOOD PRESSURE: 70 MMHG

## 2021-06-14 DIAGNOSIS — Z89.422 ACQUIRED ABSENCE OF OTHER LEFT TOE(S) (HCC): ICD-10-CM

## 2021-06-14 DIAGNOSIS — C34.91 SQUAMOUS CELL LUNG CANCER, RIGHT (HCC): Chronic | ICD-10-CM

## 2021-06-14 DIAGNOSIS — Z79.4 TYPE 2 DIABETES MELLITUS WITH STAGE 3A CHRONIC KIDNEY DISEASE, WITH LONG-TERM CURRENT USE OF INSULIN (HCC): Primary | ICD-10-CM

## 2021-06-14 DIAGNOSIS — M06.9 RHEUMATOID ARTHRITIS, INVOLVING UNSPECIFIED SITE, UNSPECIFIED WHETHER RHEUMATOID FACTOR PRESENT (HCC): Chronic | ICD-10-CM

## 2021-06-14 DIAGNOSIS — Z00.00 MEDICARE ANNUAL WELLNESS VISIT, SUBSEQUENT: ICD-10-CM

## 2021-06-14 DIAGNOSIS — M86.272 SUBACUTE OSTEOMYELITIS OF LEFT FOOT (HCC): ICD-10-CM

## 2021-06-14 DIAGNOSIS — N18.31 TYPE 2 DIABETES MELLITUS WITH STAGE 3A CHRONIC KIDNEY DISEASE, WITH LONG-TERM CURRENT USE OF INSULIN (HCC): Primary | ICD-10-CM

## 2021-06-14 DIAGNOSIS — I71.4 ABDOMINAL AORTIC ANEURYSM (AAA) WITHOUT RUPTURE (HCC): ICD-10-CM

## 2021-06-14 DIAGNOSIS — E11.22 TYPE 2 DIABETES MELLITUS WITH STAGE 3A CHRONIC KIDNEY DISEASE, WITH LONG-TERM CURRENT USE OF INSULIN (HCC): Primary | ICD-10-CM

## 2021-06-14 DIAGNOSIS — E07.9 THYROID DISEASE: ICD-10-CM

## 2021-06-14 DIAGNOSIS — G62.9 NEUROPATHY: Chronic | ICD-10-CM

## 2021-06-14 PROBLEM — I71.40 ABDOMINAL AORTIC ANEURYSM (AAA) WITHOUT RUPTURE: Status: ACTIVE | Noted: 2021-06-14

## 2021-06-14 PROCEDURE — 99214 OFFICE O/P EST MOD 30 MIN: CPT | Performed by: FAMILY MEDICINE

## 2021-06-14 PROCEDURE — 1123F ACP DISCUSS/DSCN MKR DOCD: CPT | Performed by: FAMILY MEDICINE

## 2021-06-14 PROCEDURE — G0439 PPPS, SUBSEQ VISIT: HCPCS | Performed by: FAMILY MEDICINE

## 2021-06-14 NOTE — PROGRESS NOTES
Assessment and Plan:     Problem List Items Addressed This Visit     None           Preventive health issues were discussed with patient, and age appropriate screening tests were ordered as noted in patient's After Visit Summary  Personalized health advice and appropriate referrals for health education or preventive services given if needed, as noted in patient's After Visit Summary  History of Present Illness:     Patient presents for Medicare Annual Wellness visit    Patient Care Team:  JEROME Vivas as PCP - General (Family Medicine)     Problem List:     Patient Active Problem List   Diagnosis    PRADEEP (acute kidney injury) ([place] [address] )    Type 2 diabetes mellitus with stage 3a chronic kidney disease, with long-term current use of insulin ([place] [address] )    HTN (hypertension)    Anemia    Pancytopenia (HCC)    [facility] ([facility] )    Squamous cell lung cancer, right ([facility] )    Neuropathy    Hypotension    Hyperlipidemia    COPD (chronic obstructive pulmonary disease) ([facility] )    GERD (gastroesophageal reflux disease)    Lactic acidosis      Past Medical and Surgical History:     Past Medical History:   Diagnosis Date    COPD (chronic obstructive pulmonary disease) ([facility] )     Diabetes mellitus ([facility] )     Hypertension     Iron deficiency anemia     Lung cancer ([facility] )     Lung cancer ([facility] )     Lyme disease     Rheumatoid arthritis ([facility] )     Rheumatoid arthritis ([facility] )      Past Surgical History:   Procedure Laterality Date    BALLOON ANGIOPLASTY, ARTERY      CLAVICLE SURGERY      TOE AMPUTATION Left 11/20/2018    Procedure: AMPUTATION GREAT TOE;  Surgeon: Chidi Felix DPM;  Location: [facility];  Service: Podiatry      Family History:     No family history on file     Social History:     Social History     Socioeconomic History    Marital status: /Civil Union     Spouse name: None    Number of children: None    Years of education: None    Highest education level: None   Occupational History    None   Tobacco Use    Smoking status: Current Every Day Smoker     Packs/day: 0 20     Years: 50 00     Pack years: 10 00     Types: Cigarettes     Last attempt to quit: 10/20/2018     Years since quittin 6    Smokeless tobacco: Never Used   Substance and Sexual Activity    Alcohol use: No    Drug use: No    Sexual activity: None   Other Topics Concern    None   Social History Narrative    None     Social Determinants of Health     Financial Resource Strain:     Difficulty of Paying Living Expenses:    Food Insecurity:     Worried About Running Out of Food in the Last Year:     Ran Out of Food in the Last Year:    Transportation Needs:     Lack of Transportation (Medical):      Lack of Transportation (Non-Medical):    Physical Activity:     Days of Exercise per Week:     Minutes of Exercise per Session:    Stress:     Feeling of Stress :    Social Connections:     Frequency of Communication with Friends and Family:     Frequency of Social Gatherings with Friends and Family:     Attends Anabaptism Services:     Active Member of Clubs or Organizations:     Attends Club or Organization Meetings:     Marital Status:    Intimate Partner Violence:     Fear of Current or Ex-Partner:     Emotionally Abused:     Physically Abused:     Sexually Abused:       Medications and Allergies:     Current Outpatient Medications   Medication Sig Dispense Refill    albuterol (PROVENTIL HFA,VENTOLIN HFA) 90 mcg/act inhaler Inhale 2 puffs every 4 (four) hours as needed      amLODIPine (NORVASC) 5 mg tablet Take 5 mg by mouth daily      ascorbic acid (VITAMIN C) 250 mg tablet Take 250 mg by mouth 2 (two) times a day      atenolol (TENORMIN) 25 mg tablet Take 1 tablet (25 mg total) by mouth daily 30 tablet 0    B-D ULTRAFINE III SHORT PEN 31G X 8 MM MISC       fenofibrate (TRICOR) 48 mg tablet Take 45 mg by mouth every evening      ferrous sulfate 325 (65 Fe) mg tablet Take 325 mg by mouth 2 (two) times a day with meals      gabapentin (NEURONTIN) 400 mg capsule Take 400 mg by mouth 2 (two) times a day      insulin degludec (TRESIBA FLEXTOUCH) 100 units/mL injection pen Inject 34 Units under the skin daily at bedtime (Patient taking differently: Inject 64 Units under the skin daily at bedtime ) 5 pen 0    metFORMIN (GLUCOPHAGE) 1000 MG tablet Take 1,000 mg by mouth      omeprazole (PriLOSEC) 40 MG capsule Take 40 mg by mouth 2 (two) times a day      rosuvastatin (CRESTOR) 40 MG tablet Take 45 mg by mouth every evening      sitaGLIPtin (JANUVIA) 50 mg tablet Take 50 mg by mouth daily      umeclidinium-vilanterol (ANORO ELLIPTA) 62 5-25 MCG/INH inhaler Inhale 1 puff daily      gabapentin (NEURONTIN) 300 mg capsule Take 600 mg by mouth 2 (two) times a day       No current facility-administered medications for this visit       Allergies   Allergen Reactions    Ace Inhibitors Swelling    Angiotensin Receptor Blockers Other (See Comments)     Elevated K+    Clindamycin Diarrhea and Nausea Only    Plaquenil [Hydroxychloroquine] Swelling     Tongue swelling      Immunizations:     Immunization History   Administered Date(s) Administered    Hep B, adult 11/07/2012    INFLUENZA 12/14/1999, 09/18/2000, 10/09/2000, 10/11/2001, 10/08/2002, 10/10/2003, 09/27/2004, 12/08/2005, 10/30/2007, 10/02/2008, 10/02/2009, 10/18/2010, 09/28/2011, 10/24/2012, 09/25/2015, 10/31/2016, 08/29/2018, 11/07/2018, 10/16/2019, 10/06/2020    Influenza Split High Dose Preservative Free IM 10/27/2017    Pneumococcal Conjugate 13-Valent 03/11/2015    Pneumococcal Polysaccharide PPV23 10/02/2008, 01/04/2019    Tdap 10/24/2012    Zoster 11/07/2012    Zoster Vaccine Recombinant 10/20/2019, 01/02/2020    influenza, injectable, quadrivalent 10/06/2020      Health Maintenance:         Topic Date Due    Hepatitis C Screening  Never done    Colorectal Cancer Screening  Never done         Topic Date Due    COVID-19 Vaccine (1) Never done      Medicare Health Risk Assessment:     Ht 5' 10" (1 778 m)   Wt 81 6 kg (180 lb)   BMI 25 83 kg/m²      Jeannine Powell is here for his Subsequent Wellness visit  Health Risk Assessment:   Patient rates overall health as good  Patient feels that their physical health rating is same  Patient is very satisfied with their life  Eyesight was rated as same  Hearing was rated as same  Patient feels that their emotional and mental health rating is same  Patients states they are never, rarely angry  Patient states they are sometimes unusually tired/fatigued  Pain experienced in the last 7 days has been a lot  Patient's pain rating has been 8/10  Patient states that he has experienced weight loss or gain in last 6 months  Depression Screening:   PHQ-2 Score: 0      Fall Risk Screening: In the past year, patient has experienced: no history of falling in past year      Home Safety:  Patient has trouble with stairs inside or outside of their home  Patient has working smoke alarms and has working carbon monoxide detector  Home safety hazards include: none  Nutrition:   Current diet is Regular  Medications:   Patient is currently taking over-the-counter supplements  OTC medications include: see medication list  Patient is able to manage medications  Activities of Daily Living (ADLs)/Instrumental Activities of Daily Living (IADLs):   Walk and transfer into and out of bed and chair?: Yes  Dress and groom yourself?: Yes    Bathe or shower yourself?: Yes    Feed yourself? Yes  Do your laundry/housekeeping?: Yes  Manage your money, pay your bills and track your expenses?: Yes  Make your own meals?: Yes    Do your own shopping?: Yes    Previous Hospitalizations:   Any hospitalizations or ED visits within the last 12 months?: No      Advance Care Planning:   Living will: Yes    Durable POA for healthcare:  Yes    Advanced directive: Yes      Cognitive Screening:   Provider or family/friend/caregiver concerned regarding cognition?: No    PREVENTIVE SCREENINGS      Cardiovascular Screening:    General: Screening Not Indicated and History Lipid Disorder      Diabetes Screening:     General: Screening Not Indicated and History Diabetes      Colorectal Cancer Screening:     General: Risks and Benefits Discussed and Screening Not Indicated      Prostate Cancer Screening:    General: Risks and Benefits Discussed and Screening Not Indicated      Abdominal Aortic Aneurysm (AAA) Screening:    Risk factors include: age between 73-67 yo and tobacco use        General: Screening Not Indicated and History AAA      Lung Cancer Screening:     General: Screening Not Indicated and History Lung Cancer      Preventive Screening Comments: md winn = cardiology     Screening, Brief Intervention, and Referral to Treatment (SBIRT)    Screening  Typical number of drinks in a day: 0  Typical number of drinks in a week: 0  Interpretation: Low risk drinking behavior      Single Item Drug Screening:  How often have you used an illegal drug (including marijuana) or a prescription medication for non-medical reasons in the past year? never    Single Item Drug Screen Score: 0  Interpretation: Negative screen for possible drug use disorder      JEROME Vicente

## 2021-06-14 NOTE — PROGRESS NOTES
BMI Counseling: Body mass index is 25 83 kg/m²  The BMI is above normal  Nutrition recommendations include decreasing portion sizes, decreasing fast food intake, limiting drinks that contain sugar, moderation in carbohydrate intake, increasing intake of lean protein, reducing intake of saturated and trans fat and reducing intake of cholesterol  Exercise recommendations include moderate physical activity 150 minutes/week and exercising 3-5 times per week  No pharmacotherapy was ordered  Assessment/Plan:     Chronic Problems:  No problem-specific Assessment & Plan notes found for this encounter  Visit Diagnosis:  Diagnoses and all orders for this visit:    Type 2 diabetes mellitus with stage 3a chronic kidney disease, with long-term current use of insulin ([place] [address] )  -     Ambulatory referral to Endocrinology; Future  -     Hemoglobin A1C; Future  -     Comprehensive metabolic panel; Future  -     Lipid panel; Future  -     TSH, 3rd generation; Future    Squamous cell lung cancer, right (HCC)  -     Hemoglobin A1C; Future  -     Comprehensive metabolic panel; Future  -     Lipid panel; Future  -     TSH, 3rd generation; Future    Neuropathy  -     Hemoglobin A1C; Future  -     Comprehensive metabolic panel; Future  -     Lipid panel; Future  -     TSH, 3rd generation; Future    Rheumatoid arthritis, involving unspecified site, unspecified whether rheumatoid factor present ([place] [address] )  -     Hemoglobin A1C; Future  -     Comprehensive metabolic panel; Future  -     Lipid panel; Future  -     TSH, 3rd generation; Future    Medicare annual wellness visit, subsequent  -     Hemoglobin A1C; Future  -     Comprehensive metabolic panel; Future  -     Lipid panel; Future  -     TSH, 3rd generation; Future    Thyroid disease  -     Ambulatory referral to Endocrinology; Future  -     Hemoglobin A1C; Future  -     Comprehensive metabolic panel; Future  -     Lipid panel; Future  -     TSH, 3rd generation;  Future    Acquired absence of other left toe(s) (HCC)  -     Hemoglobin A1C; Future  -     Comprehensive metabolic panel; Future  -     Lipid panel; Future  -     TSH, 3rd generation; Future    Subacute osteomyelitis of left foot (HCC)  -     Hemoglobin A1C; Future  -     Comprehensive metabolic panel; Future  -     Lipid panel; Future  -     TSH, 3rd generation; Future    Abdominal aortic aneurysm (AAA) without rupture (HCC)  -     Hemoglobin A1C; Future  -     Comprehensive metabolic panel; Future  -     Lipid panel; Future  -     TSH, 3rd generation; Future          Subjective:    Patient ID: Anne Nash is a 73 y o  male      PMHx of Rheumatoid Arthritis, 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  Diabetes   tresiba 74 u qd , januvia 50 , metforimin 1 g b i d    presently checking home blood sugars with standard device   denies any hypoglycemic episodes   denies any  polyuria polydipsia, weight remains stable  Started to take the cinnamon and chromium supplements   Thyroid, family hx of over acted  Has never been treated for thyroid , endocrine =    blood pressure, controlled seen by Cardiology Dr murrell        The following portions of the patient's history were reviewed and updated as appropriate: allergies, current medications, past family history, past medical history, past social history, past surgical history and problem list    Hematology Oncology [facility] a work   has neuropathy hands feet presently taking gabapentin 300 mg q 6, additionally  Seen by Rheumatology   prostate cancer, Urology with [facility]    Review of Systems   Constitutional: Negative for appetite change, chills, fever and unexpected weight change  HENT: Negative for congestion, dental problem, ear pain, hearing loss, postnasal drip, rhinorrhea, sinus pressure, sinus pain, sneezing, sore throat, tinnitus and voice change  Eyes: Negative for visual disturbance  Respiratory: Negative for apnea, cough, chest tightness and shortness of breath  Cardiovascular: Negative for chest pain, palpitations and leg swelling  Gastrointestinal: Negative for abdominal pain, blood in stool, constipation, diarrhea, nausea and vomiting  Endocrine: Negative for cold intolerance, heat intolerance, polydipsia, polyphagia and polyuria  Genitourinary: Negative for decreased urine volume, difficulty urinating, dysuria, frequency and hematuria  Musculoskeletal: Negative for arthralgias, back pain, gait problem, joint swelling and myalgias  Skin: Negative for color change, rash and wound  Allergic/Immunologic: Negative for environmental allergies and food allergies  Neurological: Negative for dizziness, syncope, weakness, light-headedness, numbness and headaches  Hematological: Negative for adenopathy  Does not bruise/bleed easily  Psychiatric/Behavioral: Negative for sleep disturbance and suicidal ideas  The patient is not nervous/anxious            /70   Pulse 87   Ht 5' 10" (1 778 m)   Wt 81 6 kg (180 lb)   SpO2 97%   BMI 25 83 kg/m²   Social History     Socioeconomic History    Marital status: /Civil Union     Spouse name: Not on file    Number of children: Not on file    Years of education: Not on file    Highest education level: Not on file   Occupational History    Not on file   Tobacco Use    Smoking status: Former Smoker     Packs/day: 0 20     Years: 50 00     Pack years: 10 00     Types: Cigarettes     Quit date: 10/20/2018     Years since quittin 6    Smokeless tobacco: Never Used   Substance and Sexual Activity    Alcohol use: No    Drug use: No    Sexual activity: Not on file   Other Topics Concern    Not on file   Social History Narrative    Not on file     Social Determinants of Health     Financial Resource Strain:     Difficulty of Paying Living Expenses:    Food Insecurity:     Worried About Running Out of Food in the Last Year:     [address] in the Last Year:    Transportation Needs:     Lack of Transportation (Medical):  Lack of Transportation (Non-Medical):    Physical Activity:     Days of Exercise per Week:     Minutes of Exercise per Session:    Stress:     Feeling of Stress :    Social Connections:     Frequency of Communication with Friends and Family:     Frequency of Social Gatherings with Friends and Family:     Attends Rastafarian Services:     Active Member of Clubs or Organizations:     Attends Club or Organization Meetings:     Marital Status:    Intimate Partner Violence:     Fear of Current or Ex-Partner:     Emotionally Abused:     Physically Abused:     Sexually Abused:      Past Medical History:   Diagnosis Date    COPD (chronic obstructive pulmonary disease) ([place] [address] )     Diabetes mellitus ([place] [address] )     Hypertension     Iron deficiency anemia     Lung cancer ([place] [address] )     Lung cancer ([place] [address] )     Lyme disease     Rheumatoid arthritis ([place] [address] )     Rheumatoid arthritis ([place] [address] )      History reviewed  No pertinent family history    Past Surgical History:   Procedure Laterality Date    BALLOON ANGIOPLASTY, ARTERY      CLAVICLE SURGERY      TOE AMPUTATION Left 11/20/2018    Procedure: AMPUTATION GREAT TOE;  Surgeon: Marilu Mazariegos DPM;  Location: [facility];  Service: Podiatry       Current Outpatient Medications:     albuterol (PROVENTIL HFA,VENTOLIN HFA) 90 mcg/act inhaler, Inhale 2 puffs every 4 (four) hours as needed, Disp: , Rfl:     amLODIPine (NORVASC) 5 mg tablet, Take 5 mg by mouth daily, Disp: , Rfl:     ascorbic acid (VITAMIN C) 250 mg tablet, Take 250 mg by mouth 2 (two) times a day, Disp: , Rfl:     atenolol (TENORMIN) 25 mg tablet, Take 1 tablet (25 mg total) by mouth daily, Disp: 30 tablet, Rfl: 0    B-D ULTRAFINE III SHORT PEN 31G X 8 MM MISC, , Disp: , Rfl:     fenofibrate (TRICOR) 48 mg tablet, Take 45 mg by mouth every evening, Disp: , Rfl:     ferrous sulfate 325 (65 Fe) mg tablet, Take 325 mg by mouth 2 (two) times a day with meals, Disp: , Rfl:     gabapentin (NEURONTIN) 400 mg capsule, Take 400 mg by mouth 2 (two) times a day, Disp: , Rfl:     insulin degludec (TRESIBA FLEXTOUCH) 100 units/mL injection pen, Inject 34 Units under the skin daily at bedtime (Patient taking differently: Inject 64 Units under the skin daily at bedtime ), Disp: 5 pen, Rfl: 0    metFORMIN (GLUCOPHAGE) 1000 MG tablet, Take 1,000 mg by mouth, Disp: , Rfl:     omeprazole (PriLOSEC) 40 MG capsule, Take 40 mg by mouth 2 (two) times a day, Disp: , Rfl:     rosuvastatin (CRESTOR) 40 MG tablet, Take 45 mg by mouth every evening, Disp: , Rfl:     sitaGLIPtin (JANUVIA) 50 mg tablet, Take 50 mg by mouth daily, Disp: , Rfl:     umeclidinium-vilanterol (ANORO ELLIPTA) 62 5-25 MCG/INH inhaler, Inhale 1 puff daily, Disp: , Rfl:     gabapentin (NEURONTIN) 300 mg capsule, Take 600 mg by mouth 2 (two) times a day, Disp: , Rfl:     Allergies   Allergen Reactions    Ace Inhibitors Swelling    Angiotensin Receptor Blockers Other (See Comments)     Elevated K+    Clindamycin Diarrhea and Nausea Only    Plaquenil [Hydroxychloroquine] Swelling     Tongue swelling          Lab Review   Appointment on 06/11/2021   Component Date Value    WBC 06/11/2021 9 09     RBC 06/11/2021 4 64     Hemoglobin 06/11/2021 13 9     Hematocrit 06/11/2021 42 4     MCV 06/11/2021 91     MCH 06/11/2021 30 0     MCHC 06/11/2021 32 8     RDW 06/11/2021 13 8     MPV 06/11/2021 10 1     Platelets 19/75/4904 177     nRBC 06/11/2021 0     Neutrophils Relative 06/11/2021 75     Immat GRANS % 06/11/2021 0     Lymphocytes Relative 06/11/2021 8*    Monocytes Relative 06/11/2021 9     Eosinophils Relative 06/11/2021 7*    Basophils Relative 06/11/2021 1     Neutrophils Absolute 06/11/2021 6 71     Immature Grans Absolute 06/11/2021 0 04     Lymphocytes Absolute 06/11/2021 0 76     Monocytes Absolute 06/11/2021 0 81     Eosinophils Absolute 06/11/2021 0 67*    Basophils Absolute 06/11/2021 0 10     Sodium 06/11/2021 137     Potassium 06/11/2021 4 1     Chloride 06/11/2021 104     CO2 06/11/2021 28     ANION GAP 06/11/2021 5     BUN 06/11/2021 25     Creatinine 06/11/2021 1 23     Glucose, Fasting 06/11/2021 178*    Calcium 06/11/2021 9 4     Corrected Calcium 06/11/2021 9 9     AST 06/11/2021 15     ALT 06/11/2021 18     Alkaline Phosphatase 06/11/2021 90     Total Protein 06/11/2021 7 8     Albumin 06/11/2021 3 4*    Total Bilirubin 06/11/2021 0 45     eGFR 06/11/2021 60     Cholesterol 06/11/2021 123     Triglycerides 06/11/2021 148     HDL, Direct 06/11/2021 25*    LDL Calculated 06/11/2021 68     Non-HDL-Chol (CHOL-HDL) 06/11/2021 98     Creatinine, Ur 06/11/2021 73 4     Microalbum  ,U,Random 06/11/2021 21 2*    Microalb Creat Ratio 06/11/2021 29     TSH 3RD GENERATON 06/11/2021 <0 007*    Hemoglobin A1C 06/11/2021 7 9*    EAG 06/11/2021 180         Imaging: No results found  Objective:     Physical Exam  Constitutional:       General: He is not in acute distress  Appearance: He is well-developed  He is not ill-appearing  HENT:      Head: Normocephalic and atraumatic  Cardiovascular:      Rate and Rhythm: Normal rate and regular rhythm  Heart sounds: Normal heart sounds  Pulmonary:      Effort: Pulmonary effort is normal       Breath sounds: Normal breath sounds  Abdominal:      General: Bowel sounds are normal       Palpations: Abdomen is soft  Musculoskeletal:         General: Normal range of motion  Cervical back: Normal range of motion and neck supple  Skin:     General: Skin is warm and dry     Neurological: Mental Status: He is alert and oriented to person, place, and time  Deep Tendon Reflexes: Reflexes are normal and symmetric  Psychiatric:         Behavior: Behavior normal          Thought Content: Thought content normal          Judgment: Judgment normal            There are no Patient Instructions on file for this visit  JEROME Bowser    Portions of the record may have been created with voice recognition software  Occasional wrong word or "sound a like" substitutions may have occurred due to the inherent limitations of voice recognition software  Read the chart carefully and recognize, using context, where substitutions have occurred

## 2021-06-14 NOTE — ASSESSMENT & PLAN NOTE
Lab Results   Component Value Date    HGBA1C 7 9 (H) 06/11/2021    above goal, reviewed present insulin regimen, to be continued, reinforced safety, negative episodes of hypoglycemia, recommend eval endocrine

## 2021-06-17 ENCOUNTER — TELEPHONE (OUTPATIENT)
Dept: ADMINISTRATIVE | Facility: OTHER | Age: 71
End: 2021-06-17

## 2021-06-17 NOTE — TELEPHONE ENCOUNTER
----- Message from Martin Alfaro LPN sent at 1/04/0981 11:08 AM EDT -----  06/16/21 11:08 AM    Hello, our patient Jenaro Sparrow has had CRC: Colonoscopy completed/performed  Please assist in updating the patient chart by making an External outreach to Dr Thomas Fry  facility located in [place]  The date of service is unknown      Thank you,  [place] Curl, LPN  [id number]

## 2021-06-17 NOTE — LETTER
Procedure Request Form: Colonoscopy      Date Requested: 21  Patient: Og Levon  Patient : 1950   Referring Provider: Serge Meter, CRNP        Date of Procedure ______________________________       The above patient has informed us that they have completed their   most recent Colonoscopy at your facility  Please complete   this form and attach all corresponding procedure reports/results  Comments __________________________________________________________  ____________________________________________________________________  ____________________________________________________________________  ____________________________________________________________________    Facility Completing Procedure _________________________________________    Form Completed By (print name) _______________________________________      Signature __________________________________________________________      These reports are needed for  compliance    Please fax this completed form and a copy of the procedure report to our office located at [place] [address] as soon as possible to [phone] attention Heidy Friend: Phone [phone]    We thank you for your assistance in treating our mutual patient

## 2021-06-17 NOTE — TELEPHONE ENCOUNTER
Upon review of the In Basket request and the patient's chart, initial outreach has been made via fax, please see Contacts section for details       Thank you  Elaine Beavers

## 2021-06-21 DIAGNOSIS — E11.22 TYPE 2 DIABETES MELLITUS WITH STAGE 3A CHRONIC KIDNEY DISEASE, WITH LONG-TERM CURRENT USE OF INSULIN (HCC): ICD-10-CM

## 2021-06-21 DIAGNOSIS — E07.9 THYROID DISEASE: ICD-10-CM

## 2021-06-21 DIAGNOSIS — Z79.4 TYPE 2 DIABETES MELLITUS WITH STAGE 3A CHRONIC KIDNEY DISEASE, WITH LONG-TERM CURRENT USE OF INSULIN (HCC): ICD-10-CM

## 2021-06-21 DIAGNOSIS — Z79.4 TYPE 2 DIABETES MELLITUS WITHOUT COMPLICATION, WITH LONG-TERM CURRENT USE OF INSULIN (HCC): Primary | ICD-10-CM

## 2021-06-21 DIAGNOSIS — N18.31 TYPE 2 DIABETES MELLITUS WITH STAGE 3A CHRONIC KIDNEY DISEASE, WITH LONG-TERM CURRENT USE OF INSULIN (HCC): ICD-10-CM

## 2021-06-21 DIAGNOSIS — E11.9 TYPE 2 DIABETES MELLITUS WITHOUT COMPLICATION, WITH LONG-TERM CURRENT USE OF INSULIN (HCC): Primary | ICD-10-CM

## 2021-06-21 NOTE — TELEPHONE ENCOUNTER
As a follow-up, a second attempt has been made for outreach via fax, please see Contacts section for details      Thank you  Darío Lino

## 2021-07-01 NOTE — TELEPHONE ENCOUNTER
Upon review of the In Basket request we have not received the requested information after three attempts  Any additional questions or concerns should be emailed to the Practice Liaisons via [email or web address] email, please do not reply via In Basket      Thank you  Doris Santana

## 2021-07-01 NOTE — TELEPHONE ENCOUNTER
As a final attempt, a third outreach has been made via telephone call  Please see Contacts section for details  This encounter will be closed and completed by end of day  Should we receive the requested information because of previous outreach attempts, the requested patient's chart will be updated appropriately       Thank you  Kinjal Crandall

## 2021-07-07 NOTE — TELEPHONE ENCOUNTER
Upon review of the In Basket request we received a call from providers office  They have no record of this patient ever seeing Dr Fei Azar  Any additional questions or concerns should be emailed to the Practice Liaisons via [email or web address] email, please do not reply via In Basket      Thank you  Jaoxn Farias

## 2021-08-23 ENCOUNTER — HOSPITAL ENCOUNTER (OUTPATIENT)
Dept: NUCLEAR MEDICINE | Facility: HOSPITAL | Age: 71
Discharge: HOME | End: 2021-08-23
Payer: COMMERCIAL

## 2021-08-23 DIAGNOSIS — E05.90 THYROTOXICOSIS, UNSPECIFIED WITHOUT THYROTOXIC CRISIS OR STORM: ICD-10-CM

## 2021-08-23 PROCEDURE — G1004 CDSM NDSC: HCPCS

## 2021-08-23 PROCEDURE — 78014 THYROID IMAGING W/BLOOD FLOW: CPT

## 2021-08-23 PROCEDURE — A9516 IODINE I-123 SOD IODIDE MIC: HCPCS

## 2021-08-24 ENCOUNTER — HOSPITAL ENCOUNTER (OUTPATIENT)
Dept: NUCLEAR MEDICINE | Facility: HOSPITAL | Age: 71
Discharge: HOME | End: 2021-08-24
Payer: COMMERCIAL

## 2021-09-16 ENCOUNTER — APPOINTMENT (OUTPATIENT)
Dept: LAB | Facility: CLINIC | Age: 71
End: 2021-09-16
Payer: COMMERCIAL

## 2021-09-16 DIAGNOSIS — Z00.00 MEDICARE ANNUAL WELLNESS VISIT, SUBSEQUENT: ICD-10-CM

## 2021-09-16 DIAGNOSIS — C34.91 SQUAMOUS CELL LUNG CANCER, RIGHT (HCC): Chronic | ICD-10-CM

## 2021-09-16 DIAGNOSIS — N18.31 TYPE 2 DIABETES MELLITUS WITH STAGE 3A CHRONIC KIDNEY DISEASE, WITH LONG-TERM CURRENT USE OF INSULIN (HCC): ICD-10-CM

## 2021-09-16 DIAGNOSIS — M86.272 SUBACUTE OSTEOMYELITIS OF LEFT FOOT (HCC): ICD-10-CM

## 2021-09-16 DIAGNOSIS — I71.4 ABDOMINAL AORTIC ANEURYSM (AAA) WITHOUT RUPTURE (HCC): ICD-10-CM

## 2021-09-16 DIAGNOSIS — Z79.4 TYPE 2 DIABETES MELLITUS WITH STAGE 3A CHRONIC KIDNEY DISEASE, WITH LONG-TERM CURRENT USE OF INSULIN (HCC): ICD-10-CM

## 2021-09-16 DIAGNOSIS — E07.9 THYROID DISEASE: ICD-10-CM

## 2021-09-16 DIAGNOSIS — E11.22 TYPE 2 DIABETES MELLITUS WITH STAGE 3A CHRONIC KIDNEY DISEASE, WITH LONG-TERM CURRENT USE OF INSULIN (HCC): ICD-10-CM

## 2021-09-16 DIAGNOSIS — Z89.422 ACQUIRED ABSENCE OF OTHER LEFT TOE(S) (HCC): ICD-10-CM

## 2021-09-16 DIAGNOSIS — G62.9 NEUROPATHY: Chronic | ICD-10-CM

## 2021-09-16 DIAGNOSIS — M06.9 RHEUMATOID ARTHRITIS, INVOLVING UNSPECIFIED SITE, UNSPECIFIED WHETHER RHEUMATOID FACTOR PRESENT (HCC): Chronic | ICD-10-CM

## 2021-09-16 LAB
ALBUMIN SERPL BCP-MCNC: 3.4 G/DL (ref 3.5–5)
ALP SERPL-CCNC: 67 U/L (ref 46–116)
ALT SERPL W P-5'-P-CCNC: 19 U/L (ref 12–78)
ANION GAP SERPL CALCULATED.3IONS-SCNC: 4 MMOL/L (ref 4–13)
AST SERPL W P-5'-P-CCNC: 18 U/L (ref 5–45)
BILIRUB SERPL-MCNC: 1 MG/DL (ref 0.2–1)
BUN SERPL-MCNC: 18 MG/DL (ref 5–25)
CALCIUM ALBUM COR SERPL-MCNC: 9.6 MG/DL (ref 8.3–10.1)
CALCIUM SERPL-MCNC: 9.1 MG/DL (ref 8.3–10.1)
CHLORIDE SERPL-SCNC: 107 MMOL/L (ref 100–108)
CHOLEST SERPL-MCNC: 113 MG/DL (ref 50–200)
CO2 SERPL-SCNC: 27 MMOL/L (ref 21–32)
CREAT SERPL-MCNC: 1.18 MG/DL (ref 0.6–1.3)
EST. AVERAGE GLUCOSE BLD GHB EST-MCNC: 151 MG/DL
GFR SERPL CREATININE-BSD FRML MDRD: 63 ML/MIN/1.73SQ M
GLUCOSE P FAST SERPL-MCNC: 75 MG/DL (ref 65–99)
HBA1C MFR BLD: 6.9 %
HDLC SERPL-MCNC: 25 MG/DL
LDLC SERPL CALC-MCNC: 71 MG/DL (ref 0–100)
NONHDLC SERPL-MCNC: 88 MG/DL
POTASSIUM SERPL-SCNC: 4.1 MMOL/L (ref 3.5–5.3)
PROT SERPL-MCNC: 7.7 G/DL (ref 6.4–8.2)
SODIUM SERPL-SCNC: 138 MMOL/L (ref 136–145)
TRIGL SERPL-MCNC: 84 MG/DL
TSH SERPL DL<=0.05 MIU/L-ACNC: <0.007 UIU/ML (ref 0.36–3.74)

## 2021-09-16 PROCEDURE — 36415 COLL VENOUS BLD VENIPUNCTURE: CPT

## 2021-09-16 PROCEDURE — 83036 HEMOGLOBIN GLYCOSYLATED A1C: CPT

## 2021-09-16 PROCEDURE — 3066F NEPHROPATHY DOC TX: CPT | Performed by: FAMILY MEDICINE

## 2021-09-16 PROCEDURE — 84443 ASSAY THYROID STIM HORMONE: CPT

## 2021-09-16 PROCEDURE — 80061 LIPID PANEL: CPT

## 2021-09-16 PROCEDURE — 3044F HG A1C LEVEL LT 7.0%: CPT | Performed by: FAMILY MEDICINE

## 2021-09-16 PROCEDURE — 80053 COMPREHEN METABOLIC PANEL: CPT

## 2021-09-20 ENCOUNTER — TELEPHONE (OUTPATIENT)
Dept: FAMILY MEDICINE CLINIC | Facility: CLINIC | Age: 71
End: 2021-09-20

## 2021-09-20 ENCOUNTER — OFFICE VISIT (OUTPATIENT)
Dept: FAMILY MEDICINE CLINIC | Facility: CLINIC | Age: 71
End: 2021-09-20
Payer: COMMERCIAL

## 2021-09-20 VITALS
DIASTOLIC BLOOD PRESSURE: 72 MMHG | HEART RATE: 82 BPM | HEIGHT: 70 IN | SYSTOLIC BLOOD PRESSURE: 112 MMHG | TEMPERATURE: 98.2 F | OXYGEN SATURATION: 98 % | BODY MASS INDEX: 26.14 KG/M2 | WEIGHT: 182.6 LBS

## 2021-09-20 DIAGNOSIS — Z12.11 SCREENING FOR COLON CANCER: Primary | ICD-10-CM

## 2021-09-20 DIAGNOSIS — Z23 FLU VACCINE NEED: ICD-10-CM

## 2021-09-20 DIAGNOSIS — E11.22 TYPE 2 DIABETES MELLITUS WITH STAGE 3A CHRONIC KIDNEY DISEASE, WITH LONG-TERM CURRENT USE OF INSULIN (HCC): ICD-10-CM

## 2021-09-20 DIAGNOSIS — I10 ESSENTIAL HYPERTENSION: ICD-10-CM

## 2021-09-20 DIAGNOSIS — N18.31 TYPE 2 DIABETES MELLITUS WITH STAGE 3A CHRONIC KIDNEY DISEASE, WITH LONG-TERM CURRENT USE OF INSULIN (HCC): ICD-10-CM

## 2021-09-20 DIAGNOSIS — E78.2 MIXED HYPERLIPIDEMIA: ICD-10-CM

## 2021-09-20 DIAGNOSIS — Z79.4 TYPE 2 DIABETES MELLITUS WITH STAGE 3A CHRONIC KIDNEY DISEASE, WITH LONG-TERM CURRENT USE OF INSULIN (HCC): ICD-10-CM

## 2021-09-20 PROCEDURE — G0008 ADMIN INFLUENZA VIRUS VAC: HCPCS | Performed by: FAMILY MEDICINE

## 2021-09-20 PROCEDURE — 3074F SYST BP LT 130 MM HG: CPT | Performed by: FAMILY MEDICINE

## 2021-09-20 PROCEDURE — 3078F DIAST BP <80 MM HG: CPT | Performed by: FAMILY MEDICINE

## 2021-09-20 PROCEDURE — 1036F TOBACCO NON-USER: CPT | Performed by: FAMILY MEDICINE

## 2021-09-20 PROCEDURE — 3725F SCREEN DEPRESSION PERFORMED: CPT | Performed by: FAMILY MEDICINE

## 2021-09-20 PROCEDURE — 1160F RVW MEDS BY RX/DR IN RCRD: CPT | Performed by: FAMILY MEDICINE

## 2021-09-20 PROCEDURE — 99214 OFFICE O/P EST MOD 30 MIN: CPT | Performed by: FAMILY MEDICINE

## 2021-09-20 PROCEDURE — 3008F BODY MASS INDEX DOCD: CPT | Performed by: FAMILY MEDICINE

## 2021-09-20 PROCEDURE — 90662 IIV NO PRSV INCREASED AG IM: CPT | Performed by: FAMILY MEDICINE

## 2021-09-20 RX ORDER — CLOTRIMAZOLE AND BETAMETHASONE DIPROPIONATE 10; .5 MG/ML; MG/ML
LOTION TOPICAL
COMMUNITY
Start: 2021-09-01 | End: 2021-12-02 | Stop reason: SDUPTHER

## 2021-09-20 RX ORDER — BLOOD SUGAR DIAGNOSTIC
STRIP MISCELLANEOUS
COMMUNITY
Start: 2021-08-06 | End: 2021-10-29 | Stop reason: ALTCHOICE

## 2021-09-20 RX ORDER — ASPIRIN 81 MG/1
TABLET ORAL
COMMUNITY

## 2021-09-20 RX ORDER — FENOFIBRATE 145 MG/1
145 TABLET, COATED ORAL DAILY
COMMUNITY
Start: 2021-08-05 | End: 2021-12-02 | Stop reason: SDUPTHER

## 2021-09-20 NOTE — PROGRESS NOTES
Depression Screening and Follow-up Plan:   Patient was screened for depression during today's encounter  They screened negative with a PHQ-2 score of 0  Assessment/Plan:     Chronic Problems:  Type 2 diabetes mellitus with stage 3a chronic kidney disease, with long-term current use of insulin (HCC)    Lab Results   Component Value Date    HGBA1C 6 9 (H) 09/16/2021    stable, hemoglobin A1c in within parameters, negative history of hypoglycemia, reviewed signs symptoms, treatment, continue current therapies   We discussed the importance of controlling blood glucose (hemoglobin A1c less than 7  0)Premeal , even better <110  2hr after a meal <170, even better <140  A1C <7%, even better <6 5%  blood pressure control, and cholesterol to lower the risk for complications  Encouraged advise to check home blood sugars discussed goals in range of therapy  Reviewed recommendations of annual eye exams (ophthalmology) on long foot exam (Podiatry)    HTN (hypertension)   Stable, continue current medications atenolol 25 milligrams p o  daily, intolerant of Ace/Arb    Hyperlipidemia   Stable, tolerating statins to be continued    Pancytopenia ([facility] )   Stable continue follow-up Hematology Oncology [facility]      Visit Diagnosis:  Diagnoses and all orders for this visit:    Screening for colon cancer  -     Ambulatory referral to Gastroenterology; Future    Type 2 diabetes mellitus with stage 3a chronic kidney disease, with long-term current use of insulin (HCC)  -     Microalbumin / creatinine urine ratio; Future  -     Comprehensive metabolic panel; Future  -     Hemoglobin A1C; Future    Essential hypertension  -     Comprehensive metabolic panel; Future  -     TSH, 3rd generation with Free T4 reflex; Future  -     CBC and differential; Future    Mixed hyperlipidemia  -     Lipid panel;  Future    Flu vaccine need  -     influenza vaccine, high-dose, PF 0 7 mL (FLUZONE HIGH-DOSE)    Other orders  - clotrimazole-betamethasone (LOTRISONE) 1-0 05 % lotion; APPLY TOPICALLY 2 (TWO) TIMES A DAY  TRY TO GET IT UNDER THE NAIL  -     aspirin (ECOTRIN LOW STRENGTH) 81 mg EC tablet; 1 tab(s)  -     fenofibrate (TRICOR) 145 mg tablet; Take 145 mg by mouth daily  -     Contour Next Test test strip; DX  E11 9 FINGER STICK ONCE A DAY          Subjective:    Patient ID: Edgar Cousin is a 73 y o  male  F/u  [employer] retiring , July   with hypertension  Patient is here for F/U of his BP DM RA and lung Ca  dabetes , meds   Insulin , 70a approx  units   Fasting bs 75 - 100   negy hypoglycemia , polyuria ,  polydipsia  Endocrine, tsh = [facility]   Podiatrist , md hope , left foot amputation 2yrs ago   Unable to tolerate ace , arb   ra , rheum =     Lung ca , first  Dx 3/2018 = OK kahn , due for upcoming scan  md sánchez      htn / cad   meds   Negative chest pain palpitations shortness of breath difficulty breathing cough lesions rash          The following portions of the patient's history were reviewed and updated as appropriate: allergies, current medications, past family history, past medical history, past social history, past surgical history and problem list     Review of Systems   Constitutional: Negative for appetite change, chills, fever and unexpected weight change  HENT: Negative for congestion, dental problem, ear pain, hearing loss, postnasal drip, rhinorrhea, sinus pressure, sinus pain, sneezing, sore throat, tinnitus and voice change  Eyes: Negative for visual disturbance  Respiratory: Negative for apnea, cough, chest tightness and shortness of breath  Cardiovascular: Negative for chest pain, palpitations and leg swelling  Gastrointestinal: Negative for abdominal pain, blood in stool, constipation, diarrhea, nausea and vomiting  Endocrine: Negative for cold intolerance, heat intolerance, polydipsia, polyphagia and polyuria     Genitourinary: Negative for decreased urine volume, difficulty urinating, dysuria, frequency and hematuria  Musculoskeletal: Negative for arthralgias, back pain, gait problem, joint swelling and myalgias  Skin: Negative for color change, rash and wound  Allergic/Immunologic: Negative for environmental allergies and food allergies  Neurological: Negative for dizziness, syncope, weakness, light-headedness, numbness and headaches  Hematological: Negative for adenopathy  Does not bruise/bleed easily  Psychiatric/Behavioral: Negative for sleep disturbance and suicidal ideas  The patient is not nervous/anxious  /72   Pulse 82   Temp 98 2 °F (36 8 °C)   Ht 5' 10" (1 778 m)   Wt 82 8 kg (182 lb 9 6 oz)   SpO2 98%   BMI 26 20 kg/m²   Social History     Socioeconomic History    Marital status: /Civil Union     Spouse name: Not on file    Number of children: Not on file    Years of education: Not on file    Highest education level: Not on file   Occupational History    Not on file   Tobacco Use    Smoking status: Former Smoker     Packs/day: 0 20     Years: 50 00     Pack years: 10 00     Types: Cigarettes     Quit date: 10/20/2018     Years since quittin 9    Smokeless tobacco: Never Used   Substance and Sexual Activity    Alcohol use: No    Drug use: No    Sexual activity: Not on file   Other Topics Concern    Not on file   Social History Narrative    Not on file     Social Determinants of Health     Financial Resource Strain:     Difficulty of Paying Living Expenses:    Food Insecurity:     Worried About Running Out of Food in the Last Year:     [address] in the Last Year:    Transportation Needs:     Lack of Transportation (Medical):      Lack of Transportation (Non-Medical):    Physical Activity:     Days of Exercise per Week:     Minutes of Exercise per Session:    Stress:     Feeling of Stress :    Social Connections:     Frequency of Communication with Friends and Family:     Frequency of Social Gatherings with Friends and Family:     Attends [employer] Services:     Active Member of Clubs or Organizations:     Attends Club or Organization Meetings:     Marital Status:    Intimate Partner Violence:     Fear of Current or Ex-Partner:     Emotionally Abused:     Physically Abused:     Sexually Abused:      Past Medical History:   Diagnosis Date    COPD [facility]chronic obstructive pulmonary disease) ([facility] )     Diabetes mellitus ([facility] )     Hypertension     Iron deficiency anemia     Lung cancer ([place] [address] )     Lung cancer ([place] [address] )     Lyme disease     Rheumatoid arthritis ([place] [address] )     Rheumatoid arthritis ([place] [address] )      History reviewed  No pertinent family history  Past Surgical History:   Procedure Laterality Date    BALLOON ANGIOPLASTY, ARTERY      CLAVICLE SURGERY      TOE AMPUTATION Left 11/20/2018    Procedure: AMPUTATION GREAT TOE;  Surgeon: Alyce Feldman DPM;  Location: [facility];  Service: Podiatry       Current Outpatient Medications:     albuterol (PROVENTIL HFA,VENTOLIN HFA) 90 mcg/act inhaler, Inhale 2 puffs every 4 (four) hours as needed, Disp: , Rfl:     amLODIPine (NORVASC) 5 mg tablet, Take 5 mg by mouth daily, Disp: , Rfl:     ascorbic acid (VITAMIN C) 250 mg tablet, Take 250 mg by mouth 2 (two) times a day, Disp: , Rfl:     aspirin (ECOTRIN LOW STRENGTH) 81 mg EC tablet, 1 tab(s), Disp: , Rfl:     atenolol (TENORMIN) 25 mg tablet, Take 1 tablet (25 mg total) by mouth daily, Disp: 30 tablet, Rfl: 0    B-D ULTRAFINE III SHORT PEN 31G X 8 MM MISC, , Disp: , Rfl:     clotrimazole-betamethasone (LOTRISONE) 1-0 05 % lotion, APPLY TOPICALLY 2 (TWO) TIMES A DAY   TRY TO GET IT UNDER THE NAIL, Disp: , Rfl:     Contour Next Test test strip, DX  E11 9 FINGER STICK ONCE A DAY, Disp: , Rfl:     fenofibrate (TRICOR) 145 mg tablet, Take 145 mg by mouth daily, Disp: , Rfl:     fenofibrate (TRICOR) 48 mg tablet, Take 45 mg by mouth every evening, Disp: , Rfl:     ferrous sulfate 325 (65 Fe) mg tablet, Take 325 mg by mouth 2 (two) times a day with meals, Disp: , Rfl:     gabapentin (NEURONTIN) 400 mg capsule, Take 400 mg by mouth 2 (two) times a day, Disp: , Rfl:     insulin degludec (TRESIBA FLEXTOUCH) 100 units/mL injection pen, Inject 34 Units under the skin daily at bedtime (Patient taking differently: Inject 64 Units under the skin daily at bedtime ), Disp: 5 pen, Rfl: 0    metFORMIN (GLUCOPHAGE) 1000 MG tablet, Take 1,000 mg by mouth, Disp: , Rfl:     omeprazole (PriLOSEC) 40 MG capsule, Take 40 mg by mouth 2 (two) times a day, Disp: , Rfl:     rosuvastatin (CRESTOR) 40 MG tablet, Take 45 mg by mouth every evening, Disp: , Rfl:     sitaGLIPtin (JANUVIA) 50 mg tablet, Take 50 mg by mouth daily, Disp: , Rfl:     umeclidinium-vilanterol (ANORO ELLIPTA) 62 5-25 MCG/INH inhaler, Inhale 1 puff daily, Disp: , Rfl:     Allergies   Allergen Reactions    Ace Inhibitors Swelling    Angiotensin Receptor Blockers Other (See Comments)     Elevated K+    Clindamycin Diarrhea and Nausea Only    Plaquenil [Hydroxychloroquine] Swelling     Tongue swelling          Lab Review   Appointment on 09/16/2021   Component Date Value    Hemoglobin A1C 09/16/2021 6 9*    EAG 09/16/2021 151     Sodium 09/16/2021 138     Potassium 09/16/2021 4 1     Chloride 09/16/2021 107     CO2 09/16/2021 27     ANION GAP 09/16/2021 4     BUN 09/16/2021 18     Creatinine 09/16/2021 1 18     Glucose, Fasting 09/16/2021 75     Calcium 09/16/2021 9 1     Corrected Calcium 09/16/2021 9 6     AST 09/16/2021 18     ALT 09/16/2021 19     Alkaline Phosphatase 09/16/2021 67     Total Protein 09/16/2021 7 7     Albumin 09/16/2021 3 4*    Total Bilirubin 09/16/2021 1 00     eGFR 09/16/2021 63     Cholesterol 09/16/2021 113     Triglycerides 09/16/2021 84     HDL, Direct 09/16/2021 25*    LDL Calculated 09/16/2021 71     Non-HDL-Chol (CHOL-HDL) 09/16/2021 88     TSH 3RD GENERATON 09/16/2021 <0 007*   Appointment on 06/11/2021   Component Date Value    WBC 06/11/2021 9 09     RBC 06/11/2021 4 64     Hemoglobin 06/11/2021 13 9     Hematocrit 06/11/2021 42 4     MCV 06/11/2021 91     MCH 06/11/2021 30 0     MCHC 06/11/2021 32 8     RDW 06/11/2021 13 8     MPV 06/11/2021 10 1     Platelets 13/13/7815 177     nRBC 06/11/2021 0     Neutrophils Relative 06/11/2021 75     Immat GRANS % 06/11/2021 0     Lymphocytes Relative 06/11/2021 8*    Monocytes Relative 06/11/2021 9     Eosinophils Relative 06/11/2021 7*    Basophils Relative 06/11/2021 1     Neutrophils Absolute 06/11/2021 6 71     Immature Grans Absolute 06/11/2021 0 04     Lymphocytes Absolute 06/11/2021 0 76     Monocytes Absolute 06/11/2021 0 81     Eosinophils Absolute 06/11/2021 0 67*    Basophils Absolute 06/11/2021 0 10     Sodium 06/11/2021 137     Potassium 06/11/2021 4 1     Chloride 06/11/2021 104     CO2 06/11/2021 28     ANION GAP 06/11/2021 5     BUN 06/11/2021 25     Creatinine 06/11/2021 1 23     Glucose, Fasting 06/11/2021 178*    Calcium 06/11/2021 9 4     Corrected Calcium 06/11/2021 9 9     AST 06/11/2021 15     ALT 06/11/2021 18     Alkaline Phosphatase 06/11/2021 90     Total Protein 06/11/2021 7 8     Albumin 06/11/2021 3 4*    Total Bilirubin 06/11/2021 0 45     eGFR 06/11/2021 60     Cholesterol 06/11/2021 123     Triglycerides 06/11/2021 148     HDL, Direct 06/11/2021 25*    LDL Calculated 06/11/2021 68     Non-HDL-Chol (CHOL-HDL) 06/11/2021 98     Creatinine, Ur 06/11/2021 73 4     Microalbum  ,U,Random 06/11/2021 21 2*    Microalb Creat Ratio 06/11/2021 29     TSH 3RD GENERATON 06/11/2021 <0 007*    Hemoglobin A1C 06/11/2021 7 9*    EAG 06/11/2021 180         Imaging: NM thyroid imaging w uptake(s)    Result Date: 8/24/2021  Narrative: THYROID SCAN AND UPTAKE INDICATION:  E05 90: Thyrotoxicosis, unspecified without thyrotoxic crisis or storm   COMPARISON:  None TECHNIQUE: The study was performed following the oral administration of 280 uCi I-123  FINDINGS: Images demonstrate mild asymmetry prominence of the left lobe as compared to the right, for which an underlying nodule is not excluded  Radiotracer uptake is otherwise homogeneous  No dominant hyperfunctioning nodule visualized  Thyroid uptake values are: 6 hours:   6 3% (N =  5 -15%) 24 hours: 14 5% (N =15 - 35%)     Impression: 1  Mild prominence of the left lobe as compared to the right, for which an underlying nodule is not excluded  Recommend correlation with ultrasound  2   No dominant hyperfunctioning nodule demonstrated  3  Thyroid uptake in the lower range of normal  Workstation performed: [id number]       Objective:     Physical Exam  Constitutional:       Appearance: He is well-developed  HENT:      Head: Normocephalic and atraumatic  Neck:      Vascular: No carotid bruit  Cardiovascular:      Rate and Rhythm: Normal rate and regular rhythm  Heart sounds: Normal heart sounds  Pulmonary:      Effort: Pulmonary effort is normal       Breath sounds: Normal breath sounds  Musculoskeletal:         General: Normal range of motion  Cervical back: Normal range of motion and neck supple  Lymphadenopathy:      Cervical: No cervical adenopathy  Skin:     General: Skin is warm and dry  Neurological:      Mental Status: He is alert and oriented to person, place, and time  Deep Tendon Reflexes: Reflexes are normal and symmetric  Psychiatric:         Behavior: Behavior normal          Thought Content: Thought content normal          Judgment: Judgment normal            There are no Patient Instructions on file for this visit  JEROME Alberts    Portions of the record may have been created with voice recognition software  Occasional wrong word or "sound a like" substitutions may have occurred due to the inherent limitations of voice recognition software  Read the chart carefully and recognize, using context, where substitutions have occurred

## 2021-09-20 NOTE — TELEPHONE ENCOUNTER
Víctor Mendez stated he needs verio test strips and lancets sent into pharmacy insurance will not cover old strips and lancets

## 2021-09-20 NOTE — ASSESSMENT & PLAN NOTE
Lab Results   Component Value Date    HGBA1C 6 9 (H) 09/16/2021    stable, hemoglobin A1c in within parameters, negative history of hypoglycemia, reviewed signs symptoms, treatment, continue current therapies   We discussed the importance of controlling blood glucose (hemoglobin A1c less than 7  0)Premeal , even better <110  2hr after a meal <170, even better <140  A1C <7%, even better <6 5%  blood pressure control, and cholesterol to lower the risk for complications  Encouraged advise to check home blood sugars discussed goals in range of therapy    Reviewed recommendations of annual eye exams (ophthalmology) on long foot exam (Podiatry)

## 2021-10-18 ENCOUNTER — HOSPITAL ENCOUNTER (EMERGENCY)
Facility: HOSPITAL | Age: 71
End: 2021-10-18
Attending: EMERGENCY MEDICINE | Admitting: EMERGENCY MEDICINE
Payer: COMMERCIAL

## 2021-10-18 ENCOUNTER — APPOINTMENT (EMERGENCY)
Dept: RADIOLOGY | Facility: HOSPITAL | Age: 71
End: 2021-10-18
Payer: COMMERCIAL

## 2021-10-18 ENCOUNTER — HOSPITAL ENCOUNTER (EMERGENCY)
Facility: HOSPITAL | Age: 71
Discharge: HOME | End: 2021-10-19
Admitting: EMERGENCY MEDICINE
Payer: COMMERCIAL

## 2021-10-18 VITALS
HEART RATE: 82 BPM | OXYGEN SATURATION: 96 % | DIASTOLIC BLOOD PRESSURE: 78 MMHG | BODY MASS INDEX: 26.11 KG/M2 | RESPIRATION RATE: 18 BRPM | TEMPERATURE: 98 F | WEIGHT: 182 LBS | SYSTOLIC BLOOD PRESSURE: 136 MMHG

## 2021-10-18 VITALS
RESPIRATION RATE: 18 BRPM | TEMPERATURE: 98.2 F | OXYGEN SATURATION: 98 % | HEART RATE: 80 BPM | SYSTOLIC BLOOD PRESSURE: 114 MMHG | DIASTOLIC BLOOD PRESSURE: 68 MMHG

## 2021-10-18 DIAGNOSIS — S68.119A AMPUTATION OF TIP OF FINGER, INITIAL ENCOUNTER: Primary | ICD-10-CM

## 2021-10-18 DIAGNOSIS — S61.212A LACERATION OF RIGHT MIDDLE FINGER, FOREIGN BODY PRESENCE UNSPECIFIED, NAIL DAMAGE STATUS UNSPECIFIED, INITIAL ENCOUNTER: Primary | ICD-10-CM

## 2021-10-18 LAB
ABO GROUP BLD: NORMAL
APTT PPP: 35 SECONDS (ref 23–37)
BASOPHILS # BLD AUTO: 0.08 THOUSANDS/ΜL (ref 0–0.1)
BASOPHILS NFR BLD AUTO: 1 % (ref 0–1)
BLD GP AB SCN SERPL QL: NEGATIVE
EOSINOPHIL # BLD AUTO: 0.35 THOUSAND/ΜL (ref 0–0.61)
EOSINOPHIL NFR BLD AUTO: 4 % (ref 0–6)
ERYTHROCYTE [DISTWIDTH] IN BLOOD BY AUTOMATED COUNT: 14.1 % (ref 11.6–15.1)
HCT VFR BLD AUTO: 40.4 % (ref 36.5–49.3)
HGB BLD-MCNC: 13.4 G/DL (ref 12–17)
IMM GRANULOCYTES # BLD AUTO: 0.02 THOUSAND/UL (ref 0–0.2)
IMM GRANULOCYTES NFR BLD AUTO: 0 % (ref 0–2)
INR PPP: 1.25 (ref 0.84–1.19)
LYMPHOCYTES # BLD AUTO: 0.83 THOUSANDS/ΜL (ref 0.6–4.47)
LYMPHOCYTES NFR BLD AUTO: 10 % (ref 14–44)
MCH RBC QN AUTO: 29.5 PG (ref 26.8–34.3)
MCHC RBC AUTO-ENTMCNC: 33.2 G/DL (ref 31.4–37.4)
MCV RBC AUTO: 89 FL (ref 82–98)
MONOCYTES # BLD AUTO: 0.77 THOUSAND/ΜL (ref 0.17–1.22)
MONOCYTES NFR BLD AUTO: 9 % (ref 4–12)
NEUTROPHILS # BLD AUTO: 6.47 THOUSANDS/ΜL (ref 1.85–7.62)
NEUTS SEG NFR BLD AUTO: 76 % (ref 43–75)
NRBC BLD AUTO-RTO: 0 /100 WBCS
PLATELET # BLD AUTO: 157 THOUSANDS/UL (ref 149–390)
PMV BLD AUTO: 9.8 FL (ref 8.9–12.7)
PROTHROMBIN TIME: 15.2 SECONDS (ref 11.6–14.5)
RBC # BLD AUTO: 4.55 MILLION/UL (ref 3.88–5.62)
RH BLD: POSITIVE
SPECIMEN EXPIRATION DATE: NORMAL
WBC # BLD AUTO: 8.52 THOUSAND/UL (ref 4.31–10.16)

## 2021-10-18 PROCEDURE — 85610 PROTHROMBIN TIME: CPT | Performed by: EMERGENCY MEDICINE

## 2021-10-18 PROCEDURE — 86850 RBC ANTIBODY SCREEN: CPT | Performed by: EMERGENCY MEDICINE

## 2021-10-18 PROCEDURE — 86901 BLOOD TYPING SEROLOGIC RH(D): CPT | Performed by: EMERGENCY MEDICINE

## 2021-10-18 PROCEDURE — 99284 EMERGENCY DEPT VISIT MOD MDM: CPT | Performed by: EMERGENCY MEDICINE

## 2021-10-18 PROCEDURE — 85730 THROMBOPLASTIN TIME PARTIAL: CPT | Performed by: EMERGENCY MEDICINE

## 2021-10-18 PROCEDURE — 96365 THER/PROPH/DIAG IV INF INIT: CPT

## 2021-10-18 PROCEDURE — 86900 BLOOD TYPING SEROLOGIC ABO: CPT | Performed by: EMERGENCY MEDICINE

## 2021-10-18 PROCEDURE — 99285 EMERGENCY DEPT VISIT HI MDM: CPT

## 2021-10-18 PROCEDURE — 36415 COLL VENOUS BLD VENIPUNCTURE: CPT | Performed by: EMERGENCY MEDICINE

## 2021-10-18 PROCEDURE — 85025 COMPLETE CBC W/AUTO DIFF WBC: CPT | Performed by: EMERGENCY MEDICINE

## 2021-10-18 PROCEDURE — 99284 EMERGENCY DEPT VISIT MOD MDM: CPT

## 2021-10-18 PROCEDURE — 73130 X-RAY EXAM OF HAND: CPT

## 2021-10-18 RX ORDER — CEFAZOLIN SODIUM 2 G/50ML
2000 SOLUTION INTRAVENOUS ONCE
Status: COMPLETED | OUTPATIENT
Start: 2021-10-18 | End: 2021-10-18

## 2021-10-18 RX ORDER — LIDOCAINE HYDROCHLORIDE 20 MG/ML
10 INJECTION, SOLUTION EPIDURAL; INFILTRATION; INTRACAUDAL; PERINEURAL ONCE
Status: COMPLETED | OUTPATIENT
Start: 2021-10-18 | End: 2021-10-18

## 2021-10-18 RX ADMIN — LIDOCAINE HYDROCHLORIDE 10 ML: 20 INJECTION, SOLUTION EPIDURAL; INFILTRATION; INTRACAUDAL; PERINEURAL at 18:15

## 2021-10-18 RX ADMIN — CEFAZOLIN SODIUM 2000 MG: 2 SOLUTION INTRAVENOUS at 19:21

## 2021-10-19 LAB — GLUCOSE SERPL-MCNC: 119 MG/DL (ref 65–140)

## 2021-10-19 PROCEDURE — NC001 PR NO CHARGE: Performed by: ORTHOPAEDIC SURGERY

## 2021-10-19 PROCEDURE — 82948 REAGENT STRIP/BLOOD GLUCOSE: CPT

## 2021-10-19 RX ORDER — CEPHALEXIN 500 MG/1
500 CAPSULE ORAL EVERY 6 HOURS SCHEDULED
Qty: 28 CAPSULE | Refills: 0 | Status: SHIPPED | OUTPATIENT
Start: 2021-10-19 | End: 2021-10-26

## 2021-10-19 RX ORDER — LIDOCAINE HYDROCHLORIDE 10 MG/ML
30 INJECTION, SOLUTION EPIDURAL; INFILTRATION; INTRACAUDAL; PERINEURAL ONCE
Status: COMPLETED | OUTPATIENT
Start: 2021-10-19 | End: 2021-10-19

## 2021-10-19 RX ADMIN — LIDOCAINE HYDROCHLORIDE 30 ML: 10 INJECTION, SOLUTION EPIDURAL; INFILTRATION; INTRACAUDAL; PERINEURAL at 01:28

## 2021-10-20 ENCOUNTER — TELEPHONE (OUTPATIENT)
Dept: OBGYN CLINIC | Facility: HOSPITAL | Age: 71
End: 2021-10-20

## 2021-10-22 ENCOUNTER — RA CDI HCC (OUTPATIENT)
Dept: OTHER | Facility: HOSPITAL | Age: 71
End: 2021-10-22

## 2021-10-26 ENCOUNTER — OFFICE VISIT (OUTPATIENT)
Dept: FAMILY MEDICINE CLINIC | Facility: CLINIC | Age: 71
End: 2021-10-26
Payer: COMMERCIAL

## 2021-10-26 VITALS
TEMPERATURE: 99.6 F | WEIGHT: 185 LBS | HEIGHT: 70 IN | DIASTOLIC BLOOD PRESSURE: 72 MMHG | BODY MASS INDEX: 26.48 KG/M2 | OXYGEN SATURATION: 94 % | SYSTOLIC BLOOD PRESSURE: 126 MMHG | RESPIRATION RATE: 18 BRPM | HEART RATE: 87 BPM

## 2021-10-26 DIAGNOSIS — N18.31 TYPE 2 DIABETES MELLITUS WITH STAGE 3A CHRONIC KIDNEY DISEASE, WITH LONG-TERM CURRENT USE OF INSULIN (HCC): ICD-10-CM

## 2021-10-26 DIAGNOSIS — E11.22 TYPE 2 DIABETES MELLITUS WITH STAGE 3A CHRONIC KIDNEY DISEASE, WITH LONG-TERM CURRENT USE OF INSULIN (HCC): ICD-10-CM

## 2021-10-26 DIAGNOSIS — S69.91XS INJURY OF FINGER OF RIGHT HAND, SEQUELA: Primary | ICD-10-CM

## 2021-10-26 DIAGNOSIS — Z79.4 TYPE 2 DIABETES MELLITUS WITH STAGE 3A CHRONIC KIDNEY DISEASE, WITH LONG-TERM CURRENT USE OF INSULIN (HCC): ICD-10-CM

## 2021-10-26 PROCEDURE — 3066F NEPHROPATHY DOC TX: CPT | Performed by: ORTHOPAEDIC SURGERY

## 2021-10-26 PROCEDURE — 3725F SCREEN DEPRESSION PERFORMED: CPT | Performed by: FAMILY MEDICINE

## 2021-10-26 PROCEDURE — 99214 OFFICE O/P EST MOD 30 MIN: CPT | Performed by: FAMILY MEDICINE

## 2021-10-28 PROBLEM — S69.91XA INJURY OF FINGER OF RIGHT HAND: Status: ACTIVE | Noted: 2021-10-28

## 2021-10-29 ENCOUNTER — HOSPITAL ENCOUNTER (OUTPATIENT)
Dept: RADIOLOGY | Facility: HOSPITAL | Age: 71
Discharge: HOME | End: 2021-10-29
Attending: ORTHOPAEDIC SURGERY
Payer: COMMERCIAL

## 2021-10-29 ENCOUNTER — OFFICE VISIT (OUTPATIENT)
Dept: OBGYN CLINIC | Facility: HOSPITAL | Age: 71
End: 2021-10-29
Payer: COMMERCIAL

## 2021-10-29 ENCOUNTER — OFFICE VISIT (OUTPATIENT)
Dept: OCCUPATIONAL THERAPY | Facility: HOSPITAL | Age: 71
End: 2021-10-29
Payer: COMMERCIAL

## 2021-10-29 VITALS
BODY MASS INDEX: 26.6 KG/M2 | SYSTOLIC BLOOD PRESSURE: 111 MMHG | HEIGHT: 70 IN | DIASTOLIC BLOOD PRESSURE: 74 MMHG | HEART RATE: 89 BPM | WEIGHT: 185.8 LBS

## 2021-10-29 DIAGNOSIS — T14.8XXA FRACTURE: ICD-10-CM

## 2021-10-29 DIAGNOSIS — S68.119A: Primary | ICD-10-CM

## 2021-10-29 DIAGNOSIS — S69.91XS INJURY OF FINGER OF RIGHT HAND, SEQUELA: Primary | ICD-10-CM

## 2021-10-29 DIAGNOSIS — S68.119A: ICD-10-CM

## 2021-10-29 PROCEDURE — 3074F SYST BP LT 130 MM HG: CPT | Performed by: ORTHOPAEDIC SURGERY

## 2021-10-29 PROCEDURE — 3008F BODY MASS INDEX DOCD: CPT | Performed by: ORTHOPAEDIC SURGERY

## 2021-10-29 PROCEDURE — 73130 X-RAY EXAM OF HAND: CPT

## 2021-10-29 PROCEDURE — L3933 FO W/O JOINTS CF: HCPCS | Performed by: OCCUPATIONAL THERAPIST

## 2021-10-29 PROCEDURE — 99204 OFFICE O/P NEW MOD 45 MIN: CPT | Performed by: ORTHOPAEDIC SURGERY

## 2021-10-29 PROCEDURE — 3078F DIAST BP <80 MM HG: CPT | Performed by: ORTHOPAEDIC SURGERY

## 2021-10-29 PROCEDURE — 1160F RVW MEDS BY RX/DR IN RCRD: CPT | Performed by: ORTHOPAEDIC SURGERY

## 2021-11-04 ENCOUNTER — APPOINTMENT (OUTPATIENT)
Dept: LAB | Facility: CLINIC | Age: 71
End: 2021-11-04
Payer: COMMERCIAL

## 2021-11-04 DIAGNOSIS — E05.90 HYPERTHYROIDISM: ICD-10-CM

## 2021-11-04 LAB — TSH SERPL DL<=0.05 MIU/L-ACNC: <0.007 UIU/ML (ref 0.36–3.74)

## 2021-11-04 PROCEDURE — 84443 ASSAY THYROID STIM HORMONE: CPT

## 2021-11-04 PROCEDURE — 36415 COLL VENOUS BLD VENIPUNCTURE: CPT

## 2021-11-05 ENCOUNTER — EVALUATION (OUTPATIENT)
Dept: OCCUPATIONAL THERAPY | Facility: CLINIC | Age: 71
End: 2021-11-05
Payer: COMMERCIAL

## 2021-11-05 DIAGNOSIS — S68.119D AMPUTATION OF FINGER OF RIGHT HAND, SUBSEQUENT ENCOUNTER: ICD-10-CM

## 2021-11-05 DIAGNOSIS — S69.91XD INJURY OF FINGER OF RIGHT HAND, SUBSEQUENT ENCOUNTER: Primary | ICD-10-CM

## 2021-11-05 DIAGNOSIS — Z48.89 OTHER SPECIFIED AFTERCARE FOLLOWING SURGERY: ICD-10-CM

## 2021-11-05 PROCEDURE — 97140 MANUAL THERAPY 1/> REGIONS: CPT

## 2021-11-05 PROCEDURE — 97165 OT EVAL LOW COMPLEX 30 MIN: CPT

## 2021-11-11 ENCOUNTER — OFFICE VISIT (OUTPATIENT)
Dept: OCCUPATIONAL THERAPY | Facility: CLINIC | Age: 71
End: 2021-11-11
Payer: COMMERCIAL

## 2021-11-11 DIAGNOSIS — S69.91XD INJURY OF FINGER OF RIGHT HAND, SUBSEQUENT ENCOUNTER: ICD-10-CM

## 2021-11-11 DIAGNOSIS — S68.119D AMPUTATION OF FINGER OF RIGHT HAND, SUBSEQUENT ENCOUNTER: Primary | ICD-10-CM

## 2021-11-11 DIAGNOSIS — Z48.89 OTHER SPECIFIED AFTERCARE FOLLOWING SURGERY: ICD-10-CM

## 2021-11-11 PROCEDURE — 97110 THERAPEUTIC EXERCISES: CPT

## 2021-11-11 PROCEDURE — 97140 MANUAL THERAPY 1/> REGIONS: CPT

## 2021-11-16 ENCOUNTER — OFFICE VISIT (OUTPATIENT)
Dept: OCCUPATIONAL THERAPY | Facility: CLINIC | Age: 71
End: 2021-11-16
Payer: COMMERCIAL

## 2021-11-16 DIAGNOSIS — S68.119D AMPUTATION OF FINGER OF RIGHT HAND, SUBSEQUENT ENCOUNTER: Primary | ICD-10-CM

## 2021-11-16 DIAGNOSIS — Z48.89 OTHER SPECIFIED AFTERCARE FOLLOWING SURGERY: ICD-10-CM

## 2021-11-16 DIAGNOSIS — S69.91XD INJURY OF FINGER OF RIGHT HAND, SUBSEQUENT ENCOUNTER: ICD-10-CM

## 2021-11-16 PROCEDURE — 97110 THERAPEUTIC EXERCISES: CPT

## 2021-11-16 PROCEDURE — 97140 MANUAL THERAPY 1/> REGIONS: CPT

## 2021-11-18 ENCOUNTER — HOSPITAL ENCOUNTER (OUTPATIENT)
Dept: CT IMAGING | Facility: HOSPITAL | Age: 71
Discharge: HOME | End: 2021-11-18
Payer: COMMERCIAL

## 2021-11-18 DIAGNOSIS — C61 PROSTATE CANCER (HCC): ICD-10-CM

## 2021-11-18 PROCEDURE — 71250 CT THORAX DX C-: CPT

## 2021-11-18 PROCEDURE — 74176 CT ABD & PELVIS W/O CONTRAST: CPT

## 2021-11-18 PROCEDURE — G1004 CDSM NDSC: HCPCS

## 2021-11-23 ENCOUNTER — APPOINTMENT (OUTPATIENT)
Dept: OCCUPATIONAL THERAPY | Facility: CLINIC | Age: 71
End: 2021-11-23
Payer: COMMERCIAL

## 2021-12-02 ENCOUNTER — OFFICE VISIT (OUTPATIENT)
Dept: OCCUPATIONAL THERAPY | Facility: CLINIC | Age: 71
End: 2021-12-02
Payer: COMMERCIAL

## 2021-12-02 DIAGNOSIS — I10 PRIMARY HYPERTENSION: ICD-10-CM

## 2021-12-02 DIAGNOSIS — R21 RASH: ICD-10-CM

## 2021-12-02 DIAGNOSIS — Z79.4 TYPE 2 DIABETES MELLITUS WITH STAGE 3A CHRONIC KIDNEY DISEASE, WITH LONG-TERM CURRENT USE OF INSULIN (HCC): Primary | ICD-10-CM

## 2021-12-02 DIAGNOSIS — Z48.89 OTHER SPECIFIED AFTERCARE FOLLOWING SURGERY: ICD-10-CM

## 2021-12-02 DIAGNOSIS — S68.119D AMPUTATION OF FINGER OF RIGHT HAND, SUBSEQUENT ENCOUNTER: Primary | ICD-10-CM

## 2021-12-02 DIAGNOSIS — J44.9 CHRONIC OBSTRUCTIVE PULMONARY DISEASE, UNSPECIFIED COPD TYPE (HCC): ICD-10-CM

## 2021-12-02 DIAGNOSIS — E78.5 HYPERLIPIDEMIA, UNSPECIFIED HYPERLIPIDEMIA TYPE: ICD-10-CM

## 2021-12-02 DIAGNOSIS — I25.10 CAD (CORONARY ARTERY DISEASE): ICD-10-CM

## 2021-12-02 DIAGNOSIS — S69.91XD INJURY OF FINGER OF RIGHT HAND, SUBSEQUENT ENCOUNTER: ICD-10-CM

## 2021-12-02 DIAGNOSIS — N18.31 TYPE 2 DIABETES MELLITUS WITH STAGE 3A CHRONIC KIDNEY DISEASE, WITH LONG-TERM CURRENT USE OF INSULIN (HCC): Primary | ICD-10-CM

## 2021-12-02 DIAGNOSIS — E11.22 TYPE 2 DIABETES MELLITUS WITH STAGE 3A CHRONIC KIDNEY DISEASE, WITH LONG-TERM CURRENT USE OF INSULIN (HCC): Primary | ICD-10-CM

## 2021-12-02 DIAGNOSIS — K21.9 GASTROESOPHAGEAL REFLUX DISEASE WITHOUT ESOPHAGITIS: ICD-10-CM

## 2021-12-02 PROCEDURE — 97110 THERAPEUTIC EXERCISES: CPT

## 2021-12-02 PROCEDURE — 3066F NEPHROPATHY DOC TX: CPT | Performed by: FAMILY MEDICINE

## 2021-12-02 PROCEDURE — 97140 MANUAL THERAPY 1/> REGIONS: CPT

## 2021-12-02 RX ORDER — OMEPRAZOLE 40 MG/1
40 CAPSULE, DELAYED RELEASE ORAL 2 TIMES DAILY
Qty: 30 CAPSULE | Refills: 2 | Status: SHIPPED | OUTPATIENT
Start: 2021-12-02

## 2021-12-02 RX ORDER — FENOFIBRATE 145 MG/1
145 TABLET, COATED ORAL DAILY
Qty: 30 TABLET | Refills: 2 | Status: SHIPPED | OUTPATIENT
Start: 2021-12-02 | End: 2022-05-02

## 2021-12-02 RX ORDER — GABAPENTIN 400 MG/1
400 CAPSULE ORAL 2 TIMES DAILY
Qty: 30 CAPSULE | Refills: 2 | Status: SHIPPED | OUTPATIENT
Start: 2021-12-02 | End: 2022-04-13 | Stop reason: SDUPTHER

## 2021-12-02 RX ORDER — ATENOLOL 25 MG/1
25 TABLET ORAL DAILY
Qty: 30 TABLET | Refills: 3 | Status: SHIPPED | OUTPATIENT
Start: 2021-12-02 | End: 2022-06-27

## 2021-12-02 RX ORDER — UMECLIDINIUM BROMIDE AND VILANTEROL TRIFENATATE 62.5; 25 UG/1; UG/1
1 POWDER RESPIRATORY (INHALATION) DAILY
Qty: 60 BLISTER | Refills: 3 | Status: SHIPPED | OUTPATIENT
Start: 2021-12-02 | End: 2022-04-16

## 2021-12-02 RX ORDER — CLOTRIMAZOLE AND BETAMETHASONE DIPROPIONATE 10; .5 MG/ML; MG/ML
LOTION TOPICAL 2 TIMES DAILY
Qty: 30 ML | Refills: 3 | Status: SHIPPED | OUTPATIENT
Start: 2021-12-02 | End: 2022-07-21 | Stop reason: ALTCHOICE

## 2021-12-02 RX ORDER — AMLODIPINE BESYLATE 5 MG/1
5 TABLET ORAL DAILY
Qty: 30 TABLET | Refills: 2 | Status: SHIPPED | OUTPATIENT
Start: 2021-12-02 | End: 2022-05-29

## 2021-12-02 RX ORDER — ATENOLOL 25 MG/1
25 TABLET ORAL DAILY
Qty: 30 TABLET | Refills: 3 | Status: SHIPPED | OUTPATIENT
Start: 2021-12-02 | End: 2021-12-02 | Stop reason: SDUPTHER

## 2021-12-02 RX ORDER — ROSUVASTATIN CALCIUM 40 MG/1
40 TABLET, COATED ORAL EVERY EVENING
Qty: 30 TABLET | Refills: 3 | Status: SHIPPED | OUTPATIENT
Start: 2021-12-02 | End: 2022-03-15

## 2021-12-13 ENCOUNTER — APPOINTMENT (OUTPATIENT)
Dept: LAB | Facility: CLINIC | Age: 71
End: 2021-12-13
Payer: COMMERCIAL

## 2021-12-13 DIAGNOSIS — E78.2 MIXED HYPERLIPIDEMIA: ICD-10-CM

## 2021-12-13 DIAGNOSIS — Z79.4 TYPE 2 DIABETES MELLITUS WITH STAGE 3A CHRONIC KIDNEY DISEASE, WITH LONG-TERM CURRENT USE OF INSULIN (HCC): ICD-10-CM

## 2021-12-13 DIAGNOSIS — N18.31 TYPE 2 DIABETES MELLITUS WITH STAGE 3A CHRONIC KIDNEY DISEASE, WITH LONG-TERM CURRENT USE OF INSULIN (HCC): ICD-10-CM

## 2021-12-13 DIAGNOSIS — E11.22 TYPE 2 DIABETES MELLITUS WITH STAGE 3A CHRONIC KIDNEY DISEASE, WITH LONG-TERM CURRENT USE OF INSULIN (HCC): ICD-10-CM

## 2021-12-13 DIAGNOSIS — I10 ESSENTIAL HYPERTENSION: ICD-10-CM

## 2021-12-13 LAB
ALBUMIN SERPL BCP-MCNC: 3.5 G/DL (ref 3.5–5)
ALP SERPL-CCNC: 64 U/L (ref 46–116)
ALT SERPL W P-5'-P-CCNC: 18 U/L (ref 12–78)
ANION GAP SERPL CALCULATED.3IONS-SCNC: 5 MMOL/L (ref 4–13)
AST SERPL W P-5'-P-CCNC: 14 U/L (ref 5–45)
BASOPHILS # BLD AUTO: 0.12 THOUSANDS/ΜL (ref 0–0.1)
BASOPHILS NFR BLD AUTO: 1 % (ref 0–1)
BILIRUB SERPL-MCNC: 0.52 MG/DL (ref 0.2–1)
BUN SERPL-MCNC: 22 MG/DL (ref 5–25)
CALCIUM SERPL-MCNC: 9.3 MG/DL (ref 8.3–10.1)
CHLORIDE SERPL-SCNC: 105 MMOL/L (ref 100–108)
CHOLEST SERPL-MCNC: 137 MG/DL
CO2 SERPL-SCNC: 28 MMOL/L (ref 21–32)
CREAT SERPL-MCNC: 1.49 MG/DL (ref 0.6–1.3)
CREAT UR-MCNC: 55.2 MG/DL
EOSINOPHIL # BLD AUTO: 0.31 THOUSAND/ΜL (ref 0–0.61)
EOSINOPHIL NFR BLD AUTO: 4 % (ref 0–6)
ERYTHROCYTE [DISTWIDTH] IN BLOOD BY AUTOMATED COUNT: 14 % (ref 11.6–15.1)
EST. AVERAGE GLUCOSE BLD GHB EST-MCNC: 189 MG/DL
GFR SERPL CREATININE-BSD FRML MDRD: 47 ML/MIN/1.73SQ M
GLUCOSE P FAST SERPL-MCNC: 160 MG/DL (ref 65–99)
HBA1C MFR BLD: 8.2 %
HCT VFR BLD AUTO: 44.9 % (ref 36.5–49.3)
HDLC SERPL-MCNC: 29 MG/DL
HGB BLD-MCNC: 14.2 G/DL (ref 12–17)
IMM GRANULOCYTES # BLD AUTO: 0.04 THOUSAND/UL (ref 0–0.2)
IMM GRANULOCYTES NFR BLD AUTO: 1 % (ref 0–2)
LDLC SERPL CALC-MCNC: 82 MG/DL (ref 0–100)
LYMPHOCYTES # BLD AUTO: 0.95 THOUSANDS/ΜL (ref 0.6–4.47)
LYMPHOCYTES NFR BLD AUTO: 11 % (ref 14–44)
MCH RBC QN AUTO: 28.7 PG (ref 26.8–34.3)
MCHC RBC AUTO-ENTMCNC: 31.6 G/DL (ref 31.4–37.4)
MCV RBC AUTO: 91 FL (ref 82–98)
MICROALBUMIN UR-MCNC: 10.7 MG/L (ref 0–20)
MICROALBUMIN/CREAT 24H UR: 19 MG/G CREATININE (ref 0–30)
MONOCYTES # BLD AUTO: 0.74 THOUSAND/ΜL (ref 0.17–1.22)
MONOCYTES NFR BLD AUTO: 9 % (ref 4–12)
NEUTROPHILS # BLD AUTO: 6.57 THOUSANDS/ΜL (ref 1.85–7.62)
NEUTS SEG NFR BLD AUTO: 74 % (ref 43–75)
NONHDLC SERPL-MCNC: 108 MG/DL
NRBC BLD AUTO-RTO: 0 /100 WBCS
PLATELET # BLD AUTO: 213 THOUSANDS/UL (ref 149–390)
PMV BLD AUTO: 10.4 FL (ref 8.9–12.7)
POTASSIUM SERPL-SCNC: 4.4 MMOL/L (ref 3.5–5.3)
PROT SERPL-MCNC: 7.8 G/DL (ref 6.4–8.2)
RBC # BLD AUTO: 4.95 MILLION/UL (ref 3.88–5.62)
SODIUM SERPL-SCNC: 138 MMOL/L (ref 136–145)
T4 FREE SERPL-MCNC: 0.88 NG/DL (ref 0.76–1.46)
TRIGL SERPL-MCNC: 132 MG/DL
TSH SERPL DL<=0.05 MIU/L-ACNC: 0.05 UIU/ML (ref 0.36–3.74)
WBC # BLD AUTO: 8.73 THOUSAND/UL (ref 4.31–10.16)

## 2021-12-13 PROCEDURE — 82570 ASSAY OF URINE CREATININE: CPT

## 2021-12-13 PROCEDURE — 3061F NEG MICROALBUMINURIA REV: CPT | Performed by: FAMILY MEDICINE

## 2021-12-13 PROCEDURE — 84439 ASSAY OF FREE THYROXINE: CPT

## 2021-12-13 PROCEDURE — 83036 HEMOGLOBIN GLYCOSYLATED A1C: CPT

## 2021-12-13 PROCEDURE — 80053 COMPREHEN METABOLIC PANEL: CPT

## 2021-12-13 PROCEDURE — 82043 UR ALBUMIN QUANTITATIVE: CPT

## 2021-12-13 PROCEDURE — 36415 COLL VENOUS BLD VENIPUNCTURE: CPT

## 2021-12-13 PROCEDURE — 80061 LIPID PANEL: CPT

## 2021-12-13 PROCEDURE — 85025 COMPLETE CBC W/AUTO DIFF WBC: CPT

## 2021-12-13 PROCEDURE — 84443 ASSAY THYROID STIM HORMONE: CPT

## 2021-12-15 ENCOUNTER — OFFICE VISIT (OUTPATIENT)
Dept: OBGYN CLINIC | Facility: HOSPITAL | Age: 71
End: 2021-12-15
Payer: COMMERCIAL

## 2021-12-15 VITALS
HEIGHT: 70 IN | WEIGHT: 188 LBS | BODY MASS INDEX: 26.92 KG/M2 | DIASTOLIC BLOOD PRESSURE: 62 MMHG | SYSTOLIC BLOOD PRESSURE: 123 MMHG

## 2021-12-15 DIAGNOSIS — S68.119D AMPUTATION OF FINGER OF RIGHT HAND, SUBSEQUENT ENCOUNTER: Primary | ICD-10-CM

## 2021-12-15 PROCEDURE — 3074F SYST BP LT 130 MM HG: CPT | Performed by: ORTHOPAEDIC SURGERY

## 2021-12-15 PROCEDURE — 99213 OFFICE O/P EST LOW 20 MIN: CPT | Performed by: ORTHOPAEDIC SURGERY

## 2021-12-15 PROCEDURE — 3078F DIAST BP <80 MM HG: CPT | Performed by: ORTHOPAEDIC SURGERY

## 2021-12-15 RX ORDER — METHIMAZOLE 5 MG/1
TABLET ORAL
COMMUNITY
Start: 2021-11-29

## 2021-12-20 ENCOUNTER — APPOINTMENT (OUTPATIENT)
Dept: LAB | Facility: CLINIC | Age: 71
End: 2021-12-20
Payer: COMMERCIAL

## 2021-12-20 ENCOUNTER — OFFICE VISIT (OUTPATIENT)
Dept: FAMILY MEDICINE CLINIC | Facility: CLINIC | Age: 71
End: 2021-12-20
Payer: COMMERCIAL

## 2021-12-20 ENCOUNTER — TRANSCRIBE ORDERS (OUTPATIENT)
Dept: LAB | Facility: CLINIC | Age: 71
End: 2021-12-20

## 2021-12-20 VITALS
WEIGHT: 187.2 LBS | DIASTOLIC BLOOD PRESSURE: 74 MMHG | BODY MASS INDEX: 26.8 KG/M2 | OXYGEN SATURATION: 95 % | RESPIRATION RATE: 18 BRPM | HEIGHT: 70 IN | TEMPERATURE: 96.7 F | SYSTOLIC BLOOD PRESSURE: 126 MMHG | HEART RATE: 77 BPM

## 2021-12-20 DIAGNOSIS — E11.9 TYPE 2 DIABETES MELLITUS WITHOUT COMPLICATION, WITH LONG-TERM CURRENT USE OF INSULIN (HCC): ICD-10-CM

## 2021-12-20 DIAGNOSIS — Z79.4 TYPE 2 DIABETES MELLITUS WITHOUT COMPLICATION, WITH LONG-TERM CURRENT USE OF INSULIN (HCC): ICD-10-CM

## 2021-12-20 DIAGNOSIS — C61 PROSTATE CANCER (HCC): ICD-10-CM

## 2021-12-20 DIAGNOSIS — I10 PRIMARY HYPERTENSION: Chronic | ICD-10-CM

## 2021-12-20 DIAGNOSIS — Z79.4 TYPE 2 DIABETES MELLITUS WITH STAGE 3A CHRONIC KIDNEY DISEASE, WITH LONG-TERM CURRENT USE OF INSULIN (HCC): Primary | ICD-10-CM

## 2021-12-20 DIAGNOSIS — C34.91 SQUAMOUS CELL LUNG CANCER, RIGHT (HCC): Chronic | ICD-10-CM

## 2021-12-20 DIAGNOSIS — N18.31 TYPE 2 DIABETES MELLITUS WITH STAGE 3A CHRONIC KIDNEY DISEASE, WITH LONG-TERM CURRENT USE OF INSULIN (HCC): Primary | ICD-10-CM

## 2021-12-20 DIAGNOSIS — C34.91 PRIMARY LUNG CANCER WITH METASTASIS FROM LUNG TO OTHER SITE, RIGHT (HCC): ICD-10-CM

## 2021-12-20 DIAGNOSIS — C34.91 PRIMARY LUNG CANCER WITH METASTASIS FROM LUNG TO OTHER SITE, RIGHT (HCC): Primary | ICD-10-CM

## 2021-12-20 DIAGNOSIS — E11.22 TYPE 2 DIABETES MELLITUS WITH STAGE 3A CHRONIC KIDNEY DISEASE, WITH LONG-TERM CURRENT USE OF INSULIN (HCC): Primary | ICD-10-CM

## 2021-12-20 DIAGNOSIS — D61.818 OTHER PANCYTOPENIA (HCC): ICD-10-CM

## 2021-12-20 LAB
BUN SERPL-MCNC: 25 MG/DL (ref 5–25)
CREAT SERPL-MCNC: 1.49 MG/DL (ref 0.6–1.3)
GFR SERPL CREATININE-BSD FRML MDRD: 47 ML/MIN/1.73SQ M
SL AMB POCT HEMOGLOBIN AIC: 8.8 (ref ?–6.5)

## 2021-12-20 PROCEDURE — 1160F RVW MEDS BY RX/DR IN RCRD: CPT | Performed by: FAMILY MEDICINE

## 2021-12-20 PROCEDURE — 3052F HG A1C>EQUAL 8.0%<EQUAL 9.0%: CPT | Performed by: FAMILY MEDICINE

## 2021-12-20 PROCEDURE — 99214 OFFICE O/P EST MOD 30 MIN: CPT | Performed by: FAMILY MEDICINE

## 2021-12-20 PROCEDURE — 84520 ASSAY OF UREA NITROGEN: CPT

## 2021-12-20 PROCEDURE — 82565 ASSAY OF CREATININE: CPT

## 2021-12-20 PROCEDURE — 83036 HEMOGLOBIN GLYCOSYLATED A1C: CPT | Performed by: FAMILY MEDICINE

## 2021-12-20 PROCEDURE — 36415 COLL VENOUS BLD VENIPUNCTURE: CPT

## 2021-12-20 PROCEDURE — 3008F BODY MASS INDEX DOCD: CPT | Performed by: FAMILY MEDICINE

## 2021-12-20 RX ORDER — INSULIN DEGLUDEC INJECTION 100 U/ML
70 INJECTION, SOLUTION SUBCUTANEOUS
Qty: 15 ML | Refills: 3 | Status: SHIPPED | OUTPATIENT
Start: 2021-12-20 | End: 2022-07-19 | Stop reason: SDUPTHER

## 2022-01-14 ENCOUNTER — APPOINTMENT (OUTPATIENT)
Dept: LAB | Facility: CLINIC | Age: 72
End: 2022-01-14
Payer: COMMERCIAL

## 2022-01-14 DIAGNOSIS — E05.90 PRETIBIAL MYXEDEMA: ICD-10-CM

## 2022-01-14 LAB
ALT SERPL W P-5'-P-CCNC: 18 U/L (ref 12–78)
AST SERPL W P-5'-P-CCNC: 11 U/L (ref 5–45)
BASOPHILS # BLD AUTO: 0.08 THOUSANDS/ΜL (ref 0–0.1)
BASOPHILS NFR BLD AUTO: 1 % (ref 0–1)
EOSINOPHIL # BLD AUTO: 0.37 THOUSAND/ΜL (ref 0–0.61)
EOSINOPHIL NFR BLD AUTO: 4 % (ref 0–6)
ERYTHROCYTE [DISTWIDTH] IN BLOOD BY AUTOMATED COUNT: 14.4 % (ref 11.6–15.1)
HCT VFR BLD AUTO: 42.5 % (ref 36.5–49.3)
HGB BLD-MCNC: 13.6 G/DL (ref 12–17)
IMM GRANULOCYTES # BLD AUTO: 0.04 THOUSAND/UL (ref 0–0.2)
IMM GRANULOCYTES NFR BLD AUTO: 1 % (ref 0–2)
LYMPHOCYTES # BLD AUTO: 0.84 THOUSANDS/ΜL (ref 0.6–4.47)
LYMPHOCYTES NFR BLD AUTO: 10 % (ref 14–44)
MCH RBC QN AUTO: 28.6 PG (ref 26.8–34.3)
MCHC RBC AUTO-ENTMCNC: 32 G/DL (ref 31.4–37.4)
MCV RBC AUTO: 90 FL (ref 82–98)
MONOCYTES # BLD AUTO: 0.71 THOUSAND/ΜL (ref 0.17–1.22)
MONOCYTES NFR BLD AUTO: 8 % (ref 4–12)
NEUTROPHILS # BLD AUTO: 6.7 THOUSANDS/ΜL (ref 1.85–7.62)
NEUTS SEG NFR BLD AUTO: 76 % (ref 43–75)
NRBC BLD AUTO-RTO: 0 /100 WBCS
PLATELET # BLD AUTO: 202 THOUSANDS/UL (ref 149–390)
PMV BLD AUTO: 9.9 FL (ref 8.9–12.7)
RBC # BLD AUTO: 4.75 MILLION/UL (ref 3.88–5.62)
T4 FREE SERPL-MCNC: 0.81 NG/DL (ref 0.76–1.46)
TSH SERPL DL<=0.05 MIU/L-ACNC: 1.72 UIU/ML (ref 0.36–3.74)
WBC # BLD AUTO: 8.74 THOUSAND/UL (ref 4.31–10.16)

## 2022-01-14 PROCEDURE — 84445 ASSAY OF TSI GLOBULIN: CPT

## 2022-01-14 PROCEDURE — 84450 TRANSFERASE (AST) (SGOT): CPT

## 2022-01-14 PROCEDURE — 36415 COLL VENOUS BLD VENIPUNCTURE: CPT

## 2022-01-14 PROCEDURE — 84443 ASSAY THYROID STIM HORMONE: CPT

## 2022-01-14 PROCEDURE — 84460 ALANINE AMINO (ALT) (SGPT): CPT

## 2022-01-14 PROCEDURE — 84439 ASSAY OF FREE THYROXINE: CPT

## 2022-01-14 PROCEDURE — 85025 COMPLETE CBC W/AUTO DIFF WBC: CPT

## 2022-01-14 PROCEDURE — 83520 IMMUNOASSAY QUANT NOS NONAB: CPT

## 2022-01-16 LAB — TSH RECEP AB SER-ACNC: 27.7 IU/L (ref 0–1.75)

## 2022-01-18 LAB — TSI SER-ACNC: 32.1 IU/L (ref 0–0.55)

## 2022-01-26 ENCOUNTER — OFFICE VISIT (OUTPATIENT)
Dept: OBGYN CLINIC | Facility: HOSPITAL | Age: 72
End: 2022-01-26
Payer: COMMERCIAL

## 2022-01-26 ENCOUNTER — HOSPITAL ENCOUNTER (OUTPATIENT)
Dept: RADIOLOGY | Facility: HOSPITAL | Age: 72
Discharge: HOME | End: 2022-01-26
Attending: ORTHOPAEDIC SURGERY
Payer: COMMERCIAL

## 2022-01-26 ENCOUNTER — TELEPHONE (OUTPATIENT)
Dept: OBGYN CLINIC | Facility: OTHER | Age: 72
End: 2022-01-26

## 2022-01-26 VITALS
HEIGHT: 70 IN | SYSTOLIC BLOOD PRESSURE: 119 MMHG | HEART RATE: 82 BPM | BODY MASS INDEX: 27.49 KG/M2 | DIASTOLIC BLOOD PRESSURE: 70 MMHG | WEIGHT: 192 LBS

## 2022-01-26 DIAGNOSIS — S68.119D AMPUTATION OF FINGER OF RIGHT HAND, SUBSEQUENT ENCOUNTER: Primary | ICD-10-CM

## 2022-01-26 DIAGNOSIS — S68.119A: ICD-10-CM

## 2022-01-26 PROCEDURE — 99214 OFFICE O/P EST MOD 30 MIN: CPT | Performed by: ORTHOPAEDIC SURGERY

## 2022-01-26 PROCEDURE — 73140 X-RAY EXAM OF FINGER(S): CPT

## 2022-01-26 PROCEDURE — 3074F SYST BP LT 130 MM HG: CPT | Performed by: ORTHOPAEDIC SURGERY

## 2022-01-26 PROCEDURE — 3078F DIAST BP <80 MM HG: CPT | Performed by: ORTHOPAEDIC SURGERY

## 2022-01-26 PROCEDURE — 3008F BODY MASS INDEX DOCD: CPT | Performed by: ORTHOPAEDIC SURGERY

## 2022-01-26 PROCEDURE — 1160F RVW MEDS BY RX/DR IN RCRD: CPT | Performed by: ORTHOPAEDIC SURGERY

## 2022-01-26 NOTE — TELEPHONE ENCOUNTER
Patient saw Dr Jean-Paul Rossi today, [facility] he did not realize that at the time and wishes to follow up in [place] # [phone]    Thanks Teri Treviño

## 2022-01-26 NOTE — PROGRESS NOTES
ASSESSMENT/PLAN:    Assessment:   Right long and ring partial tip amputation    Plan:   Eschar debridement tip of the ring finger  Discontinue cap splints    Follow Up:  8  week(s)    To Do Next Visit:  recheck      _____________________________________________________  CHIEF COMPLAINT:  Chief Complaint   Patient presents with    Right Hand - Follow-up     Right middle and ring finger partial Amp         SUBJECTIVE:  Cristina Escalona is a 73 y o  male who presents for follow up regarding partial tip amputation of the long and ring fingers 10/18/21  Patient has been wearing cap splints as instructed  PAST MEDICAL HISTORY:  Past Medical History:   Diagnosis Date    COPD [facility]ch[facility] pulmonary disease) ([facility] )     Diabetes mellitus ([facility] )     Hypertension     Iron deficiency anemia     Lung cancer ([facility] )     Lung cancer ([facility] )     Lyme disease     Rheumatoid arthritis ([facility] )     Rheumatoid arthritis ([place] [address] )        PAST SURGICAL HISTORY:  Past Surgical History:   Procedure Laterality Date    BALLOON ANGIOPLASTY, ARTERY      CLAVICLE SURGERY      TOE AMPUTATION Left 11/20/2018    Procedure: AMPUTATION GREAT TOE;  Surgeon: Hector Barrios DPM;  Location[facility];  Service: Podiatry       FAMILY HISTORY:  History reviewed  No pertinent family history      SOCIAL HISTORY:  Social History     Tobacco Use    Smoking status: Current Every Day Smoker     Packs/day: 0 25     Years: 50 00     Pack years: 12 50     Types: Cigarettes     Last attempt to quit: 10/20/2018     Years since quitting: 3 2    Smokeless tobacco: Never Used   Substance Use Topics    Alcohol use: No    Drug use: No       MEDICATIONS:    Current Outpatient Medications:     albuterol (PROVENTIL HFA,VENTOLIN HFA) 90 mcg/act inhaler, Inhale 2 puffs every 4 (four) hours as needed, Disp: , Rfl:     amLODIPine (NORVASC) 5 mg tablet, Take 1 tablet (5 mg total) by mouth daily, Disp: 30 tablet, Rfl: 2    ascorbic acid (VITAMIN C) 250 mg tablet, Take 250 mg by mouth 2 (two) times a day, Disp: , Rfl:     aspirin (ECOTRIN LOW STRENGTH) 81 mg EC tablet, 1 tab(s), Disp: , Rfl:     atenolol (TENORMIN) 25 mg tablet, Take 1 tablet (25 mg total) by mouth daily, Disp: 30 tablet, Rfl: 3    B-D ULTRAFINE III SHORT PEN 31G X 8 MM MISC, , Disp: , Rfl:     clotrimazole-betamethasone (LOTRISONE) 1-0 05 % lotion, Apply topically 2 (two) times a day, Disp: 30 mL, Rfl: 3    fenofibrate (TRICOR) 145 mg tablet, Take 1 tablet (145 mg total) by mouth daily, Disp: 30 tablet, Rfl: 2    ferrous sulfate 325 (65 Fe) mg tablet, Take 325 mg by mouth 2 (two) times a day with meals, Disp: , Rfl:     gabapentin (NEURONTIN) 400 mg capsule, Take 1 capsule (400 mg total) by mouth 2 (two) times a day, Disp: 30 capsule, Rfl: 2    glucose blood test strip, Check bs qam fasting , and after a meal, Disp: 100 each, Rfl: 3    insulin degludec (Tresiba FlexTouch) 100 units/mL injection pen, Inject 70 Units under the skin daily at bedtime, Disp: 15 mL, Rfl: 3    Lancets (accu-chek soft touch) lancets, bs check qid, Disp: 100 each, Rfl: 2    metFORMIN (GLUCOPHAGE) 1000 MG tablet, Take 1 tablet (1,000 mg total) by mouth 2 (two) times a day with meals for 5000 doses, Disp: 60 tablet, Rfl: 2    methimazole (TAPAZOLE) 5 mg tablet, , Disp: , Rfl:     omeprazole (PriLOSEC) 40 MG capsule, Take 1 capsule (40 mg total) by mouth 2 (two) times a day, Disp: 30 capsule, Rfl: 2    rosuvastatin (CRESTOR) 40 MG tablet, Take 1 tablet (40 mg total) by mouth every evening, Disp: 30 tablet, Rfl: 3    sitaGLIPtin (JANUVIA) 100 mg tablet, Take 1 tablet (100 mg total) by mouth daily, Disp: 30 tablet, Rfl: 3    umeclidinium-vilanterol (Anoro Ellipta) 62 5-25 MCG/INH inhaler, Inhale 1 puff daily, Disp: 60 blister, Rfl: 3    ALLERGIES:  Allergies   Allergen Reactions    Ace Inhibitors Swelling    Angiotensin Receptor Blockers Other (See Comments)     Elevated K+    Clindamycin Diarrhea and Nausea Only    Plaquenil [Hydroxychloroquine] Swelling     Tongue swelling       REVIEW OF SYSTEMS:  Pertinent items are noted in HPI  A comprehensive review of systems was negative  LABS:  HgA1c:   Lab Results   Component Value Date    HGBA1C 8 8 (A) 12/20/2021     BMP:   Lab Results   Component Value Date    CALCIUM 9 3 12/13/2021    K 4 4 12/13/2021    CO2 28 12/13/2021     12/13/2021    BUN 25 12/20/2021    CREATININE 1 49 (H) 12/20/2021           _____________________________________________________  PHYSICAL EXAMINATION:  Vital signs: /70   Pulse 82   Ht 5' 10" (1 778 m)   Wt 87 1 kg (192 lb)   BMI 27 55 kg/m²   General: well developed and well nourished, alert, oriented times 3 and appears comfortable  Psychiatric: Normal  HEENT: Trachea Midline, No torticollis  Cardiovascular: No discernable arrhythmia  Pulmonary: No wheezing or stridor  Abdomen: No rebound or guarding  Extremities: No peripheral edema  Skin: No masses, erythema, lacerations, fluctation, ulcerations  Neurovascular: Sensation Intact to the Median, Ulnar, Radial Nerve, Motor Intact to the Median, Ulnar, Radial Nerve and Pulses Intact    MUSCULOSKELETAL EXAMINATION:  Right long finger: well healed, no signs of infection, flexion and extension intact    Right ring finger: eschar tip of the finger mild erythema     _____________________________________________________  STUDIES REVIEWED:  Images were reviewed in PACS by Dr Wolfgang Soliman and demonstrate: right ring finger: no acute fracture, partial tip amputation, no signs of infection      PROCEDURES PERFORMED:  Procedures  No Procedures performed today     Scribe Attestation    I,:  Kenrick Johnson am acting as a scribe while in the presence of the attending physician :       I,:  Leopold Mitts, MD personally performed the services described in this documentation    as scribed in my presence :

## 2022-01-31 LAB
LEFT EYE DIABETIC RETINOPATHY: NORMAL
RIGHT EYE DIABETIC RETINOPATHY: NORMAL

## 2022-02-23 PROCEDURE — 3066F NEPHROPATHY DOC TX: CPT | Performed by: ORTHOPAEDIC SURGERY

## 2022-03-02 ENCOUNTER — HOSPITAL ENCOUNTER (OUTPATIENT)
Dept: CT IMAGING | Facility: HOSPITAL | Age: 72
Discharge: HOME | End: 2022-03-02
Payer: COMMERCIAL

## 2022-03-02 ENCOUNTER — TELEPHONE (OUTPATIENT)
Dept: FAMILY MEDICINE CLINIC | Facility: CLINIC | Age: 72
End: 2022-03-02

## 2022-03-02 DIAGNOSIS — C34.91 SQUAMOUS CELL CARCINOMA LUNG, RIGHT (HCC): ICD-10-CM

## 2022-03-02 PROCEDURE — G1004 CDSM NDSC: HCPCS

## 2022-03-02 PROCEDURE — 71250 CT THORAX DX C-: CPT

## 2022-03-02 NOTE — TELEPHONE ENCOUNTER
Patient aware of lab work that needs to be done   Has an appointment on 3/21 for annual physical and to go over blood work

## 2022-03-09 ENCOUNTER — TRANSCRIBE ORDERS (OUTPATIENT)
Dept: ADMINISTRATIVE | Facility: HOSPITAL | Age: 72
End: 2022-03-09

## 2022-03-09 DIAGNOSIS — C61 MALIGNANT NEOPLASM OF PROSTATE (HCC): Primary | ICD-10-CM

## 2022-03-09 DIAGNOSIS — N28.9 KIDNEY DISEASE: ICD-10-CM

## 2022-03-09 DIAGNOSIS — C34.91 SQUAMOUS CELL CARCINOMA OF RIGHT LUNG (HCC): ICD-10-CM

## 2022-03-15 ENCOUNTER — RA CDI HCC (OUTPATIENT)
Dept: OTHER | Facility: HOSPITAL | Age: 72
End: 2022-03-15

## 2022-03-15 DIAGNOSIS — E78.5 HYPERLIPIDEMIA, UNSPECIFIED HYPERLIPIDEMIA TYPE: ICD-10-CM

## 2022-03-15 RX ORDER — ROSUVASTATIN CALCIUM 40 MG/1
TABLET, COATED ORAL
Qty: 90 TABLET | Refills: 1 | Status: SHIPPED | OUTPATIENT
Start: 2022-03-15 | End: 2022-07-12

## 2022-03-15 NOTE — PROGRESS NOTES
[facility]  coding opportunities          Chart Reviewed number of suggestions sent to Provider: 2  E11 42, E11 51  See 5/28/21 podiatry assessment   Patients Insurance     Medicare Insurance: [facility]

## 2022-03-16 ENCOUNTER — APPOINTMENT (OUTPATIENT)
Dept: LAB | Facility: CLINIC | Age: 72
End: 2022-03-16
Payer: COMMERCIAL

## 2022-03-16 DIAGNOSIS — C34.91 SQUAMOUS CELL CARCINOMA OF RIGHT LUNG (HCC): ICD-10-CM

## 2022-03-16 DIAGNOSIS — D61.818 OTHER PANCYTOPENIA (HCC): ICD-10-CM

## 2022-03-16 DIAGNOSIS — N18.31 TYPE 2 DIABETES MELLITUS WITH STAGE 3A CHRONIC KIDNEY DISEASE, WITH LONG-TERM CURRENT USE OF INSULIN (HCC): ICD-10-CM

## 2022-03-16 DIAGNOSIS — C34.91 SQUAMOUS CELL LUNG CANCER, RIGHT (HCC): ICD-10-CM

## 2022-03-16 DIAGNOSIS — Z79.4 TYPE 2 DIABETES MELLITUS WITH STAGE 3A CHRONIC KIDNEY DISEASE, WITH LONG-TERM CURRENT USE OF INSULIN (HCC): ICD-10-CM

## 2022-03-16 DIAGNOSIS — E11.9 TYPE 2 DIABETES MELLITUS WITHOUT COMPLICATION, WITH LONG-TERM CURRENT USE OF INSULIN (HCC): ICD-10-CM

## 2022-03-16 DIAGNOSIS — J44.9 CHRONIC OBSTRUCTIVE PULMONARY DISEASE, UNSPECIFIED COPD TYPE (HCC): ICD-10-CM

## 2022-03-16 DIAGNOSIS — C34.91 SQUAMOUS CELL LUNG CANCER, RIGHT (HCC): Chronic | ICD-10-CM

## 2022-03-16 DIAGNOSIS — C61 PROSTATE CANCER (HCC): ICD-10-CM

## 2022-03-16 DIAGNOSIS — Z79.4 TYPE 2 DIABETES MELLITUS WITHOUT COMPLICATION, WITH LONG-TERM CURRENT USE OF INSULIN (HCC): ICD-10-CM

## 2022-03-16 DIAGNOSIS — N28.9 KIDNEY DISEASE: ICD-10-CM

## 2022-03-16 DIAGNOSIS — E11.22 TYPE 2 DIABETES MELLITUS WITH STAGE 3A CHRONIC KIDNEY DISEASE, WITH LONG-TERM CURRENT USE OF INSULIN (HCC): ICD-10-CM

## 2022-03-16 DIAGNOSIS — C61 MALIGNANT NEOPLASM OF PROSTATE (HCC): ICD-10-CM

## 2022-03-16 DIAGNOSIS — E78.5 HYPERLIPIDEMIA, UNSPECIFIED HYPERLIPIDEMIA TYPE: ICD-10-CM

## 2022-03-16 DIAGNOSIS — I10 PRIMARY HYPERTENSION: Chronic | ICD-10-CM

## 2022-03-16 DIAGNOSIS — I10 PRIMARY HYPERTENSION: ICD-10-CM

## 2022-03-16 LAB
ALBUMIN SERPL BCP-MCNC: 3.8 G/DL (ref 3.5–5)
ALP SERPL-CCNC: 63 U/L (ref 46–116)
ALT SERPL W P-5'-P-CCNC: 20 U/L (ref 12–78)
ANION GAP SERPL CALCULATED.3IONS-SCNC: 9 MMOL/L (ref 4–13)
AST SERPL W P-5'-P-CCNC: 21 U/L (ref 5–45)
BASOPHILS # BLD AUTO: 0.11 THOUSANDS/ΜL (ref 0–0.1)
BASOPHILS NFR BLD AUTO: 1 % (ref 0–1)
BILIRUB SERPL-MCNC: 0.33 MG/DL (ref 0.2–1)
BUN SERPL-MCNC: 24 MG/DL (ref 5–25)
CALCIUM SERPL-MCNC: 9.5 MG/DL (ref 8.3–10.1)
CHLORIDE SERPL-SCNC: 104 MMOL/L (ref 100–108)
CHOLEST SERPL-MCNC: 130 MG/DL
CO2 SERPL-SCNC: 24 MMOL/L (ref 21–32)
CREAT SERPL-MCNC: 1.45 MG/DL (ref 0.6–1.3)
EOSINOPHIL # BLD AUTO: 0.68 THOUSAND/ΜL (ref 0–0.61)
EOSINOPHIL NFR BLD AUTO: 8 % (ref 0–6)
ERYTHROCYTE [DISTWIDTH] IN BLOOD BY AUTOMATED COUNT: 15.9 % (ref 11.6–15.1)
EST. AVERAGE GLUCOSE BLD GHB EST-MCNC: 169 MG/DL
GFR SERPL CREATININE-BSD FRML MDRD: 48 ML/MIN/1.73SQ M
GLUCOSE P FAST SERPL-MCNC: 171 MG/DL (ref 65–99)
HBA1C MFR BLD: 7.5 %
HCT VFR BLD AUTO: 43.8 % (ref 36.5–49.3)
HDLC SERPL-MCNC: 29 MG/DL
HGB BLD-MCNC: 14.1 G/DL (ref 12–17)
IMM GRANULOCYTES # BLD AUTO: 0.03 THOUSAND/UL (ref 0–0.2)
IMM GRANULOCYTES NFR BLD AUTO: 0 % (ref 0–2)
LDLC SERPL CALC-MCNC: 74 MG/DL (ref 0–100)
LYMPHOCYTES # BLD AUTO: 0.79 THOUSANDS/ΜL (ref 0.6–4.47)
LYMPHOCYTES NFR BLD AUTO: 9 % (ref 14–44)
MCH RBC QN AUTO: 28.5 PG (ref 26.8–34.3)
MCHC RBC AUTO-ENTMCNC: 32.2 G/DL (ref 31.4–37.4)
MCV RBC AUTO: 89 FL (ref 82–98)
MONOCYTES # BLD AUTO: 0.75 THOUSAND/ΜL (ref 0.17–1.22)
MONOCYTES NFR BLD AUTO: 9 % (ref 4–12)
NEUTROPHILS # BLD AUTO: 6.47 THOUSANDS/ΜL (ref 1.85–7.62)
NEUTS SEG NFR BLD AUTO: 73 % (ref 43–75)
NONHDLC SERPL-MCNC: 101 MG/DL
NRBC BLD AUTO-RTO: 0 /100 WBCS
PLATELET # BLD AUTO: 225 THOUSANDS/UL (ref 149–390)
PMV BLD AUTO: 10.2 FL (ref 8.9–12.7)
POTASSIUM SERPL-SCNC: 4.1 MMOL/L (ref 3.5–5.3)
PROT SERPL-MCNC: 7.8 G/DL (ref 6.4–8.2)
PSA SERPL-MCNC: 5.2 NG/ML (ref 0–4)
RBC # BLD AUTO: 4.95 MILLION/UL (ref 3.88–5.62)
SODIUM SERPL-SCNC: 137 MMOL/L (ref 136–145)
TRIGL SERPL-MCNC: 135 MG/DL
TSH SERPL DL<=0.05 MIU/L-ACNC: 1.97 UIU/ML (ref 0.36–3.74)
WBC # BLD AUTO: 8.83 THOUSAND/UL (ref 4.31–10.16)

## 2022-03-16 PROCEDURE — 3051F HG A1C>EQUAL 7.0%<8.0%: CPT | Performed by: FAMILY MEDICINE

## 2022-03-16 PROCEDURE — 80061 LIPID PANEL: CPT

## 2022-03-16 PROCEDURE — 83036 HEMOGLOBIN GLYCOSYLATED A1C: CPT

## 2022-03-16 PROCEDURE — 84153 ASSAY OF PSA TOTAL: CPT

## 2022-03-16 PROCEDURE — 3066F NEPHROPATHY DOC TX: CPT | Performed by: FAMILY MEDICINE

## 2022-03-16 PROCEDURE — 84443 ASSAY THYROID STIM HORMONE: CPT

## 2022-03-16 PROCEDURE — 85025 COMPLETE CBC W/AUTO DIFF WBC: CPT

## 2022-03-16 PROCEDURE — 36415 COLL VENOUS BLD VENIPUNCTURE: CPT

## 2022-03-16 PROCEDURE — 80053 COMPREHEN METABOLIC PANEL: CPT

## 2022-03-21 ENCOUNTER — OFFICE VISIT (OUTPATIENT)
Dept: FAMILY MEDICINE CLINIC | Facility: CLINIC | Age: 72
End: 2022-03-21
Payer: COMMERCIAL

## 2022-03-21 ENCOUNTER — APPOINTMENT (OUTPATIENT)
Dept: LAB | Facility: CLINIC | Age: 72
End: 2022-03-21
Payer: COMMERCIAL

## 2022-03-21 VITALS
DIASTOLIC BLOOD PRESSURE: 74 MMHG | TEMPERATURE: 96.8 F | SYSTOLIC BLOOD PRESSURE: 128 MMHG | WEIGHT: 187.4 LBS | OXYGEN SATURATION: 96 % | HEIGHT: 70 IN | BODY MASS INDEX: 26.83 KG/M2 | RESPIRATION RATE: 18 BRPM | HEART RATE: 82 BPM

## 2022-03-21 DIAGNOSIS — Z79.4 TYPE 2 DIABETES MELLITUS WITH STAGE 3A CHRONIC KIDNEY DISEASE, WITH LONG-TERM CURRENT USE OF INSULIN (HCC): ICD-10-CM

## 2022-03-21 DIAGNOSIS — I71.4 ABDOMINAL AORTIC ANEURYSM (AAA) WITHOUT RUPTURE (HCC): ICD-10-CM

## 2022-03-21 DIAGNOSIS — D61.818 OTHER PANCYTOPENIA (HCC): ICD-10-CM

## 2022-03-21 DIAGNOSIS — Z89.422 ACQUIRED ABSENCE OF OTHER LEFT TOE(S) (HCC): ICD-10-CM

## 2022-03-21 DIAGNOSIS — E11.22 TYPE 2 DIABETES MELLITUS WITH STAGE 3A CHRONIC KIDNEY DISEASE, WITH LONG-TERM CURRENT USE OF INSULIN (HCC): ICD-10-CM

## 2022-03-21 DIAGNOSIS — M05.70 RHEUMATOID ARTHRITIS WITH RHEUMATOID FACTOR OF UNSPECIFIED SITE WITHOUT ORGAN OR SYSTEMS INVOLVEMENT (HCC): ICD-10-CM

## 2022-03-21 DIAGNOSIS — J44.9 CHRONIC OBSTRUCTIVE PULMONARY DISEASE, UNSPECIFIED COPD TYPE (HCC): ICD-10-CM

## 2022-03-21 DIAGNOSIS — M86.272 SUBACUTE OSTEOMYELITIS OF LEFT FOOT (HCC): ICD-10-CM

## 2022-03-21 DIAGNOSIS — Z12.11 SCREENING FOR COLON CANCER: Primary | ICD-10-CM

## 2022-03-21 DIAGNOSIS — N18.31 TYPE 2 DIABETES MELLITUS WITH STAGE 3A CHRONIC KIDNEY DISEASE, WITH LONG-TERM CURRENT USE OF INSULIN (HCC): ICD-10-CM

## 2022-03-21 DIAGNOSIS — E78.5 HYPERLIPIDEMIA, UNSPECIFIED HYPERLIPIDEMIA TYPE: Chronic | ICD-10-CM

## 2022-03-21 DIAGNOSIS — C34.91 SQUAMOUS CELL LUNG CANCER, RIGHT (HCC): ICD-10-CM

## 2022-03-21 DIAGNOSIS — E05.90 HYPERTHYROIDISM: ICD-10-CM

## 2022-03-21 LAB
T4 FREE SERPL-MCNC: 0.91 NG/DL (ref 0.76–1.46)
TSH 30M P TRH SERPL-ACNC: 2.61 UIU/ML
TSH SERPL DL<=0.05 MIU/L-ACNC: 2.76 UIU/ML

## 2022-03-21 PROCEDURE — 84443 ASSAY THYROID STIM HORMONE: CPT

## 2022-03-21 PROCEDURE — 99214 OFFICE O/P EST MOD 30 MIN: CPT | Performed by: FAMILY MEDICINE

## 2022-03-21 PROCEDURE — 3074F SYST BP LT 130 MM HG: CPT | Performed by: FAMILY MEDICINE

## 2022-03-21 PROCEDURE — 3078F DIAST BP <80 MM HG: CPT | Performed by: FAMILY MEDICINE

## 2022-03-21 PROCEDURE — 84439 ASSAY OF FREE THYROXINE: CPT

## 2022-03-21 PROCEDURE — 4004F PT TOBACCO SCREEN RCVD TLK: CPT | Performed by: FAMILY MEDICINE

## 2022-03-21 PROCEDURE — 1003F LEVEL OF ACTIVITY ASSESS: CPT | Performed by: FAMILY MEDICINE

## 2022-03-21 PROCEDURE — 3008F BODY MASS INDEX DOCD: CPT | Performed by: FAMILY MEDICINE

## 2022-03-21 PROCEDURE — 3725F SCREEN DEPRESSION PERFORMED: CPT | Performed by: FAMILY MEDICINE

## 2022-03-21 PROCEDURE — 1160F RVW MEDS BY RX/DR IN RCRD: CPT | Performed by: FAMILY MEDICINE

## 2022-03-21 PROCEDURE — 36415 COLL VENOUS BLD VENIPUNCTURE: CPT

## 2022-03-21 NOTE — PATIENT INSTRUCTIONS
10% - bad control"> 10% - bad control,Hemoglobin A1c (HbA1c) greater than 10% indicating poor diabetic control,Haemoglobin A1c greater than 10% indicating poor diabetic control">   Diabetes mellitus tipo 2 en adultos, cuidados ambulatorios   INFORMACIÓN GENERAL:   La diabetes mellitus tipo 2 en adultos  es nathalie enfermedad que afecta la forma en que el cuerpo utiliza la glucosa (azúcar)  La insulina ayuda a extraer el azúcar de la shamir para que pueda usarse en la producción de energía  Generalmente, cuando el nivel de azúcar K[place]ie, el páncreas produce más insulina  La diabetes tipo 2 se desarrolla ya sea porque el cuerpo no puede producir suficiente insulina, o no la puede usar correctamente  Después de [place], bee páncreas podría dejar de producir insulina  Síntomas comunes incluyen los siguientes:   · Más hambre o sed de la usual    · Necesidad frecuente de orinar     · Pérdida de peso sin tratar     · Visión borrosa  Busque cuidados inmediatos para los siguientes síntomas:   · Dolor abdominal severo o dolor que se propaga hacia bee espalda  Es probable que usted también vomite  · Dificultad para permanecer despierto o concentrado    · Temblores o sudoración    · Visión borrosa o doble    · Aliento con olor a frutas o camila    · Respiración profunda y dificultosa o rápida y superficial    · Ritmo cardíaco rápido y débil  El tratamiento para la diabetes mellitus tipo 2  incluye mantener bee nivel de azúcar en el shamir a un rango normal  Usted debe comer los alimentos correctos y ejercitarse con regularidad  Además es probable que necesite medicamentos si no puede controlar bee nivel de azúcar en la shamir con nutrición y ejercicio  Maneje la diabetes mellitus tipo 2:   · Revise bee nivel de azúcar en la shamir  A usted le enseñarán cómo revisar nathalie pequeña gota de [place] aguiar en un medidor de glucosa  Pregúntele a bee proveedor de madonna cuándo y con cuánta frecuencia es necesario revisar halima el día  También pregúntele a hernandez proveedor de madonna cuáles deberían ser omer niveles de azúcar en la shamir cuando usted se los [place]  · Mantenga un registro de los carbohidratos (azúcares y almidones)  Hernandez nivel de azúcar en la shamir puede elevarse demasiado si usted come demasiados carbohidratos  Hernandez dietista le ayudará a planear comidas y meriendas que tengan la cantidad correcta de carbohidratos  · Consuma alimentos bajos en grasas  Farmer Ripper son el masoud sin piel y la leche descremada  · Consuma menos sodio (sal)  Algunos ejemplos de alimentos altos en sodio que hay que limitar son la salsa de soya, las jean-pierre tostadas y la sopa  No le agregue sal a la comida que usted cocina  Limite hernandez uso de sal de palencia  · Coma alimentos altos en fibra  Alimentos que son buena vera de fibra incluyen los vegetales, el pan integral y los frijoles  · Limite el alcohol  El alcohol afecta hernandez nivel de azúcar en la shamir y puede dificultar el [place] de hernandez diabetes  Las mujeres deben limitar el consumo de alcohol a 1 bebida al día  Los hombres deben limitarlo a 2 debidas al día  Nathalie bebida de alcohol equivale a 12 onzas de cerveza, 5 onzas de vino o 1½ onzas de licor  · Ejercítese regularmente  El ejercicio puede ayudar a mantener estable hernandez nivel de azúcar en la shamir, al mismo tiempo que disminuye hernandez riesgo de enfermedad cardíaca y le ayuda a perder peso  Ejercítese por al menos 30 minutos, 5 esme a la semana  Curt Motts de fortalecimiento muscular 2 días a la semana  Colabore con hernandez proveedor de madonna para crear un plan de ejercicios  · Revise omer pies a diario  para bernice si tienen heridas o llagas abiertas  Pregúntele a hernandez proveedor de [employer] que usted puede hacer si tiene nathalie llaga abierta  · Deje de fumar  Si usted fuma, nunca es tarde para dejar de hacerlo  El fumar puede [employer] problemas que puedan ocurrir con la diabetes   Pregúntele a hernandez proveedor de [employer] sobre información para aprender a dejar de fumar si usted tiene dificultad para hacerlo  · Pregunte sobre carney peso:  Pregúntele a omer proveedores de madonna si usted necesita perder peso y cuánto debe perder  Pídales que le ayuden con un programa de perdida de [place]  Incluso perder unas 10 a 15 libras puede ayudarle a manejar carney nivel de azúcar en la shamir  · Tenga a mano carney identificación de [employer]  Use un brazalete de alerta médica o lleve consigo nathalie tarjeta que diga que usted tiene diabetes  Pregúntele a carney proveedor de madonna dónde conseguir estos artículos  · Pregunte sobre vacunas  La diabetes lo pone a usted en riesgo de enfermedad seria si usted se resfría, tiene neumonía o hepatitis  Pregúntele a carney proveedor de madonna si usted debe ponerse las vacunas contra la gripe, neumonía o hepatitis B, y cuándo ponérselas  Programe nathalie thanh con carney proveedor de [employer] se le haya indicado: Anote omer preguntas para que se acuerde de hacerlas halima omer visitas  ACUERDOS SOBRE CARNEY CUIDADO:   Usted tiene el derecho de participar en la planificación de carney cuidado  Aprenda todo lo que pueda sobre carney condición y radha darle tratamiento  Discuta con omer médicos omer opciones de tratamiento para juntos decidir el cuidado que usted quiere recibir  Usted siempre tiene el derecho a rechazar carney tratamiento  Esta información es sólo para uso en educación  Carney intención no es darle un consejo médico sobre enfermedades o tratamientos  Colsulte con carney Daria Poncho farmacéutico antes de seguir cualquier régimen médico para saber si es seguro y efectivo para usted  © [id number] is for End User's use only and may not be sold, redistributed or otherwise used for commercial purposes  All illustrations and images included in CareNotes® are the copyrighted property of A D A M [employer]  or Ziggy Smith

## 2022-03-21 NOTE — ASSESSMENT & PLAN NOTE
Lab Results   Component Value Date    HGBA1C 7 5 (H) 03/16/2022   Stable, negative reported episodes of hypoglycemia, currently being followed by endocrinology, continue current regimen  Continued schedule follow-up Podiatry

## 2022-03-21 NOTE — PROGRESS NOTES
BMI Counseling: Body mass index is 26 89 kg/m²  The BMI is above normal  Nutrition recommendations include decreasing portion sizes, decreasing fast food intake, limiting drinks that contain sugar, moderation in carbohydrate intake, increasing intake of lean protein, reducing intake of saturated and trans fat and reducing intake of cholesterol  Exercise recommendations include moderate physical activity 150 minutes/week and exercising 3-5 times per week  No pharmacotherapy was ordered  Rationale for BMI follow-up plan is due to patient being overweight or obese  Depression Screening and Follow-up Plan: Patient was screened for depression during today's encounter  They screened negative with a PHQ-2 score of 0  Tobacco Cessation Counseling: Tobacco cessation counseling was provided  The patient is sincerely urged to quit consumption of tobacco  He is not ready to quit tobacco      Assessment/Plan:     Chronic Problems:  Type 2 diabetes mellitus with stage 3a chronic kidney disease, with long-term current use of insulin ([facility])    Lab Results   Component Value Date    HGBA1C 7 5 (H) 03/16/2022   Stable, negative reported episodes of hypoglycemia, currently being followed by endocrinology, continue current regimen  Continued schedule follow-up Podiatry    Subacute osteomyelitis of left foot ([address] )  Stable, continue follow podiatry    Hyperlipidemia  Stable tolerating statin to be continued    Right Foot/Ankle   Right Foot Inspection  Skin Exam: dry skin, callus and callus  Toe Exam: ROM and strength within normal limits  Sensory   Monofilament testing: diminished    Vascular  The right DP pulse is 1+  The right PT pulse is 1+  Left Foot/Ankle  Left Foot Inspection  Skin Exam: dry skin and callus  Toe Exam: ROM and strength within normal limits  Sensory   Monofilament testing: diminished    Vascular  The left DP pulse is 1+  The left PT pulse is 1+       Assign Risk Category  Deformity present  Loss of protective sensation  No weak pulses  Risk: 3  Visit Diagnosis:  Diagnoses and all orders for this visit:    Screening for colon cancer  -     Ambulatory referral to Gastroenterology; Future    Other pancytopenia [facility])  Comments:  Continued follow-up with [facility] Hematology-Oncology    Acquired absence of other left toe(s) [facility])  Comments:  Stressed importance of diabetic control, presently A1c 7 5, continued follow-up with Podiatry Endocrinology  Orders:  -     Comprehensive metabolic panel; Future  -     TSH, 3rd generation; Future  -     CBC and differential; Future  -     Lipid panel; Future  -     PSA, Total Screen; Future    Rheumatoid arthritis with rheumatoid factor of unspecified site without organ or systems involvement [facility])  Comments:  [address]    Subacute osteomyelitis of left foot ([facility] )  -     Comprehensive metabolic panel; Future  -     TSH, 3rd generation; Future  -     CBC and differential; Future  -     Lipid panel; Future  -     PSA, Total Screen; Future    Chronic obstructive pulmonary disease, unspecified COPD type ([facility] )  Comments:  no longer , continues with inhaler , without need for rescue in months anuro daily   Orders:  -     Comprehensive metabolic panel; Future  -     TSH, 3rd generation; Future  -     CBC and differential; Future  -     Lipid panel; Future  -     PSA, Total Screen; Future    Abdominal aortic aneurysm (AAA) without rupture (H[facility])  Comments:  Stable, per patient not evident on CT scans, stressed continue blood pressure control request past ultrasound CT results continued follow-up with Cardiology    Type 2 diabetes mellitus with stage 3a chronic kidney disease, with long-term current use of insulin (H[facility])  -     Comprehensive metabolic panel; Future  -     TSH, 3rd generation; Future  -     CBC and differential; Future  -     Lipid panel; Future  -     PSA, Total Screen;  Future  -     Hemoglobin A1C; Future  -     Hemoglobin A1C; Future  -     Comprehensive metabolic panel; Future    Squamous cell lung cancer, right ([address] )  Comments:  Stable, presently under care Hematology-Oncology [facility] ([place], [place])  Orders:  -     Comprehensive metabolic panel; Future  -     TSH, 3rd generation; Future  -     CBC and differential; Future  -     Lipid panel; Future  -     PSA, Total Screen; Future    Hyperlipidemia, unspecified hyperlipidemia type          Subjective:    Patient ID: Karolina Ayala is a 81 y o  male  F/u   Prostate , continues to be monitored by urology md canales   Urination   2019, Squamous cell carcinoma right lung , lvh , md sánchez , last seen 3/7/2022  Rheumatoid arthritis , in the past seen by md blas , presently not under ccare secondary to cancer therapy   diab , thyroid followed by endocrine , md ferguson   Metformin 1000 mg b i d , Tresiba 70 units q h s  Januvia 100 mg q day  Diabetic neuropathy continues follow-up with Podiatry, continues gabapentin 400 mg b i d  Denies any reported episodes hypoglycemia, monitoring blood sugars  Left great toe amputation , podiatry , barry , seen q3 mnths   htn/ Cad /cholesterol, regular visit with cardiology , md oconnell ,   Negative chest pain palpitations shortness of breath difficulty breathing cough lesions rash  Colon , had to postphone, will need to reschedule md ferreira , denies any significant changes in bowel , appetite normal negative changes noted denies any dysphagia recurrent GERD symptoms            The following portions of the patient's history were reviewed and updated as appropriate: allergies, current medications, past family history, past medical history, past social history, past surgical history and problem list     Review of Systems   Constitutional: Negative for appetite change, chills, fever and unexpected weight change     HENT: Negative for congestion, dental problem, ear pain, hearing loss, postnasal drip, rhinorrhea, sinus pressure, sinus pain, sneezing, sore throat, tinnitus and voice change  Eyes: Negative for visual disturbance  Respiratory: Negative for apnea, cough, chest tightness and shortness of breath  Cardiovascular: Negative for chest pain, palpitations and leg swelling  Gastrointestinal: Negative for abdominal pain, blood in stool, constipation, diarrhea, nausea and vomiting  Endocrine: Negative for cold intolerance, heat intolerance, polydipsia, polyphagia and polyuria  Genitourinary: Negative for decreased urine volume, difficulty urinating, dysuria, frequency and hematuria  Musculoskeletal: Negative for arthralgias, back pain, gait problem, joint swelling and myalgias  Skin: Negative for color change, rash and wound  Allergic/Immunologic: Negative for environmental allergies and food allergies  Neurological: Negative for dizziness, syncope, weakness, light-headedness, numbness and headaches  Hematological: Negative for adenopathy  Does not bruise/bleed easily  Psychiatric/Behavioral: Negative for sleep disturbance and suicidal ideas  The patient is not nervous/anxious            /74   Pulse 82   Temp (!) 96 8 °F (36 °C)   Resp 18   Ht 5' 10" (1 778 m)   Wt 85 kg (187 lb 6 4 oz)   SpO2 96%   BMI 26 89 kg/m²   Social History     Socioeconomic History    Marital status: /Civil Union     Spouse name: Not on file    Number of children: Not on file    Years of education: Not on file    Highest education level: Not on file   Occupational History    Not on file   Tobacco Use    Smoking status: Current Every Day Smoker     Packs/day: 0 25     Years: 50 00     Pack years: 12 50     Types: Cigarettes     Last attempt to quit: 10/20/2018     Years since quitting: 3 4    Smokeless tobacco: Never Used   Substance and Sexual Activity    Alcohol use: No    Drug use: No    Sexual activity: Not on file   Other Topics Concern    Not on file   Social History Narrative    Not on file     Social Determinants of Health     Financial Resource Strain: Not on file   Food Insecurity: Not on file   Transportation Needs: Not on file   Physical Activity: Not on file   Stress: Not on file   Social Connections: Not on file   Intimate Partner Violence: Not on file   Housing Stability: Not on file     Past Medical History:   Diagnosis Date    COPD [facility]chronic obstructive pulmonary disease) ([facility] )     Diabetes mellitus ([facility] )     Hypertension     Iron deficiency anemia     Lung [place] ([place] [address] )     Lung cancer ([place] [address] )     Lyme disease     Rheumatoid arthritis ([place] [address] )     Rheumatoid arthritis ([place] [address] )      No family history on file    Past Surgical History:   Procedure Laterality Date    BALLOON ANGIOPLASTY, ARTERY      CLAVICLE SURGERY      TOE AMPUTATION Left 11/20/2018    Procedure: AMPUTATION GREAT TOE;  Surgeon: Liz Richard DPM;  Location: [facility];  Service: Podiatry       Current Outpatient Medications:     albuterol (PROVENTIL HFA,VENTOLIN HFA) 90 mcg/act inhaler, Inhale 2 puffs every 4 (four) hours as needed, Disp: , Rfl:     amLODIPine (NORVASC) 5 mg tablet, Take 1 tablet (5 mg total) by mouth daily, Disp: 30 tablet, Rfl: 2    ascorbic acid (VITAMIN C) 250 mg tablet, Take 250 mg by mouth 2 (two) times a day, Disp: , Rfl:     aspirin (ECOTRIN LOW STRENGTH) 81 mg EC tablet, 1 tab(s), Disp: , Rfl:     atenolol (TENORMIN) 25 mg tablet, Take 1 tablet (25 mg total) by mouth daily, Disp: 30 tablet, Rfl: 3    B-D ULTRAFINE III SHORT PEN 31G X 8 MM MISC, , Disp: , Rfl:     clotrimazole-betamethasone (LOTRISONE) 1-0 05 % lotion, Apply topically 2 (two) times a day, Disp: 30 mL, Rfl: 3    fenofibrate (TRICOR) 145 mg tablet, Take 1 tablet (145 mg total) by mouth daily, Disp: 30 tablet, Rfl: 2    ferrous sulfate 325 (65 Fe) mg tablet, Take 325 mg by mouth 2 (two) times a day with meals, Disp: , Rfl:     gabapentin (NEURONTIN) 400 mg capsule, Take 1 capsule (400 mg total) by mouth 2 (two) times a day, Disp: 30 capsule, Rfl: 2    glucose blood test strip, Check bs qam fasting , and after a meal, Disp: 100 each, Rfl: 3    insulin degludec Steele  FlexTouch) 100 units/mL injection pen, Inject 70 Units under the skin daily at bedtime, Disp: 15 mL, Rfl: 3    Lancets (accu-chek soft touch) lancets, bs check qid, Disp: 100 each, Rfl: 2    metFORMIN (GLUCOPHAGE) 1000 MG tablet, Take 1 tablet (1,000 mg total) by mouth 2 (two) times a day with meals for 5000 doses, Disp: 60 tablet, Rfl: 2    methimazole (TAPAZOLE) 5 mg tablet, , Disp: , Rfl:     omeprazole (PriLOSEC) 40 MG capsule, Take 1 capsule (40 mg total) by mouth 2 (two) times a day, Disp: 30 capsule, Rfl: 2    rosuvastatin (CRESTOR) 40 MG tablet, TAKE 1 TABLET BY MOUTH EVERY DAY IN THE EVENING, Disp: 90 tablet, Rfl: 1    sitaGLIPtin (JANUVIA) 100 mg tablet, Take 1 tablet (100 mg total) by mouth daily, Disp: 30 tablet, Rfl: 3    umeclidinium-vilanterol (Anoro Ellipta) 62 5-25 MCG/INH inhaler, Inhale 1 puff daily, Disp: 60 blister, Rfl: 3    Allergies   Allergen Reactions    Ace Inhibitors Swelling    Angiotensin Receptor Blockers Other (See Comments)     Elevated K+    Clindamycin Diarrhea and Nausea Only    Plaquenil [Hydroxychloroquine] Swelling     Tongue swelling          Lab Review   Appointment on 03/16/2022   Component Date Value    Sodium 03/16/2022 137     Potassium 03/16/2022 4 1     Chloride 03/16/2022 104     CO2 03/16/2022 24     ANION GAP 03/16/2022 9     BUN 03/16/2022 24     Creatinine 03/16/2022 1 45*    Glucose, Fasting 03/16/2022 171*    Calcium 03/16/2022 9 5     AST 03/16/2022 21     ALT 03/16/2022 20     Alkaline Phosphatase 03/16/2022 63     Total Protein 03/16/2022 7 8     Albumin 03/16/2022 3 8     Total Bilirubin 03/16/2022 0 33     eGFR 03/16/2022 48     Cholesterol 03/16/2022 130     Triglycerides 03/16/2022 135     HDL, Direct 03/16/2022 29*    LDL Calculated 03/16/2022 74     Non-HDL-Chol (CHOL-HDL) 03/16/2022 101     Hemoglobin A1C 03/16/2022 7 5*    EAG 03/16/2022 169     WBC 03/16/2022 8 83     RBC 03/16/2022 4 95     Hemoglobin 03/16/2022 14 1     Hematocrit 03/16/2022 43 8     MCV 03/16/2022 89     MCH 03/16/2022 28 5     MCHC 03/16/2022 32 2     RDW 03/16/2022 15 9*    MPV 03/16/2022 10 2     Platelets 64/77/8315 225     nRBC 03/16/2022 0     Neutrophils Relative 03/16/2022 73     Immat GRANS % 03/16/2022 0     Lymphocytes Relative 03/16/2022 9*    Monocytes Relative 03/16/2022 9     Eosinophils Relative 03/16/2022 8*    Basophils Relative 03/16/2022 1     Neutrophils Absolute 03/16/2022 6 47     Immature Grans Absolute 03/16/2022 0 03     Lymphocytes Absolute 03/16/2022 0 79     Monocytes Absolute 03/16/2022 0 75     Eosinophils Absolute 03/16/2022 0 68*    Basophils Absolute 03/16/2022 0 11*    TSH 3RD GENERATON 03/16/2022 1 970     PSA, Diagnostic 03/16/2022 5 2*   Appointment on 01/14/2022   Component Date Value    TSH 3RD GENERATON 01/14/2022 1 720     Free T4 01/14/2022 0 81     TSI 01/14/2022 32 10*    WBC 01/14/2022 8 74     RBC 01/14/2022 4 75     Hemoglobin 01/14/2022 13 6     Hematocrit 01/14/2022 42 5     MCV 01/14/2022 90     MCH 01/14/2022 28 6     MCHC 01/14/2022 32 0     RDW 01/14/2022 14 4     MPV 01/14/2022 9 9     Platelets 34/75/0666 202     nRBC 01/14/2022 0     Neutrophils Relative 01/14/2022 76*    Immat GRANS % 01/14/2022 1     Lymphocytes Relative 01/14/2022 10*    Monocytes Relative 01/14/2022 8     Eosinophils Relative 01/14/2022 4     Basophils Relative 01/14/2022 1     Neutrophils Absolute 01/14/2022 6 70     Immature Grans Absolute 01/14/2022 0 04     Lymphocytes Absolute 01/14/2022 0 84     Monocytes Absolute 01/14/2022 0 71     Eosinophils Absolute 01/14/2022 0 37     Basophils Absolute 01/14/2022 0 08     AST 01/14/2022 11     ALT 01/14/2022 18     Thyrotropin Receptor Ab 01/14/2022 27 70*   Appointment on 12/20/2021   Component Date Value    BUN 12/20/2021 25     Creatinine 12/20/2021 1 49*    eGFR 12/20/2021 47    Office Visit on 12/20/2021   Component Date Value    Hemoglobin A1C 12/20/2021 8 8*   Appointment on 12/13/2021   Component Date Value    Creatinine, Ur 12/13/2021 55 2     Microalbum  ,U,Random 12/13/2021 10 7     Microalb Creat Ratio 12/13/2021 19     Sodium 12/13/2021 138     Potassium 12/13/2021 4 4     Chloride 12/13/2021 105     CO2 12/13/2021 28     ANION GAP 12/13/2021 5     BUN 12/13/2021 22     Creatinine 12/13/2021 1 49*    Glucose, Fasting 12/13/2021 160*    Calcium 12/13/2021 9 3     AST 12/13/2021 14     ALT 12/13/2021 18     Alkaline Phosphatase 12/13/2021 64     Total Protein 12/13/2021 7 8     Albumin 12/13/2021 3 5     Total Bilirubin 12/13/2021 0 52     eGFR 12/13/2021 47     TSH 3RD GENERATON 12/13/2021 0 048*    WBC 12/13/2021 8 73     RBC 12/13/2021 4 95     Hemoglobin 12/13/2021 14 2     Hematocrit 12/13/2021 44 9     MCV 12/13/2021 91     MCH 12/13/2021 28 7     MCHC 12/13/2021 31 6     RDW 12/13/2021 14 0     MPV 12/13/2021 10 4     Platelets 80/84/6121 213     nRBC 12/13/2021 0     Neutrophils Relative 12/13/2021 74     Immat GRANS % 12/13/2021 1     Lymphocytes Relative 12/13/2021 11*    Monocytes Relative 12/13/2021 9     Eosinophils Relative 12/13/2021 4     Basophils Relative 12/13/2021 1     Neutrophils Absolute 12/13/2021 6 57     Immature Grans Absolute 12/13/2021 0 04     Lymphocytes Absolute 12/13/2021 0 95     Monocytes Absolute 12/13/2021 0 74     Eosinophils Absolute 12/13/2021 0 31     Basophils Absolute 12/13/2021 0 12*    Cholesterol 12/13/2021 137     Triglycerides 12/13/2021 132     HDL, Direct 12/13/2021 29*    LDL Calculated 12/13/2021 82     Non-HDL-Chol (CHOL-HDL) 12/13/2021 108     Hemoglobin A1C 12/13/2021 8 2*    EAG 12/13/2021 189     Free T4 12/13/2021 0 88    Appointment on 11/04/2021   Component Date Value    TSH 3RD Martha Nuno 11/04/2021 <0 007*   Admission on 10/18/2021, Discharged on 10/19/2021   Component Date Value    POC Glucose 10/19/2021 119    Admission on 10/18/2021, Discharged on 10/18/2021   Component Date Value    WBC 10/18/2021 8 52     RBC 10/18/2021 4 55     Hemoglobin 10/18/2021 13 4     Hematocrit 10/18/2021 40 4     MCV 10/18/2021 89     MCH 10/18/2021 29 5     MCHC 10/18/2021 33 2     RDW 10/18/2021 14 1     MPV 10/18/2021 9 8     Platelets 44/23/5329 157     nRBC 10/18/2021 0     Neutrophils Relative 10/18/2021 76*    Immat GRANS % 10/18/2021 0     Lymphocytes Relative 10/18/2021 10*    Monocytes Relative 10/18/2021 9     Eosinophils Relative 10/18/2021 4     Basophils Relative 10/18/2021 1     Neutrophils Absolute 10/18/2021 6 47     Immature Grans Absolute 10/18/2021 0 02     Lymphocytes Absolute 10/18/2021 0 83     Monocytes Absolute 10/18/2021 0 77     Eosinophils Absolute 10/18/2021 0 35     Basophils Absolute 10/18/2021 0 08     Protime 10/18/2021 15 2*    INR 10/18/2021 1 25*    PTT 10/18/2021 35     ABO Grouping 10/18/2021 B     Rh Factor 10/18/2021 Positive     Antibody Screen 10/18/2021 Negative     Specimen Expiration Date 10/18/2021 [id number]         Imaging: CT chest wo contrast    Result Date: 3/5/2022  Narrative: CHEST INDICATION:   C34 91: Malignant neoplasm of unspecified part of right bronchus or lung  Right lower lobe mass biopsy in September 2018 showed moderately differentiated squamous cell carcinoma with multiple pulmonary nodules and bilateral hilar and mediastinal lymph node metastases  Treated with chemotherapy  COMPARISON:  Chest CT from 11/18/2021  TECHNIQUE: Chest CT without intravenous contrast   Axial, sagittal, coronal 2D reformats and coronal MIPS from source data  Radiation dose length product (DLP):  270 3 mGy-cm   Radiation dose exposure minimized using iterative reconstruction and automated exposure control   FINDINGS: LUNGS:  No change in multiple bilateral well-defined predominantly peripheral nodules measuring up to 1 6 cm  An irregular ill-defined 1 7 x 1 6 cm in the paraspinal right lower lobe is minimally smaller (3/64) and associated with scar  New discoid scar in the superior segment right lower lobe (3/65; 601/127)  Moderate panlobular emphysema  AIRWAYS: No significant filling defects  PLEURA:  Slight increased right paraspinal pleural thickening, likely benign  HEART/GREAT VESSELS:  Normal heart size  Moderate coronary artery calcification indicating atherosclerotic heart disease  Right port in lower SVC  MEDIASTINUM AND VARUN:  Stable diffuse thickening of the esophagus  CHEST WALL AND LOWER NECK: Unremarkable  UPPER ABDOMEN:  Nonobstructing left renal calcification  OSSEOUS STRUCTURES: Old right rib and clavicle fractures  Mild degenerative disease in the spine  Suture anchors in the right humeral head  Impression: Multiple bilateral pulmonary nodules, without significant change since 11/18/2021  Persistent diffuse esophageal wall thickening  Workstation performed: [id number]       Objective:     Physical Exam  Constitutional:       General: He is not in acute distress  Appearance: He is well-developed  He is not ill-appearing  HENT:      Head: Normocephalic and atraumatic  Cardiovascular:      Rate and Rhythm: Normal rate and regular rhythm  Pulses: no weak pulses          Dorsalis pedis pulses are 1+ on the right side and 1+ on the left side  Posterior tibial pulses are 1+ on the right side and 1+ on the left side  Heart sounds: Normal heart sounds  Pulmonary:      Effort: Pulmonary effort is normal       Breath sounds: Normal breath sounds  Musculoskeletal:         General: Normal range of motion  Cervical back: Normal range of motion and neck supple  Right lower leg: No edema  Left lower leg: No edema  Feet:      Right foot:      Skin integrity: Callus and dry skin present  Left foot:      Skin integrity: Callus and dry skin present  Lymphadenopathy:      Cervical: No cervical adenopathy  Skin:     General: Skin is warm and dry  Findings: No lesion or rash  Neurological:      Mental Status: He is alert and oriented to person, place, and time  Deep Tendon Reflexes: Reflexes are normal and symmetric  Psychiatric:         Behavior: Behavior normal          Thought Content: Thought content normal          Judgment: Judgment normal            Patient Instructions     10% - bad control"> 10% - bad control,Hemoglobin A1c (HbA1c) greater than 10% indicating poor diabetic control,Haemoglobin A1c greater than 10% indicating poor diabetic control">   Diabetes mellitus tipo 2 en adultos, cuidados ambulatorios   INFORMACIÓN GENERAL:   La diabetes mellitus tipo 2 en adultos  es nathalie enfermedad que afecta la forma en que el cuerpo utiliza la glucosa (azúcar)  La insulina ayuda a extraer el azúcar de la shamir para que pueda usarse en la producción de energía  Generalmente, cuando el nivel de azúcar Kelsie, el páncreas produce más insulina  La diabetes tipo 2 se desarrolla ya sea porque el cuerpo no puede producir suficiente insulina, o no la puede usar correctamente  Después de [employer], bee páncreas podría dejar de producir insulina  Síntomas comunes incluyen los siguientes:   · Más hambre o sed de la usual    · Necesidad frecuente de orinar     · Pérdida de peso sin tratar     · Visión borrosa  Busque cuidados inmediatos para los siguientes síntomas:   · Dolor abdominal severo o dolor que se propaga hacia bee espalda  Es probable que usted también vomite      · Dificultad para permanecer despierto o concentrado    · Temblores o sudoración    · Visión borrosa o doble    · Aliento con olor a frutas o camila    · Respiración profunda y dificultosa o rápida y superficial    · Ritmo cardíaco rápido y débil  El tratamiento para la diabetes mellitus tipo 2  incluye mantener bee nivel de azúcar en el shamir a un rango normal  Usted debe comer los alimentos correctos y ejercitarse con regularidad  Además es probable que necesite medicamentos si no puede controlar bee nivel de azúcar en la shamir con nutrición y ejercicio  Maneje la diabetes mellitus tipo 2:   · Revise bee nivel de azúcar en la shamir  A usted le enseñarán cómo revisar nathalie pequeña gota de [place] aguiar en un medidor de glucosa  Pregúntele a bee proveedor de madonna cuándo y con cuánta frecuencia es necesario revisar halima el día  También pregúntele a bee proveedor de madonna cuáles deberían ser omer niveles de azúcar en la shamir cuando usted se los [place]  · Mantenga un registro de los carbohidratos (azúcares y almidones)  Bee nivel de azúcar en la shamir puede elevarse demasiado si usted come demasiados carbohidratos  Bee dietista le ayudará a planear comidas y meriendas que tengan la cantidad correcta de carbohidratos  · Consuma alimentos bajos en grasas  Vidhya Messing son el masoud sin piel y la leche descremada  · Consuma menos sodio (sal)  Algunos ejemplos de alimentos altos en sodio que hay que limitar son la salsa de soya, las jean-pierre tostadas y la sopa  No le agregue sal a la comida que usted cocina  Limite bee uso de sal de palencia  · Coma alimentos altos en fibra  Alimentos que son buena vera de fibra incluyen los vegetales, el pan integral y los frijoles  · Limite el alcohol  El alcohol afecta bee nivel de azúcar en la shamir y puede dificultar el [place] de bee diabetes  Las mujeres deben limitar el consumo de alcohol a 1 bebida al día  Los hombres deben limitarlo a 2 debidas al día  Nathalie bebida de alcohol equivale a 12 onzas de cerveza, 5 onzas de vino o 1½ onzas de licor  · Ejercítese regularmente  El ejercicio puede ayudar a mantener estable bee nivel de azúcar en la shamir, al mismo tiempo que disminuye bee riesgo de enfermedad cardíaca y le ayuda a perder peso   Ejercítese por al menos 30 minutos, 5 días a la semana  [place] de fortalecimiento muscular 2 días a la semana  Colabore con bee proveedor de madonna para crear un plan de ejercicios  · Revise omer pies a diario  para bernice si tienen heridas o llagas abiertas  Pregúntele a bee proveedor de [employer] que usted puede hacer si tiene nathalie llaga abierta  · Deje de fumar  Si usted fuma, nunca es tarde para dejar de hacerlo  El fumar puede [employer] problemas que puedan ocurrir con la diabetes  Pregúntele a bee proveedor de [employer] para aprender a dejar de fumar si usted tiene dificultad para hacerlo  · Pregunte sobre bee peso:  Pregúntele a omer proveedores de madonna si usted necesita perder peso y cuánto debe perder  Pídales que le ayuden con un programa de perdida de [place]  Incluso perder unas 10 a 15 libras puede ayudarle a manejar bee nivel de azúcar en la shamir  · Tenga a mano bee identificación de [employer]  Use un brazalete de alerta médica o lleve consigo nathalie tarjeta que diga que usted tiene diabetes  Pregúntele a bee proveedor de madonna dónde conseguir estos artículos  · Pregunte sobre vacunas  La diabetes lo pone a usted en riesgo de enfermedad seria si usted se resfría, tiene neumonía o hepatitis  Pregúntele a bee proveedor de madonna si usted debe ponerse las vacunas contra la gripe, neumonía o hepatitis B, y cuándo ponérselas  Programe nathalie thanh con bee proveedor de [employer] se le haya indicado: Anote omer preguntas para que se acuerde de hacerlas halima omer visitas  ACUERDOS SOBRE BEE CUIDADO:   Usted tiene el derecho de participar en la planificación de bee cuidado  Aprenda todo lo que pueda sobre bee condición y radha darle tratamiento  Discuta con omer médicos omer opciones de tratamiento para juntos decidir el cuidado que usted quiere recibir  Usted siempre tiene el derecho a rechazar bee tratamiento  Esta información es sólo para uso en educación   Bee intención no es darle un consejo [employer] enfermedades o tratamientos  Colsulte con bee [employer] farmacéutico antes de seguir cualquier régimen médico para saber si es seguro y efectivo para usted  © 2014 [address] is for End User's use only and may not be sold, redistributed or otherwise used for commercial purposes  All illustrations and images included in CareNotes® are the copyrighted property of A D A M , Inc  or JEROME Brown    Portions of the record may have been created with voice recognition software  Occasional wrong word or "sound a like" substitutions may have occurred due to the inherent limitations of voice recognition software  Read the chart carefully and recognize, using context, where substitutions have occurred

## 2022-04-01 ENCOUNTER — OFFICE VISIT (OUTPATIENT)
Dept: OBGYN CLINIC | Facility: HOSPITAL | Age: 72
End: 2022-04-01
Payer: COMMERCIAL

## 2022-04-01 VITALS
DIASTOLIC BLOOD PRESSURE: 71 MMHG | HEART RATE: 86 BPM | HEIGHT: 70 IN | SYSTOLIC BLOOD PRESSURE: 116 MMHG | WEIGHT: 189.2 LBS | BODY MASS INDEX: 27.09 KG/M2

## 2022-04-01 DIAGNOSIS — S68.119D AMPUTATION OF FINGER OF RIGHT HAND, SUBSEQUENT ENCOUNTER: Primary | ICD-10-CM

## 2022-04-01 PROCEDURE — 3074F SYST BP LT 130 MM HG: CPT | Performed by: ORTHOPAEDIC SURGERY

## 2022-04-01 PROCEDURE — 3078F DIAST BP <80 MM HG: CPT | Performed by: ORTHOPAEDIC SURGERY

## 2022-04-01 PROCEDURE — 3008F BODY MASS INDEX DOCD: CPT | Performed by: ORTHOPAEDIC SURGERY

## 2022-04-01 PROCEDURE — 1160F RVW MEDS BY RX/DR IN RCRD: CPT | Performed by: ORTHOPAEDIC SURGERY

## 2022-04-01 PROCEDURE — 99213 OFFICE O/P EST LOW 20 MIN: CPT | Performed by: ORTHOPAEDIC SURGERY

## 2022-04-01 PROCEDURE — 4004F PT TOBACCO SCREEN RCVD TLK: CPT | Performed by: ORTHOPAEDIC SURGERY

## 2022-04-01 NOTE — PROGRESS NOTES
ASSESSMENT/PLAN:    Assessment:   Partial amputation to the tip of the right long and ring finger    Plan:   Topical lotion for dry skin as needed  Continue with activities as tolerated    Follow Up:  PRN      _____________________________________________________  CHIEF COMPLAINT:  Chief Complaint   Patient presents with    Right Hand - Follow-up         SUBJECTIVE:  Ree Pittman is a 81 y o  male who presents for follow up regarding partial amputation to the ring and long finger distal phalanx of the right hand  He denies any pain  He does have some periodic tingling in the ring finger tip  He denies any signs of an infection  He denies any acute complaints  PAST MEDICAL HISTORY:  Past Medical History:   Diagnosis Date    [facility]ch[facility]) [facility] )     Diabetes mellitus ([facility] )     Hypertension     Iron deficiency anemia     Lung cancer ([facility] )     Lung cancer ([facility] )     Lyme disease     Rheumatoid arthritis ([place] [address] )     Rheumatoid arthritis ([facility] )        PAST SURGICAL HISTORY:  Past Surgical History:   Procedure Laterality Date    BALLOON ANGIOPLASTY, ARTERY      CLAVICLE SURGERY      TOE AMPUTATION Left 11/20/2018    Procedure: AMPUTATION GREAT TOE;  Surgeon: Michell Barone DPM;  [facility];  Service: Podiatry       FAMILY HISTORY:  History reviewed  No pertinent family history      SOCIAL HISTORY:  Social History     Tobacco Use    Smoking status: Current Every Day Smoker     Packs/day: 0 25     Years: 50 00     Pack years: 12 50     Types: Cigarettes     Last attempt to quit: 10/20/2018     Years since quitting: 3 4    Smokeless tobacco: Never Used   Substance Use Topics    Alcohol use: No    Drug use: No       MEDICATIONS:    Current Outpatient Medications:     albuterol (PROVENTIL HFA,VENTOLIN HFA) 90 mcg/act inhaler, Inhale 2 puffs every 4 (four) hours as needed, Disp: , Rfl:     amLODIPine (NORVASC) 5 mg tablet, Take 1 tablet (5 mg total) by mouth daily, Disp: 30 tablet, Rfl: 2    ascorbic acid (VITAMIN C) 250 mg tablet, Take 250 mg by mouth 2 (two) times a day, Disp: , Rfl:     aspirin (ECOTRIN LOW STRENGTH) 81 mg EC tablet, 1 tab(s), Disp: , Rfl:     atenolol (TENORMIN) 25 mg tablet, Take 1 tablet (25 mg total) by mouth daily, Disp: 30 tablet, Rfl: 3    B-D ULTRAFINE III SHORT PEN 31G X 8 MM MISC, , Disp: , Rfl:     clotrimazole-betamethasone (LOTRISONE) 1-0 05 % lotion, Apply topically 2 (two) times a day, Disp: 30 mL, Rfl: 3    fenofibrate (TRICOR) 145 mg tablet, Take 1 tablet (145 mg total) by mouth daily, Disp: 30 tablet, Rfl: 2    ferrous sulfate 325 (65 Fe) mg tablet, Take 325 mg by mouth 2 (two) times a day with meals, Disp: , Rfl:     gabapentin (NEURONTIN) 400 mg capsule, Take 1 capsule (400 mg total) by mouth 2 (two) times a day, Disp: 30 capsule, Rfl: 2    glucose blood test strip, Check bs qam fasting , and after a meal, Disp: 100 each, Rfl: 3    insulin degludec Graydon Kale FlexTouch) 100 units/mL injection pen, Inject 70 Units under the skin daily at bedtime, Disp: 15 mL, Rfl: 3    Lancets (accu-chek soft touch) lancets, bs check qid, Disp: 100 each, Rfl: 2    metFORMIN (GLUCOPHAGE) 1000 MG tablet, Take 1 tablet (1,000 mg total) by mouth 2 (two) times a day with meals for 5000 doses, Disp: 60 tablet, Rfl: 2    methimazole (TAPAZOLE) 5 mg tablet, , Disp: , Rfl:     omeprazole (PriLOSEC) 40 MG capsule, Take 1 capsule (40 mg total) by mouth 2 (two) times a day, Disp: 30 capsule, Rfl: 2    rosuvastatin (CRESTOR) 40 MG tablet, TAKE 1 TABLET BY MOUTH EVERY DAY IN THE EVENING, Disp: 90 tablet, Rfl: 1    sitaGLIPtin (JANUVIA) 100 mg tablet, Take 1 tablet (100 mg total) by mouth daily, Disp: 30 tablet, Rfl: 3    umeclidinium-vilanterol (Anoro Ellipta) 62 5-25 MCG/INH inhaler, Inhale 1 puff daily, Disp: 60 blister, Rfl: 3    ALLERGIES:  Allergies   Allergen Reactions    Ace Inhibitors Swelling    Angiotensin Receptor Blockers Other (See Comments)     Elevated K+    Clindamycin Diarrhea and Nausea Only    Plaquenil [Hydroxychloroquine] Swelling     Tongue swelling       REVIEW OF SYSTEMS:  Pertinent items are noted in HPI  A comprehensive review of systems was negative      LABS:  HgA1c:   Lab Results   Component Value Date    HGBA1C 7 5 (H) 03/16/2022     BMP:   Lab Results   Component Value Date    CALCIUM 9 5 03/16/2022    K 4 1 03/16/2022    CO2 24 03/16/2022     03/16/2022    BUN 24 03/16/2022    CREATININE 1 45 (H) 03/16/2022           _____________________________________________________  PHYSICAL EXAMINATION:  Vital signs: /71   Pulse 86   Ht 5' 10" (1 778 m)   Wt 85 8 kg (189 lb 3 2 oz)   BMI 27 15 kg/m²   General: well developed and well nourished, alert, oriented times 3 and appears comfortable  Psychiatric: Normal  HEENT: Trachea Midline, No torticollis  Cardiovascular: No discernable arrhythmia  Pulmonary: No wheezing or stridor  Abdomen: No rebound or guarding  Extremities: No peripheral edema  Skin: No masses, erythema, lacerations, fluctation, ulcerations  Neurovascular: Sensation Intact to the Median, Ulnar, Radial Nerve, Motor Intact to the Median, Ulnar, Radial Nerve and Pulses Intact    MUSCULOSKELETAL EXAMINATION:  RIGHT SIDE:  Ring and Long finger   Well healed over amputation site with some slight dry skin  Sensation intact  Brisk capillary refill  Capable of making a full fist    _____________________________________________________  STUDIES REVIEWED:  No Studies to review      PROCEDURES PERFORMED:  Procedures  No Procedures performed today    Scribe Attestation    I,:  Duke Mendenhall PA-C am acting as a scribe while in the presence of the attending physician :       I,:  Toby Renner MD personally performed the services described in this documentation    as scribed in my presence :

## 2022-04-11 ENCOUNTER — APPOINTMENT (OUTPATIENT)
Dept: LAB | Facility: CLINIC | Age: 72
End: 2022-04-11
Payer: COMMERCIAL

## 2022-04-11 DIAGNOSIS — C34.91 SQUAMOUS CELL LUNG CANCER, RIGHT (HCC): ICD-10-CM

## 2022-04-11 DIAGNOSIS — Z79.4 TYPE 2 DIABETES MELLITUS WITH STAGE 3A CHRONIC KIDNEY DISEASE, WITH LONG-TERM CURRENT USE OF INSULIN (HCC): ICD-10-CM

## 2022-04-11 DIAGNOSIS — M86.272 SUBACUTE OSTEOMYELITIS OF LEFT FOOT (HCC): ICD-10-CM

## 2022-04-11 DIAGNOSIS — J44.9 CHRONIC OBSTRUCTIVE PULMONARY DISEASE, UNSPECIFIED COPD TYPE (HCC): ICD-10-CM

## 2022-04-11 DIAGNOSIS — E11.22 TYPE 2 DIABETES MELLITUS WITH STAGE 3A CHRONIC KIDNEY DISEASE, WITH LONG-TERM CURRENT USE OF INSULIN (HCC): ICD-10-CM

## 2022-04-11 DIAGNOSIS — C61 CANCER OF PROSTATE (HCC): ICD-10-CM

## 2022-04-11 DIAGNOSIS — Z89.422 ACQUIRED ABSENCE OF OTHER LEFT TOE(S) (HCC): ICD-10-CM

## 2022-04-11 DIAGNOSIS — N18.31 TYPE 2 DIABETES MELLITUS WITH STAGE 3A CHRONIC KIDNEY DISEASE, WITH LONG-TERM CURRENT USE OF INSULIN (HCC): ICD-10-CM

## 2022-04-11 LAB
ALBUMIN SERPL BCP-MCNC: 3.6 G/DL (ref 3.5–5)
ALP SERPL-CCNC: 47 U/L (ref 46–116)
ALT SERPL W P-5'-P-CCNC: 19 U/L (ref 12–78)
ANION GAP SERPL CALCULATED.3IONS-SCNC: 7 MMOL/L (ref 4–13)
APTT PPP: 32 SECONDS (ref 23–37)
AST SERPL W P-5'-P-CCNC: 18 U/L (ref 5–45)
BASOPHILS # BLD AUTO: 0.12 THOUSANDS/ΜL (ref 0–0.1)
BASOPHILS NFR BLD AUTO: 1 % (ref 0–1)
BILIRUB SERPL-MCNC: 0.36 MG/DL (ref 0.2–1)
BUN SERPL-MCNC: 23 MG/DL (ref 5–25)
CALCIUM SERPL-MCNC: 9.4 MG/DL (ref 8.3–10.1)
CHLORIDE SERPL-SCNC: 103 MMOL/L (ref 100–108)
CHOLEST SERPL-MCNC: 121 MG/DL
CO2 SERPL-SCNC: 28 MMOL/L (ref 21–32)
CREAT SERPL-MCNC: 1.42 MG/DL (ref 0.6–1.3)
EOSINOPHIL # BLD AUTO: 0.67 THOUSAND/ΜL (ref 0–0.61)
EOSINOPHIL NFR BLD AUTO: 7 % (ref 0–6)
ERYTHROCYTE [DISTWIDTH] IN BLOOD BY AUTOMATED COUNT: 15.4 % (ref 11.6–15.1)
EST. AVERAGE GLUCOSE BLD GHB EST-MCNC: 166 MG/DL
GFR SERPL CREATININE-BSD FRML MDRD: 49 ML/MIN/1.73SQ M
GLUCOSE P FAST SERPL-MCNC: 70 MG/DL (ref 65–99)
HBA1C MFR BLD: 7.4 %
HCT VFR BLD AUTO: 43.3 % (ref 36.5–49.3)
HDLC SERPL-MCNC: 30 MG/DL
HGB BLD-MCNC: 13.9 G/DL (ref 12–17)
IMM GRANULOCYTES # BLD AUTO: 0.05 THOUSAND/UL (ref 0–0.2)
IMM GRANULOCYTES NFR BLD AUTO: 1 % (ref 0–2)
INR PPP: 1.16 (ref 0.84–1.19)
LDLC SERPL CALC-MCNC: 68 MG/DL (ref 0–100)
LYMPHOCYTES # BLD AUTO: 0.97 THOUSANDS/ΜL (ref 0.6–4.47)
LYMPHOCYTES NFR BLD AUTO: 10 % (ref 14–44)
MCH RBC QN AUTO: 28.3 PG (ref 26.8–34.3)
MCHC RBC AUTO-ENTMCNC: 32.1 G/DL (ref 31.4–37.4)
MCV RBC AUTO: 88 FL (ref 82–98)
MONOCYTES # BLD AUTO: 0.91 THOUSAND/ΜL (ref 0.17–1.22)
MONOCYTES NFR BLD AUTO: 10 % (ref 4–12)
NEUTROPHILS # BLD AUTO: 6.69 THOUSANDS/ΜL (ref 1.85–7.62)
NEUTS SEG NFR BLD AUTO: 71 % (ref 43–75)
NONHDLC SERPL-MCNC: 91 MG/DL
NRBC BLD AUTO-RTO: 0 /100 WBCS
PLATELET # BLD AUTO: 241 THOUSANDS/UL (ref 149–390)
PMV BLD AUTO: 10.5 FL (ref 8.9–12.7)
POTASSIUM SERPL-SCNC: 4 MMOL/L (ref 3.5–5.3)
PROT SERPL-MCNC: 7.7 G/DL (ref 6.4–8.2)
PROTHROMBIN TIME: 14.3 SECONDS (ref 11.6–14.5)
PSA SERPL-MCNC: 9.4 NG/ML (ref 0–4)
RBC # BLD AUTO: 4.91 MILLION/UL (ref 3.88–5.62)
SODIUM SERPL-SCNC: 138 MMOL/L (ref 136–145)
TRIGL SERPL-MCNC: 115 MG/DL
TSH SERPL DL<=0.05 MIU/L-ACNC: 2.56 UIU/ML (ref 0.45–4.5)
WBC # BLD AUTO: 9.41 THOUSAND/UL (ref 4.31–10.16)

## 2022-04-11 PROCEDURE — G0103 PSA SCREENING: HCPCS

## 2022-04-11 PROCEDURE — 83036 HEMOGLOBIN GLYCOSYLATED A1C: CPT

## 2022-04-11 PROCEDURE — 80061 LIPID PANEL: CPT

## 2022-04-11 PROCEDURE — 85025 COMPLETE CBC W/AUTO DIFF WBC: CPT

## 2022-04-11 PROCEDURE — 80053 COMPREHEN METABOLIC PANEL: CPT

## 2022-04-11 PROCEDURE — 85730 THROMBOPLASTIN TIME PARTIAL: CPT

## 2022-04-11 PROCEDURE — 85610 PROTHROMBIN TIME: CPT

## 2022-04-11 PROCEDURE — 3051F HG A1C>EQUAL 7.0%<8.0%: CPT | Performed by: ORTHOPAEDIC SURGERY

## 2022-04-11 PROCEDURE — 36415 COLL VENOUS BLD VENIPUNCTURE: CPT

## 2022-04-11 PROCEDURE — 84443 ASSAY THYROID STIM HORMONE: CPT

## 2022-04-11 PROCEDURE — 3066F NEPHROPATHY DOC TX: CPT | Performed by: ORTHOPAEDIC SURGERY

## 2022-04-13 DIAGNOSIS — E11.22 TYPE 2 DIABETES MELLITUS WITH STAGE 3A CHRONIC KIDNEY DISEASE, WITH LONG-TERM CURRENT USE OF INSULIN (HCC): ICD-10-CM

## 2022-04-13 DIAGNOSIS — N18.31 TYPE 2 DIABETES MELLITUS WITH STAGE 3A CHRONIC KIDNEY DISEASE, WITH LONG-TERM CURRENT USE OF INSULIN (HCC): ICD-10-CM

## 2022-04-13 DIAGNOSIS — Z79.4 TYPE 2 DIABETES MELLITUS WITH STAGE 3A CHRONIC KIDNEY DISEASE, WITH LONG-TERM CURRENT USE OF INSULIN (HCC): ICD-10-CM

## 2022-04-13 NOTE — TELEPHONE ENCOUNTER
PT called to refill gabapentin (NEURONTIN) 400 mg capsule  He stated that his previous Dr Heydi Lojaans him taking it 4xs a day not 2xs  He would like the go back to 4xs a day

## 2022-04-14 RX ORDER — GABAPENTIN 400 MG/1
400 CAPSULE ORAL 3 TIMES DAILY
Qty: 90 CAPSULE | Refills: 3 | Status: SHIPPED | OUTPATIENT
Start: 2022-04-14 | End: 2022-07-19 | Stop reason: SDUPTHER

## 2022-04-16 DIAGNOSIS — J44.9 CHRONIC OBSTRUCTIVE PULMONARY DISEASE, UNSPECIFIED COPD TYPE (HCC): ICD-10-CM

## 2022-04-16 RX ORDER — UMECLIDINIUM BROMIDE AND VILANTEROL TRIFENATATE 62.5; 25 UG/1; UG/1
POWDER RESPIRATORY (INHALATION)
Qty: 60 BLISTER | Refills: 1 | Status: SHIPPED | OUTPATIENT
Start: 2022-04-16 | End: 2022-06-25

## 2022-04-30 DIAGNOSIS — N18.31 TYPE 2 DIABETES MELLITUS WITH STAGE 3A CHRONIC KIDNEY DISEASE, WITH LONG-TERM CURRENT USE OF INSULIN (HCC): ICD-10-CM

## 2022-04-30 DIAGNOSIS — Z79.4 TYPE 2 DIABETES MELLITUS WITH STAGE 3A CHRONIC KIDNEY DISEASE, WITH LONG-TERM CURRENT USE OF INSULIN (HCC): ICD-10-CM

## 2022-04-30 DIAGNOSIS — E78.5 HYPERLIPIDEMIA, UNSPECIFIED HYPERLIPIDEMIA TYPE: ICD-10-CM

## 2022-04-30 DIAGNOSIS — E11.22 TYPE 2 DIABETES MELLITUS WITH STAGE 3A CHRONIC KIDNEY DISEASE, WITH LONG-TERM CURRENT USE OF INSULIN (HCC): ICD-10-CM

## 2022-05-02 RX ORDER — FENOFIBRATE 145 MG/1
TABLET, COATED ORAL
Qty: 90 TABLET | Refills: 1 | Status: SHIPPED | OUTPATIENT
Start: 2022-05-02 | End: 2022-07-19 | Stop reason: SDUPTHER

## 2022-05-29 DIAGNOSIS — I10 PRIMARY HYPERTENSION: ICD-10-CM

## 2022-05-29 RX ORDER — AMLODIPINE BESYLATE 5 MG/1
TABLET ORAL
Qty: 90 TABLET | Refills: 1 | Status: SHIPPED | OUTPATIENT
Start: 2022-05-29 | End: 2022-07-19 | Stop reason: SDUPTHER

## 2022-06-08 ENCOUNTER — APPOINTMENT (OUTPATIENT)
Dept: LAB | Facility: CLINIC | Age: 72
End: 2022-06-08
Payer: COMMERCIAL

## 2022-06-08 DIAGNOSIS — Z89.422 ACQUIRED ABSENCE OF OTHER LEFT TOE(S) (HCC): ICD-10-CM

## 2022-06-08 DIAGNOSIS — E11.9 TYPE 2 DIABETES MELLITUS WITH INSULIN THERAPY (HCC): ICD-10-CM

## 2022-06-08 DIAGNOSIS — J43.9 PULMONARY EMPHYSEMA, UNSPECIFIED EMPHYSEMA TYPE (HCC): ICD-10-CM

## 2022-06-08 DIAGNOSIS — M86.272 SUBACUTE OSTEOMYELITIS OF LEFT FOOT (HCC): ICD-10-CM

## 2022-06-08 DIAGNOSIS — N18.31 STAGE 3A CHRONIC KIDNEY DISEASE (HCC): ICD-10-CM

## 2022-06-08 DIAGNOSIS — C61 CANCER OF PROSTATE (HCC): ICD-10-CM

## 2022-06-08 DIAGNOSIS — C34.91 SQUAMOUS CELL CARCINOMA LUNG, RIGHT (HCC): ICD-10-CM

## 2022-06-08 DIAGNOSIS — Z79.4 TYPE 2 DIABETES MELLITUS WITH INSULIN THERAPY (HCC): ICD-10-CM

## 2022-06-08 LAB
ALBUMIN SERPL BCP-MCNC: 3.2 G/DL (ref 3.5–5)
ALP SERPL-CCNC: 55 U/L (ref 46–116)
ALT SERPL W P-5'-P-CCNC: 18 U/L (ref 12–78)
ANION GAP SERPL CALCULATED.3IONS-SCNC: 7 MMOL/L (ref 4–13)
AST SERPL W P-5'-P-CCNC: 16 U/L (ref 5–45)
BASOPHILS # BLD AUTO: 0.07 THOUSANDS/ΜL (ref 0–0.1)
BASOPHILS NFR BLD AUTO: 1 % (ref 0–1)
BILIRUB SERPL-MCNC: 0.34 MG/DL (ref 0.2–1)
BUN SERPL-MCNC: 19 MG/DL (ref 5–25)
CALCIUM ALBUM COR SERPL-MCNC: 9.9 MG/DL (ref 8.3–10.1)
CALCIUM SERPL-MCNC: 9.3 MG/DL (ref 8.3–10.1)
CHLORIDE SERPL-SCNC: 104 MMOL/L (ref 100–108)
CO2 SERPL-SCNC: 27 MMOL/L (ref 21–32)
CREAT SERPL-MCNC: 1.28 MG/DL (ref 0.6–1.3)
EOSINOPHIL # BLD AUTO: 0.4 THOUSAND/ΜL (ref 0–0.61)
EOSINOPHIL NFR BLD AUTO: 5 % (ref 0–6)
ERYTHROCYTE [DISTWIDTH] IN BLOOD BY AUTOMATED COUNT: 15 % (ref 11.6–15.1)
GFR SERPL CREATININE-BSD FRML MDRD: 56 ML/MIN/1.73SQ M
GLUCOSE P FAST SERPL-MCNC: 116 MG/DL (ref 65–99)
HCT VFR BLD AUTO: 41.3 % (ref 36.5–49.3)
HGB BLD-MCNC: 13.4 G/DL (ref 12–17)
IMM GRANULOCYTES # BLD AUTO: 0.05 THOUSAND/UL (ref 0–0.2)
IMM GRANULOCYTES NFR BLD AUTO: 1 % (ref 0–2)
LYMPHOCYTES # BLD AUTO: 0.44 THOUSANDS/ΜL (ref 0.6–4.47)
LYMPHOCYTES NFR BLD AUTO: 6 % (ref 14–44)
MCH RBC QN AUTO: 27.6 PG (ref 26.8–34.3)
MCHC RBC AUTO-ENTMCNC: 32.4 G/DL (ref 31.4–37.4)
MCV RBC AUTO: 85 FL (ref 82–98)
MONOCYTES # BLD AUTO: 0.81 THOUSAND/ΜL (ref 0.17–1.22)
MONOCYTES NFR BLD AUTO: 10 % (ref 4–12)
NEUTROPHILS # BLD AUTO: 6.19 THOUSANDS/ΜL (ref 1.85–7.62)
NEUTS SEG NFR BLD AUTO: 77 % (ref 43–75)
NRBC BLD AUTO-RTO: 0 /100 WBCS
PLATELET # BLD AUTO: 243 THOUSANDS/UL (ref 149–390)
PMV BLD AUTO: 9.8 FL (ref 8.9–12.7)
POTASSIUM SERPL-SCNC: 4.2 MMOL/L (ref 3.5–5.3)
PROT SERPL-MCNC: 7.7 G/DL (ref 6.4–8.2)
PSA SERPL-MCNC: 2.5 NG/ML (ref 0–4)
RBC # BLD AUTO: 4.85 MILLION/UL (ref 3.88–5.62)
SODIUM SERPL-SCNC: 138 MMOL/L (ref 136–145)
WBC # BLD AUTO: 7.96 THOUSAND/UL (ref 4.31–10.16)

## 2022-06-08 PROCEDURE — 85025 COMPLETE CBC W/AUTO DIFF WBC: CPT

## 2022-06-08 PROCEDURE — G0103 PSA SCREENING: HCPCS

## 2022-06-08 PROCEDURE — 36415 COLL VENOUS BLD VENIPUNCTURE: CPT

## 2022-06-08 PROCEDURE — 80053 COMPREHEN METABOLIC PANEL: CPT

## 2022-06-11 DIAGNOSIS — E11.22 TYPE 2 DIABETES MELLITUS WITH STAGE 3A CHRONIC KIDNEY DISEASE, WITH LONG-TERM CURRENT USE OF INSULIN (HCC): ICD-10-CM

## 2022-06-11 DIAGNOSIS — Z79.4 TYPE 2 DIABETES MELLITUS WITH STAGE 3A CHRONIC KIDNEY DISEASE, WITH LONG-TERM CURRENT USE OF INSULIN (HCC): ICD-10-CM

## 2022-06-11 DIAGNOSIS — N18.31 TYPE 2 DIABETES MELLITUS WITH STAGE 3A CHRONIC KIDNEY DISEASE, WITH LONG-TERM CURRENT USE OF INSULIN (HCC): ICD-10-CM

## 2022-06-11 RX ORDER — SITAGLIPTIN 100 MG/1
TABLET, FILM COATED ORAL
Qty: 30 TABLET | Refills: 3 | Status: SHIPPED | OUTPATIENT
Start: 2022-06-11 | End: 2022-07-19 | Stop reason: SDUPTHER

## 2022-06-15 ENCOUNTER — HOSPITAL ENCOUNTER (OUTPATIENT)
Dept: CT IMAGING | Facility: HOSPITAL | Age: 72
Discharge: HOME | End: 2022-06-15

## 2022-06-15 DIAGNOSIS — N28.9 KIDNEY DISEASE: ICD-10-CM

## 2022-06-16 ENCOUNTER — HOSPITAL ENCOUNTER (OUTPATIENT)
Dept: CT IMAGING | Facility: HOSPITAL | Age: 72
Discharge: HOME | End: 2022-06-16
Payer: COMMERCIAL

## 2022-06-16 DIAGNOSIS — N28.9 KIDNEY DISEASE: ICD-10-CM

## 2022-06-16 DIAGNOSIS — C34.91 SQUAMOUS CELL CARCINOMA OF RIGHT LUNG (HCC): ICD-10-CM

## 2022-06-16 PROCEDURE — 74176 CT ABD & PELVIS W/O CONTRAST: CPT

## 2022-06-16 PROCEDURE — 71250 CT THORAX DX C-: CPT

## 2022-06-25 DIAGNOSIS — J44.9 CHRONIC OBSTRUCTIVE PULMONARY DISEASE, UNSPECIFIED COPD TYPE (HCC): ICD-10-CM

## 2022-06-25 RX ORDER — UMECLIDINIUM BROMIDE AND VILANTEROL TRIFENATATE 62.5; 25 UG/1; UG/1
POWDER RESPIRATORY (INHALATION)
Qty: 60 BLISTER | Refills: 1 | Status: SHIPPED | OUTPATIENT
Start: 2022-06-25

## 2022-06-27 DIAGNOSIS — I25.10 CAD (CORONARY ARTERY DISEASE): ICD-10-CM

## 2022-06-27 RX ORDER — ATENOLOL 25 MG/1
TABLET ORAL
Qty: 90 TABLET | Refills: 1 | Status: SHIPPED | OUTPATIENT
Start: 2022-06-27 | End: 2022-07-19 | Stop reason: SDUPTHER

## 2022-06-29 DIAGNOSIS — J44.9 CHRONIC OBSTRUCTIVE PULMONARY DISEASE, UNSPECIFIED COPD TYPE (HCC): Primary | ICD-10-CM

## 2022-06-29 RX ORDER — ALBUTEROL SULFATE 90 UG/1
2 AEROSOL, METERED RESPIRATORY (INHALATION) EVERY 4 HOURS PRN
Qty: 8 G | Refills: 2 | Status: SHIPPED | OUTPATIENT
Start: 2022-06-29 | End: 2022-12-27 | Stop reason: SDUPTHER

## 2022-07-12 DIAGNOSIS — E11.22 TYPE 2 DIABETES MELLITUS WITH STAGE 3A CHRONIC KIDNEY DISEASE, WITH LONG-TERM CURRENT USE OF INSULIN (HCC): ICD-10-CM

## 2022-07-12 DIAGNOSIS — Z79.4 TYPE 2 DIABETES MELLITUS WITH STAGE 3A CHRONIC KIDNEY DISEASE, WITH LONG-TERM CURRENT USE OF INSULIN (HCC): ICD-10-CM

## 2022-07-12 DIAGNOSIS — N18.31 TYPE 2 DIABETES MELLITUS WITH STAGE 3A CHRONIC KIDNEY DISEASE, WITH LONG-TERM CURRENT USE OF INSULIN (HCC): ICD-10-CM

## 2022-07-12 DIAGNOSIS — E78.5 HYPERLIPIDEMIA, UNSPECIFIED HYPERLIPIDEMIA TYPE: ICD-10-CM

## 2022-07-12 PROCEDURE — 3066F NEPHROPATHY DOC TX: CPT | Performed by: FAMILY MEDICINE

## 2022-07-12 RX ORDER — ROSUVASTATIN CALCIUM 40 MG/1
TABLET, COATED ORAL
Qty: 90 TABLET | Refills: 1 | Status: SHIPPED | OUTPATIENT
Start: 2022-07-12 | End: 2022-07-19 | Stop reason: SDUPTHER

## 2022-07-12 RX ORDER — BLOOD SUGAR DIAGNOSTIC
STRIP MISCELLANEOUS
Qty: 100 STRIP | Refills: 3 | Status: SHIPPED | OUTPATIENT
Start: 2022-07-12

## 2022-07-15 ENCOUNTER — APPOINTMENT (OUTPATIENT)
Dept: LAB | Facility: CLINIC | Age: 72
End: 2022-07-15
Payer: COMMERCIAL

## 2022-07-15 DIAGNOSIS — M86.272 SUBACUTE OSTEOMYELITIS OF LEFT FOOT (HCC): ICD-10-CM

## 2022-07-15 DIAGNOSIS — Z89.422 ACQUIRED ABSENCE OF OTHER LEFT TOE(S) (HCC): ICD-10-CM

## 2022-07-15 DIAGNOSIS — J43.9 PULMONARY EMPHYSEMA, UNSPECIFIED EMPHYSEMA TYPE (HCC): ICD-10-CM

## 2022-07-15 DIAGNOSIS — E11.9 TYPE 2 DIABETES MELLITUS WITH INSULIN THERAPY (HCC): ICD-10-CM

## 2022-07-15 DIAGNOSIS — C34.91 SQUAMOUS CELL CARCINOMA LUNG, RIGHT (HCC): ICD-10-CM

## 2022-07-15 DIAGNOSIS — Z79.4 TYPE 2 DIABETES MELLITUS WITH INSULIN THERAPY (HCC): ICD-10-CM

## 2022-07-15 LAB
ALBUMIN SERPL BCP-MCNC: 3.3 G/DL (ref 3.5–5)
ALP SERPL-CCNC: 42 U/L (ref 46–116)
ALT SERPL W P-5'-P-CCNC: 19 U/L (ref 12–78)
ANION GAP SERPL CALCULATED.3IONS-SCNC: 7 MMOL/L (ref 4–13)
AST SERPL W P-5'-P-CCNC: 22 U/L (ref 5–45)
BASOPHILS # BLD AUTO: 0.09 THOUSANDS/ΜL (ref 0–0.1)
BASOPHILS NFR BLD AUTO: 1 % (ref 0–1)
BILIRUB SERPL-MCNC: 0.42 MG/DL (ref 0.2–1)
BUN SERPL-MCNC: 27 MG/DL (ref 5–25)
CALCIUM ALBUM COR SERPL-MCNC: 9.6 MG/DL (ref 8.3–10.1)
CALCIUM SERPL-MCNC: 9 MG/DL (ref 8.3–10.1)
CHLORIDE SERPL-SCNC: 109 MMOL/L (ref 100–108)
CHOLEST SERPL-MCNC: 114 MG/DL
CO2 SERPL-SCNC: 25 MMOL/L (ref 21–32)
CREAT SERPL-MCNC: 1.42 MG/DL (ref 0.6–1.3)
EOSINOPHIL # BLD AUTO: 0.38 THOUSAND/ΜL (ref 0–0.61)
EOSINOPHIL NFR BLD AUTO: 4 % (ref 0–6)
ERYTHROCYTE [DISTWIDTH] IN BLOOD BY AUTOMATED COUNT: 17.1 % (ref 11.6–15.1)
EST. AVERAGE GLUCOSE BLD GHB EST-MCNC: 180 MG/DL
GFR SERPL CREATININE-BSD FRML MDRD: 49 ML/MIN/1.73SQ M
GLUCOSE P FAST SERPL-MCNC: 59 MG/DL (ref 65–99)
HBA1C MFR BLD: 7.9 %
HCT VFR BLD AUTO: 39.8 % (ref 36.5–49.3)
HDLC SERPL-MCNC: 30 MG/DL
HGB BLD-MCNC: 12.4 G/DL (ref 12–17)
IMM GRANULOCYTES # BLD AUTO: 0.04 THOUSAND/UL (ref 0–0.2)
IMM GRANULOCYTES NFR BLD AUTO: 0 % (ref 0–2)
LDLC SERPL CALC-MCNC: 63 MG/DL (ref 0–100)
LYMPHOCYTES # BLD AUTO: 0.65 THOUSANDS/ΜL (ref 0.6–4.47)
LYMPHOCYTES NFR BLD AUTO: 7 % (ref 14–44)
MCH RBC QN AUTO: 27.4 PG (ref 26.8–34.3)
MCHC RBC AUTO-ENTMCNC: 31.2 G/DL (ref 31.4–37.4)
MCV RBC AUTO: 88 FL (ref 82–98)
MONOCYTES # BLD AUTO: 0.83 THOUSAND/ΜL (ref 0.17–1.22)
MONOCYTES NFR BLD AUTO: 9 % (ref 4–12)
NEUTROPHILS # BLD AUTO: 7.33 THOUSANDS/ΜL (ref 1.85–7.62)
NEUTS SEG NFR BLD AUTO: 79 % (ref 43–75)
NONHDLC SERPL-MCNC: 84 MG/DL
NRBC BLD AUTO-RTO: 0 /100 WBCS
PLATELET # BLD AUTO: 239 THOUSANDS/UL (ref 149–390)
PMV BLD AUTO: 10.6 FL (ref 8.9–12.7)
POTASSIUM SERPL-SCNC: 4 MMOL/L (ref 3.5–5.3)
PROT SERPL-MCNC: 7.6 G/DL (ref 6.4–8.2)
RBC # BLD AUTO: 4.53 MILLION/UL (ref 3.88–5.62)
SODIUM SERPL-SCNC: 141 MMOL/L (ref 136–145)
TRIGL SERPL-MCNC: 104 MG/DL
TSH SERPL DL<=0.05 MIU/L-ACNC: 1.86 UIU/ML (ref 0.45–4.5)
WBC # BLD AUTO: 9.32 THOUSAND/UL (ref 4.31–10.16)

## 2022-07-15 PROCEDURE — 85025 COMPLETE CBC W/AUTO DIFF WBC: CPT

## 2022-07-15 PROCEDURE — 80053 COMPREHEN METABOLIC PANEL: CPT

## 2022-07-15 PROCEDURE — 36415 COLL VENOUS BLD VENIPUNCTURE: CPT

## 2022-07-15 PROCEDURE — 83036 HEMOGLOBIN GLYCOSYLATED A1C: CPT

## 2022-07-15 PROCEDURE — 80061 LIPID PANEL: CPT

## 2022-07-15 PROCEDURE — 3051F HG A1C>EQUAL 7.0%<8.0%: CPT | Performed by: FAMILY MEDICINE

## 2022-07-15 PROCEDURE — 84443 ASSAY THYROID STIM HORMONE: CPT

## 2022-07-19 DIAGNOSIS — Z79.4 TYPE 2 DIABETES MELLITUS WITH STAGE 3A CHRONIC KIDNEY DISEASE, WITH LONG-TERM CURRENT USE OF INSULIN (HCC): ICD-10-CM

## 2022-07-19 DIAGNOSIS — E78.5 HYPERLIPIDEMIA, UNSPECIFIED HYPERLIPIDEMIA TYPE: ICD-10-CM

## 2022-07-19 DIAGNOSIS — Z79.4 TYPE 2 DIABETES MELLITUS WITHOUT COMPLICATION, WITH LONG-TERM CURRENT USE OF INSULIN (HCC): ICD-10-CM

## 2022-07-19 DIAGNOSIS — N18.31 TYPE 2 DIABETES MELLITUS WITH STAGE 3A CHRONIC KIDNEY DISEASE, WITH LONG-TERM CURRENT USE OF INSULIN (HCC): ICD-10-CM

## 2022-07-19 DIAGNOSIS — I25.10 CAD (CORONARY ARTERY DISEASE): ICD-10-CM

## 2022-07-19 DIAGNOSIS — E11.22 TYPE 2 DIABETES MELLITUS WITH STAGE 3A CHRONIC KIDNEY DISEASE, WITH LONG-TERM CURRENT USE OF INSULIN (HCC): ICD-10-CM

## 2022-07-19 DIAGNOSIS — I10 PRIMARY HYPERTENSION: ICD-10-CM

## 2022-07-19 DIAGNOSIS — E11.9 TYPE 2 DIABETES MELLITUS WITHOUT COMPLICATION, WITH LONG-TERM CURRENT USE OF INSULIN (HCC): ICD-10-CM

## 2022-07-19 RX ORDER — INSULIN DEGLUDEC INJECTION 100 U/ML
70 INJECTION, SOLUTION SUBCUTANEOUS
Qty: 15 ML | Refills: 3 | Status: SHIPPED | OUTPATIENT
Start: 2022-07-19 | End: 2022-08-16

## 2022-07-19 RX ORDER — AMLODIPINE BESYLATE 5 MG/1
5 TABLET ORAL DAILY
Qty: 90 TABLET | Refills: 1 | Status: SHIPPED | OUTPATIENT
Start: 2022-07-19 | End: 2023-02-07

## 2022-07-19 RX ORDER — ATENOLOL 25 MG/1
25 TABLET ORAL DAILY
Qty: 90 TABLET | Refills: 1 | Status: SHIPPED | OUTPATIENT
Start: 2022-07-19 | End: 2023-02-14

## 2022-07-19 RX ORDER — FENOFIBRATE 145 MG/1
145 TABLET, COATED ORAL DAILY
Qty: 90 TABLET | Refills: 1 | Status: SHIPPED | OUTPATIENT
Start: 2022-07-19 | End: 2023-01-27

## 2022-07-19 RX ORDER — ROSUVASTATIN CALCIUM 40 MG/1
40 TABLET, COATED ORAL EVERY EVENING
Qty: 90 TABLET | Refills: 1 | Status: SHIPPED | OUTPATIENT
Start: 2022-07-19 | End: 2022-09-14 | Stop reason: SDUPTHER

## 2022-07-19 RX ORDER — GABAPENTIN 400 MG/1
400 CAPSULE ORAL 3 TIMES DAILY
Qty: 90 CAPSULE | Refills: 3 | Status: SHIPPED | OUTPATIENT
Start: 2022-07-19 | End: 2022-11-16 | Stop reason: SDUPTHER

## 2022-07-21 ENCOUNTER — OFFICE VISIT (OUTPATIENT)
Dept: FAMILY MEDICINE CLINIC | Facility: CLINIC | Age: 72
End: 2022-07-21
Payer: COMMERCIAL

## 2022-07-21 VITALS
SYSTOLIC BLOOD PRESSURE: 110 MMHG | OXYGEN SATURATION: 96 % | BODY MASS INDEX: 26.77 KG/M2 | WEIGHT: 187 LBS | DIASTOLIC BLOOD PRESSURE: 68 MMHG | TEMPERATURE: 97.4 F | HEART RATE: 77 BPM | HEIGHT: 70 IN

## 2022-07-21 DIAGNOSIS — Z79.4 TYPE 2 DIABETES MELLITUS WITH STAGE 3A CHRONIC KIDNEY DISEASE, WITH LONG-TERM CURRENT USE OF INSULIN (HCC): Primary | ICD-10-CM

## 2022-07-21 DIAGNOSIS — Z00.00 MEDICARE ANNUAL WELLNESS VISIT, SUBSEQUENT: ICD-10-CM

## 2022-07-21 DIAGNOSIS — N18.31 TYPE 2 DIABETES MELLITUS WITH STAGE 3A CHRONIC KIDNEY DISEASE, WITH LONG-TERM CURRENT USE OF INSULIN (HCC): Primary | ICD-10-CM

## 2022-07-21 DIAGNOSIS — Z12.11 SCREENING FOR COLON CANCER: ICD-10-CM

## 2022-07-21 DIAGNOSIS — J44.9 CHRONIC OBSTRUCTIVE PULMONARY DISEASE, UNSPECIFIED COPD TYPE (HCC): Chronic | ICD-10-CM

## 2022-07-21 DIAGNOSIS — E11.22 TYPE 2 DIABETES MELLITUS WITH STAGE 3A CHRONIC KIDNEY DISEASE, WITH LONG-TERM CURRENT USE OF INSULIN (HCC): Primary | ICD-10-CM

## 2022-07-21 DIAGNOSIS — C34.91 SQUAMOUS CELL LUNG CANCER, RIGHT (HCC): Chronic | ICD-10-CM

## 2022-07-21 DIAGNOSIS — I10 PRIMARY HYPERTENSION: Chronic | ICD-10-CM

## 2022-07-21 DIAGNOSIS — I71.40 ABDOMINAL AORTIC ANEURYSM (AAA) WITHOUT RUPTURE: ICD-10-CM

## 2022-07-21 PROBLEM — I95.9 HYPOTENSION: Status: RESOLVED | Noted: 2019-03-31 | Resolved: 2022-07-21

## 2022-07-21 PROBLEM — M86.272 SUBACUTE OSTEOMYELITIS OF LEFT FOOT (HCC): Status: RESOLVED | Noted: 2021-06-14 | Resolved: 2022-07-21

## 2022-07-21 PROCEDURE — 1160F RVW MEDS BY RX/DR IN RCRD: CPT | Performed by: FAMILY MEDICINE

## 2022-07-21 PROCEDURE — G0439 PPPS, SUBSEQ VISIT: HCPCS | Performed by: FAMILY MEDICINE

## 2022-07-21 PROCEDURE — 99214 OFFICE O/P EST MOD 30 MIN: CPT | Performed by: FAMILY MEDICINE

## 2022-07-21 PROCEDURE — 3725F SCREEN DEPRESSION PERFORMED: CPT | Performed by: FAMILY MEDICINE

## 2022-07-21 PROCEDURE — 3288F FALL RISK ASSESSMENT DOCD: CPT | Performed by: FAMILY MEDICINE

## 2022-07-21 PROCEDURE — 3078F DIAST BP <80 MM HG: CPT | Performed by: FAMILY MEDICINE

## 2022-07-21 PROCEDURE — 1125F AMNT PAIN NOTED PAIN PRSNT: CPT | Performed by: FAMILY MEDICINE

## 2022-07-21 PROCEDURE — 3074F SYST BP LT 130 MM HG: CPT | Performed by: FAMILY MEDICINE

## 2022-07-21 PROCEDURE — 1170F FXNL STATUS ASSESSED: CPT | Performed by: FAMILY MEDICINE

## 2022-07-21 RX ORDER — PEN NEEDLE, DIABETIC 31 GX5/16"
NEEDLE, DISPOSABLE MISCELLANEOUS DAILY
Qty: 30 EACH | Refills: 3 | Status: SHIPPED | OUTPATIENT
Start: 2022-07-21 | End: 2022-09-09 | Stop reason: SDUPTHER

## 2022-07-21 NOTE — PROGRESS NOTES
Assessment and Plan:     Problem List Items Addressed This Visit        Endocrine    Type 2 diabetes mellitus with stage 3a chronic kidney disease, with long-term current use of insulin ([facility] [address] ) - Primary       Lab Results   Component Value Date    HGBA1C 7 9 (H) 07/15/2022   Discussed current plan, has discontinued/ inconsistently taken  tresiba  since last visit, would rec restart , reviewed pharmacology,  past and upward trending a1c , monitoring fasting and postprandial   We discussed the importance of controlling blood glucose (hemoglobin A1c less than 7  0)Premeal , even better <110  2hr after a meal <170, even better <140  A1C <7%, even better <6 5%  blood pressure control, and cholesterol to lower the risk for complications  Encouraged advise to check home blood sugars discussed goals in range of therapy  Reviewed recommendations of annual eye exams (ophthalmology) on long foot exam (Podiatry)         Relevant Medications    B-D ULTRAFINE III SHORT PEN 31G X 8 MM MISC    Other Relevant Orders    Hemoglobin A1C    Comprehensive metabolic panel    CBC and differential       Respiratory    Squamous cell lung cancer, right (HCC) (Chronic)    COPD (chronic obstructive pulmonary disease) (HCC) (Chronic)       Cardiovascular and Mediastinum    HTN (hypertension) (Chronic)    Relevant Orders    Hemoglobin A1C    Comprehensive metabolic panel    CBC and differential    Abdominal aortic aneurysm (AAA) without rupture ([facility] [address] )    Relevant Orders    US abdominal aorta      Other Visit Diagnoses     Screening for colon cancer        Relevant Orders    Cologuard Medicare annual wellness visit, subsequent              Depression Screening and Follow-up Plan: Patient was screened for depression during today's encounter  They screened negative with a PHQ-2 score of 0  Patient's shoes and socks removed      Right Foot/Ankle   Right Foot Inspection  Skin Exam: skin normal  Skin not intact, no dry skin, no warmth, no callus, no erythema, no maceration, no abnormal color, no pre-ulcer, no ulcer and no callus  Toe Exam: ROM and strength within normal limits  Vascular  The right DP pulse is 1+  The right PT pulse is 1+  Left Foot/Ankle  Left Foot Inspection  Skin Exam: skin normal  Skin not intact, no dry skin, no warmth, no erythema, no maceration, normal color, no pre-ulcer, no ulcer and no callus  Toe Exam: Left toe deformity: great toe amputation , scarring  Vascular  The left DP pulse is 1+  The left PT pulse is 1+  Assign Risk Category  Deformity present  No loss of protective sensation  No weak pulses  Risk: 0    Preventive health issues were discussed with patient, and age appropriate screening tests were ordered as noted in patient's After Visit Summary  Personalized health advice and appropriate referrals for health education or preventive services given if needed, as noted in patient's After Visit Summary  History of Present Illness:     Patient presents for a Medicare Wellness Visit    SUBJECTIVE:  81 y o  male for follow up of diabetes  Diabetic Review of Systems - medication compliance has NOT BEEN TAKING THE TRESIBA , thinking could work with out : noncompliant some of the time, diabetic diet compliance: compliant most of the time, home glucose monitoring: is performed sporadically  Other symptoms and concerns:   Podiatry = md hope upcoming Monday   Copd , pulmonary nodes , lung sc ca, LVH , oncology, md sánchez , shayy avitia ,   Prostate , ca , ct scan April last radiation treatment , did well , no issue , last psa 4     Declan Santamaria was told not evident , md winn scheduled dec , annual stress , u/s , angioplasty 1996   Current Outpatient Medications:  albuterol (PROVENTIL HFA,VENTOLIN HFA) 90 mcg/act inhaler, Inhale 2 puffs every 4 (four) hours as needed for wheezing, Disp: 8 g, Rfl: 2  amLODIPine (NORVASC) 5 mg tablet, Take 1 tablet (5 mg total) by mouth daily, Disp: 90 tablet, Rfl: 1  Anoro Ellipta 62 5-25 MCG/INH inhaler, INHALE 1 PUFF BY MOUTH EVERY DAY, Disp: 60 blister, Rfl: 1  ascorbic acid (VITAMIN C) 250 mg tablet, Take 250 mg by mouth 2 (two) times a day, Disp: , Rfl:   aspirin (ECOTRIN LOW STRENGTH) 81 mg EC tablet, 1 tab(s), Disp: , Rfl:   atenolol (TENORMIN) 25 mg tablet, Take 1 tablet (25 mg total) by mouth daily, Disp: 90 tablet, Rfl: 1  B-D ULTRAFINE III SHORT PEN 31G X 8 MM MISC, , Disp: , Rfl:   fenofibrate (TRICOR) 145 mg tablet, Take 1 tablet (145 mg total) by mouth daily, Disp: 90 tablet, Rfl: 1  ferrous sulfate 325 (65 Fe) mg tablet, Take 325 mg by mouth 2 (two) times a day with meals, Disp: , Rfl:   gabapentin (NEURONTIN) 400 mg capsule, Take 1 capsule (400 mg total) by mouth 3 (three) times a day, Disp: 90 capsule, Rfl: 3  glucose blood (OneTouch Verio) test strip, CHECK BS AT MORNING FASTING , AND AFTER A MEAL, Disp: 100 strip, Rfl: 3  insulin degludec [place] FlexTouch) 100 units/mL injection pen, Inject 70 Units under the skin daily at bedtime, Disp: 15 mL, Rfl: 3  Lancets (accu-chek soft touch) lancets, bs check qid, Disp: 100 each, Rfl: 2   metFORMIN (GLUCOPHAGE) 1000 MG tablet, Take 1 tablet (1,000 mg total) by mouth 2 (two) times a day with meals for 5000 doses, Disp: 180 tablet, Rfl: 1  methimazole (TAPAZOLE) 5 mg tablet, , Disp: , Rfl:   omeprazole (PriLOSEC) 40 MG capsule, Take 1 capsule (40 mg total) by mouth 2 (two) times a day, Disp: 30 capsule, Rfl: 2  rosuvastatin (CRESTOR) 40 MG tablet, Take 1 tablet (40 mg total) by mouth every evening, Disp: 90 tablet, Rfl: 1  sitaGLIPtin (Januvia) 100 mg tablet, Take 1 tablet (100 mg total) by mouth daily, Disp: 30 tablet, Rfl: 3    No current facility-administered medications for this visit  Patient Care Team:  Garry Rush as PCP - General (Family Medicine)     Review of Systems:     Review of Systems   Constitutional: Negative for appetite change, chills, fever and unexpected weight change  HENT: Negative for congestion, dental problem, ear pain, hearing loss, postnasal drip, rhinorrhea, sinus pressure, sinus pain, sneezing, sore throat, tinnitus and voice change  Eyes: Negative for pain and visual disturbance  Respiratory: Negative for apnea, cough, chest tightness and shortness of breath  Cardiovascular: Negative for chest pain, palpitations and leg swelling  Gastrointestinal: Negative for abdominal pain, blood in stool, constipation, diarrhea, nausea and vomiting  Endocrine: Negative for cold intolerance, heat intolerance, polydipsia, polyphagia and polyuria  Genitourinary: Negative for decreased urine volume, difficulty urinating, dysuria, frequency and hematuria  Musculoskeletal: Negative for arthralgias, back pain, gait problem, joint swelling and myalgias  Skin: Negative for color change, rash and wound  Allergic/Immunologic: Negative for environmental allergies and food allergies  Neurological: Negative for dizziness, seizures, syncope, weakness, light-headedness, numbness and headaches  Hematological: Negative for adenopathy  Does not bruise/bleed easily  Psychiatric/Behavioral: Negative for sleep disturbance and suicidal ideas  The patient is not nervous/anxious  All other systems reviewed and are negative         Problem List:     Patient Active Problem List   Diagnosis    [facility]    Type 2 diabetes mellitus with stage 3a chronic kidney disease, with long-term current use of insulin (HCC)    HTN (hypertension)    Anemia    Pancytopenia (HCC)    Rheumatoid arthritis (HCC)    Squamous cell lung cancer, right (HCC)    Neuropathy    Hyperlipidemia    COPD (chronic obstructive pulmonary disease) (HCC)    GERD (gastroesophageal reflux disease)    Lactic acidosis    Acquired absence of other left toe(s) ([facility] [address] [address] )    Abdominal aortic aneurysm (AAA) without rupture ([facility] [address] )    Injury of finger of right hand    Prostate cancer ([facility] [address] ) Past Medical and Surgical History:     Past Medical History:   Diagnosis Date    COPD (chronic obstructive pulmonary disease) ([facility] )     Diabetes mellitus ([place] [address] )     Hypertension     Iron deficiency anemia     Lung cancer ([place] [address] )     Lung cancer ([place] [address] )     Lyme disease     Rheumatoid arthritis ([facility] )     Rheumatoid arthritis ([place] [address] )      Past Surgical History:   Procedure Laterality Date    BALLOON ANGIOPLASTY, ARTERY      CLAVICLE SURGERY      TOE AMPUTATION Left 2018    Procedure: AMPUTATION GREAT TOE;  Surgeon: Aliza Liao DPM;  Location: [facility];  Service: Podiatry      Family History:     No family history on file     Social History:     Social History     Socioeconomic History    Marital status: /Civil Union     Spouse name: None    Number of children: None    Years of education: None    Highest education level: None   Occupational History    None   Tobacco Use    Smoking status: Former Smoker     Packs/day: 0 25     Years: 50 00     Pack years: 12 50     Types: Cigarettes     Quit date: 2022     Years since quittin 5    Smokeless tobacco: Never Used   Substance and Sexual Activity    Alcohol use: No    Drug use: No    Sexual activity: None   Other Topics Concern    None   Social History Narrative    None     Social Determinants of Health     Financial Resource Strain: Not on file   Food Insecurity: Not on file   Transportation Needs: Not on file   Physical Activity: Not on file   Stress: Not on file   Social Connections: Not on file   Intimate Partner Violence: Not on file   Housing Stability: Not on file      Medications and Allergies:     Current Outpatient Medications   Medication Sig Dispense Refill    albuterol (PROVENTIL HFA,VENTOLIN HFA) 90 mcg/act inhaler Inhale 2 puffs every 4 (four) hours as needed for wheezing 8 g 2    amLODIPine (NORVASC) 5 mg tablet Take 1 tablet (5 mg total) by mouth daily 90 tablet 1    Anoro Ellipta 62 5-25 MCG/INH inhaler INHALE 1 PUFF BY MOUTH EVERY DAY 60 blister 1    ascorbic acid (VITAMIN C) 250 mg tablet Take 250 mg by mouth 2 (two) times a day      aspirin (ECOTRIN LOW STRENGTH) 81 mg EC tablet 1 tab(s)      atenolol (TENORMIN) 25 mg tablet Take 1 tablet (25 mg total) by mouth daily 90 tablet 1    B-D ULTRAFINE III SHORT PEN 31G X 8 MM MISC Inject under the skin in the morning 30 each 3    fenofibrate (TRICOR) 145 mg tablet Take 1 tablet (145 mg total) by mouth daily 90 tablet 1    ferrous sulfate 325 (65 Fe) mg tablet Take 325 mg by mouth 2 (two) times a day with meals      gabapentin (NEURONTIN) 400 mg capsule Take 1 capsule (400 mg total) by mouth 3 (three) times a day 90 capsule 3    glucose blood (OneTouch Verio) test strip CHECK BS AT MORNING FASTING , AND AFTER A MEAL 100 strip 3    insulin degludec [place] FlexTouch) 100 units/mL injection pen Inject 70 Units under the skin daily at bedtime 15 mL 3    Lancets (accu-chek soft touch) lancets bs check qid 100 each 2    metFORMIN (GLUCOPHAGE) 1000 MG tablet Take 1 tablet (1,000 mg total) by mouth 2 (two) times a day with meals for 5000 doses 180 tablet 1    methimazole (TAPAZOLE) 5 mg tablet       omeprazole (PriLOSEC) 40 MG capsule Take 1 capsule (40 mg total) by mouth 2 (two) times a day 30 capsule 2    rosuvastatin (CRESTOR) 40 MG tablet Take 1 tablet (40 mg total) by mouth every evening 90 tablet 1    sitaGLIPtin (Januvia) 100 mg tablet Take 1 tablet (100 mg total) by mouth daily 30 tablet 3     No current facility-administered medications for this visit       Allergies   Allergen Reactions    Ace Inhibitors Swelling    Angiotensin Receptor Blockers Other (See Comments)     Elevated K+    Clindamycin Diarrhea and Nausea Only    Plaquenil [Hydroxychloroquine] Swelling     Tongue swelling      Immunizations:     Immunization History   Administered Date(s) Administered    Hep B, adult 11/07/2012    INFLUENZA 12/14/1999, 09/18/2000, 10/09/2000, 10/11/2001, 10/08/2002, 10/10/2003, 09/27/2004, 12/08/2005, 10/30/2007, 10/02/2008, 10/02/2009, 10/18/2010, 09/28/2011, 10/24/2012, 09/25/2015, 10/31/2016, 08/29/2018, 11/07/2018, 10/16/2019, 10/06/2020    Influenza Split High Dose Preservative Free IM 10/27/2017    Influenza, high dose seasonal 0 7 mL 09/20/2021    Pneumococcal Conjugate 13-Valent 03/11/2015    Pneumococcal Polysaccharide PPV23 10/02/2008, 01/04/2019    Tdap 10/24/2012    Zoster 11/07/2012    Zoster Vaccine Recombinant 10/20/2019, 01/02/2020    influenza, injectable, quadrivalent 10/06/2020      Health Maintenance:         Topic Date Due    Hepatitis C Screening  Never done    Colorectal Cancer Screening  Never done         Topic Date Due    COVID-19 Vaccine (1) Never done    Influenza Vaccine (1) 09/01/2022      Medicare Screening Tests and Risk Assessments:     Lorne Acosta is here for his Subsequent Wellness visit  Health Risk Assessment:   Patient rates overall health as fair  Patient feels that their physical health rating is same  Patient is satisfied with their life  Eyesight was rated as same  Hearing was rated as same  Patient feels that their emotional and mental health rating is same  Patients states they are always angry  Patient states they are never, rarely unusually tired/fatigued  Pain experienced in the last 7 days has been none  Patient states that he has experienced no weight loss or gain in last 6 months  Depression Screening:   PHQ-2 Score: 0      Fall Risk Screening: In the past year, patient has experienced: history of falling in past year    Number of falls: 1  Injured during fall?: No    Feels unsteady when standing or walking?: No    Worried about falling?: No      Home Safety:  Patient does not have trouble with stairs inside or outside of their home  Patient has working smoke alarms and has working carbon monoxide detector  Home safety hazards include: none  Nutrition:   Current diet is Diabetic  Medications:   Patient is currently taking over-the-counter supplements  OTC medications include: see medication list  Patient is able to manage medications  Activities of Daily Living (ADLs)/Instrumental Activities of Daily Living (IADLs):   Walk and transfer into and out of bed and chair?: Yes  Dress and groom yourself?: Yes    Bathe or shower yourself?: Yes    Feed yourself? Yes  Do your laundry/housekeeping?: Yes  Manage your money, pay your bills and track your expenses?: Yes  Make your own meals?: Yes    Do your own shopping?: Yes    Previous Hospitalizations:   Any hospitalizations or ED visits within the last 12 months?: No      Advance Care Planning:     Five wishes given: Yes      Cognitive Screening:   Provider or family/friend/caregiver concerned regarding cognition?: No    PREVENTIVE SCREENINGS      Cardiovascular Screening:    General: Screening Not Indicated and History Lipid Disorder      Diabetes Screening:     General: Screening Not Indicated and History Diabetes      Colorectal Cancer Screening:     General: Screening Current      Prostate Cancer Screening:    General: History Prostate Cancer      Osteoporosis Screening:    General: Screening Not Indicated      Abdominal Aortic Aneurysm (AAA) Screening:    Risk factors include: age between 73-69 yo and tobacco use        General: Screening Not Indicated and History AAA      Lung Cancer Screening:     General: Screening Not Indicated and History Lung Cancer      Hepatitis C Screening:    General: Patient Declines    Screening, Brief Intervention, and Referral to Treatment (SBIRT)    Screening  Typical number of drinks in a day: 0  Typical number of drinks in a week: 0  Interpretation: Low risk drinking behavior      Single Item Drug Screening:  How often have you used an illegal drug (including marijuana) or a prescription medication for non-medical reasons in the past year? never    Single Item Drug Screen Score: 0  Interpretation: Negative screen for possible drug use disorder    No exam data present     Physical Exam:     /68   Pulse 77   Temp (!) 97 4 °F (36 3 °C)   Ht 5' 10" (1 778 m)   Wt 84 8 kg (187 lb)   SpO2 96%   BMI 26 83 kg/m²     Physical Exam  Vitals and nursing note reviewed  Constitutional:       General: He is not in acute distress  Appearance: He is well-developed  He is not ill-appearing or toxic-appearing  HENT:      Head: Normocephalic and atraumatic  Eyes:      Conjunctiva/sclera: Conjunctivae normal    Cardiovascular:      Rate and Rhythm: Normal rate and regular rhythm  Pulses: no weak pulses          Dorsalis pedis pulses are 1+ on the right side and 1+ on the left side  Posterior tibial pulses are 1+ on the right side and 1+ on the left side  Heart sounds: No murmur heard  Pulmonary:      Effort: Pulmonary effort is normal  No respiratory distress  Breath sounds: Normal breath sounds  Abdominal:      Palpations: Abdomen is soft  Tenderness: There is no abdominal tenderness  Musculoskeletal:      Cervical back: Neck supple  Feet:      Right foot:      Skin integrity: No ulcer, skin breakdown, erythema, warmth, callus or dry skin  Left foot:      Skin integrity: No ulcer, skin breakdown, erythema, warmth, callus or dry skin  Skin:     General: Skin is warm and dry  Neurological:      Mental Status: He is alert            JEROME Ramirez

## 2022-07-21 NOTE — ASSESSMENT & PLAN NOTE
Lab Results   Component Value Date    HGBA1C 7 9 (H) 07/15/2022   Discussed current plan, has discontinued/ inconsistently taken  tresiba  since last visit, would rec restart , reviewed pharmacology,  past and upward trending a1c , monitoring fasting and postprandial   We discussed the importance of controlling blood glucose (hemoglobin A1c less than 7  0)Premeal , even better <110  2hr after a meal <170, even better <140  A1C <7%, even better <6 5%  blood pressure control, and cholesterol to lower the risk for complications  Encouraged advise to check home blood sugars discussed goals in range of therapy    Reviewed recommendations of annual eye exams (ophthalmology) on long foot exam (Podiatry)

## 2022-07-25 ENCOUNTER — HOSPITAL ENCOUNTER (OUTPATIENT)
Dept: ULTRASOUND IMAGING | Facility: CLINIC | Age: 72
Discharge: HOME | End: 2022-07-25
Payer: COMMERCIAL

## 2022-07-25 DIAGNOSIS — I71.40 ABDOMINAL AORTIC ANEURYSM (AAA) WITHOUT RUPTURE: ICD-10-CM

## 2022-07-25 PROCEDURE — 76775 US EXAM ABDO BACK WALL LIM: CPT

## 2022-07-28 ENCOUNTER — TELEPHONE (OUTPATIENT)
Dept: FAMILY MEDICINE CLINIC | Facility: CLINIC | Age: 72
End: 2022-07-28

## 2022-07-28 NOTE — TELEPHONE ENCOUNTER
Kaykay Martin from [address] called stating that us has significant findings

## 2022-08-11 ENCOUNTER — APPOINTMENT (OUTPATIENT)
Dept: LAB | Facility: CLINIC | Age: 72
End: 2022-08-11
Payer: COMMERCIAL

## 2022-08-11 DIAGNOSIS — C61 PROSTATE CANCER (HCC): ICD-10-CM

## 2022-08-11 DIAGNOSIS — E05.90 PRETIBIAL MYXEDEMA: ICD-10-CM

## 2022-08-11 LAB
T4 FREE SERPL-MCNC: 0.96 NG/DL (ref 0.76–1.46)
TSH SERPL DL<=0.05 MIU/L-ACNC: 1.51 UIU/ML (ref 0.45–4.5)

## 2022-08-11 PROCEDURE — 84439 ASSAY OF FREE THYROXINE: CPT

## 2022-08-11 PROCEDURE — 84443 ASSAY THYROID STIM HORMONE: CPT

## 2022-08-11 PROCEDURE — 84153 ASSAY OF PSA TOTAL: CPT

## 2022-08-11 PROCEDURE — 36415 COLL VENOUS BLD VENIPUNCTURE: CPT

## 2022-08-11 PROCEDURE — 84445 ASSAY OF TSI GLOBULIN: CPT

## 2022-08-12 LAB — PSA SERPL DL<=0.01 NG/ML-MCNC: 0.72 NG/ML (ref 0–4)

## 2022-08-13 LAB — TSI SER-ACNC: 12.6 IU/L (ref 0–0.55)

## 2022-08-16 ENCOUNTER — OFFICE VISIT (OUTPATIENT)
Dept: ENDOCRINOLOGY | Age: 72
End: 2022-08-16
Payer: COMMERCIAL

## 2022-08-16 VITALS
OXYGEN SATURATION: 97 % | TEMPERATURE: 97.8 F | BODY MASS INDEX: 26.92 KG/M2 | WEIGHT: 188 LBS | DIASTOLIC BLOOD PRESSURE: 62 MMHG | SYSTOLIC BLOOD PRESSURE: 110 MMHG | HEART RATE: 79 BPM | HEIGHT: 70 IN

## 2022-08-16 DIAGNOSIS — E05.90 HYPERTHYROIDISM: ICD-10-CM

## 2022-08-16 DIAGNOSIS — E11.9 TYPE 2 DIABETES MELLITUS WITHOUT COMPLICATION, WITH LONG-TERM CURRENT USE OF INSULIN (HCC): ICD-10-CM

## 2022-08-16 DIAGNOSIS — E11.65 INADEQUATELY CONTROLLED DIABETES MELLITUS (HCC): ICD-10-CM

## 2022-08-16 DIAGNOSIS — Z79.4 TYPE 2 DIABETES MELLITUS WITHOUT COMPLICATION, WITH LONG-TERM CURRENT USE OF INSULIN (HCC): ICD-10-CM

## 2022-08-16 DIAGNOSIS — I10 ESSENTIAL HYPERTENSION, BENIGN: Primary | ICD-10-CM

## 2022-08-16 PROCEDURE — 1160F RVW MEDS BY RX/DR IN RCRD: CPT | Performed by: INTERNAL MEDICINE

## 2022-08-16 PROCEDURE — 99214 OFFICE O/P EST MOD 30 MIN: CPT | Performed by: INTERNAL MEDICINE

## 2022-08-16 PROCEDURE — 3074F SYST BP LT 130 MM HG: CPT | Performed by: INTERNAL MEDICINE

## 2022-08-16 PROCEDURE — 3078F DIAST BP <80 MM HG: CPT | Performed by: INTERNAL MEDICINE

## 2022-08-16 RX ORDER — INSULIN DEGLUDEC INJECTION 100 U/ML
60 INJECTION, SOLUTION SUBCUTANEOUS
Qty: 15 ML | Refills: 3 | Status: SHIPPED | OUTPATIENT
Start: 2022-08-16 | End: 2022-08-29

## 2022-08-16 NOTE — PROGRESS NOTES
Bobo Gallo 81 y o  male MRN: [id number]    Encounter: [id number]      Assessment/Plan     Assessment:   This is a 81y o -year-old male with  1-uncontrolled T2DM with last A1c level of 7 9 which is higher than before  2-dyslipidemia  3-thyrotoxicosis  4-CKD  5-HTN    Plan:  ***    CC: Diabetes    History of Present Illness     HPI:  ***     Review of Systems    Historical Information   Past Medical History:   Diagnosis Date    Anemia     Aortic aneurysm (HCC)     BPH (benign prostatic hyperplasia)     CAD (coronary artery disease)     Chronic kidney disease     COPD (chronic obstructive pulmonary disease) (HCC)     Diabetes mellitus (HCC)     Dyslipidemia     GERD (gastroesophageal reflux disease)     Graves disease     Hypertension     Iron deficiency anemia     Lactic acidosis     Lung cancer (HCC)     Lyme disease     Neuropathy     Pancytopenia (HCC)     Prostate cancer (HCC)     Rheumatoid arthritis ([address] )      Past Surgical History:   Procedure Laterality Date    BALLOON ANGIOPLASTY, ARTERY      CATARACT EXTRACTION, BILATERAL  2019    CLAVICLE SURGERY      TOE AMPUTATION Left 2018    Procedure: AMPUTATION GREAT TOE;  Surgeon: Marely Adame DPM;  Location: [facility];  Service: Podiatry     Social History   Social History     Substance and Sexual Activity   Alcohol Use No     Social History     Substance and Sexual Activity   Drug Use No     Social History     Tobacco Use   Smoking Status Former Smoker    Packs/day: 0 25    Years: 50 00    Pack years: 12 50    Types: Cigarettes    Quit date: 2022    Years since quittin 6   Smokeless Tobacco Never Used     Family History:   Family History   Problem Relation Age of Onset    Coronary artery disease Father        Meds/Allergies   Current Outpatient Medications   Medication Sig Dispense Refill    albuterol (PROVENTIL HFA,VENTOLIN HFA) 90 mcg/act inhaler Inhale 2 puffs every 4 (four) hours as needed for wheezing 8 g 2    amLODIPine (NORVASC) 5 mg tablet Take 1 tablet (5 mg total) by mouth daily 90 tablet 1    Anoro Ellipta 62 5-25 MCG/INH inhaler INHALE 1 PUFF BY MOUTH EVERY DAY 60 blister 1    ascorbic acid (VITAMIN C) 250 mg tablet Take 250 mg by mouth 2 (two) times a day      aspirin (ECOTRIN LOW STRENGTH) 81 mg EC tablet 1 tab(s)      atenolol (TENORMIN) 25 mg tablet Take 1 tablet (25 mg total) by mouth daily 90 tablet 1    B-D ULTRAFINE III SHORT PEN 31G X 8 MM MISC Inject under the skin in the morning 30 each 3    fenofibrate (TRICOR) 145 mg tablet Take 1 tablet (145 mg total) by mouth daily 90 tablet 1    ferrous sulfate 325 (65 Fe) mg tablet Take 325 mg by mouth 2 (two) times a day with meals      gabapentin (NEURONTIN) 400 mg capsule Take 1 capsule (400 mg total) by mouth 3 (three) times a day 90 capsule 3    glucose blood (OneTouch Verio) test strip CHECK BS AT MORNING FASTING , AND AFTER A MEAL 100 strip 3    insulin degludec Aditya Lieberman FlexTouch) 100 units/mL injection pen Inject 70 Units under the skin daily at bedtime 15 mL 3    Lancets (accu-chek soft touch) lancets bs check qid 100 each 2    metFORMIN (GLUCOPHAGE) 1000 MG tablet Take 1 tablet (1,000 mg total) by mouth 2 (two) times a day with meals for 5000 doses 180 tablet 1    methimazole (TAPAZOLE) 5 mg tablet       omeprazole (PriLOSEC) 40 MG capsule Take 1 capsule (40 mg total) by mouth 2 (two) times a day 30 capsule 2    rosuvastatin (CRESTOR) 40 MG tablet Take 1 tablet (40 mg total) by mouth every evening 90 tablet 1    sitaGLIPtin (Januvia) 100 mg tablet Take 1 tablet (100 mg total) by mouth daily 30 tablet 3     No current facility-administered medications for this visit       Allergies   Allergen Reactions    Ace Inhibitors Swelling    Angiotensin Receptor Blockers Other (See Comments)     Elevated K+    Clindamycin Diarrhea and Nausea Only    Plaquenil [Hydroxychloroquine] Swelling     Tongue swelling       Objective   Vitals: There were no vitals taken for this visit  Physical Exam    The history was obtained from the review of the chart, {HISTORY OBTAINED FROM:[id number]}      Lab Results:   Lab Results   Component Value Date/Time    Hemoglobin A1C 7 9 (H) 07/15/2022 09:38 AM    Hemoglobin A1C 7 4 (H) 04/11/2022 01:21 PM    Hemoglobin A1C 7 5 (H) 03/16/2022 10:10 AM    WBC 9 32 07/15/2022 09:38 AM    WBC 7 96 06/08/2022 08:54 AM    WBC 9 41 04/11/2022 01:21 PM    Hemoglobin 12 4 07/15/2022 09:38 AM    Hemoglobin 13 4 06/08/2022 08:54 AM    Hemoglobin 13 9 04/11/2022 01:21 PM    Hematocrit 39 8 07/15/2022 09:38 AM    Hematocrit 41 3 06/08/2022 08:54 AM    Hematocrit 43 3 04/11/2022 01:21 PM    MCV 88 07/15/2022 09:38 AM    MCV 85 06/08/2022 08:54 AM    MCV 88 04/11/2022 01:21 PM    Platelets 744 19/58/9385 09:38 AM    Platelets 653 78/33/6057 08:54 AM    Platelets 334 93/25/7752 01:21 PM    BUN 27 (H) 07/15/2022 09:38 AM    BUN 19 06/08/2022 08:54 AM    BUN 23 04/11/2022 01:21 PM    Potassium 4 0 07/15/2022 09:38 AM    Potassium 4 2 06/08/2022 08:54 AM    Potassium 4 0 04/11/2022 01:21 PM    Chloride 109 (H) 07/15/2022 09:38 AM    Chloride 104 06/08/2022 08:54 AM    Chloride 103 04/11/2022 01:21 PM    CO2 25 07/15/2022 09:38 AM    CO2 27 06/08/2022 08:54 AM    CO2 28 04/11/2022 01:21 PM    Creatinine 1 42 (H) 07/15/2022 09:38 AM    Creatinine 1 28 06/08/2022 08:54 AM    Creatinine 1 42 (H) 04/11/2022 01:21 PM    AST 22 07/15/2022 09:38 AM    AST 16 06/08/2022 08:54 AM    AST 18 04/11/2022 01:21 PM    ALT 19 07/15/2022 09:38 AM    ALT 18 06/08/2022 08:54 AM    ALT 19 04/11/2022 01:21 PM    Albumin 3 3 (L) 07/15/2022 09:38 AM    Albumin 3 2 (L) 06/08/2022 08:54 AM    Albumin 3 6 04/11/2022 01:21 PM    HDL, Direct 30 (L) 07/15/2022 09:38 AM    HDL, Direct 30 (L) 04/11/2022 01:21 PM    HDL, Direct 29 (L) 03/16/2022 10:10 AM    Triglycerides 104 07/15/2022 09:38 AM    Triglycerides 115 04/11/2022 01:21 PM    Triglycerides 135 03/16/2022 10:10 AM           Imaging Studies: {Results Review Statement:39132}    Portions of the record may have been created with voice recognition software  Occasional wrong word or "sound a like" substitutions may have occurred due to the inherent limitations of voice recognition software  Read the chart carefully and recognize, using context, where substitutions have occurred

## 2022-08-16 NOTE — PROGRESS NOTES
Toña Lopez 81 y o  male MRN: [id number]    Encounter: [id number]      Assessment/Plan     Assessment: This is a 81y o -year-old male with   T2DM: uncontrolled with last A1c level of 7 9% while taking three agents  Graves disease: his last TSH/FT4 while taking methimazole is WNL but his TSI level still elevated  HTN: well controlled on amlodipine and beta blocker/he is allergic to ACE inhs   Dyslipidemia: his last non HDL level is in acceptable range while on two agents  CAD: he is followed up by his cardiologist   Anemia: Last H? H was 7 0 from few months ago    Plan:  1-keep him on current anti DM regimen but add low dose farxiga knowing he has h/o CAD  Though he will bring this up to his urologist also  Knowing he has usually low FBS daily , he was advised to reduce tresiba dose at bed time to 60 U   2-methimazole 5 0 mg/d for time being / re monitor his TFT in 3 months  3-CAD: follow up with Dr Alex Lomeli  4-lung cancer/ prostate cancer diagnosed and treated 3 years ago and still under care of his doctors/    CC: Diabetes    History of Present Illness     HPI:  81 yr old man with h/o T2 DM since 2001  He has no h/o pancreatitis/he monitors his BSx2/d  He denies having hypoglycemic symptoms  He also been diagnosed with Graves disease since few months ago for which he has been taking methimazole  His last eye exam is from few months ago and reportedly (-) for retinopathy  Review of Systems   Constitutional: Negative for chills and fever  HENT: Negative for ear pain and sore throat  Eyes: Negative for pain and visual disturbance  Respiratory: Negative for cough and shortness of breath  Cardiovascular: Negative for chest pain and palpitations  Gastrointestinal: Negative for abdominal pain and vomiting  Genitourinary: Positive for frequency  Negative for dysuria and hematuria  Musculoskeletal: Negative for arthralgias and back pain  Skin: Negative for color change and rash  Neurological: Negative for seizures and syncope  All other systems reviewed and are negative        Historical Information   Past Medical History:   Diagnosis Date    Anemia     Aortic aneurysm (HCC)     BPH (benign prostatic hyperplasia)     CAD (coronary artery disease)     Chronic kidney disease     COPD (chronic obstructive pulmonary disease) (HCC)     Diabetes mellitus (HCC)     Dyslipidemia     GERD (gastroesophageal reflux disease)     Graves disease     Hypertension     Iron deficiency anemia     Lactic acidosis     Lung cancer (HCC)     Lyme disease     Neuropathy     Pancytopenia (HCC)     Prostate cancer (HCC)     Rheumatoid arthritis ([address] )      Past Surgical History:   Procedure Laterality Date    BALLOON ANGIOPLASTY, ARTERY      CATARACT EXTRACTION, BILATERAL  2019    CLAVICLE SURGERY      TOE AMPUTATION Left 2018    Procedure: AMPUTATION GREAT TOE;  Surgeon: Mago Goff DPM;  Location: [facility];  Service: Podiatry     Social History   Social History     Substance and Sexual Activity   Alcohol Use No     Social History     Substance and Sexual Activity   Drug Use No     Social History     Tobacco Use   Smoking Status Former Smoker    Packs/day: 0 25    Years: 50 00    Pack years: 12 50    Types: Cigarettes    Quit date: 2022    Years since quittin 6   Smokeless Tobacco Never Used     Family History:   Family History   Problem Relation Age of Onset    Coronary artery disease Father        Meds/Allergies   Current Outpatient Medications   Medication Sig Dispense Refill    albuterol (PROVENTIL HFA,VENTOLIN HFA) 90 mcg/act inhaler Inhale 2 puffs every 4 (four) hours as needed for wheezing 8 g 2    amLODIPine (NORVASC) 5 mg tablet Take 1 tablet (5 mg total) by mouth daily 90 tablet 1    Anoro Ellipta 62 5-25 MCG/INH inhaler INHALE 1 PUFF BY MOUTH EVERY DAY 60 blister 1    ascorbic acid (VITAMIN C) 250 mg tablet Take 250 mg by mouth 2 (two) times a day      aspirin (ECOTRIN LOW STRENGTH) 81 mg EC tablet 1 tab(s)      atenolol (TENORMIN) 25 mg tablet Take 1 tablet (25 mg total) by mouth daily 90 tablet 1    B-D ULTRAFINE III SHORT PEN 31G X 8 MM MISC Inject under the skin in the morning 30 each 3    fenofibrate (TRICOR) 145 mg tablet Take 1 tablet (145 mg total) by mouth daily 90 tablet 1    ferrous sulfate 325 (65 Fe) mg tablet Take 325 mg by mouth 2 (two) times a day with meals      gabapentin (NEURONTIN) 400 mg capsule Take 1 capsule (400 mg total) by mouth 3 (three) times a day 90 capsule 3    glucose blood (OneTouch Verio) test strip CHECK BS AT MORNING FASTING , AND AFTER A MEAL 100 strip 3    insulin degludec Melecio Ovens FlexTouch) 100 units/mL injection pen Inject 70 Units under the skin daily at bedtime 15 mL 3    Lancets (accu-chek soft touch) lancets bs check qid 100 each 2    metFORMIN (GLUCOPHAGE) 1000 MG tablet Take 1 tablet (1,000 mg total) by mouth 2 (two) times a day with meals for 5000 doses 180 tablet 1    methimazole (TAPAZOLE) 5 mg tablet       omeprazole (PriLOSEC) 40 MG capsule Take 1 capsule (40 mg total) by mouth 2 (two) times a day 30 capsule 2    rosuvastatin (CRESTOR) 40 MG tablet Take 1 tablet (40 mg total) by mouth every evening 90 tablet 1    sitaGLIPtin (Januvia) 100 mg tablet Take 1 tablet (100 mg total) by mouth daily 30 tablet 3     No current facility-administered medications for this visit  Allergies   Allergen Reactions    Ace Inhibitors Swelling    Angiotensin Receptor Blockers Other (See Comments)     Elevated K+    Clindamycin Diarrhea and Nausea Only    Plaquenil [Hydroxychloroquine] Swelling     Tongue swelling       Objective   Vitals: There were no vitals taken for this visit  Physical Exam  Vitals reviewed  Constitutional:       Appearance: Normal appearance     HENT:      Right Ear: External ear normal       Left Ear: External ear normal       Mouth/Throat:      Mouth: Mucous membranes are dry    Neck:      Thyroid: No thyromegaly or thyroid tenderness  Cardiovascular:      Rate and Rhythm: Normal rate and regular rhythm  Pulses:           Dorsalis pedis pulses are 1+ on the right side and 1+ on the left side  Heart sounds: No murmur heard  Pulmonary:      Effort: Pulmonary effort is normal       Breath sounds: Normal breath sounds  No wheezing  Abdominal:      General: Abdomen is flat  Palpations: Abdomen is soft  There is no mass  Musculoskeletal:         General: No swelling  Cervical back: Normal range of motion  Right foot: No deformity  Left foot: No deformity  Right Lower Extremity: Right leg is amputated below ankle  Feet:      Right foot:      Skin integrity: Skin integrity normal       Left foot:      Skin integrity: Skin integrity normal       Comments: He also has some shin skin abrasion  Lymphadenopathy:      Cervical: No cervical adenopathy  Skin:     General: Skin is warm  Neurological:      General: No focal deficit present  Mental Status: He is alert and oriented to person, place, and time  Psychiatric:         Mood and Affect: Mood normal          The history was obtained from the review of the chart, patient      Lab Results:   Lab Results   Component Value Date/Time    Hemoglobin A1C 7 9 (H) 07/15/2022 09:38 AM    Hemoglobin A1C 7 4 (H) 04/11/2022 01:21 PM    Hemoglobin A1C 7 5 (H) 03/16/2022 10:10 AM    WBC 9 32 07/15/2022 09:38 AM    WBC 7 96 06/08/2022 08:54 AM    WBC 9 41 04/11/2022 01:21 PM    Hemoglobin 12 4 07/15/2022 09:38 AM    Hemoglobin 13 4 06/08/2022 08:54 AM    Hemoglobin 13 9 04/11/2022 01:21 PM    Hematocrit 39 8 07/15/2022 09:38 AM    Hematocrit 41 3 06/08/2022 08:54 AM    Hematocrit 43 3 04/11/2022 01:21 PM    MCV 88 07/15/2022 09:38 AM    MCV 85 06/08/2022 08:54 AM    MCV 88 04/11/2022 01:21 PM    Platelets 740 14/06/1069 09:38 AM    Platelets 305 72/02/0867 08:54 AM    Platelets 618 68/84/3570 01:21 PM BUN 27 (H) 07/15/2022 09:38 AM    BUN 19 06/08/2022 08:54 AM    BUN 23 04/11/2022 01:21 PM    Potassium 4 0 07/15/2022 09:38 AM    Potassium 4 2 06/08/2022 08:54 AM    Potassium 4 0 04/11/2022 01:21 PM    Chloride 109 (H) 07/15/2022 09:38 AM    Chloride 104 06/08/2022 08:54 AM    Chloride 103 04/11/2022 01:21 PM    CO2 25 07/15/2022 09:38 AM    CO2 27 06/08/2022 08:54 AM    CO2 28 04/11/2022 01:21 PM    Creatinine 1 42 (H) 07/15/2022 09:38 AM    Creatinine 1 28 06/08/2022 08:54 AM    Creatinine 1 42 (H) 04/11/2022 01:21 PM    AST 22 07/15/2022 09:38 AM    AST 16 06/08/2022 08:54 AM    AST 18 04/11/2022 01:21 PM    ALT 19 07/15/2022 09:38 AM    ALT 18 06/08/2022 08:54 AM    ALT 19 04/11/2022 01:21 PM    Albumin 3 3 (L) 07/15/2022 09:38 AM    Albumin 3 2 (L) 06/08/2022 08:54 AM    Albumin 3 6 04/11/2022 01:21 PM    HDL, Direct 30 (L) 07/15/2022 09:38 AM    HDL, Direct 30 (L) 04/11/2022 01:21 PM    HDL, Direct 29 (L) 03/16/2022 10:10 AM    Triglycerides 104 07/15/2022 09:38 AM    Triglycerides 115 04/11/2022 01:21 PM    Triglycerides 135 03/16/2022 10:10 AM           Imaging Studies: I have personally reviewed pertinent reports  Portions of the record may have been created with voice recognition software  Occasional wrong word or "sound a like" substitutions may have occurred due to the inherent limitations of voice recognition software  Read the chart carefully and recognize, using context, where substitutions have occurred

## 2022-08-18 DIAGNOSIS — J44.9 CHRONIC OBSTRUCTIVE PULMONARY DISEASE, UNSPECIFIED COPD TYPE (HCC): ICD-10-CM

## 2022-08-24 DIAGNOSIS — K21.9 GASTROESOPHAGEAL REFLUX DISEASE WITHOUT ESOPHAGITIS: ICD-10-CM

## 2022-08-24 DIAGNOSIS — E11.9 TYPE 2 DIABETES MELLITUS WITHOUT COMPLICATION, WITH LONG-TERM CURRENT USE OF INSULIN (HCC): ICD-10-CM

## 2022-08-24 DIAGNOSIS — Z79.4 TYPE 2 DIABETES MELLITUS WITHOUT COMPLICATION, WITH LONG-TERM CURRENT USE OF INSULIN (HCC): ICD-10-CM

## 2022-08-24 RX ORDER — OMEPRAZOLE 40 MG/1
CAPSULE, DELAYED RELEASE ORAL
Qty: 180 CAPSULE | Refills: 3 | Status: SHIPPED | OUTPATIENT
Start: 2022-08-24

## 2022-08-29 ENCOUNTER — TELEPHONE (OUTPATIENT)
Dept: FAMILY MEDICINE CLINIC | Facility: CLINIC | Age: 72
End: 2022-08-29

## 2022-08-29 RX ORDER — INSULIN DEGLUDEC INJECTION 100 U/ML
INJECTION, SOLUTION SUBCUTANEOUS
Qty: 3 ML | Refills: 11 | Status: SHIPPED | OUTPATIENT
Start: 2022-08-29

## 2022-08-29 RX ORDER — GABAPENTIN 600 MG/1
TABLET ORAL
COMMUNITY
Start: 2022-08-22 | End: 2022-11-16 | Stop reason: SDUPTHER

## 2022-08-29 RX ORDER — TAMSULOSIN HYDROCHLORIDE 0.4 MG/1
0.4 CAPSULE ORAL
COMMUNITY
Start: 2022-05-13 | End: 2023-05-13

## 2022-08-29 RX ORDER — TAMSULOSIN HYDROCHLORIDE 0.4 MG/1
CAPSULE ORAL
COMMUNITY
Start: 2022-08-11 | End: 2022-11-16

## 2022-09-06 ENCOUNTER — TELEPHONE (OUTPATIENT)
Dept: ADMINISTRATIVE | Facility: OTHER | Age: 72
End: 2022-09-06

## 2022-09-06 NOTE — LETTER
Diabetic Eye Exam Form    Date Requested: 22  Patient: Patrick Salmeron  Patient : 1950   Referring Provider: JEROME Selby    DIABETIC Eye Exam Date _______________________________    Type of Exam MUST be documented for Diabetic Eye Exams  Please CHECK ONE  Retinal Exam       Dilated Retinal Exam       OCT       Optomap-Iris Exam      Fundus Photography     Left Eye - Please check Retinopathy AND Type or No Retinopathy      Exam did show retinopathy    Exam did not show retinopathy         Mild     Proliferative           Moderate    Severe            None         Right Eye - Please check Retinopathy AND Type or No Retinopathy     Exam did show retinopathy    Exam did not show retinopathy         Mild     Proliferative        Moderate    Severe        None       Comments __________________________________________________________    Practice Providing Exam ______________________________________________    Exam Performed By (print name) _______________________________________      Provider Signature ___________________________________________________    These reports are needed for  compliance  Please fax this completed form and a copy of the Diabetic Eye Exam report to our office located at [place] [address] as soon as possible via [phone] dina Polanco Beauregard: Phone [phone]  We thank you for your assistance in treating our mutual patient

## 2022-09-06 NOTE — TELEPHONE ENCOUNTER
Upon review of the In Basket request and the patient's chart, initial outreach has been made via fax, please see Contacts section for details       Thank you  Mark Sarmiento, [address] [phone] Fax [phone]

## 2022-09-06 NOTE — TELEPHONE ENCOUNTER
----- Message from Bart Miles sent at 9/2/2022  3:21 PM EDT -----  Regarding: DM eye  09/02/22 3:21 PM    Hello, our patient Majo Ng has had Diabetic Eye Exam completed/performed   Please assist in updating the patient chart by making an External outreach to [facility] located in [place]  The date of service is 1/19/2022    Thank you,  To Shultz MA   [facility] [address] [facility]

## 2022-09-09 DIAGNOSIS — E11.22 TYPE 2 DIABETES MELLITUS WITH STAGE 3A CHRONIC KIDNEY DISEASE, WITH LONG-TERM CURRENT USE OF INSULIN (HCC): ICD-10-CM

## 2022-09-09 DIAGNOSIS — N18.31 TYPE 2 DIABETES MELLITUS WITH STAGE 3A CHRONIC KIDNEY DISEASE, WITH LONG-TERM CURRENT USE OF INSULIN (HCC): ICD-10-CM

## 2022-09-09 DIAGNOSIS — Z79.4 TYPE 2 DIABETES MELLITUS WITH STAGE 3A CHRONIC KIDNEY DISEASE, WITH LONG-TERM CURRENT USE OF INSULIN (HCC): ICD-10-CM

## 2022-09-09 PROCEDURE — 3066F NEPHROPATHY DOC TX: CPT | Performed by: INTERNAL MEDICINE

## 2022-09-12 RX ORDER — PEN NEEDLE, DIABETIC 31 GX5/16"
NEEDLE, DISPOSABLE MISCELLANEOUS DAILY
Qty: 30 EACH | Refills: 0 | Status: SHIPPED | OUTPATIENT
Start: 2022-09-12

## 2022-09-14 DIAGNOSIS — E78.5 HYPERLIPIDEMIA, UNSPECIFIED HYPERLIPIDEMIA TYPE: ICD-10-CM

## 2022-09-14 RX ORDER — ROSUVASTATIN CALCIUM 40 MG/1
40 TABLET, COATED ORAL EVERY EVENING
Qty: 90 TABLET | Refills: 0 | Status: SHIPPED | OUTPATIENT
Start: 2022-09-14

## 2022-10-03 ENCOUNTER — APPOINTMENT (OUTPATIENT)
Dept: LAB | Facility: CLINIC | Age: 72
End: 2022-10-03
Payer: COMMERCIAL

## 2022-10-03 DIAGNOSIS — C61 MALIGNANT NEOPLASM OF PROSTATE (HCC): ICD-10-CM

## 2022-10-03 LAB — PSA SERPL-MCNC: 0.7 NG/ML (ref 0–4)

## 2022-10-03 PROCEDURE — 84153 ASSAY OF PSA TOTAL: CPT

## 2022-10-17 ENCOUNTER — APPOINTMENT (OUTPATIENT)
Dept: LAB | Facility: CLINIC | Age: 72
End: 2022-10-17
Payer: COMMERCIAL

## 2022-10-17 DIAGNOSIS — C34.11 MALIGNANT NEOPLASM OF UPPER LOBE OF RIGHT LUNG (HCC): ICD-10-CM

## 2022-10-17 LAB
ALBUMIN SERPL BCP-MCNC: 3.2 G/DL (ref 3.5–5)
ALP SERPL-CCNC: 55 U/L (ref 46–116)
ALT SERPL W P-5'-P-CCNC: 20 U/L (ref 12–78)
ANION GAP SERPL CALCULATED.3IONS-SCNC: 7 MMOL/L (ref 4–13)
AST SERPL W P-5'-P-CCNC: 21 U/L (ref 5–45)
BASOPHILS # BLD AUTO: 0.08 THOUSANDS/ΜL (ref 0–0.1)
BASOPHILS NFR BLD AUTO: 1 % (ref 0–1)
BILIRUB SERPL-MCNC: 0.25 MG/DL (ref 0.2–1)
BUN SERPL-MCNC: 27 MG/DL (ref 5–25)
CALCIUM ALBUM COR SERPL-MCNC: 10 MG/DL (ref 8.3–10.1)
CALCIUM SERPL-MCNC: 9.4 MG/DL (ref 8.3–10.1)
CHLORIDE SERPL-SCNC: 107 MMOL/L (ref 96–108)
CO2 SERPL-SCNC: 25 MMOL/L (ref 21–32)
CREAT SERPL-MCNC: 1.52 MG/DL (ref 0.6–1.3)
EOSINOPHIL # BLD AUTO: 0.31 THOUSAND/ΜL (ref 0–0.61)
EOSINOPHIL NFR BLD AUTO: 4 % (ref 0–6)
ERYTHROCYTE [DISTWIDTH] IN BLOOD BY AUTOMATED COUNT: 16.7 % (ref 11.6–15.1)
GFR SERPL CREATININE-BSD FRML MDRD: 45 ML/MIN/1.73SQ M
GLUCOSE P FAST SERPL-MCNC: 149 MG/DL (ref 65–99)
HCT VFR BLD AUTO: 42.2 % (ref 36.5–49.3)
HGB BLD-MCNC: 13.2 G/DL (ref 12–17)
IMM GRANULOCYTES # BLD AUTO: 0.03 THOUSAND/UL (ref 0–0.2)
IMM GRANULOCYTES NFR BLD AUTO: 0 % (ref 0–2)
LYMPHOCYTES # BLD AUTO: 0.68 THOUSANDS/ΜL (ref 0.6–4.47)
LYMPHOCYTES NFR BLD AUTO: 9 % (ref 14–44)
MCH RBC QN AUTO: 28.1 PG (ref 26.8–34.3)
MCHC RBC AUTO-ENTMCNC: 31.3 G/DL (ref 31.4–37.4)
MCV RBC AUTO: 90 FL (ref 82–98)
MONOCYTES # BLD AUTO: 0.72 THOUSAND/ΜL (ref 0.17–1.22)
MONOCYTES NFR BLD AUTO: 10 % (ref 4–12)
NEUTROPHILS # BLD AUTO: 5.48 THOUSANDS/ΜL (ref 1.85–7.62)
NEUTS SEG NFR BLD AUTO: 76 % (ref 43–75)
NRBC BLD AUTO-RTO: 0 /100 WBCS
PLATELET # BLD AUTO: 228 THOUSANDS/UL (ref 149–390)
PMV BLD AUTO: 10.6 FL (ref 8.9–12.7)
POTASSIUM SERPL-SCNC: 4.5 MMOL/L (ref 3.5–5.3)
PROT SERPL-MCNC: 7.8 G/DL (ref 6.4–8.4)
RBC # BLD AUTO: 4.7 MILLION/UL (ref 3.88–5.62)
SODIUM SERPL-SCNC: 139 MMOL/L (ref 135–147)
WBC # BLD AUTO: 7.3 THOUSAND/UL (ref 4.31–10.16)

## 2022-10-17 PROCEDURE — 85025 COMPLETE CBC W/AUTO DIFF WBC: CPT

## 2022-10-17 PROCEDURE — 36415 COLL VENOUS BLD VENIPUNCTURE: CPT

## 2022-10-17 PROCEDURE — 80053 COMPREHEN METABOLIC PANEL: CPT

## 2022-10-27 DIAGNOSIS — J44.9 CHRONIC OBSTRUCTIVE PULMONARY DISEASE, UNSPECIFIED COPD TYPE (HCC): ICD-10-CM

## 2022-11-14 ENCOUNTER — APPOINTMENT (OUTPATIENT)
Dept: LAB | Facility: CLINIC | Age: 72
End: 2022-11-14

## 2022-11-14 DIAGNOSIS — E05.90 HYPERTHYROIDISM: ICD-10-CM

## 2022-11-14 DIAGNOSIS — N18.31 TYPE 2 DIABETES MELLITUS WITH STAGE 3A CHRONIC KIDNEY DISEASE, WITH LONG-TERM CURRENT USE OF INSULIN (HCC): ICD-10-CM

## 2022-11-14 DIAGNOSIS — E11.22 TYPE 2 DIABETES MELLITUS WITH STAGE 3A CHRONIC KIDNEY DISEASE, WITH LONG-TERM CURRENT USE OF INSULIN (HCC): ICD-10-CM

## 2022-11-14 DIAGNOSIS — Z79.4 TYPE 2 DIABETES MELLITUS WITH STAGE 3A CHRONIC KIDNEY DISEASE, WITH LONG-TERM CURRENT USE OF INSULIN (HCC): ICD-10-CM

## 2022-11-14 DIAGNOSIS — I10 PRIMARY HYPERTENSION: Chronic | ICD-10-CM

## 2022-11-14 DIAGNOSIS — I10 ESSENTIAL HYPERTENSION, BENIGN: ICD-10-CM

## 2022-11-14 DIAGNOSIS — E11.65 INADEQUATELY CONTROLLED DIABETES MELLITUS (HCC): ICD-10-CM

## 2022-11-14 LAB
ALBUMIN SERPL BCP-MCNC: 3.3 G/DL (ref 3.5–5)
ALP SERPL-CCNC: 54 U/L (ref 46–116)
ALT SERPL W P-5'-P-CCNC: 17 U/L (ref 12–78)
ANION GAP SERPL CALCULATED.3IONS-SCNC: 5 MMOL/L (ref 4–13)
AST SERPL W P-5'-P-CCNC: 18 U/L (ref 5–45)
BASOPHILS # BLD AUTO: 0.1 THOUSANDS/ÂΜL (ref 0–0.1)
BASOPHILS NFR BLD AUTO: 1 % (ref 0–1)
BILIRUB SERPL-MCNC: 0.45 MG/DL (ref 0.2–1)
BUN SERPL-MCNC: 26 MG/DL (ref 5–25)
CALCIUM ALBUM COR SERPL-MCNC: 10.3 MG/DL (ref 8.3–10.1)
CALCIUM SERPL-MCNC: 9.7 MG/DL (ref 8.3–10.1)
CHLORIDE SERPL-SCNC: 109 MMOL/L (ref 96–108)
CO2 SERPL-SCNC: 27 MMOL/L (ref 21–32)
CREAT SERPL-MCNC: 1.4 MG/DL (ref 0.6–1.3)
EOSINOPHIL # BLD AUTO: 0.43 THOUSAND/ÂΜL (ref 0–0.61)
EOSINOPHIL NFR BLD AUTO: 5 % (ref 0–6)
ERYTHROCYTE [DISTWIDTH] IN BLOOD BY AUTOMATED COUNT: 16.4 % (ref 11.6–15.1)
EST. AVERAGE GLUCOSE BLD GHB EST-MCNC: 143 MG/DL
GFR SERPL CREATININE-BSD FRML MDRD: 50 ML/MIN/1.73SQ M
GLUCOSE P FAST SERPL-MCNC: 52 MG/DL (ref 65–99)
HBA1C MFR BLD: 6.6 %
HCT VFR BLD AUTO: 44.3 % (ref 36.5–49.3)
HGB BLD-MCNC: 13.7 G/DL (ref 12–17)
IMM GRANULOCYTES # BLD AUTO: 0.03 THOUSAND/UL (ref 0–0.2)
IMM GRANULOCYTES NFR BLD AUTO: 0 % (ref 0–2)
LYMPHOCYTES # BLD AUTO: 0.78 THOUSANDS/ÂΜL (ref 0.6–4.47)
LYMPHOCYTES NFR BLD AUTO: 9 % (ref 14–44)
MCH RBC QN AUTO: 27.7 PG (ref 26.8–34.3)
MCHC RBC AUTO-ENTMCNC: 30.9 G/DL (ref 31.4–37.4)
MCV RBC AUTO: 90 FL (ref 82–98)
MONOCYTES # BLD AUTO: 0.88 THOUSAND/ÂΜL (ref 0.17–1.22)
MONOCYTES NFR BLD AUTO: 10 % (ref 4–12)
NEUTROPHILS # BLD AUTO: 6.68 THOUSANDS/ÂΜL (ref 1.85–7.62)
NEUTS SEG NFR BLD AUTO: 75 % (ref 43–75)
NRBC BLD AUTO-RTO: 0 /100 WBCS
PLATELET # BLD AUTO: 230 THOUSANDS/UL (ref 149–390)
PMV BLD AUTO: 10.3 FL (ref 8.9–12.7)
POTASSIUM SERPL-SCNC: 3.9 MMOL/L (ref 3.5–5.3)
PROT SERPL-MCNC: 8.2 G/DL (ref 6.4–8.4)
RBC # BLD AUTO: 4.94 MILLION/UL (ref 3.88–5.62)
SODIUM SERPL-SCNC: 141 MMOL/L (ref 135–147)
T4 FREE SERPL-MCNC: 1 NG/DL (ref 0.76–1.46)
TSH SERPL DL<=0.05 MIU/L-ACNC: 2.12 UIU/ML (ref 0.45–4.5)
WBC # BLD AUTO: 8.9 THOUSAND/UL (ref 4.31–10.16)

## 2022-11-16 ENCOUNTER — OFFICE VISIT (OUTPATIENT)
Dept: ENDOCRINOLOGY | Age: 72
End: 2022-11-16

## 2022-11-16 ENCOUNTER — RA CDI HCC (OUTPATIENT)
Dept: OTHER | Facility: HOSPITAL | Age: 72
End: 2022-11-16

## 2022-11-16 VITALS
OXYGEN SATURATION: 90 % | WEIGHT: 190 LBS | HEIGHT: 70 IN | BODY MASS INDEX: 27.2 KG/M2 | TEMPERATURE: 98.3 F | HEART RATE: 79 BPM | SYSTOLIC BLOOD PRESSURE: 110 MMHG | DIASTOLIC BLOOD PRESSURE: 60 MMHG

## 2022-11-16 DIAGNOSIS — N18.31 TYPE 2 DIABETES MELLITUS WITH STAGE 3A CHRONIC KIDNEY DISEASE, WITH LONG-TERM CURRENT USE OF INSULIN (HCC): Primary | ICD-10-CM

## 2022-11-16 DIAGNOSIS — E11.22 TYPE 2 DIABETES MELLITUS WITH STAGE 3A CHRONIC KIDNEY DISEASE, WITH LONG-TERM CURRENT USE OF INSULIN (HCC): Primary | ICD-10-CM

## 2022-11-16 DIAGNOSIS — I71.40 ABDOMINAL AORTIC ANEURYSM (AAA) WITHOUT RUPTURE, UNSPECIFIED PART: ICD-10-CM

## 2022-11-16 DIAGNOSIS — C61 PROSTATE CANCER (HCC): ICD-10-CM

## 2022-11-16 DIAGNOSIS — E11.22 TYPE 2 DIABETES MELLITUS WITH STAGE 3A CHRONIC KIDNEY DISEASE, WITH LONG-TERM CURRENT USE OF INSULIN (HCC): ICD-10-CM

## 2022-11-16 DIAGNOSIS — E83.52 HYPERCALCEMIA: ICD-10-CM

## 2022-11-16 DIAGNOSIS — N18.31 TYPE 2 DIABETES MELLITUS WITH STAGE 3A CHRONIC KIDNEY DISEASE, WITH LONG-TERM CURRENT USE OF INSULIN (HCC): ICD-10-CM

## 2022-11-16 DIAGNOSIS — Z79.4 TYPE 2 DIABETES MELLITUS WITH STAGE 3A CHRONIC KIDNEY DISEASE, WITH LONG-TERM CURRENT USE OF INSULIN (HCC): Primary | ICD-10-CM

## 2022-11-16 DIAGNOSIS — I10 PRIMARY HYPERTENSION: Chronic | ICD-10-CM

## 2022-11-16 DIAGNOSIS — Z79.4 TYPE 2 DIABETES MELLITUS WITH STAGE 3A CHRONIC KIDNEY DISEASE, WITH LONG-TERM CURRENT USE OF INSULIN (HCC): ICD-10-CM

## 2022-11-16 DIAGNOSIS — E78.5 DYSLIPIDEMIA: ICD-10-CM

## 2022-11-16 DIAGNOSIS — E05.90 HYPERTHYROIDISM: ICD-10-CM

## 2022-11-16 RX ORDER — GABAPENTIN 600 MG/1
TABLET ORAL
COMMUNITY
Start: 2022-08-22

## 2022-11-16 NOTE — PROGRESS NOTES
Flor Batter 81 y o  male MRN: [id number]    Encounter: [id number]      Assessment/Plan     Assessment: This is a 81y o -year-old male with   1-T2DM: better controlled on metformin/ farxiga/ januvia and lantus/ his A1c has dropped to 6 6% and he denies having hypoglycemic symptoms but his FBS at lab was 56 ( his glucometer was 120 45 min prior to that)  His GFR is still in safe range to be kept on metformin but needs hanivia dose adjustment  2-hyperthyroidism: euthyroid/ TSH=2 0 liana/   3-HTN: on multiple meds and controlled/ I suggest one time nephro consult  4-dyslipidemia: on two agents  5-increased serum calcium: no h/o kidney stones or pathological Fx: will look into this  Plan:  All meds same but reduce lantus to 50 U q hs and januvia 50 mg/d  2-reduce methimazole to 2 5 mg/d  3-eye exam report  4-RTV in 4 months with A1c, lipid panel, urine microalb/cr  TSh  IPTH/ 25OHD, alb/ ca    CC: Diabetes/ hyperthyroidism    History of Present Illness     HPI:  T2DM  Hyperthyroidism/ CKDIII,dyslipidemia, CAD,HTN     Review of Systems   Constitutional: Negative for appetite change, fatigue and unexpected weight change  HENT: Negative for trouble swallowing  Eyes: Negative for visual disturbance  Respiratory: Negative for cough, chest tightness and shortness of breath  Cardiovascular: Negative for chest pain, palpitations and leg swelling  Gastrointestinal: Negative for abdominal distention, abdominal pain, blood in stool, constipation, diarrhea, nausea and vomiting  Endocrine: Negative for polyphagia and polyuria  Genitourinary: Negative for dysuria, frequency and genital sores  Skin: Negative for rash and wound  Neurological: Positive for numbness  Negative for weakness and headaches  Under care of a podiatrist and will be seeing a neurologist? soon   Hematological: Does not bruise/bleed easily  Psychiatric/Behavioral: Negative for confusion and sleep disturbance  Historical Information   Past Medical History:   Diagnosis Date   • Anemia    • Aortic aneurysm (HCC)    • BPH (benign prostatic hyperplasia)    • CAD (coronary artery disease)    • Chronic kidney disease    • COPD (chronic obstructive pulmonary disease) (HCC)    • Diabetes mellitus (HCC)    • Dyslipidemia    • GERD (gastroesophageal reflux disease)    • Graves disease    • Hypertension    • Iron deficiency anemia    • Lactic acidosis    • Lung cancer (HCC)    • Lyme disease    • Neuropathy    • Pancytopenia (HCC)    • Prostate cancer (HCC)    • Rheumatoid arthritis ([address] )      Past Surgical History:   Procedure Laterality Date   • BALLOON ANGIOPLASTY, ARTERY     • CATARACT EXTRACTION, BILATERAL     • CLAVICLE SURGERY     • TOE AMPUTATION Left 2018    Procedure: AMPUTATION GREAT TOE;  Surgeon: Anoop Esteban DPM;  Location: [facility];  Service: Podiatry     Social History   Social History     Substance and Sexual Activity   Alcohol Use No     Social History     Substance and Sexual Activity   Drug Use No     Social History     Tobacco Use   Smoking Status Former   • Packs/day: 0 25   • Years: 50 00   • Pack years: 12 50   • Types: Cigarettes   • Quit date: 2022   • Years since quittin 8   Smokeless Tobacco Never     Family History:   Family History   Problem Relation Age of Onset   • Coronary artery disease Father        Meds/Allergies   Current Outpatient Medications   Medication Sig Dispense Refill   • albuterol (PROVENTIL HFA,VENTOLIN HFA) 90 mcg/act inhaler Inhale 2 puffs every 4 (four) hours as needed for wheezing 8 g 2   • amLODIPine (NORVASC) 5 mg tablet Take 1 tablet (5 mg total) by mouth daily 90 tablet 1   • Anoro Ellipta 62 5-25 MCG/INH inhaler INHALE 1 PUFF BY MOUTH EVERY DAY 60 blister 1   • ascorbic acid (VITAMIN C) 250 mg tablet Take 250 mg by mouth 2 (two) times a day     • aspirin (ECOTRIN LOW STRENGTH) 81 mg EC tablet 1 tab(s)     • atenolol (TENORMIN) 25 mg tablet Take 1 tablet (25 mg total) by mouth daily 90 tablet 1   • B-D ULTRAFINE III SHORT PEN 31G X 8 MM MISC Inject under the skin in the morning 30 each 0   • Dapagliflozin Propanediol (Farxiga) 5 MG TABS Take 1 tablet (5 mg total) by mouth daily 90 tablet 1   • fenofibrate (TRICOR) 145 mg tablet Take 1 tablet (145 mg total) by mouth daily 90 tablet 1   • ferrous sulfate 325 (65 Fe) mg tablet Take 325 mg by mouth 2 (two) times a day with meals     • gabapentin (NEURONTIN) 600 MG tablet      • glucose blood (OneTouch Verio) test strip CHECK BS AT MORNING FASTING , AND AFTER A MEAL 100 strip 3   • Lancets (accu-chek soft touch) lancets bs check qid 100 each 2   • metFORMIN (GLUCOPHAGE) 1000 MG tablet Take 1 tablet (1,000 mg total) by mouth 2 (two) times a day with meals for 5000 doses 180 tablet 1   • methimazole (TAPAZOLE) 5 mg tablet Take 5 mg by mouth in the morning     • omeprazole (PriLOSEC) 40 MG capsule TAKE 1 CAPSULE BY MOUTH TWICE A  capsule 3   • rosuvastatin (CRESTOR) 40 MG tablet Take 1 tablet (40 mg total) by mouth every evening 90 tablet 0   • sitaGLIPtin (JANUVIA) 50 mg tablet Take 1 tablet (50 mg total) by mouth daily 90 tablet 2   • Tresiba FlexTouch 100 units/mL injection pen INJECT 64 UNITS OF TRESIBA DAILY, (MAY INCREASE UP TO 80 UNITS AS NEEDED DANIEL ON THE CHEMO) 3 mL 11   • tamsulosin (FLOMAX) 0 4 mg Take 0 4 mg by mouth (Patient not taking: Reported on 11/16/2022)       No current facility-administered medications for this visit  Allergies   Allergen Reactions   • Ace Inhibitors Swelling   • Angiotensin Receptor Blockers Other (See Comments)     Elevated K+   • Clindamycin Diarrhea and Nausea Only   • Plaquenil [Hydroxychloroquine] Swelling     Tongue swelling       Objective   Vitals: Blood pressure 110/60, pulse 79, temperature 98 3 °F (36 8 °C), temperature source Temporal, height 5' 10" (1 778 m), weight 86 2 kg (190 lb), SpO2 90 %  Physical Exam  Vitals reviewed     Constitutional: Appearance: He is obese  HENT:      Head: Normocephalic and atraumatic  Mouth/Throat:      Mouth: Mucous membranes are moist    Eyes:      General: Scleral icterus present  Extraocular Movements: Extraocular movements intact  Comments: ? subicterus sclera   Neck:      Vascular: Carotid bruit present  Cardiovascular:      Rate and Rhythm: Normal rate and regular rhythm  Pulses: Normal pulses  Heart sounds: Normal heart sounds  No murmur heard  No friction rub  No gallop  Pulmonary:      Effort: Pulmonary effort is normal       Breath sounds: Normal breath sounds  No stridor  No wheezing, rhonchi or rales  Abdominal:      General: Bowel sounds are normal       Palpations: Abdomen is soft  There is no mass  Musculoskeletal:         General: No swelling or deformity  Cervical back: No tenderness  Right lower leg: No edema  Left lower leg: No edema  Lymphadenopathy:      Cervical: No cervical adenopathy  Skin:     General: Skin is warm  Coloration: Skin is not jaundiced  Findings: No rash  Neurological:      General: No focal deficit present  Mental Status: He is alert and oriented to person, place, and time  Psychiatric:         Mood and Affect: Mood normal          Behavior: Behavior normal          The history was obtained from the review of the chart, patient      Lab Results:   Lab Results   Component Value Date/Time    Hemoglobin A1C 6 6 (H) 11/14/2022 08:21 AM    Hemoglobin A1C 7 9 (H) 07/15/2022 09:38 AM    Hemoglobin A1C 7 4 (H) 04/11/2022 01:21 PM    WBC 8 90 11/14/2022 08:21 AM    WBC 7 30 10/17/2022 10:47 AM    WBC 9 32 07/15/2022 09:38 AM    Hemoglobin 13 7 11/14/2022 08:21 AM    Hemoglobin 13 2 10/17/2022 10:47 AM    Hemoglobin 12 4 07/15/2022 09:38 AM    Hematocrit 44 3 11/14/2022 08:21 AM    Hematocrit 42 2 10/17/2022 10:47 AM    Hematocrit 39 8 07/15/2022 09:38 AM    MCV 90 11/14/2022 08:21 AM    MCV 90 10/17/2022 10:47 AM    MCV 88 07/15/2022 09:38 AM    Platelets 860 31/05/0747 08:21 AM    Platelets 938 18/60/9224 10:47 AM    Platelets 255 44/55/4227 09:38 AM    BUN 26 (H) 11/14/2022 08:21 AM    BUN 27 (H) 10/17/2022 10:47 AM    BUN 27 (H) 07/15/2022 09:38 AM    Potassium 3 9 11/14/2022 08:21 AM    Potassium 4 5 10/17/2022 10:47 AM    Potassium 4 0 07/15/2022 09:38 AM    Chloride 109 (H) 11/14/2022 08:21 AM    Chloride 107 10/17/2022 10:47 AM    Chloride 109 (H) 07/15/2022 09:38 AM    CO2 27 11/14/2022 08:21 AM    CO2 25 10/17/2022 10:47 AM    CO2 25 07/15/2022 09:38 AM    Creatinine 1 40 (H) 11/14/2022 08:21 AM    Creatinine 1 52 (H) 10/17/2022 10:47 AM    Creatinine 1 42 (H) 07/15/2022 09:38 AM    AST 18 11/14/2022 08:21 AM    AST 21 10/17/2022 10:47 AM    AST 22 07/15/2022 09:38 AM    ALT 17 11/14/2022 08:21 AM    ALT 20 10/17/2022 10:47 AM    ALT 19 07/15/2022 09:38 AM    Albumin 3 3 (L) 11/14/2022 08:21 AM    Albumin 3 2 (L) 10/17/2022 10:47 AM    Albumin 3 3 (L) 07/15/2022 09:38 AM    HDL, Direct 30 (L) 07/15/2022 09:38 AM    HDL, Direct 30 (L) 04/11/2022 01:21 PM    HDL, Direct 29 (L) 03/16/2022 10:10 AM    Triglycerides 104 07/15/2022 09:38 AM    Triglycerides 115 04/11/2022 01:21 PM    Triglycerides 135 03/16/2022 10:10 AM           Imaging Studies: I have personally reviewed pertinent reports  Portions of the record may have been created with voice recognition software  Occasional wrong word or "sound a like" substitutions may have occurred due to the inherent limitations of voice recognition software  Read the chart carefully and recognize, using context, where substitutions have occurred

## 2022-11-16 NOTE — PATIENT INSTRUCTIONS
Your diabetes is better controlled so reduce lantus dose from 60 to 50 U at bed time  Reduce methimazole to 2 5 mg daily (1/2 tab)  I will evaluate your increased serum calcium level   Call us with BS less than 80 and more than 200   Also reduce januvia to 50 mg daily

## 2022-11-16 NOTE — PROGRESS NOTES
[address]  coding opportunities          Chart Reviewed number of suggestions sent to Provider: 2   [id number]  [id number]    Patients Insurance     Medicare Insurance: [employer]

## 2022-11-21 ENCOUNTER — OFFICE VISIT (OUTPATIENT)
Dept: FAMILY MEDICINE CLINIC | Facility: CLINIC | Age: 72
End: 2022-11-21

## 2022-11-21 ENCOUNTER — APPOINTMENT (OUTPATIENT)
Dept: LAB | Facility: CLINIC | Age: 72
End: 2022-11-21

## 2022-11-21 VITALS
WEIGHT: 190 LBS | OXYGEN SATURATION: 97 % | BODY MASS INDEX: 27.2 KG/M2 | HEART RATE: 72 BPM | SYSTOLIC BLOOD PRESSURE: 118 MMHG | HEIGHT: 70 IN | DIASTOLIC BLOOD PRESSURE: 64 MMHG

## 2022-11-21 DIAGNOSIS — M25.50 ARTHRALGIA, UNSPECIFIED JOINT: ICD-10-CM

## 2022-11-21 DIAGNOSIS — Z79.4 TYPE 2 DIABETES MELLITUS WITH STAGE 3A CHRONIC KIDNEY DISEASE, WITH LONG-TERM CURRENT USE OF INSULIN (HCC): Primary | ICD-10-CM

## 2022-11-21 DIAGNOSIS — N18.31 TYPE 2 DIABETES MELLITUS WITH STAGE 3A CHRONIC KIDNEY DISEASE, WITH LONG-TERM CURRENT USE OF INSULIN (HCC): Primary | ICD-10-CM

## 2022-11-21 DIAGNOSIS — I10 PRIMARY HYPERTENSION: Chronic | ICD-10-CM

## 2022-11-21 DIAGNOSIS — C61 PROSTATE CANCER (HCC): ICD-10-CM

## 2022-11-21 DIAGNOSIS — E11.22 TYPE 2 DIABETES MELLITUS WITH STAGE 3A CHRONIC KIDNEY DISEASE, WITH LONG-TERM CURRENT USE OF INSULIN (HCC): Primary | ICD-10-CM

## 2022-11-21 NOTE — ASSESSMENT & PLAN NOTE
Discussed patient's current issues concerns, patient has scheduled follow-up with Rheumatology when to return to previous treat

## 2022-11-21 NOTE — ASSESSMENT & PLAN NOTE
Lab Results   Component Value Date    HGBA1C 6 6 (H) 11/14/2022   Stable, within parameters, continued care endocrinology insulin regimen safety warnings reviewed treatments hypoglycemia  We discussed the importance of controlling blood glucose (hemoglobin A1c less than 7  0)Premeal , even better <110  2hr after a meal <170, even better <140  A1C <7%, even better <6 5%  blood pressure control, and cholesterol to lower the risk for complications  Encouraged advise to check home blood sugars discussed goals in range of therapy    Reviewed recommendations of annual eye exams (ophthalmology) on long foot exam (Podiatry)

## 2022-11-21 NOTE — PROGRESS NOTES
Assessment/Plan:     Chronic Problems:  Type 2 diabetes mellitus with stage 3a chronic kidney disease, with long-term current use of insulin ([facility])    Lab Results   Component Value Date    HGBA1C 6 6 (H) 11/14/2022   Stable, within parameters, continued care endocrinology insulin regimen safety warnings reviewed treatments hypoglycemia  We discussed the importance of controlling blood glucose (hemoglobin A1c less than 7  0)Premeal , even better <110  2hr after a meal <170, even better <140  A1C <7%, even better <6 5%  blood pressure control, and cholesterol to lower the risk for complications  Encouraged advise to check home blood sugars discussed goals in range of therapy  Reviewed recommendations of annual eye exams (ophthalmology) on long foot exam (Podiatry)    Squamous cell lung cancer, right ([facility] [address] )  Stable, continue care [facility]    Rheumatoid arthritis [facility])  Discussed patient's current issues concerns, patient has scheduled follow-up with Rheumatology when to return to previous treat    Abdominal aortic aneurysm (AAA) without rupture ([facility] [address] )  Stable asymptomatic continued follow-up Cardiology    Prostate cancer ([facility] [address] )  Stable, asymptomatic continued follow-up with Urology      Visit Diagnosis:  Diagnoses and all orders for this visit:    Type 2 diabetes mellitus with stage 3a chronic kidney disease, with long-term current use of insulin ([facility] [address] )    Primary hypertension    Prostate cancer ([facility] [address] )    Arthralgia, unspecified joint  -     Lyme Antibody Profile with reflex to WB; Future          Subjective:    Patient ID: Shannan Alcaraz is a 81 y o  male  follow up of diabetes,cad,htn,and other complaints   Diabetic Review of Systems - medication compliance: compliant all of the time, diabetic diet compliance: compliant all of the time, home glucose monitoring: is performed regularly     Cad , scheduled with md winn , test  ekg and f/u stress , carotid   Med   Doing well although has been having issue with arthralgias myalgias hip hands locking stiff, low back discomfort, some discomfort in the knees bilateral, has previously been diagnosed with rheumatoid arthritis, feels arthralgias related to previous diagnosis Lyme, denies any new rash lesion no known insect bites remains active outdoors, denies headache fever chills vision changes has been off of treatment with current Oncology treatments2, has scheduled follow-up Rheumatology  Lung cancer/prostate cancer, treatment per Hematology Oncology negative changes made  Has schedule follow-up nephrology has noted no changes negative swelling to extremities denies use of NSAIDs  Current Outpatient Medications:  albuterol (PROVENTIL HFA,VENTOLIN HFA) 90 mcg/act inhaler, Inhale 2 puffs every 4 (four) hours as needed for wheezing, Disp: 8 g, Rfl: 2  amLODIPine (NORVASC) 5 mg tablet, Take 1 tablet (5 mg total) by mouth daily, Disp: 90 tablet, Rfl: 1  Anoro Ellipta 62 5-25 MCG/INH inhaler, INHALE 1 PUFF BY MOUTH EVERY DAY, Disp: 60 blister, Rfl: 1  ascorbic acid (VITAMIN C) 250 mg tablet, Take 250 mg by mouth 2 (two) times a day, Disp: , Rfl:   aspirin (ECOTRIN LOW STRENGTH) 81 mg EC tablet, 1 tab(s), Disp: , Rfl:   atenolol (TENORMIN) 25 mg tablet, Take 1 tablet (25 mg total) by mouth daily, Disp: 90 tablet, Rfl: 1  B-D ULTRAFINE III SHORT PEN 31G X 8 MM MISC, Inject under the skin in the morning, Disp: 30 each, Rfl: 0  Dapagliflozin Propanediol (Farxiga) 5 MG TABS, Take 1 tablet (5 mg total) by mouth daily, Disp: 90 tablet, Rfl: 1  fenofibrate (TRICOR) 145 mg tablet, Take 1 tablet (145 mg total) by mouth daily, Disp: 90 tablet, Rfl: 1  ferrous sulfate 325 (65 Fe) mg tablet, Take 325 mg by mouth 2 (two) times a day with meals, Disp: , Rfl:   gabapentin (NEURONTIN) 600 MG tablet, , Disp: , Rfl:   glucose blood (OneTouch Verio) test strip, CHECK BS AT MORNING FASTING , AND AFTER A MEAL, Disp: 100 strip, Rfl: 3  Lancets (accu-chek soft touch) lancets, bs check qid, Disp: 100 each, Rfl: 2   metFORMIN (GLUCOPHAGE) 1000 MG tablet, Take 1 tablet (1,000 mg total) by mouth 2 (two) times a day with meals for 5000 doses, Disp: 180 tablet, Rfl: 1  methimazole (TAPAZOLE) 5 mg tablet, Take 5 mg by mouth in the morning, Disp: , Rfl:   omeprazole (PriLOSEC) 40 MG capsule, TAKE 1 CAPSULE BY MOUTH TWICE A DAY, Disp: 180 capsule, Rfl: 3  rosuvastatin (CRESTOR) 40 MG tablet, Take 1 tablet (40 mg total) by mouth every evening, Disp: 90 tablet, Rfl: 0  sitaGLIPtin (JANUVIA) 50 mg tablet, Take 1 tablet (50 mg total) by mouth daily, Disp: 90 tablet, Rfl: 2  Tresiba FlexTouch 100 units/mL injection pen, INJECT 64 UNITS OF TRESIBA DAILY, (MAY INCREASE UP TO 80 UNITS AS NEEDED DANIEL ON THE CHEMO), Disp: 3 mL, Rfl: 11  tamsulosin (FLOMAX) 0 4 mg, Take 0 4 mg by mouth, Disp: , Rfl:     No current facility-administered medications for this visit                  The following portions of the patient's history were reviewed and updated as appropriate: allergies, current medications, past family history, past medical history, past social history, past surgical history and problem list     Review of Systems      /64   Pulse 72   Ht 5' 10" (1 778 m)   Wt 86 2 kg (190 lb)   SpO2 97%   BMI 27 26 kg/m²   Social History     Socioeconomic History   • Marital status: /Civil Union     Spouse name: Not on file   • Number of children: Not on file   • Years of education: Not on file   • Highest education level: Not on file   Occupational History   • Not on file   Tobacco Use   • Smoking status: Former     Packs/day: 0 25     Years: 50 00     Pack years: 12 50     Types: Cigarettes     Quit date: 2022     Years since quittin 8   • Smokeless tobacco: Never   Vaping Use   • Vaping Use: Never used   Substance and Sexual Activity   • Alcohol use: No   • Drug use: No   • Sexual activity: Not on file   Other Topics Concern   • Not on file   Social History Narrative   • Not on file     Social Determinants of Health     Financial Resource Strain: Not on file   Food Insecurity: Not on file   Transportation Needs: Not on file   Physical Activity: Not on file   Stress: Not on file   Social Connections: Not on file   Intimate Partner Violence: Not on file   Housing Stability: Not on file     Past Medical History:   Diagnosis Date   • Anemia    • Aortic aneurysm (HCC)    • BPH (benign prostatic hyperplasia)    • CAD (coronary artery disease)    • Chronic kidney disease    • COPD (chronic obstructive pulmonary disease) (HCC)    • Diabetes mellitus ([facility] )    • Dyslipidemia    • GERD (gastroesophageal reflux disease)    • Graves disease    • Hypertension    • Iron deficiency anemia    • Lactic acidosis    • Lung cancer (HCC)    • Lyme disease    • Neuropathy    • Pancytopenia (HCC)    • Prostate cancer ([facility] [address] )    • Rheumatoid arthritis ([facility] )      Family History   Problem Relation Age of Onset   • Coronary artery disease Father      Past Surgical History:   Procedure Laterality Date   • BALLOON ANGIOPLASTY, ARTERY  1996   • CATARACT EXTRACTION, BILATERAL  2019   • CLAVICLE SURGERY     • TOE AMPUTATION Left 11/20/2018    Procedure: AMPUTATION GREAT TOE;  Surgeon: Gretchen Mccullough DPM;  Location: [facility];  Service: Podiatry       Current Outpatient Medications:   •  albuterol (PROVENTIL HFA,VENTOLIN HFA) 90 mcg/act inhaler, Inhale 2 puffs every 4 (four) hours as needed for wheezing, Disp: 8 g, Rfl: 2  •  amLODIPine (NORVASC) 5 mg tablet, Take 1 tablet (5 mg total) by mouth daily, Disp: 90 tablet, Rfl: 1  •  Anoro Ellipta 62 5-25 MCG/INH inhaler, INHALE 1 PUFF BY MOUTH EVERY DAY, Disp: 60 blister, Rfl: 1  •  ascorbic acid (VITAMIN C) 250 mg tablet, Take 250 mg by mouth 2 (two) times a day, Disp: , Rfl:   •  aspirin (ECOTRIN LOW STRENGTH) 81 mg EC tablet, 1 tab(s), Disp: , Rfl:   •  atenolol (TENORMIN) 25 mg tablet, Take 1 tablet (25 mg total) by mouth daily, Disp: 90 tablet, Rfl: 1  •  B-D ULTRAFINE III SHORT PEN 31G X 8 MM MISC, Inject under the skin in the morning, Disp: 30 each, Rfl: 0  •  Dapagliflozin Propanediol (Farxiga) 5 MG TABS, Take 1 tablet (5 mg total) by mouth daily, Disp: 90 tablet, Rfl: 1  •  fenofibrate (TRICOR) 145 mg tablet, Take 1 tablet (145 mg total) by mouth daily, Disp: 90 tablet, Rfl: 1  •  ferrous sulfate 325 (65 Fe) mg tablet, Take 325 mg by mouth 2 (two) times a day with meals, Disp: , Rfl:   •  gabapentin (NEURONTIN) 600 MG tablet, , Disp: , Rfl:   •  glucose blood (OneTouch Verio) test strip, CHECK BS AT MORNING FASTING , AND AFTER A MEAL, Disp: 100 strip, Rfl: 3  •  Lancets (accu-chek soft touch) lancets, bs check qid, Disp: 100 each, Rfl: 2  •  metFORMIN (GLUCOPHAGE) 1000 MG tablet, Take 1 tablet (1,000 mg total) by mouth 2 (two) times a day with meals for 5000 doses, Disp: 180 tablet, Rfl: 1  •  methimazole (TAPAZOLE) 5 mg tablet, Take 5 mg by mouth in the morning, Disp: , Rfl:   •  omeprazole (PriLOSEC) 40 MG capsule, TAKE 1 CAPSULE BY MOUTH TWICE A DAY, Disp: 180 capsule, Rfl: 3  •  rosuvastatin (CRESTOR) 40 MG tablet, Take 1 tablet (40 mg total) by mouth every evening, Disp: 90 tablet, Rfl: 0  •  sitaGLIPtin (JANUVIA) 50 mg tablet, Take 1 tablet (50 mg total) by mouth daily, Disp: 90 tablet, Rfl: 2  •  Tresiba FlexTouch 100 units/mL injection pen, INJECT 64 UNITS OF TRESIBA DAILY, (MAY INCREASE UP TO 80 UNITS AS NEEDED DANIEL ON THE CHEMO), Disp: 3 mL, Rfl: 11  •  tamsulosin (FLOMAX) 0 4 mg, Take 0 4 mg by mouth, Disp: , Rfl:     Allergies   Allergen Reactions   • Ace Inhibitors Swelling   • Angiotensin Receptor Blockers Other (See Comments)     Elevated K+   • Clindamycin Diarrhea and Nausea Only   • Plaquenil [Hydroxychloroquine] Swelling     Tongue swelling          Lab Review   Appointment on 11/14/2022   Component Date Value   • Hemoglobin A1C 11/14/2022 6 6 (H)    • EAG 11/14/2022 143    • Sodium 11/14/2022 141    • Potassium 11/14/2022 3 9    • Chloride 11/14/2022 109 (H) • CO2 11/14/2022 27    • ANION GAP 11/14/2022 5    • BUN 11/14/2022 26 (H)    • Creatinine 11/14/2022 1 40 (H)    • Glucose, Fasting 11/14/2022 52 (L)    • Calcium 11/14/2022 9 7    • Corrected Calcium 11/14/2022 10 3 (H)    • AST 11/14/2022 18    • ALT 11/14/2022 17    • Alkaline Phosphatase 11/14/2022 54    • Total Protein 11/14/2022 8 2    • Albumin 11/14/2022 3 3 (L)    • Total Bilirubin 11/14/2022 0 45    • eGFR 11/14/2022 50    • WBC 11/14/2022 8 90    • RBC 11/14/2022 4 94    • Hemoglobin 11/14/2022 13 7    • Hematocrit 11/14/2022 44 3    • MCV 11/14/2022 90    • MCH 11/14/2022 27 7    • MCHC 11/14/2022 30 9 (L)    • RDW 11/14/2022 16 4 (H)    • MPV 11/14/2022 10 3    • Platelets 91/08/6756 230    • nRBC 11/14/2022 0    • Neutrophils Relative 11/14/2022 75    • Immat GRANS % 11/14/2022 0    • Lymphocytes Relative 11/14/2022 9 (L)    • Monocytes Relative 11/14/2022 10    • Eosinophils Relative 11/14/2022 5    • Basophils Relative 11/14/2022 1    • Neutrophils Absolute 11/14/2022 6 68    • Immature Grans Absolute 11/14/2022 0 03    • Lymphocytes Absolute 11/14/2022 0 78    • Monocytes Absolute 11/14/2022 0 88    • Eosinophils Absolute 11/14/2022 0 43    • Basophils Absolute 11/14/2022 0 10    • Free T4 11/14/2022 1 00    • TSH 3RD GENERATON 11/14/2022 2 120    Appointment on 10/17/2022   Component Date Value   • Sodium 10/17/2022 139    • Potassium 10/17/2022 4 5    • Chloride 10/17/2022 107    • CO2 10/17/2022 25    • ANION GAP 10/17/2022 7    • BUN 10/17/2022 27 (H)    • Creatinine 10/17/2022 1 52 (H)    • Glucose, Fasting 10/17/2022 149 (H)    • Calcium 10/17/2022 9 4    • Corrected Calcium 10/17/2022 10 0    • AST 10/17/2022 21    • ALT 10/17/2022 20    • Alkaline Phosphatase 10/17/2022 55    • Total Protein 10/17/2022 7 8    • Albumin 10/17/2022 3 2 (L)    • Total Bilirubin 10/17/2022 0 25    • eGFR 10/17/2022 45    • WBC 10/17/2022 7 30    • RBC 10/17/2022 4 70    • Hemoglobin 10/17/2022 13 2    • Hematocrit 10/17/2022 42 2    • MCV 10/17/2022 90    • MCH 10/17/2022 28 1    • MCHC 10/17/2022 31 3 (L)    • RDW 10/17/2022 16 7 (H)    • MPV 10/17/2022 10 6    • Platelets 90/75/5056 228    • nRBC 10/17/2022 0    • Neutrophils Relative 10/17/2022 76 (H)    • Immat GRANS % 10/17/2022 0    • Lymphocytes Relative 10/17/2022 9 (L)    • Monocytes Relative 10/17/2022 10    • Eosinophils Relative 10/17/2022 4    • Basophils Relative 10/17/2022 1    • Neutrophils Absolute 10/17/2022 5 48    • Immature Grans Absolute 10/17/2022 0 03    • Lymphocytes Absolute 10/17/2022 0 68    • Monocytes Absolute 10/17/2022 0 72    • Eosinophils Absolute 10/17/2022 0 31    • Basophils Absolute 10/17/2022 0 08    Appointment on 10/03/2022   Component Date Value   • PSA, Diagnostic 10/03/2022 0 7         Imaging: No results found  Objective:     Physical Exam  Constitutional:       General: He is not in acute distress  Appearance: He is well-developed and well-nourished  He is not ill-appearing or toxic-appearing  HENT:      Head: Normocephalic and atraumatic  Eyes:      Extraocular Movements: EOM normal       Conjunctiva/sclera: Conjunctivae normal    Cardiovascular:      Rate and Rhythm: Normal rate and regular rhythm  Heart sounds: Normal heart sounds  Pulmonary:      Effort: Pulmonary effort is normal       Breath sounds: Normal breath sounds  Musculoskeletal:         General: No swelling, tenderness, deformity or signs of injury  Normal range of motion  Cervical back: Normal range of motion and neck supple  Right lower leg: No edema  Left lower leg: No edema  Lymphadenopathy:      Cervical: No cervical adenopathy  Skin:     General: Skin is warm and dry  Neurological:      General: No focal deficit present  Mental Status: He is alert and oriented to person, place, and time  Deep Tendon Reflexes: Reflexes are normal and symmetric     Psychiatric:         Mood and Affect: Mood and affect normal          Behavior: Behavior normal          Thought Content: Thought content normal          Judgment: Judgment normal            There are no Patient Instructions on file for this visit  JEROME Laws    Portions of the record may have been created with voice recognition software  Occasional wrong word or "sound a like" substitutions may have occurred due to the inherent limitations of voice recognition software  Read the chart carefully and recognize, using context, where substitutions have occurred

## 2022-11-26 LAB — B BURGDOR IGG+IGM SER-ACNC: 0.9 AI

## 2022-11-29 LAB
B BURGDOR IGG PATRN SER IB-IMP: NEGATIVE
B BURGDOR IGM PATRN SER IB-IMP: NEGATIVE
B BURGDOR18KD IGG SER QL IB: PRESENT
B BURGDOR23KD IGG SER QL IB: ABNORMAL
B BURGDOR23KD IGM SER QL IB: ABNORMAL
B BURGDOR28KD IGG SER QL IB: PRESENT
B BURGDOR30KD IGG SER QL IB: ABNORMAL
B BURGDOR39KD IGG SER QL IB: ABNORMAL
B BURGDOR39KD IGM SER QL IB: ABNORMAL
B BURGDOR41KD IGG SER QL IB: ABNORMAL
B BURGDOR41KD IGM SER QL IB: ABNORMAL
B BURGDOR45KD IGG SER QL IB: ABNORMAL
B BURGDOR58KD IGG SER QL IB: PRESENT
B BURGDOR66KD IGG SER QL IB: ABNORMAL
B BURGDOR93KD IGG SER QL IB: PRESENT

## 2022-12-04 DIAGNOSIS — Z79.4 TYPE 2 DIABETES MELLITUS WITH STAGE 3A CHRONIC KIDNEY DISEASE, WITH LONG-TERM CURRENT USE OF INSULIN (HCC): ICD-10-CM

## 2022-12-04 DIAGNOSIS — E11.22 TYPE 2 DIABETES MELLITUS WITH STAGE 3A CHRONIC KIDNEY DISEASE, WITH LONG-TERM CURRENT USE OF INSULIN (HCC): ICD-10-CM

## 2022-12-04 DIAGNOSIS — N18.31 TYPE 2 DIABETES MELLITUS WITH STAGE 3A CHRONIC KIDNEY DISEASE, WITH LONG-TERM CURRENT USE OF INSULIN (HCC): ICD-10-CM

## 2022-12-05 ENCOUNTER — TELEPHONE (OUTPATIENT)
Dept: FAMILY MEDICINE CLINIC | Facility: CLINIC | Age: 72
End: 2022-12-05

## 2022-12-05 RX ORDER — PEN NEEDLE, DIABETIC 31 GX5/16"
NEEDLE, DISPOSABLE MISCELLANEOUS DAILY
Qty: 30 EACH | Refills: 0 | Status: SHIPPED | OUTPATIENT
Start: 2022-12-05 | End: 2022-12-12 | Stop reason: SDUPTHER

## 2022-12-12 DIAGNOSIS — Z79.4 TYPE 2 DIABETES MELLITUS WITH STAGE 3A CHRONIC KIDNEY DISEASE, WITH LONG-TERM CURRENT USE OF INSULIN (HCC): ICD-10-CM

## 2022-12-12 DIAGNOSIS — E11.22 TYPE 2 DIABETES MELLITUS WITH STAGE 3A CHRONIC KIDNEY DISEASE, WITH LONG-TERM CURRENT USE OF INSULIN (HCC): ICD-10-CM

## 2022-12-12 DIAGNOSIS — N18.31 TYPE 2 DIABETES MELLITUS WITH STAGE 3A CHRONIC KIDNEY DISEASE, WITH LONG-TERM CURRENT USE OF INSULIN (HCC): ICD-10-CM

## 2022-12-12 RX ORDER — PEN NEEDLE, DIABETIC 31 GX5/16"
NEEDLE, DISPOSABLE MISCELLANEOUS DAILY
Qty: 30 EACH | Refills: 0 | Status: SHIPPED | OUTPATIENT
Start: 2022-12-12

## 2022-12-14 DIAGNOSIS — E78.5 HYPERLIPIDEMIA, UNSPECIFIED HYPERLIPIDEMIA TYPE: ICD-10-CM

## 2022-12-14 RX ORDER — ROSUVASTATIN CALCIUM 40 MG/1
TABLET, COATED ORAL
Qty: 90 TABLET | Refills: 0 | Status: SHIPPED | OUTPATIENT
Start: 2022-12-14

## 2022-12-18 ENCOUNTER — APPOINTMENT (EMERGENCY)
Dept: RADIOLOGY | Facility: HOSPITAL | Age: 72
End: 2022-12-18

## 2022-12-18 ENCOUNTER — HOSPITAL ENCOUNTER (EMERGENCY)
Facility: HOSPITAL | Age: 72
Discharge: HOME | End: 2022-12-18
Attending: EMERGENCY MEDICINE

## 2022-12-18 VITALS
DIASTOLIC BLOOD PRESSURE: 69 MMHG | RESPIRATION RATE: 17 BRPM | TEMPERATURE: 98.4 F | HEART RATE: 91 BPM | SYSTOLIC BLOOD PRESSURE: 127 MMHG | OXYGEN SATURATION: 96 %

## 2022-12-18 DIAGNOSIS — J10.1 INFLUENZA A: ICD-10-CM

## 2022-12-18 DIAGNOSIS — R05.1 ACUTE COUGH: Primary | ICD-10-CM

## 2022-12-18 LAB
ALBUMIN SERPL BCP-MCNC: 3.2 G/DL (ref 3.5–5)
ALP SERPL-CCNC: 47 U/L (ref 46–116)
ALT SERPL W P-5'-P-CCNC: 14 U/L (ref 12–78)
ANION GAP SERPL CALCULATED.3IONS-SCNC: 10 MMOL/L (ref 4–13)
APTT PPP: 33 SECONDS (ref 23–37)
AST SERPL W P-5'-P-CCNC: 22 U/L (ref 5–45)
ATRIAL RATE: 98 BPM
BASOPHILS # BLD AUTO: 0.07 THOUSANDS/ÂΜL (ref 0–0.1)
BASOPHILS NFR BLD AUTO: 1 % (ref 0–1)
BILIRUB DIRECT SERPL-MCNC: 0.08 MG/DL (ref 0–0.2)
BILIRUB SERPL-MCNC: 0.25 MG/DL (ref 0.2–1)
BUN SERPL-MCNC: 35 MG/DL (ref 5–25)
CALCIUM SERPL-MCNC: 8.9 MG/DL (ref 8.3–10.1)
CARDIAC TROPONIN I PNL SERPL HS: 5 NG/L
CHLORIDE SERPL-SCNC: 102 MMOL/L (ref 96–108)
CO2 SERPL-SCNC: 27 MMOL/L (ref 21–32)
CREAT SERPL-MCNC: 1.6 MG/DL (ref 0.6–1.3)
EOSINOPHIL # BLD AUTO: 0.19 THOUSAND/ÂΜL (ref 0–0.61)
EOSINOPHIL NFR BLD AUTO: 2 % (ref 0–6)
ERYTHROCYTE [DISTWIDTH] IN BLOOD BY AUTOMATED COUNT: 16.2 % (ref 11.6–15.1)
FLUAV RNA RESP QL NAA+PROBE: POSITIVE
FLUBV RNA RESP QL NAA+PROBE: NEGATIVE
GFR SERPL CREATININE-BSD FRML MDRD: 43 ML/MIN/1.73SQ M
GLUCOSE SERPL-MCNC: 107 MG/DL (ref 65–140)
HCT VFR BLD AUTO: 38.5 % (ref 36.5–49.3)
HGB BLD-MCNC: 12.1 G/DL (ref 12–17)
IMM GRANULOCYTES # BLD AUTO: 0.02 THOUSAND/UL (ref 0–0.2)
IMM GRANULOCYTES NFR BLD AUTO: 0 % (ref 0–2)
INR PPP: 1.25 (ref 0.84–1.19)
LACTATE SERPL-SCNC: 1.9 MMOL/L (ref 0.5–2)
LYMPHOCYTES # BLD AUTO: 0.28 THOUSANDS/ÂΜL (ref 0.6–4.47)
LYMPHOCYTES NFR BLD AUTO: 3 % (ref 14–44)
MCH RBC QN AUTO: 27.1 PG (ref 26.8–34.3)
MCHC RBC AUTO-ENTMCNC: 31.4 G/DL (ref 31.4–37.4)
MCV RBC AUTO: 86 FL (ref 82–98)
MONOCYTES # BLD AUTO: 0.8 THOUSAND/ÂΜL (ref 0.17–1.22)
MONOCYTES NFR BLD AUTO: 10 % (ref 4–12)
NEUTROPHILS # BLD AUTO: 6.97 THOUSANDS/ÂΜL (ref 1.85–7.62)
NEUTS SEG NFR BLD AUTO: 84 % (ref 43–75)
NRBC BLD AUTO-RTO: 0 /100 WBCS
P AXIS: 50 DEGREES
PLATELET # BLD AUTO: 212 THOUSANDS/UL (ref 149–390)
PMV BLD AUTO: 9.5 FL (ref 8.9–12.7)
POTASSIUM SERPL-SCNC: 3.9 MMOL/L (ref 3.5–5.3)
PR INTERVAL: 174 MS
PROT SERPL-MCNC: 7.6 G/DL (ref 6.4–8.4)
PROTHROMBIN TIME: 15.5 SECONDS (ref 11.6–14.5)
QRS AXIS: 56 DEGREES
QRSD INTERVAL: 96 MS
QT INTERVAL: 360 MS
QTC INTERVAL: 459 MS
RBC # BLD AUTO: 4.47 MILLION/UL (ref 3.88–5.62)
RSV RNA RESP QL NAA+PROBE: NEGATIVE
SARS-COV-2 RNA RESP QL NAA+PROBE: NEGATIVE
SODIUM SERPL-SCNC: 139 MMOL/L (ref 135–147)
T WAVE AXIS: 54 DEGREES
VENTRICULAR RATE: 98 BPM
WBC # BLD AUTO: 8.33 THOUSAND/UL (ref 4.31–10.16)

## 2022-12-18 RX ORDER — DOXYCYCLINE 100 MG/1
100 CAPSULE ORAL 2 TIMES DAILY
Qty: 20 CAPSULE | Refills: 0 | Status: SHIPPED | OUTPATIENT
Start: 2022-12-18 | End: 2022-12-23

## 2022-12-18 NOTE — ED PROVIDER NOTES
History  Chief Complaint   Patient presents with   • Cough     C/o productive cough, hx copd  80 yo male with productive cough x several days  Occurs in context of known prior lung CA and COPD  No fever  No syncope or hemoptysis or chest pain  No leg pain or swelling  Severity moderate  No aggravating or alleviating factors  Prior to Admission Medications   Prescriptions Last Dose Informant Patient Reported? Taking?    Anoro Ellipta 62 5-25 MCG/INH inhaler   No No   Sig: INHALE 1 PUFF BY MOUTH EVERY DAY   B-D ULTRAFINE III SHORT PEN 31G X 8 MM MISC   No No   Sig: Inject under the skin in the morning   Dapagliflozin Propanediol (Farxiga) 5 MG TABS   No No   Sig: Take 1 tablet (5 mg total) by mouth daily   Lancets (accu-chek soft touch) lancets   No No   Sig: bs check qid   Tresiba FlexTouch 100 units/mL injection pen   No No   Sig: INJECT 64 UNITS OF TRESIBA DAILY, (MAY INCREASE UP TO 80 UNITS AS NEEDED DANIEL ON THE CHEMO)   albuterol (PROVENTIL HFA,VENTOLIN HFA) 90 mcg/act inhaler   No No   Sig: Inhale 2 puffs every 4 (four) hours as needed for wheezing   amLODIPine (NORVASC) 5 mg tablet   No No   Sig: Take 1 tablet (5 mg total) by mouth daily   ascorbic acid (VITAMIN C) 250 mg tablet   Yes No   Sig: Take 250 mg by mouth 2 (two) times a day   aspirin (ECOTRIN LOW STRENGTH) 81 mg EC tablet   Yes No   Si tab(s)   atenolol (TENORMIN) 25 mg tablet   No No   Sig: Take 1 tablet (25 mg total) by mouth daily   fenofibrate (TRICOR) 145 mg tablet   No No   Sig: Take 1 tablet (145 mg total) by mouth daily   ferrous sulfate 325 (65 Fe) mg tablet   Yes No   Sig: Take 325 mg by mouth 2 (two) times a day with meals   gabapentin (NEURONTIN) 600 MG tablet   Yes No   glucose blood (OneTouch Verio) test strip   No No   Sig: CHECK BS AT MORNING FASTING , AND AFTER A MEAL   metFORMIN (GLUCOPHAGE) 1000 MG tablet   No No   Sig: Take 1 tablet (1,000 mg total) by mouth 2 (two) times a day with meals for 5000 doses methimazole (TAPAZOLE) 5 mg tablet   Yes No   Sig: Take 5 mg by mouth in the morning   omeprazole (PriLOSEC) 40 MG capsule   No No   Sig: TAKE 1 CAPSULE BY MOUTH TWICE A DAY   rosuvastatin (CRESTOR) 40 MG tablet   No No   Sig: TAKE 1 TABLET BY MOUTH EVERY DAY IN THE EVENING   sitaGLIPtin (JANUVIA) 50 mg tablet   No No   Sig: Take 1 tablet (50 mg total) by mouth daily   tamsulosin (FLOMAX) 0 4 mg   Yes No   Sig: Take 0 4 mg by mouth      Facility-Administered Medications: None       Past Medical History:   Diagnosis Date   • Anemia    • Aortic aneurysm (HCC)    • BPH (benign prostatic hyperplasia)    • CAD (coronary artery disease)    • Chronic kidney disease    • COPD (chronic obstructive pulmonary disease) (HCC)    • Diabetes mellitus (HCC)    • Dyslipidemia    • GERD (gastroesophageal reflux disease)    • Graves disease    • Hypertension    • Iron deficiency anemia    • Lactic acidosis    • Lung cancer (HCC)    • Lyme disease    • Neuropathy    • Pancytopenia (HCC)    • Prostate cancer (HCC)    • Rheumatoid arthritis ([address] )        Past Surgical History:   Procedure Laterality Date   • BALLOON ANGIOPLASTY, ARTERY     • CATARACT EXTRACTION, BILATERAL     • CLAVICLE SURGERY     • TOE AMPUTATION Left 2018    Procedure: AMPUTATION GREAT TOE;  Surgeon: Madison Lucio DPM;  Location: [facility] [place] OR;  Service: Podiatry       Family History   Problem Relation Age of Onset   • Coronary artery disease Father      I have reviewed and agree with the history as documented      E-Cigarette/Vaping   • E-Cigarette Use Never User      E-Cigarette/Vaping Substances   • Nicotine No    • THC No    • CBD No    • Flavoring No    • Other No    • Unknown No      Social History     Tobacco Use   • Smoking status: Former     Packs/day: 0 25     Years: 50 00     Pack years: 12 50     Types: Cigarettes     Quit date: 2022     Years since quittin 9   • Smokeless tobacco: Never   Vaping Use   • Vaping Use: Never used Substance Use Topics   • Alcohol use: No   • Drug use: No       Review of Systems   Respiratory: Positive for cough  All other systems reviewed and are negative  Physical Exam  Physical Exam  Vitals and nursing note reviewed  Constitutional:       General: He is not in acute distress  Appearance: Normal appearance  He is well-developed  He is not ill-appearing, toxic-appearing or diaphoretic  HENT:      Head: Normocephalic and atraumatic  Eyes:      Conjunctiva/sclera: Conjunctivae normal       Pupils: Pupils are equal, round, and reactive to light  Neck:      Vascular: No JVD  Cardiovascular:      Rate and Rhythm: Normal rate and regular rhythm  Pulses: Normal pulses  Heart sounds: Normal heart sounds  No murmur heard  No friction rub  No gallop  Pulmonary:      Effort: Pulmonary effort is normal  No respiratory distress  Breath sounds: Normal breath sounds  No stridor  No wheezing or rales  Abdominal:      General: There is no distension  Palpations: Abdomen is soft  Tenderness: There is no abdominal tenderness  There is no guarding or rebound  Musculoskeletal:         General: No tenderness or deformity  Normal range of motion  Cervical back: Normal range of motion and neck supple  Skin:     General: Skin is warm and dry  Capillary Refill: Capillary refill takes less than 2 seconds  Neurological:      General: No focal deficit present  Mental Status: He is alert and oriented to person, place, and time  Cranial Nerves: No cranial nerve deficit  Sensory: No sensory deficit  Motor: No weakness or abnormal muscle tone        Coordination: Coordination normal       Gait: Gait normal          Vital Signs  ED Triage Vitals   Temperature Pulse Respirations Blood Pressure SpO2   12/18/22 1145 12/18/22 1115 12/18/22 1115 12/18/22 1115 12/18/22 1115   98 4 °F (36 9 °C) 96 18 129/64 96 %      Temp Source Heart Rate Source Patient Position - Orthostatic VS BP Location FiO2 (%)   12/18/22 1145 12/18/22 1115 12/18/22 1115 12/18/22 1115 --   Oral Monitor Sitting Right arm       Pain Score       --                  Vitals:    12/18/22 1115 12/18/22 1236   BP: 129/64 127/69   Pulse: 96 91   Patient Position - Orthostatic VS: Sitting Sitting         Visual Acuity      ED Medications  Medications - No data to display    Diagnostic Studies  Results Reviewed     Procedure Component Value Units Date/Time    Hepatic function panel [[id number]]  (Abnormal) Collected: 12/18/22 1128    Lab Status: Final result Specimen: Blood from Arm, Right Updated: 12/18/22 1227     Total Bilirubin 0 25 mg/dL      Bilirubin, Direct 0 08 mg/dL      Alkaline Phosphatase 47 U/L      AST 22 U/L      ALT 14 U/L      Total Protein 7 6 g/dL      Albumin 3 2 g/dL     FLU/RSV/COVID - if FLU/RSV clinically relevant [[id number]]  (Abnormal) Collected: 12/18/22 1128    Lab Status: Final result Specimen: Nares from Nose Updated: 12/18/22 1214     SARS-CoV-2 Negative     INFLUENZA A PCR Positive     INFLUENZA B PCR Negative     RSV PCR Negative    Narrative:      FOR PEDIATRIC PATIENTS - copy/paste COVID Guidelines URL to browser: [email or web address]  [email or web address]    SARS-CoV-2 assay is a Nucleic Acid Amplification assay intended for the  qualitative detection of nucleic acid from SARS-CoV-2 in nasopharyngeal  swabs  Results are for the presumptive identification of SARS-CoV-2 RNA  Positive results are indicative of infection with SARS-CoV-2, the virus  causing COVID-19, but do not rule out bacterial infection or co-infection  with other viruses  Laboratories within the [place] and its  territories are required to report all positive results to the appropriate  public health authorities  Negative results do not preclude SARS-CoV-2  infection and should not be used as the sole basis for treatment or other  patient management decisions  Negative results must be combined with  clinical observations, patient history, and epidemiological information  This test has not been FDA cleared or approved  This test has been authorized by FDA under an Emergency Use Authorization  (EUA)  This test is only authorized for the duration of time the  declaration that circumstances exist justifying the authorization of the  emergency use of an in vitro diagnostic tests for detection of SARS-CoV-2  virus and/or diagnosis of COVID-19 infection under section 564(b)(1) of  the Act, 21 U  S C  597JLL-8(B)(1), unless the authorization is terminated  or revoked sooner  The test has been validated but independent review by FDA  and CLIA is pending  Test performed using [employer]: This RT-PCR assay targets N2,  a region unique to SARS-CoV-2  A conserved region in the E-gene was chosen  for pan-Sarbecovirus detection which includes SARS-CoV-2  According to CMS-2020-01-R, this platform meets the definition of high-throughput technology  HS Troponin I 4hr [[id number]]     Lab Status: No result Specimen: Blood     HS Troponin I 2hr [[id number]]     Lab Status: No result Specimen: Blood     HS Troponin 0hr (reflex protocol) [[id number]]  (Normal) Collected: 12/18/22 1128    Lab Status: Final result Specimen: Blood from Arm, Right Updated: 12/18/22 1200     hs TnI 0hr 5 ng/L     Lactic acid [[id number]]  (Normal) Collected: 12/18/22 1128    Lab Status: Final result Specimen: Blood from Arm, Right Updated: 12/18/22 1157     LACTIC ACID 1 9 mmol/L     Narrative:      Result may be elevated if tourniquet was used during collection      Basic metabolic panel [[id number]]  (Abnormal) Collected: 12/18/22 1128    Lab Status: Final result Specimen: Blood from Arm, Right Updated: 12/18/22 1154     Sodium 139 mmol/L      Potassium 3 9 mmol/L      Chloride 102 mmol/L      CO2 27 mmol/L      ANION GAP 10 mmol/L      BUN 35 mg/dL      Creatinine 1 60 mg/dL      Glucose 107 mg/dL Calcium 8 9 mg/dL      eGFR 43 ml/min/1 73sq m     Narrative:      National Kidney Disease Foundation guidelines for Chronic Kidney Disease (CKD):   •  Stage 1 with normal or high GFR (GFR > 90 mL/min/1 73 square meters)  •  Stage 2 Mild CKD (GFR = 60-89 mL/min/1 73 square meters)  •  Stage 3A Moderate CKD (GFR = 45-59 mL/min/1 73 square meters)  •  Stage 3B Moderate CKD (GFR = 30-44 mL/min/1 73 square meters)  •  Stage 4 Severe CKD (GFR = 15-29 mL/min/1 73 square meters)  •  Stage 5 End Stage CKD (GFR <15 mL/min/1 73 square meters)  Note: GFR calculation is accurate only with a steady state creatinine    Protime-INR [[id number]]  (Abnormal) Collected: 12/18/22 1128    Lab Status: Final result Specimen: Blood from Arm, Right Updated: 12/18/22 1152     Protime 15 5 seconds      INR 1 25    APTT [[id number]]  (Normal) Collected: 12/18/22 1128    Lab Status: Final result Specimen: Blood from Arm, Right Updated: 12/18/22 1152     PTT 33 seconds     CBC and differential [[id number]]  (Abnormal) Collected: 12/18/22 1128    Lab Status: Final result Specimen: Blood from Arm, Right Updated: 12/18/22 1135     WBC 8 33 Thousand/uL      RBC 4 47 Million/uL      Hemoglobin 12 1 g/dL      Hematocrit 38 5 %      MCV 86 fL      MCH 27 1 pg      MCHC 31 4 g/dL      RDW 16 2 %      MPV 9 5 fL      Platelets 825 Thousands/uL      nRBC 0 /100 WBCs      Neutrophils Relative 84 %      Immat GRANS % 0 %      Lymphocytes Relative 3 %      Monocytes Relative 10 %      Eosinophils Relative 2 %      Basophils Relative 1 %      Neutrophils Absolute 6 97 Thousands/µL      Immature Grans Absolute 0 02 Thousand/uL      Lymphocytes Absolute 0 28 Thousands/µL      Monocytes Absolute 0 80 Thousand/µL      Eosinophils Absolute 0 19 Thousand/µL      Basophils Absolute 0 07 Thousands/µL     Blood culture #2 [[id number]] Collected: 12/18/22 1128    Lab Status:  In process Specimen: Blood from Arm, Right Updated: 12/18/22 1132    Blood culture #1 [[id number]] Collected: 12/18/22 1128    Lab Status: In process Specimen: Blood from Arm, Left Updated: 12/18/22 1132                 XR chest 2 views   Final Result by Cecilia Sal MD (12/18 1219)      No acute disease with redemonstration of lung metastases  Workstation performed: [id number]                    Procedures  Procedures         ED Course               Identification of Seniors at [facility] Most Recent Value   (ISAR) Identification of Seniors at Risk    Before the illness or injury that brought you to the Emergency, did you need someone to help you on a regular basis? 0 Filed at: 12/18/2022 1051   In the last 24 hours, have you needed more help than usual? 0 Filed at: 12/18/2022 1051   Have you been hospitalized for one or more nights during the past 6 months? 0 Filed at: 12/18/2022 1051   In general, do you see well? 0 Filed at: 12/18/2022 1051   In general, do you have serious problems with your memory? 0 Filed at: 12/18/2022 1051   Do you take more than three different medications every day?  1 Filed at: 12/18/2022 1051   ISAR Score 1 Filed at: 12/18/2022 1051                                    MDM  Number of Diagnoses or Management Options     Amount and/or Complexity of Data Reviewed  Clinical lab tests: reviewed and ordered  Tests in the radiology section of CPT®: ordered and reviewed  Obtain history from someone other than the patient: yes  Review and summarize past medical records: yes  Independent visualization of images, tracings, or specimens: yes        Disposition  Final diagnoses:   Acute cough   Influenza A     Time reflects when diagnosis was documented in both MDM as applicable and the Disposition within this note     Time User Action Codes Description Comment    12/18/2022 12:32 PM Tang Varela Add [R05 1] Acute cough     12/18/2022 12:32 PM Alex Turner Add [J10 1] Influenza A       ED Disposition     ED Disposition   Discharge    Condition   Stable Date/Time   Sun Dec 18, 2022 12:32 PM    Comment   Mansoor Correia discharge to home/self care                 Follow-up Information     Follow up With Specialties Details Why Contact Info Additional Information    [facility]  [facility] symptoms worsen [facility] [address] [place] [address] Emergency Department, [address] [place] [address], [id number]          Discharge Medication List as of 12/18/2022 12:33 PM      START taking these medications    Details   doxycycline monohydrate (MONODOX) 100 mg capsule Take 1 capsule (100 mg total) by mouth 2 (two) times a day for 10 days, Starting Sun 12/18/2022, Until Wed 12/28/2022, Print         CONTINUE these medications which have NOT CHANGED    Details   albuterol (PROVENTIL HFA,VENTOLIN HFA) 90 mcg/act inhaler Inhale 2 puffs every 4 (four) hours as needed for wheezing, Starting Wed 6/29/2022, Normal      amLODIPine (NORVASC) 5 mg tablet Take 1 tablet (5 mg total) by mouth daily, Starting Tue 7/19/2022, Normal      Anoro Ellipta 62 5-25 MCG/INH inhaler INHALE 1 PUFF BY MOUTH EVERY DAY, Normal      ascorbic acid (VITAMIN C) 250 mg tablet Take 250 mg by mouth 2 (two) times a day, Historical Med      aspirin (ECOTRIN LOW STRENGTH) 81 mg EC tablet 1 tab(s), Historical Med      atenolol (TENORMIN) 25 mg tablet Take 1 tablet (25 mg total) by mouth daily, Starting Tue 7/19/2022, Normal      B-D ULTRAFINE III SHORT PEN 31G X 8 MM MISC Inject under the skin in the morning, Starting Mon 12/12/2022, Normal      Dapagliflozin Propanediol (Farxiga) 5 MG TABS Take 1 tablet (5 mg total) by mouth daily, Starting Tue 8/16/2022, Until Mon 11/21/2022, Normal      fenofibrate (TRICOR) 145 mg tablet Take 1 tablet (145 mg total) by mouth daily, Starting Tue 7/19/2022, Normal      ferrous sulfate 325 (65 Fe) mg tablet Take 325 mg by mouth 2 (two) times a day with meals, Historical Med      gabapentin (NEURONTIN) 600 MG tablet Starting Mon 8/22/2022, Historical Med      glucose blood (OneTouch Verio) test strip CHECK BS AT MORNING FASTING , AND AFTER A MEAL, Normal      Lancets (accu-chek soft touch) lancets bs check qid, Normal      metFORMIN (GLUCOPHAGE) 1000 MG tablet Take 1 tablet (1,000 mg total) by mouth 2 (two) times a day with meals for 5000 doses, Starting Tue 7/19/2022, Until Wed 5/23/2029, Normal      methimazole (TAPAZOLE) 5 mg tablet Take 5 mg by mouth in the morning, Starting Mon 11/29/2021, Historical Med      omeprazole (PriLOSEC) 40 MG capsule TAKE 1 CAPSULE BY MOUTH TWICE A DAY, Normal      rosuvastatin (CRESTOR) 40 MG tablet TAKE 1 TABLET BY MOUTH EVERY DAY IN THE EVENING, Normal      sitaGLIPtin (JANUVIA) 50 mg tablet Take 1 tablet (50 mg total) by mouth daily, Starting Wed 11/16/2022, Until Tue 2/14/2023, Normal      tamsulosin (FLOMAX) 0 4 mg Take 0 4 mg by mouth, Starting Fri 5/13/2022, Until Sat 5/13/2023 at 2359, Historical Med      Tresiba FlexTouch 100 units/mL injection pen INJECT 64 UNITS OF TRESIBA DAILY, (MAY INCREASE UP TO 80 UNITS AS NEEDED DANIEL ON THE CHEMO), Normal             No discharge procedures on file      PDMP Review     None          ED Provider  Electronically Signed by           Yessica Chawla MD  12/18/22 5288

## 2022-12-21 ENCOUNTER — APPOINTMENT (EMERGENCY)
Dept: RADIOLOGY | Facility: HOSPITAL | Age: 72
End: 2022-12-21

## 2022-12-21 ENCOUNTER — HOSPITAL ENCOUNTER (INPATIENT)
Facility: HOSPITAL | Age: 72
LOS: 2 days | Discharge: HOME HEALTH | End: 2022-12-23
Attending: EMERGENCY MEDICINE | Admitting: INTERNAL MEDICINE

## 2022-12-21 DIAGNOSIS — R09.02 HYPOXIA: Primary | ICD-10-CM

## 2022-12-21 DIAGNOSIS — J10.1 INFLUENZA A: ICD-10-CM

## 2022-12-21 DIAGNOSIS — J44.9 CHRONIC OBSTRUCTIVE PULMONARY DISEASE, UNSPECIFIED COPD TYPE (HCC): Chronic | ICD-10-CM

## 2022-12-21 DIAGNOSIS — J18.9 PNEUMONIA: ICD-10-CM

## 2022-12-21 DIAGNOSIS — R26.2 AMBULATORY DYSFUNCTION: ICD-10-CM

## 2022-12-21 PROBLEM — E05.00 GRAVES DISEASE: Status: ACTIVE | Noted: 2022-12-21

## 2022-12-21 PROBLEM — N18.9 CKD (CHRONIC KIDNEY DISEASE): Status: ACTIVE | Noted: 2022-12-21

## 2022-12-21 PROBLEM — A41.9 SEPSIS (HCC): Status: ACTIVE | Noted: 2022-12-21

## 2022-12-21 PROBLEM — J96.00 ACUTE RESPIRATORY FAILURE (HCC): Status: ACTIVE | Noted: 2022-12-21

## 2022-12-21 LAB
2HR DELTA HS TROPONIN: 0 NG/L
ALBUMIN SERPL BCP-MCNC: 3.2 G/DL (ref 3.5–5)
ALP SERPL-CCNC: 46 U/L (ref 46–116)
ALT SERPL W P-5'-P-CCNC: 17 U/L (ref 12–78)
ANION GAP SERPL CALCULATED.3IONS-SCNC: 9 MMOL/L (ref 4–13)
APTT PPP: 42 SECONDS (ref 23–37)
AST SERPL W P-5'-P-CCNC: 39 U/L (ref 5–45)
BACTERIA BLD CULT: ABNORMAL
BASOPHILS # BLD AUTO: 0.01 THOUSANDS/ÂΜL (ref 0–0.1)
BASOPHILS NFR BLD AUTO: 0 % (ref 0–1)
BILIRUB SERPL-MCNC: 0.31 MG/DL (ref 0.2–1)
BUN SERPL-MCNC: 30 MG/DL (ref 5–25)
CALCIUM ALBUM COR SERPL-MCNC: 9 MG/DL (ref 8.3–10.1)
CALCIUM SERPL-MCNC: 8.4 MG/DL (ref 8.3–10.1)
CARDIAC TROPONIN I PNL SERPL HS: 10 NG/L
CARDIAC TROPONIN I PNL SERPL HS: 10 NG/L
CHLORIDE SERPL-SCNC: 102 MMOL/L (ref 96–108)
CO2 SERPL-SCNC: 25 MMOL/L (ref 21–32)
CREAT SERPL-MCNC: 1.78 MG/DL (ref 0.6–1.3)
EOSINOPHIL # BLD AUTO: 0.04 THOUSAND/ÂΜL (ref 0–0.61)
EOSINOPHIL NFR BLD AUTO: 1 % (ref 0–6)
ERYTHROCYTE [DISTWIDTH] IN BLOOD BY AUTOMATED COUNT: 16.6 % (ref 11.6–15.1)
GFR SERPL CREATININE-BSD FRML MDRD: 37 ML/MIN/1.73SQ M
GLUCOSE SERPL-MCNC: 102 MG/DL (ref 65–140)
GRAM STN SPEC: ABNORMAL
HCT VFR BLD AUTO: 38.5 % (ref 36.5–49.3)
HGB BLD-MCNC: 12.2 G/DL (ref 12–17)
IMM GRANULOCYTES # BLD AUTO: 0.02 THOUSAND/UL (ref 0–0.2)
IMM GRANULOCYTES NFR BLD AUTO: 0 % (ref 0–2)
INR PPP: 1.3 (ref 0.84–1.19)
LACTATE SERPL-SCNC: 0.8 MMOL/L (ref 0.5–2)
LYMPHOCYTES # BLD AUTO: 0.35 THOUSANDS/ÂΜL (ref 0.6–4.47)
LYMPHOCYTES NFR BLD AUTO: 6 % (ref 14–44)
MCH RBC QN AUTO: 27.2 PG (ref 26.8–34.3)
MCHC RBC AUTO-ENTMCNC: 31.7 G/DL (ref 31.4–37.4)
MCV RBC AUTO: 86 FL (ref 82–98)
MECA+MECC ISLT/SPM QL: DETECTED
MONOCYTES # BLD AUTO: 0.54 THOUSAND/ÂΜL (ref 0.17–1.22)
MONOCYTES NFR BLD AUTO: 9 % (ref 4–12)
NEUTROPHILS # BLD AUTO: 5.11 THOUSANDS/ÂΜL (ref 1.85–7.62)
NEUTS SEG NFR BLD AUTO: 84 % (ref 43–75)
NRBC BLD AUTO-RTO: 0 /100 WBCS
PLATELET # BLD AUTO: 168 THOUSANDS/UL (ref 149–390)
PMV BLD AUTO: 9.7 FL (ref 8.9–12.7)
POTASSIUM SERPL-SCNC: 4.3 MMOL/L (ref 3.5–5.3)
PROCALCITONIN SERPL-MCNC: 0.15 NG/ML
PROT SERPL-MCNC: 7.7 G/DL (ref 6.4–8.4)
PROTHROMBIN TIME: 15.9 SECONDS (ref 11.6–14.5)
RBC # BLD AUTO: 4.49 MILLION/UL (ref 3.88–5.62)
S EPIDERMIDIS DNA BLD POS QL NAA+NON-PRB: DETECTED
SODIUM SERPL-SCNC: 136 MMOL/L (ref 135–147)
WBC # BLD AUTO: 6.07 THOUSAND/UL (ref 4.31–10.16)

## 2022-12-21 RX ORDER — AMLODIPINE BESYLATE 5 MG/1
5 TABLET ORAL DAILY
Status: DISCONTINUED | OUTPATIENT
Start: 2022-12-22 | End: 2022-12-23 | Stop reason: HOSPADM

## 2022-12-21 RX ORDER — METHYLPREDNISOLONE SODIUM SUCCINATE 40 MG/ML
40 INJECTION, POWDER, LYOPHILIZED, FOR SOLUTION INTRAMUSCULAR; INTRAVENOUS EVERY 8 HOURS
Status: DISCONTINUED | OUTPATIENT
Start: 2022-12-22 | End: 2022-12-22

## 2022-12-21 RX ORDER — ALBUTEROL SULFATE 90 UG/1
2 AEROSOL, METERED RESPIRATORY (INHALATION) EVERY 4 HOURS PRN
Status: DISCONTINUED | OUTPATIENT
Start: 2022-12-21 | End: 2022-12-23 | Stop reason: HOSPADM

## 2022-12-21 RX ORDER — ASPIRIN 81 MG/1
81 TABLET ORAL DAILY
Status: DISCONTINUED | OUTPATIENT
Start: 2022-12-22 | End: 2022-12-23 | Stop reason: HOSPADM

## 2022-12-21 RX ORDER — OSELTAMIVIR PHOSPHATE 30 MG/1
30 CAPSULE ORAL EVERY 12 HOURS SCHEDULED
Status: DISCONTINUED | OUTPATIENT
Start: 2022-12-21 | End: 2022-12-23 | Stop reason: HOSPADM

## 2022-12-21 RX ORDER — HEPARIN SODIUM 5000 [USP'U]/ML
5000 INJECTION, SOLUTION INTRAVENOUS; SUBCUTANEOUS EVERY 8 HOURS SCHEDULED
Status: DISCONTINUED | OUTPATIENT
Start: 2022-12-21 | End: 2022-12-23 | Stop reason: HOSPADM

## 2022-12-21 RX ORDER — PANTOPRAZOLE SODIUM 40 MG/1
40 TABLET, DELAYED RELEASE ORAL
Status: DISCONTINUED | OUTPATIENT
Start: 2022-12-22 | End: 2022-12-23 | Stop reason: HOSPADM

## 2022-12-21 RX ORDER — ATENOLOL 25 MG/1
25 TABLET ORAL DAILY
Status: DISCONTINUED | OUTPATIENT
Start: 2022-12-22 | End: 2022-12-23 | Stop reason: HOSPADM

## 2022-12-21 RX ORDER — GABAPENTIN 300 MG/1
300 CAPSULE ORAL DAILY
Status: DISCONTINUED | OUTPATIENT
Start: 2022-12-22 | End: 2022-12-23 | Stop reason: HOSPADM

## 2022-12-21 RX ORDER — ALBUTEROL SULFATE 2.5 MG/3ML
1 SOLUTION RESPIRATORY (INHALATION) ONCE
Status: DISCONTINUED | OUTPATIENT
Start: 2022-12-21 | End: 2022-12-22

## 2022-12-21 RX ORDER — METHIMAZOLE 5 MG/1
5 TABLET ORAL DAILY
Status: DISCONTINUED | OUTPATIENT
Start: 2022-12-22 | End: 2022-12-23 | Stop reason: HOSPADM

## 2022-12-21 RX ORDER — TAMSULOSIN HYDROCHLORIDE 0.4 MG/1
0.4 CAPSULE ORAL
Status: DISCONTINUED | OUTPATIENT
Start: 2022-12-22 | End: 2022-12-23 | Stop reason: HOSPADM

## 2022-12-21 RX ORDER — METHYLPREDNISOLONE SODIUM SUCCINATE 125 MG/2ML
125 INJECTION, POWDER, LYOPHILIZED, FOR SOLUTION INTRAMUSCULAR; INTRAVENOUS ONCE
Status: COMPLETED | OUTPATIENT
Start: 2022-12-21 | End: 2022-12-21

## 2022-12-21 RX ORDER — INSULIN LISPRO 100 [IU]/ML
1-6 INJECTION, SOLUTION INTRAVENOUS; SUBCUTANEOUS
Status: DISCONTINUED | OUTPATIENT
Start: 2022-12-21 | End: 2022-12-23 | Stop reason: HOSPADM

## 2022-12-21 RX ORDER — FENOFIBRATE 145 MG/1
145 TABLET, COATED ORAL DAILY
Status: DISCONTINUED | OUTPATIENT
Start: 2022-12-22 | End: 2022-12-23 | Stop reason: HOSPADM

## 2022-12-21 RX ORDER — ALBUTEROL SULFATE 2.5 MG/3ML
5 SOLUTION RESPIRATORY (INHALATION) ONCE
Status: COMPLETED | OUTPATIENT
Start: 2022-12-21 | End: 2022-12-21

## 2022-12-21 RX ORDER — METHYLPREDNISOLONE SODIUM SUCCINATE 40 MG/ML
40 INJECTION, POWDER, LYOPHILIZED, FOR SOLUTION INTRAMUSCULAR; INTRAVENOUS EVERY 8 HOURS
Status: DISCONTINUED | OUTPATIENT
Start: 2022-12-21 | End: 2022-12-21

## 2022-12-21 RX ORDER — ACETAMINOPHEN 325 MG/1
650 TABLET ORAL EVERY 6 HOURS PRN
Status: DISCONTINUED | OUTPATIENT
Start: 2022-12-21 | End: 2022-12-23 | Stop reason: HOSPADM

## 2022-12-21 RX ORDER — ATORVASTATIN CALCIUM 40 MG/1
80 TABLET, FILM COATED ORAL
Status: DISCONTINUED | OUTPATIENT
Start: 2022-12-22 | End: 2022-12-23 | Stop reason: HOSPADM

## 2022-12-21 RX ORDER — FERROUS SULFATE 325(65) MG
325 TABLET ORAL 2 TIMES DAILY WITH MEALS
Status: DISCONTINUED | OUTPATIENT
Start: 2022-12-22 | End: 2022-12-23 | Stop reason: HOSPADM

## 2022-12-21 RX ADMIN — IPRATROPIUM BROMIDE 0.5 MG: 0.5 SOLUTION RESPIRATORY (INHALATION) at 19:36

## 2022-12-21 RX ADMIN — CEFEPIME 2000 MG: 2 INJECTION, POWDER, FOR SOLUTION INTRAVENOUS at 21:10

## 2022-12-21 RX ADMIN — ALBUTEROL SULFATE 5 MG: 2.5 SOLUTION RESPIRATORY (INHALATION) at 19:36

## 2022-12-21 RX ADMIN — VANCOMYCIN HYDROCHLORIDE 1250 MG: 5 INJECTION, POWDER, LYOPHILIZED, FOR SOLUTION INTRAVENOUS at 21:13

## 2022-12-21 RX ADMIN — METHYLPREDNISOLONE SODIUM SUCCINATE 125 MG: 125 INJECTION, POWDER, FOR SOLUTION INTRAMUSCULAR; INTRAVENOUS at 21:10

## 2022-12-22 LAB
4HR DELTA HS TROPONIN: 1 NG/L
ALBUMIN SERPL BCP-MCNC: 2.9 G/DL (ref 3.5–5)
ALP SERPL-CCNC: 39 U/L (ref 46–116)
ALT SERPL W P-5'-P-CCNC: 15 U/L (ref 12–78)
ANION GAP SERPL CALCULATED.3IONS-SCNC: 10 MMOL/L (ref 4–13)
AST SERPL W P-5'-P-CCNC: 36 U/L (ref 5–45)
ATRIAL RATE: 107 BPM
BILIRUB SERPL-MCNC: 0.34 MG/DL (ref 0.2–1)
BILIRUB UR QL STRIP: NEGATIVE
BUN SERPL-MCNC: 27 MG/DL (ref 5–25)
CALCIUM ALBUM COR SERPL-MCNC: 9.4 MG/DL (ref 8.3–10.1)
CALCIUM SERPL-MCNC: 8.5 MG/DL (ref 8.3–10.1)
CARDIAC TROPONIN I PNL SERPL HS: 11 NG/L
CHLORIDE SERPL-SCNC: 104 MMOL/L (ref 96–108)
CLARITY UR: CLEAR
CO2 SERPL-SCNC: 24 MMOL/L (ref 21–32)
COLOR UR: ABNORMAL
CREAT SERPL-MCNC: 1.47 MG/DL (ref 0.6–1.3)
ERYTHROCYTE [DISTWIDTH] IN BLOOD BY AUTOMATED COUNT: 16.5 % (ref 11.6–15.1)
GFR SERPL CREATININE-BSD FRML MDRD: 47 ML/MIN/1.73SQ M
GLUCOSE SERPL-MCNC: 105 MG/DL (ref 65–140)
GLUCOSE SERPL-MCNC: 118 MG/DL (ref 65–140)
GLUCOSE SERPL-MCNC: 201 MG/DL (ref 65–140)
GLUCOSE SERPL-MCNC: 233 MG/DL (ref 65–140)
GLUCOSE SERPL-MCNC: 317 MG/DL (ref 65–140)
GLUCOSE SERPL-MCNC: 98 MG/DL (ref 65–140)
GLUCOSE UR STRIP-MCNC: ABNORMAL MG/DL
HCT VFR BLD AUTO: 37.5 % (ref 36.5–49.3)
HGB BLD-MCNC: 12 G/DL (ref 12–17)
HGB UR QL STRIP.AUTO: NEGATIVE
KETONES UR STRIP-MCNC: NEGATIVE MG/DL
L PNEUMO1 AG UR QL IA.RAPID: NEGATIVE
LEUKOCYTE ESTERASE UR QL STRIP: NEGATIVE
MCH RBC QN AUTO: 26.6 PG (ref 26.8–34.3)
MCHC RBC AUTO-ENTMCNC: 32 G/DL (ref 31.4–37.4)
MCV RBC AUTO: 83 FL (ref 82–98)
NITRITE UR QL STRIP: NEGATIVE
P AXIS: 63 DEGREES
PH UR STRIP.AUTO: 5 [PH]
PLATELET # BLD AUTO: 143 THOUSANDS/UL (ref 149–390)
PMV BLD AUTO: 9.5 FL (ref 8.9–12.7)
POTASSIUM SERPL-SCNC: 3.8 MMOL/L (ref 3.5–5.3)
PR INTERVAL: 154 MS
PROCALCITONIN SERPL-MCNC: 1.04 NG/ML
PROT SERPL-MCNC: 7.4 G/DL (ref 6.4–8.4)
PROT UR STRIP-MCNC: NEGATIVE MG/DL
QRS AXIS: 58 DEGREES
QRSD INTERVAL: 102 MS
QT INTERVAL: 320 MS
QTC INTERVAL: 427 MS
RBC # BLD AUTO: 4.51 MILLION/UL (ref 3.88–5.62)
S PNEUM AG UR QL: NEGATIVE
SODIUM SERPL-SCNC: 138 MMOL/L (ref 135–147)
SP GR UR STRIP.AUTO: 1.01 (ref 1–1.03)
T WAVE AXIS: 41 DEGREES
UROBILINOGEN UR STRIP-ACNC: <2 MG/DL
VENTRICULAR RATE: 107 BPM
WBC # BLD AUTO: 11.71 THOUSAND/UL (ref 4.31–10.16)

## 2022-12-22 RX ORDER — LEVALBUTEROL 1.25 MG/.5ML
1.25 SOLUTION, CONCENTRATE RESPIRATORY (INHALATION)
Status: DISCONTINUED | OUTPATIENT
Start: 2022-12-22 | End: 2022-12-23 | Stop reason: HOSPADM

## 2022-12-22 RX ORDER — GUAIFENESIN 600 MG/1
600 TABLET, EXTENDED RELEASE ORAL EVERY 12 HOURS SCHEDULED
Status: DISCONTINUED | OUTPATIENT
Start: 2022-12-22 | End: 2022-12-23 | Stop reason: HOSPADM

## 2022-12-22 RX ORDER — AZITHROMYCIN 500 MG/1
500 TABLET, FILM COATED ORAL EVERY 24 HOURS
Status: DISCONTINUED | OUTPATIENT
Start: 2022-12-22 | End: 2022-12-22

## 2022-12-22 RX ORDER — METHYLPREDNISOLONE SODIUM SUCCINATE 40 MG/ML
40 INJECTION, POWDER, LYOPHILIZED, FOR SOLUTION INTRAMUSCULAR; INTRAVENOUS EVERY 12 HOURS SCHEDULED
Status: DISCONTINUED | OUTPATIENT
Start: 2022-12-22 | End: 2022-12-23 | Stop reason: HOSPADM

## 2022-12-22 RX ADMIN — INSULIN LISPRO 2 UNITS: 100 INJECTION, SOLUTION INTRAVENOUS; SUBCUTANEOUS at 17:34

## 2022-12-22 RX ADMIN — OSELTAMIVIR 30 MG: 30 CAPSULE ORAL at 00:21

## 2022-12-22 RX ADMIN — OSELTAMIVIR 30 MG: 30 CAPSULE ORAL at 09:29

## 2022-12-22 RX ADMIN — IPRATROPIUM BROMIDE 0.5 MG: 0.5 SOLUTION RESPIRATORY (INHALATION) at 20:25

## 2022-12-22 RX ADMIN — TAMSULOSIN HYDROCHLORIDE 0.4 MG: 0.4 CAPSULE ORAL at 17:34

## 2022-12-22 RX ADMIN — FERROUS SULFATE TAB 325 MG (65 MG ELEMENTAL FE) 325 MG: 325 (65 FE) TAB at 10:15

## 2022-12-22 RX ADMIN — GUAIFENESIN 600 MG: 600 TABLET ORAL at 13:30

## 2022-12-22 RX ADMIN — HEPARIN SODIUM 5000 UNITS: 5000 INJECTION INTRAVENOUS; SUBCUTANEOUS at 22:02

## 2022-12-22 RX ADMIN — HEPARIN SODIUM 5000 UNITS: 5000 INJECTION INTRAVENOUS; SUBCUTANEOUS at 06:27

## 2022-12-22 RX ADMIN — AMLODIPINE BESYLATE 5 MG: 5 TABLET ORAL at 09:29

## 2022-12-22 RX ADMIN — ASPIRIN 81 MG: 81 TABLET, COATED ORAL at 09:30

## 2022-12-22 RX ADMIN — GABAPENTIN 300 MG: 300 CAPSULE ORAL at 09:30

## 2022-12-22 RX ADMIN — LEVALBUTEROL HYDROCHLORIDE 1.25 MG: 1.25 SOLUTION, CONCENTRATE RESPIRATORY (INHALATION) at 20:26

## 2022-12-22 RX ADMIN — METHIMAZOLE 5 MG: 5 TABLET ORAL at 09:30

## 2022-12-22 RX ADMIN — ATENOLOL 25 MG: 25 TABLET ORAL at 09:30

## 2022-12-22 RX ADMIN — HEPARIN SODIUM 5000 UNITS: 5000 INJECTION INTRAVENOUS; SUBCUTANEOUS at 13:34

## 2022-12-22 RX ADMIN — INSULIN LISPRO 5 UNITS: 100 INJECTION, SOLUTION INTRAVENOUS; SUBCUTANEOUS at 22:03

## 2022-12-22 RX ADMIN — CEFTRIAXONE SODIUM 1000 MG: 10 INJECTION, POWDER, FOR SOLUTION INTRAVENOUS at 22:38

## 2022-12-22 RX ADMIN — UMECLIDINIUM BROMIDE AND VILANTEROL TRIFENATATE 1 PUFF: 62.5; 25 POWDER RESPIRATORY (INHALATION) at 10:15

## 2022-12-22 RX ADMIN — INSULIN LISPRO 2 UNITS: 100 INJECTION, SOLUTION INTRAVENOUS; SUBCUTANEOUS at 12:03

## 2022-12-22 RX ADMIN — METHYLPREDNISOLONE SODIUM SUCCINATE 40 MG: 40 INJECTION, POWDER, FOR SOLUTION INTRAMUSCULAR; INTRAVENOUS at 06:27

## 2022-12-22 RX ADMIN — AZITHROMYCIN 500 MG: 500 INJECTION, POWDER, LYOPHILIZED, FOR SOLUTION INTRAVENOUS at 00:23

## 2022-12-22 RX ADMIN — OSELTAMIVIR 30 MG: 30 CAPSULE ORAL at 22:02

## 2022-12-22 RX ADMIN — FENOFIBRATE 145 MG: 145 TABLET, FILM COATED ORAL at 09:30

## 2022-12-22 RX ADMIN — ATORVASTATIN CALCIUM 80 MG: 40 TABLET, FILM COATED ORAL at 17:34

## 2022-12-22 RX ADMIN — FERROUS SULFATE TAB 325 MG (65 MG ELEMENTAL FE) 325 MG: 325 (65 FE) TAB at 17:34

## 2022-12-22 RX ADMIN — IPRATROPIUM BROMIDE 0.5 MG: 0.5 SOLUTION RESPIRATORY (INHALATION) at 07:22

## 2022-12-22 RX ADMIN — LEVALBUTEROL HYDROCHLORIDE 1.25 MG: 1.25 SOLUTION, CONCENTRATE RESPIRATORY (INHALATION) at 13:48

## 2022-12-22 RX ADMIN — METHYLPREDNISOLONE SODIUM SUCCINATE 40 MG: 40 INJECTION, POWDER, FOR SOLUTION INTRAMUSCULAR; INTRAVENOUS at 22:02

## 2022-12-22 RX ADMIN — HEPARIN SODIUM 5000 UNITS: 5000 INJECTION INTRAVENOUS; SUBCUTANEOUS at 00:21

## 2022-12-22 RX ADMIN — PANTOPRAZOLE SODIUM 40 MG: 40 TABLET, DELAYED RELEASE ORAL at 06:27

## 2022-12-22 RX ADMIN — IPRATROPIUM BROMIDE 0.5 MG: 0.5 SOLUTION RESPIRATORY (INHALATION) at 13:48

## 2022-12-22 NOTE — ASSESSMENT & PLAN NOTE
· Tested positive for influenza A  · Seen by ED on 12/18, discharged with course of oral doxycycline  · Currently requiring 4L NC  · Start on Tamiflu  · Continue treatment with ceftriaxone + azithromycin  · Monitor respiratory status

## 2022-12-22 NOTE — PROGRESS NOTES
Pt ambulated 40 feet on room air  Did not complaint of shortness of breath  O2 saturation went down to 88%  After ambulation and sitting down pt recovered quickly and went up to 91-92%  However, pt de-sats while sleeping  Pt keeps staying at 88% while sleeping  Will place pt on O2 1L to keep sats above 90%  Will continue to monitor

## 2022-12-22 NOTE — ASSESSMENT & PLAN NOTE
Lab Results   Component Value Date    HGBA1C 6 6 (H) 11/14/2022     · Holding home anti-diabetic medications  · Start on SSI with accu checks  · Hypoglycemia protocol  · Diabetic diet

## 2022-12-22 NOTE — ASSESSMENT & PLAN NOTE
· Tested positive for influenza A  · Seen by ED on 12/18, discharged with course of oral doxycycline  · Continue Tamiflu  · Monitor respiratory status

## 2022-12-22 NOTE — UTILIZATION REVIEW
Initial Clinical Review    Admission: Date/Time/Statement:   Admission Orders (From admission, onward)     Ordered        12/21/22 2052  INPATIENT ADMISSION  Once                      Orders Placed This Encounter   Procedures   • INPATIENT ADMISSION     Standing Status:   Standing     Number of Occurrences:   1     Order Specific Question:   Level of Care     Answer:   Med Surg [16]     Order Specific Question:   Estimated length of stay     Answer:   More than 2 Midnights     Order Specific Question:   Certification     Answer:   I certify that inpatient services are medically necessary for this patient for a duration of greater than two midnights  See H&P and MD Progress Notes for additional information about the patient's course of treatment  ED Arrival Information     Expected   -    Arrival   12/21/2022 19:19    Acuity   Emergent            Means of arrival   Ambulance    [facility]   [facility]    Admission type   Emergency            Arrival complaint   FLU--LIKE SYMPTOMS           Chief Complaint   Patient presents with   • Shortness of Breath     Recent flu dx  Pt c/o gen weakness and hypoxic on rm air - 83%  Pt placed on 4L nasal cannula  Initial Presentation: 81 y o  male to ED from home w/ worsening SOB   Prod cough and SOB over the last week   Assoc fever /chills and fatigue   O2 sat 83 % on arrival  Placed on 4l   CXR w/ pneumonia   PMHX COPD , CKD , HTN , DM   Admitted IP status w/ acute resp failure CXR reading pneumonia   Cont steroids , nebs , ceftriaxone   Obtain sputum cx  And urine strep legionella   Monitor pulse ox  Influenza + A , start tamiflu , cont abx and monitor resp status   F/u lactic acid , pct and BC , trend wbc and fever curve   CKD CR 1 60 stable at baseline   Wean O2 as able   Cont BP meds   DM SSI and monitor   PE: resp distress , wheezing , rales     Date: 12/22   Day 2: cont stay for IV steroids and IV abx    F/u infectious labs   Cont iv abx   CR improved to 1 47 from 1 78  lung sounds diminished        ED Triage Vitals   Temperature Pulse Respirations Blood Pressure SpO2   12/21/22 1923 12/21/22 1923 12/21/22 1923 12/21/22 1923 12/21/22 1922   100 3 °F (37 9 °C) (!) 106 (!) 24 124/61 (!) 83 %      Temp Source Heart Rate Source Patient Position - Orthostatic VS BP Location FiO2 (%)   12/21/22 1923 -- -- -- --   Oral          Pain Score       12/21/22 1923       No Pain          Wt Readings from Last 1 Encounters:   11/21/22 86 2 kg (190 lb)     Additional Vital Signs:   12/22/22 07:46:51 97 6 °F (36 4 °C) 71 -- 124/70 88 93 % -- -- --   12/22/22 07:45:17 97 6 °F (36 4 °C) 72 -- 124/70 88 94 % -- -- --   12/22/22 0733 -- -- -- -- -- 96 % -- -- --   12/22/22 0722 -- 75 20 -- -- 92 % 44 6 L/min Nasal cannula   12/21/22 2208 98 3 °F (36 8 °C) 100 20 95/54 -- 91 % -- -- Nasal cannula   12/21/22 2103 -- -- -- -- -- -- -- -- Nasal cannula   12/21/22 2100 -- 106 Abnormal  22 109/57 78 91 % 36 4 L/min Nasal cannula   12/21/22 2030 -- 107 Abnormal  23 Abnormal  119/56 80 90 %  36 4 L/min Nasal cannula   SpO2: Pt sleeping at 12/21/22 2030 12/21/22 1945 -- 103 24 Abnormal  138/71 98 94 % 36 4 L/min Nasal cannula   12/21/22 1923 100 3 °F (37 9 °C) 106 Abnormal  24 Abnormal  124/61 -- 90 % 36         Pertinent Labs/Diagnostic Test Results:   12/21 EKG Sinus tachycardia  XR chest 1 view portable   Final Result by Shaista Harris MD (12/22 9556)   New Right midlung and left lower lung patchy opacity, raises concern for infiltrate/pneumonia   Follow-up suggested to demonstrate resolution 6-8 weeks               Workstation performed: [id number]           Results from last 7 days   Lab Units 12/18/22  1128   SARS-COV-2  Negative     Results from last 7 days   Lab Units 12/22/22  0449 12/21/22  1929 12/18/22  1128   WBC Thousand/uL 11 71* 6 07 8 33   HEMOGLOBIN g/dL 12 0 12 2 12 1   HEMATOCRIT % 37 5 38 5 38 5   PLATELETS Thousands/uL 143* 168 212 NEUTROS ABS Thousands/µL  --  5 11 6 97     Results from last 7 days   Lab Units 12/22/22  0449 12/21/22 1929 12/18/22  1128   SODIUM mmol/L 138 136 139   POTASSIUM mmol/L 3 8 4 3 3 9   CHLORIDE mmol/L 104 102 102   CO2 mmol/L 24 25 27   ANION GAP mmol/L 10 9 10   BUN mg/dL 27* 30* 35*   CREATININE mg/dL 1 47* 1 78* 1 60*   EGFR ml/min/1 73sq m 47 37 43   CALCIUM mg/dL 8 5 8 4 8 9     Results from last 7 days   Lab Units 12/22/22 0449 12/21/22 1929 12/18/22  1128   AST U/L 36 39 22   ALT U/L 15 17 14   ALK PHOS U/L 39* 46 47   TOTAL PROTEIN g/dL 7 4 7 7 7 6   ALBUMIN g/dL 2 9* 3 2* 3 2*   TOTAL BILIRUBIN mg/dL 0 34 0 31 0 25   BILIRUBIN DIRECT mg/dL  --   --  0 08     Results from last 7 days   Lab Units 12/22/22  0746 12/22/22  0019   POC GLUCOSE mg/dl 118 105     Results from last 7 days   Lab Units 12/22/22 0449 12/21/22 1929 12/18/22  1128   GLUCOSE RANDOM mg/dL 98 102 107         Results from last 7 days   Lab Units 12/22/22  0004 12/21/22 2116 12/21/22 1929 12/18/22  1128   HS TNI 0HR ng/L  --   --  10 5   HS TNI 2HR ng/L  --  10  --   --    HSTNI D2 ng/L  --  0  --   --    HS TNI 4HR ng/L 11  --   --   --    HSTNI D4 ng/L 1  --   --   --      Results from last 7 days   Lab Units 12/21/22  1929 12/18/22  1128   PROTIME seconds 15 9* 15 5*   INR  1 30* 1 25*   PTT seconds 42* 33     Results from last 7 days   Lab Units 12/22/22 0449 12/21/22 1929   PROCALCITONIN ng/ml 1 04* 0 15     Results from last 7 days   Lab Units 12/21/22 1929 12/18/22  1128   LACTIC ACID mmol/L 0 8 1 9     Results from last 7 days   Lab Units 12/22/22  0525   CLARITY UA  Clear   COLOR UA  Light Yellow   SPEC GRAV UA  1 011   PH UA  5 0   GLUCOSE UA mg/dl 1000 (1%)*   KETONES UA mg/dl Negative   BLOOD UA  Negative   PROTEIN UA mg/dl Negative   NITRITE UA  Negative   BILIRUBIN UA  Negative   UROBILINOGEN UA (BE) mg/dl <2 0   LEUKOCYTES UA  Negative     Results from last 7 days   Lab Units 12/18/22  1128   INFLUENZA A PCR Positive*   INFLUENZA B PCR  Negative   RSV PCR  Negative       Results from last 7 days   Lab Units 12/21/22 1929 12/18/22  1128   BLOOD CULTURE  Received in Microbiology Lab  Culture in Progress  Received in Microbiology Lab  Culture in Progress  No Growth at 72 hrs    Staphylococcus coagulase negative*   GRAM STAIN RESULT   --  Gram positive cocci in clusters*       ED Treatment:   Medication Administration from 12/21/2022 1919 to 12/21/2022 2202       Date/Time Order Dose Route Action     12/21/2022 1936 EST albuterol inhalation solution 5 mg 5 mg Nebulization Given     12/21/2022 1936 EST ipratropium (ATROVENT) 0 02 % inhalation solution 0 5 mg 0 5 mg Nebulization Given     12/21/2022 2113 EST vancomycin (VANCOCIN) 1,250 mg in sodium chloride 0 9 % 250 mL IVPB 1,250 mg Intravenous New Bag     12/21/2022 2110 EST cefepime (MAXIPIME) 2 g/50 mL dextrose IVPB 2,000 mg Intravenous New Bag     12/21/2022 2110 EST methylPREDNISolone sodium succinate (Solu-MEDROL) injection 125 mg 125 mg Intravenous Given        Past Medical History:   Diagnosis Date   • Anemia    • Aortic aneurysm (HCC)    • BPH (benign prostatic hyperplasia)    • CAD (coronary artery disease)    • Chronic kidney disease    • COPD (chronic obstructive pulmonary disease) ([facility])    • Diabetes mellitus (HCC)    • Dyslipidemia    • GERD (gastroesophageal reflux disease)    • Graves disease    • Hypertension    • Iron deficiency anemia    • Lactic acidosis    • Lung cancer (HCC)    • Lyme disease    • Neuropathy    • Pancytopenia ([facility])    • Prostate cancer (HCC)    • Rheumatoid arthritis ([facility] )      Present on Admission:  • HTN (hypertension)  • Rheumatoid arthritis ([facility] )  • Squamous cell lung cancer, right (HCC)  • Prostate cancer ([facility] )  • COPD (chronic obstructive pulmonary disease) ([facility])      Admitting Diagnosis: Pneumonia [J18 9]  SOB (shortness of breath) [R06 02]  Hypoxia [R09 02]  Age/Sex: 81 y o  male  Admission Orders:  Scheduled Medications:  amLODIPine, 5 mg, Oral, Daily  aspirin, 81 mg, Oral, Daily  atenolol, 25 mg, Oral, Daily  atorvastatin, 80 mg, Oral, Daily With Dinner  azithromycin, 500 mg, Oral, Q24H  cefTRIAXone, 1,000 mg, Intravenous, Q24H  fenofibrate, 145 mg, Oral, Daily  ferrous sulfate, 325 mg, Oral, BID With Meals  gabapentin, 300 mg, Oral, Daily  heparin (porcine), 5,000 Units, Subcutaneous, Q8H TAYLOR  insulin lispro, 1-6 Units, Subcutaneous, 4x Daily (AC & HS)  ipratropium, 0 5 mg, Nebulization, TID  levalbuterol, 1 25 mg, Nebulization, TID  methimazole, 5 mg, Oral, Daily  methylPREDNISolone sodium succinate, 40 mg, Intravenous, Q8H  oseltamivir, 30 mg, Oral, Q12H TAYLOR  pantoprazole, 40 mg, Oral, Early Morning  tamsulosin, 0 4 mg, Oral, Daily With Dinner  umeclidinium-vilanterol, 1 puff, Inhalation, Daily      Continuous IV Infusions:     PRN Meds:  acetaminophen, 650 mg, Oral, Q6H PRN  albuterol, 2 puff, Inhalation, Q4H PRN      Fingerstick ac and hs   Aspiration precautions  Fall precautions   Cont pulse ox   Tele     Network Utilization Review Department  ATTENTION: Please call with any questions or concerns to [phone] and carefully listen to the prompts so that you are directed to the right person  All voicemails are confidential   Sharyn Grimm all requests for admission clinical reviews, approved or denied determinations and any other requests to dedicated fax number below belonging to the campus where the patient is receiving treatment   List of dedicated fax numbers for the Facilities:  [address] DENIALS (Administrative/Medical Necessity) [phone]   [address] [place] [address] (Maternity/NICU/Pediatrics) [phone]   [address] [phone]   Ivana Mccormack  [phone]   [address] [place] [address] - [facility] [address] [address] [phone]   [address] [place] [address] [facility] 134 861 [facility] [phone]

## 2022-12-22 NOTE — ASSESSMENT & PLAN NOTE
Lab Results   Component Value Date    EGFR 43 12/18/2022    EGFR 50 11/14/2022    EGFR 45 10/17/2022    CREATININE 1 60 (H) 12/18/2022    CREATININE 1 40 (H) 11/14/2022    CREATININE 1 52 (H) 10/17/2022     · Creatinine stable at baseline  · Avoid hypotension and nephrotoxic agents  · Monitor BMP daily

## 2022-12-22 NOTE — ASSESSMENT & PLAN NOTE
· Meeting sepsis criteria for tachycardia, tachypnea, and fever  · Secondary to influenza A, COPD, pna  · Started on IV ceftriaxone + azithromycin  · Follow up lactic acid, procalcitonin, BCx2  · Trend WBC and fever curve

## 2022-12-22 NOTE — ASSESSMENT & PLAN NOTE
· With acute exacerbation  · Continue pre-hospital inhalers, scheduled nebs, IV solumedrol, and ceftriaxone  · Respiratory protocol

## 2022-12-22 NOTE — H&P
[address] 1950, 81 y o  male MRN: [id number]  Unit/Bed#: ED 24 Encounter: [id number]  Primary Care Provider: JEROME Heaton   Date and time admitted to hospital: 12/21/2022  7:21 PM    * Acute respiratory failure [facility])  Assessment & Plan  Patient presenting to the ED for worsening shortness of breath  Patient has had productive cough and shortness of breath over the last week  He also reports associated fevers/chills and fatigue  Denies any associated chest pain, abdominal pain, nausea/vomiting/diarrhea, urinary complaints at this time    · Currently requiring 4L NC  · CXR (wet read) with pneumonia; official radiology read pending  · Multifactorial in the setting of influenza A, pneumonia and COPD exacerbation  · Continue with nebulizers, steroids, and ceftriaxone + azithromycin  · Obtain sputum culture and urine strep/legionella  · Respiratory protocol  · Monitor pulse ox continuous    Influenza A  Assessment & Plan  · Tested positive for influenza A  · Seen by ED on 12/18, discharged with course of oral doxycycline  · Currently requiring 4L NC  · Start on Tamiflu  · Continue treatment with ceftriaxone + azithromycin  · Monitor respiratory status      Sepsis ([facility] )  Assessment & Plan  · Meeting sepsis criteria for tachycardia, tachypnea, and fever  · Secondary to influenza A, COPD, pna  · Started on IV ceftriaxone + azithromycin  · Follow up lactic acid, procalcitonin, BCx2  · Trend WBC and fever curve    Graves disease  Assessment & Plan  · Continue methimazole    CKD (chronic kidney disease)  Assessment & Plan  Lab Results   Component Value Date    EGFR 43 12/18/2022    EGFR 50 11/14/2022    EGFR 45 10/17/2022    CREATININE 1 60 (H) 12/18/2022    CREATININE 1 40 (H) 11/14/2022    CREATININE 1 52 (H) 10/17/2022     · Creatinine stable at baseline  · Avoid hypotension and nephrotoxic agents  · Monitor BMP daily    Prostate cancer [facility])  Assessment & Plan  · Stable, continue outpatient follow up with Urology    COPD (chronic obstructive pulmonary disease) ([facility] )  Assessment & Plan  · With acute exacerbation  · Continue pre-hospital inhalers, scheduled nebs, IV solumedrol, and ceftriaxone  · Respiratory protocol  · Currently on 4L NC, wean oxygen as tolerated    Squamous cell lung cancer, right ([facility] )  Assessment & Plan  · Follows outpatient with [facility]    Rheumatoid arthritis ([place] [address] )  Assessment & Plan  · Continue outpatient follow up Rheumatology    HTN (hypertension)  Assessment & Plan  · BP well controlled  · Continue home amlodipine and atenolol  · Monitor vitals per routine    Type 2 diabetes mellitus, with long-term current use of insulin (HCC)  Assessment & Plan    Lab Results   Component Value Date    HGBA1C 6 6 (H) 11/14/2022     · Holding home anti-diabetic medications  · Start on SSI with accu checks  · Hypoglycemia protocol  · Diabetic diet    VTE Prophylaxis: Heparin  / sequential compression device   Code Status: Level 1 code  Discussion with family: Family at bedside    Anticipated Length of Stay:  Patient will be admitted on an Inpatient basis with an anticipated length of stay of  More than 2 midnights  Justification for Hospital Stay: ARF, Sepsis    Total Time for Visit, including Counseling / Coordination of Care: 90 minutes  Greater than 50% of this total time spent on direct patient counseling and coordination of care  Chief Complaint:   SOB    History of Present Illness:    Rachel Whitley is a 81 y o  male who has a past medical history significant for hypertension, COPD, diabetes, CKD, squamous cell lung cancer, prostate cancer, Graves disease  Patient presenting to the ED for worsening shortness of breath  Patient has had productive cough and shortness of breath over the last week  He also reports associated fevers/chills and fatigue    Denies any associated chest pain, abdominal pain, nausea/vomiting/diarrhea, urinary complaints at this time  Patient crying medical admission for acute respiratory failure in the setting of infection and COPD exacerbation  All patient questions answered to the best of my ability  Review of Systems:    Review of Systems   Constitutional: Positive for fever  Negative for chills  HENT: Negative for ear pain and sore throat  Eyes: Negative for pain and visual disturbance  Respiratory: Positive for cough, shortness of breath and wheezing  Cardiovascular: Negative for chest pain and palpitations  Gastrointestinal: Negative for abdominal pain and vomiting  Genitourinary: Negative for dysuria and hematuria  Musculoskeletal: Negative for arthralgias and back pain  Skin: Negative for color change and rash  Neurological: Negative for seizures and syncope  All other systems reviewed and are negative  Past Medical and Surgical History:     Past Medical History:   Diagnosis Date   • Anemia    • Aortic aneurysm ([place] [address] )    • BPH (benign prostatic hyperplasia)    • CAD (coronary artery disease)    • Chronic kidney disease    • COPD (chronic obstructive pulmonary disease) ([facility])    • Diabetes mellitus ([facility])    • Dyslipidemia    • GERD (gastroesophageal reflux disease)    • Graves disease    • Hypertension    • Iron deficiency anemia    • Lactic acidosis    • Lung cancer ([place] [address] )    • Lyme disease    • Neuropathy    • Pancytopenia ([place] [address] )    • Prostate cancer ([place] [address] )    • Rheumatoid arthritis ([place] [address] )        Past Surgical History:   Procedure Laterality Date   • BALLOON ANGIOPLASTY, ARTERY  1996   • CATARACT EXTRACTION, BILATERAL  2019   • CLAVICLE SURGERY     • TOE AMPUTATION Left 11/20/2018    Procedure: AMPUTATION GREAT TOE;  Surgeon: Courtney Sood DPM;  Location: [facility];  Service: Podiatry       Meds/Allergies:    Prior to Admission medications    Medication Sig Start Date End Date Taking?  Authorizing Provider   albuterol (PROVENTIL HFA,VENTOLIN HFA) 90 mcg/act inhaler Inhale 2 puffs every 4 (four) hours as needed for wheezing 6/29/22   JEROME Dexetr   amLODIPine (NORVASC) 5 mg tablet Take 1 tablet (5 mg total) by mouth daily 7/19/22   JEROME Dexter   Anoro Ellipta 62 5-25 MCG/INH inhaler INHALE 1 PUFF BY MOUTH EVERY DAY 10/27/22   JEROME Dexter   ascorbic acid (VITAMIN C) 250 mg tablet Take 250 mg by mouth 2 (two) times a day    Historical Provider, MD   aspirin (ECOTRIN LOW STRENGTH) 81 mg EC tablet 1 tab(s)    Historical Provider, MD   atenolol (TENORMIN) 25 mg tablet Take 1 tablet (25 mg total) by mouth daily 7/19/22   JEROME Dexter   B-D ULTRAFINE III SHORT PEN 31G X 8 MM MISC Inject under the skin in the morning 12/12/22   JEROME Dexter   Dapagliflozin Propanediol [place]) 5 MG TABS Take 1 tablet (5 mg total) by mouth daily 8/16/22 11/21/22  Devan Contreras MD   doxycycline monohydrate (MONODOX) 100 mg capsule Take 1 capsule (100 mg total) by mouth 2 (two) times a day for 10 days 12/18/22 12/28/22  Felisha Marin MD   fenofibrate (TRICOR) 145 mg tablet Take 1 tablet (145 mg total) by mouth daily 7/19/22   JEROME Dexter   ferrous sulfate 325 (65 Fe) mg tablet Take 325 mg by mouth 2 (two) times a day with meals    Historical Provider, MD   gabapentin (NEURONTIN) 600 MG tablet  8/22/22   Historical Provider, MD   glucose blood (OneTouch Verio) test strip CHECK BS AT MORNING FASTING , AND AFTER A MEAL 7/12/22   JEROME Dexter   Lancets (accu-chek soft touch) lancets bs check qid 9/20/21   JEROME Dexter   metFORMIN (GLUCOPHAGE) 1000 MG tablet Take 1 tablet (1,000 mg total) by mouth 2 (two) times a day with meals for 5000 doses 7/19/22 5/23/29  JEROME Dexter   methimazole (TAPAZOLE) 5 mg tablet Take 5 mg by mouth in the morning 11/29/21   Historical Provider, MD   omeprazole (PriLOSEC) 40 MG capsule TAKE 1 CAPSULE BY MOUTH TWICE A DAY 8/24/22   JEROME Dexter   rosuvastatin (CRESTOR) 40 MG tablet TAKE 1 TABLET BY MOUTH EVERY DAY IN THE EVENING 22   JEROME Kohli   sitaGLIPtin (JANUVIA) 50 mg tablet Take 1 tablet (50 mg total) by mouth daily 22  Scott Arnold MD   tamsulosin [facility]) 0 4 mg Take 0 4 mg by mouth 22  Historical Provider, MD Paula Angel FlexTouch 100 units/mL injection pen INJECT 59 UNITS OF TRESIBA DAILY, (MAY INCREASE UP TO 80 UNITS AS NEEDED DANIEL ON THE CHEMO) 22   Scott Arnold MD     I have reviewed home medications using allscripts  Allergies: Allergies   Allergen Reactions   • Ace Inhibitors Swelling   • Angiotensin Receptor Blockers Other (See Comments)     Elevated K+   • Clindamycin Diarrhea and Nausea Only   • Plaquenil [Hydroxychloroquine] Swelling     Tongue swelling       Social History:     Marital Status: /Civil Union   Occupation: NA  Patient Pre-hospital Living Situation: Home  Patient Pre-hospital Level of Mobility: Walks  Patient Pre-hospital Diet Restrictions: Diabetic  Substance Use History:   Social History     Substance and Sexual Activity   Alcohol Use No     Social History     Tobacco Use   Smoking Status Former   • Packs/day: 0 25   • Years: 50 00   • Pack years: 12 50   • Types: Cigarettes   • Quit date: 2022   • Years since quittin 9   Smokeless Tobacco Never     Social History     Substance and Sexual Activity   Drug Use No       Family History:    Family History   Problem Relation Age of Onset   • Coronary artery disease Father        Physical Exam:     Vitals:   Blood Pressure: 109/57 (22)  Pulse: (!) 106 (22)  Temperature: 100 3 °F (37 9 °C) (22)  Temp Source: Oral (22)  Respirations: 22 (22)  SpO2: 91 % (22)    Physical Exam  Vitals and nursing note reviewed  Constitutional:       General: He is in acute distress  Appearance: He is well-developed  HENT:      Head: Normocephalic and atraumatic  Mouth/Throat:      Pharynx: Oropharynx is clear     Eyes: Conjunctiva/sclera: Conjunctivae normal    Cardiovascular:      Rate and Rhythm: Regular rhythm  Tachycardia present  Pulses: Normal pulses  Heart sounds: No murmur heard  Pulmonary:      Effort: Respiratory distress present  Breath sounds: Wheezing and rales present  Comments: 4L NC  Abdominal:      Palpations: Abdomen is soft  Tenderness: There is no abdominal tenderness  Musculoskeletal:         General: No swelling  Cervical back: Neck supple  Right lower leg: No edema  Left lower leg: No edema  Skin:     General: Skin is warm and dry  Capillary Refill: Capillary refill takes less than 2 seconds  Neurological:      Mental Status: He is alert and oriented to person, place, and time  Psychiatric:         Mood and Affect: Mood normal          Additional Data:     Lab Results: I have personally reviewed pertinent reports  Results from last 7 days   Lab Units 12/21/22 1929   WBC Thousand/uL 6 07   HEMOGLOBIN g/dL 12 2   HEMATOCRIT % 38 5   PLATELETS Thousands/uL 168   NEUTROS PCT % 84*   LYMPHS PCT % 6*   MONOS PCT % 9   EOS PCT % 1     Results from last 7 days   Lab Units 12/21/22 1929   SODIUM mmol/L 136   POTASSIUM mmol/L 4 3   CHLORIDE mmol/L 102   CO2 mmol/L 25   BUN mg/dL 30*   CREATININE mg/dL 1 78*   ANION GAP mmol/L 9   CALCIUM mg/dL 8 4   ALBUMIN g/dL 3 2*   TOTAL BILIRUBIN mg/dL 0 31   ALK PHOS U/L 46   ALT U/L 17   AST U/L 39   GLUCOSE RANDOM mg/dL 102     Results from last 7 days   Lab Units 12/21/22 1929   INR  1 30*             Results from last 7 days   Lab Units 12/21/22 1929 12/18/22  1128   LACTIC ACID mmol/L 0 8 1 9   PROCALCITONIN ng/ml 0 15  --        Imaging: I have personally reviewed pertinent reports        XR chest 1 view portable    (Results Pending)       EKG, Pathology, and Other Studies Reviewed on Admission:   · EKG: Reviewed    Allscri[employer] buy tea / Epic Records Reviewed: Yes     ** Please Note: This note has been constructed using a voice recognition system   **

## 2022-12-22 NOTE — PROGRESS NOTES
Pt O2 sats keep fluctuating between 89-90% at 1L NC  Now pt is on 2L NC and stats at 90-91% at rest  Will continue to monitor

## 2022-12-22 NOTE — ASSESSMENT & PLAN NOTE
Lab Results   Component Value Date    EGFR 47 12/22/2022    EGFR 37 12/21/2022    EGFR 43 12/18/2022    CREATININE 1 47 (H) 12/22/2022    CREATININE 1 78 (H) 12/21/2022    CREATININE 1 60 (H) 12/18/2022     · Creatinine stable at baseline  · Avoid hypotension and nephrotoxic agents  · Monitor BMP daily

## 2022-12-22 NOTE — ASSESSMENT & PLAN NOTE
Lab Results   Component Value Date    HGBA1C 6 6 (H) 11/14/2022     · Holding home anti-diabetic medications  · Continue SSI with accu checks  · Hypoglycemia protocol  · Diabetic diet

## 2022-12-22 NOTE — PROGRESS NOTES
[facility]  Progress Note David Rosas 1950, 81 y o  male MRN: [id number]  Unit/Bed#: -01 Encounter: [id number]  Primary Care Provider: JEROME Nunez   Date and time admitted to hospital: 12/21/2022  7:21 PM    * Acute respiratory failure [facility])  Assessment & Plan  Patient presenting to the ED for worsening shortness of breath  Patient has had productive cough and shortness of breath over the last week  He also reports associated fevers/chills and fatigue  Denies any associated chest pain, abdominal pain, nausea/vomiting/diarrhea, urinary complaints at this time    · O2 tapered to room air at rest, will check pulse ox on ambualtion today   · CXR with pneumonia  · Multifactorial in the setting of influenza A, pneumonia and COPD exacerbation  · Continue with nebulizers, steroids, and ceftriaxone + azithromycin  · Follow up sputum culture and urine strep/legionella  · Respiratory protocol    Influenza A  Assessment & Plan  · Tested positive for influenza A  · Seen by ED on 12/18, discharged with course of oral doxycycline  · Continue Tamiflu  · Monitor respiratory status      Graves disease  Assessment & Plan  · Continue methimazole    Sepsis ([facility] [address] )  Assessment & Plan  · Meeting sepsis criteria for tachycardia, tachypnea, and fever  · Secondary to influenza A, COPD, pna  · on IV ceftriaxone + azithromycin  · Trend WBC and fever curve    CKD (chronic kidney disease)  Assessment & Plan  Lab Results   Component Value Date    EGFR 47 12/22/2022    EGFR 37 12/21/2022    EGFR 43 12/18/2022    CREATININE 1 47 (H) 12/22/2022    CREATININE 1 78 (H) 12/21/2022    CREATININE 1 60 (H) 12/18/2022     · Creatinine stable at baseline  · Avoid hypotension and nephrotoxic agents  · Monitor BMP daily    Prostate cancer (HCC)  Assessment & Plan  · Stable, continue outpatient follow up with Urology    COPD (chronic obstructive pulmonary disease) ([facility] [address] )  Assessment & Plan  · With acute exacerbation  · Continue pre-hospital inhalers, scheduled nebs, IV solumedrol, and ceftriaxone  · Respiratory protocol    Squamous cell lung cancer, right ([place] [address] )  Assessment & Plan  · Follows outpatient with [facility]    Rheumatoid arthritis ([place] [address] )  Assessment & Plan  · Continue outpatient follow up Rheumatology    HTN (hypertension)  Assessment & Plan  · BP well controlled  · Continue home amlodipine and atenolol  · Monitor vitals per routine    Type 2 diabetes mellitus, with long-term current use of insulin ([facility])  Assessment & Plan    Lab Results   Component Value Date    HGBA1C 6 6 (H) 11/14/2022     · Holding home anti-diabetic medications  · Continue SSI with accu checks  · Hypoglycemia protocol  · Diabetic diet      VTE Pharmacologic Prophylaxis:   Pharmacologic: Heparin  Mechanical VTE Prophylaxis in Place: Yes    Review of Systems:    Review of Systems   Constitutional: Negative for chills and fever  HENT: Negative for ear pain and sore throat  Eyes: Negative for pain and visual disturbance  Respiratory: Positive for cough  Negative for shortness of breath  Cardiovascular: Negative for chest pain and palpitations  Gastrointestinal: Negative for abdominal pain and vomiting  Genitourinary: Negative for dysuria and hematuria  Musculoskeletal: Negative for arthralgias and back pain  Skin: Negative for color change and rash  Neurological: Negative for seizures and syncope  All other systems reviewed and are negative        Past Medical and Surgical History:     Past Medical History:   Diagnosis Date   • Anemia    • Aortic aneurysm ([facility])    • BPH (benign prostatic hyperplasia)    • CAD (coronary artery disease)    • Chronic kidney disease    • COPD (chronic obstructive pulmonary disease) ([facility])    • Diabetes mellitus ([place] [address] )    • Dyslipidemia    • GERD (gastroesophageal reflux disease)    • Graves disease    • Hypertension    • Iron deficiency anemia    • Lactic acidosis    • Lung cancer ([place] [address] ) • Lyme disease    • Neuropathy    • Pancytopenia ([facility] [address] )    • Prostate cancer ([facility] [address] )    • Rheumatoid arthritis ([facility] )        Past Surgical History:   Procedure Laterality Date   • BALLOON ANGIOPLASTY, ARTERY     • CATARACT EXTRACTION, BILATERAL     • CLAVICLE SURGERY     • TOE AMPUTATION Left 2018    Procedure: AMPUTATION GREAT TOE;  Surgeon: Hector Barrios DPM;  Location: [facility];  Service: Podiatry       Patient Centered Rounds: I have performed bedside rounds with nursing staff today  Discussions with Specialists or Other Care Team Provider: CM, nursing    Education and Discussions with Family / Patient: Patient and updated patient's wife over the phone    Time Spent for Care: 1 hour  More than 50% of total time spent on counseling and coordination of care as described above  Current Length of Stay: 1 day(s)    Current Patient Status: Inpatient   Certification Statement: The patient will continue to require additional inpatient hospital stay due to IV steroid and IV antibiotics pending clinical improvement and infectious work-up    Discharge Plan: Likely the next 24 to 48 hours    Code Status: Level 1 - Full Code      Subjective:   Reports an improvement of shortness of breath, still complaining of cough    Objective:     Vitals:   Temp (24hrs), Av 5 °F (36 9 °C), Min:97 6 °F (36 4 °C), Max:100 3 °F (37 9 °C)    Temp:  [97 6 °F (36 4 °C)-100 3 °F (37 9 °C)] 97 6 °F (36 4 °C)  HR:  [] 71  Resp:  [20-24] 20  BP: ()/(54-71) 124/70  SpO2:  [83 %-96 %] 93 %  There is no height or weight on file to calculate BMI  Input and Output Summary (last 24 hours): Intake/Output Summary (Last 24 hours) at 2022 1214  Last data filed at 2022 1001  Gross per 24 hour   Intake 600 ml   Output 750 ml   Net -150 ml       Physical Exam:     Physical Exam  Vitals and nursing note reviewed  Constitutional:       General: He is not in acute distress       Appearance: He is well-developed  Interventions: Nasal cannula in place  HENT:      Head: Normocephalic and atraumatic  Mouth/Throat:      Mouth: Mucous membranes are moist       Pharynx: Oropharynx is clear  Eyes:      General: No scleral icterus  Conjunctiva/sclera: Conjunctivae normal    Cardiovascular:      Rate and Rhythm: Normal rate and regular rhythm  Heart sounds: No murmur heard  Pulmonary:      Effort: Pulmonary effort is normal  No respiratory distress  Breath sounds: No wheezing or rales  Comments: Diminished breath sounds   Abdominal:      General: Bowel sounds are normal       Palpations: Abdomen is soft  Tenderness: There is no abdominal tenderness  Musculoskeletal:         General: No swelling  Cervical back: Normal range of motion and neck supple  Right lower leg: No edema  Left lower leg: No edema  Skin:     General: Skin is warm and dry  Capillary Refill: Capillary refill takes less than 2 seconds  Neurological:      General: No focal deficit present  Mental Status: He is alert and oriented to person, place, and time     Psychiatric:         Mood and Affect: Mood normal          Behavior: Behavior normal            Additional Data:     Labs:    Results from last 7 days   Lab Units 12/22/22 0449 12/21/22 1929   WBC Thousand/uL 11 71* 6 07   HEMOGLOBIN g/dL 12 0 12 2   HEMATOCRIT % 37 5 38 5   PLATELETS Thousands/uL 143* 168   NEUTROS PCT %  --  84*   LYMPHS PCT %  --  6*   MONOS PCT %  --  9   EOS PCT %  --  1     Results from last 7 days   Lab Units 12/22/22 0449   SODIUM mmol/L 138   POTASSIUM mmol/L 3 8   CHLORIDE mmol/L 104   CO2 mmol/L 24   BUN mg/dL 27*   CREATININE mg/dL 1 47*   ANION GAP mmol/L 10   CALCIUM mg/dL 8 5   ALBUMIN g/dL 2 9*   TOTAL BILIRUBIN mg/dL 0 34   ALK PHOS U/L 39*   ALT U/L 15   AST U/L 36   GLUCOSE RANDOM mg/dL 98     Results from last 7 days   Lab Units 12/21/22 1929   INR  1 30*     Results from last 7 days   Lab Units 12/22/22  1115 12/22/22  0746 12/22/22  0019   POC GLUCOSE mg/dl 201* 118 105         Results from last 7 days   Lab Units 12/22/22  0449 12/21/22 1929 12/18/22  1128   LACTIC ACID mmol/L  --  0 8 1 9   PROCALCITONIN ng/ml 1 04* 0 15  --            * I Have Reviewed All Lab Data Listed Above  * Additional Pertinent Lab Tests Reviewed: [address] Admission Reviewed    Imaging:    Imaging Reports Reviewed Today Include: chest x-ray  Imaging Personally Reviewed by Myself Includes:  none    Recent Cultures (last 7 days):     Results from last 7 days   Lab Units 12/22/22  0526 12/21/22 1929 12/18/22  1128   BLOOD CULTURE   --  Received in Microbiology Lab  Culture in Progress  Received in Microbiology Lab  Culture in Progress  No Growth at 72 hrs    Staphylococcus coagulase negative*   GRAM STAIN RESULT   --   --  Gram positive cocci in clusters*   LEGIONELLA URINARY ANTIGEN  Negative  --   --        Last 24 Hours Medication List:   Current Facility-Administered Medications   Medication Dose Route Frequency Provider Last Rate   • acetaminophen  650 mg Oral Q6H PRN Stephanie Hobson PA-C     • albuterol  2 puff Inhalation Q4H PRN Barbara Oh PA-C     • amLODIPine  5 mg Oral Daily Barbara Oh PA-C     • aspirin  81 mg Oral Daily Barbara Oh PA-C     • atenolol  25 mg Oral Daily Barbara Oh PA-C     • atorvastatin  80 mg Oral Daily With [employer]     • azithromycin  500 mg Oral Q24H Sydnie Logan MD     • cefTRIAXone  1,000 mg Intravenous Q24H Barbara Oh PA-C     • fenofibrate  145 mg Oral Daily Barbara Oh PA-C     • ferrous sulfate  325 mg Oral BID With Meals Stephanie Hobson PA-C     • gabapentin  300 mg Oral Daily Barbara Oh PA-C     • heparin (porcine)  5,000 Units Subcutaneous Q8H [facility] Barbara Oh PA-C     • insulin lispro  1-6 Units Subcutaneous 4x Daily (AC & HS) Barbara Oh PA-C     • ipratropium  0 5 mg Nebulization TID Stephanie Hobson PA-C     • levalbuterol  1 25 mg Nebulization TID Siena Graves MD     • methimazole  5 mg Oral Daily Barbara Oh PA-C     • methylPREDNISolone sodium succinate  40 mg Intravenous Q12H [facility] Siena Graves MD     • oseltamivir  30 mg Oral Q12H [facility] Barbara Oh PA-C     • pantoprazole  40 mg Oral Early Morning Barbara Oh PA-C     • tamsulosin  0 4 mg Oral Daily With Dinner Mark Robins PA-C     • umeclidinium-vilanterol  1 puff Inhalation Daily Mark Robins PA-C          Today, Patient Was Seen By: Davonte Griffin MD    ** Please Note: Dictation voice to text software may have been used in the creation of this document   **

## 2022-12-22 NOTE — CASE MANAGEMENT
Case Management Assessment & Discharge Planning Note    Patient name Alexandria Darnell  Location [address] 310/-10 MRN [id number]  : 1950 Date 2022       Current Admission Date: 2022  Current Admission Diagnosis:Acute respiratory failure [facility])   Patient Active Problem List    Diagnosis Date Noted   • CKD (chronic kidney disease) 2022   • Acute respiratory failure ([address] ) 2022   • Sepsis ([facility] [address] ) 2022   • Graves disease 2022   • Influenza A 2022   • Prostate cancer ([facility] [address] ) 2021   • Injury of finger of right hand 10/28/2021   • Acquired absence of other left toe(s) ([facility] [address] ) 2021   • Abdominal aortic aneurysm (AAA) without rupture 2021   • Pancytopenia ([facility] [address] ) 2019   • Rheumatoid arthritis ([facility] [address] ) 2019   • Squamous cell lung cancer, right ([facility] [address] ) 2019   • Neuropathy 2019   • Hyperlipidemia 2019   • COPD (chronic obstructive pulmonary disease) ([facility] [address] ) 2019   • GERD (gastroesophageal reflux disease) 2019   • Lactic acidosis 2019   • PRADEEP (acute kidney injury) ([facility] [address] ) 2018   • Type 2 diabetes mellitus, with long-term current use of insulin ([facility] [address] ) 2018   • HTN (hypertension) 2018   • Anemia 2018      LOS (days): 1  Geometric Mean LOS (GMLOS) (days): 5 00  Days to GMLOS:4 2     OBJECTIVE:    Risk of Unplanned Readmission Score: 25 58         Current admission status: Inpatient  Referral Reason: VNA    Preferred Pharmacy:   [id number]  Phone: [phone] Fax: [phone]    Primary Care Provider: JEROME Delgadillo    Primary Insurance: [facility]  Secondary Insurance:     ASSESSMENT:  Niyah Palmer Proxies    There are no active Health Care Proxies on file         Advance Directives  Does patient have a Health Care POA?: No  Was patient offered paperwork?: Yes (Patient beleives he has it somewhere at home- will have wife try to locate )  Does patient have Advance Directives?: Yes (patient states at home somewhere- he will ask wife to locate)  Advance Directives: Living will  Primary Contact: Spouse Bienvendio Connolly        Patient Information  Admitted from[de-identified] Home  Mental Status: Alert  During Assessment patient was accompanied by: Not accompanied during assessment  Assessment information provided by[de-identified] Patient  Primary Caregiver: Self  Support Systems: Spouse/significant other  [place] of Residence: [place] [address] do you live in?: [address] [place] [address] entry access options   Select all that apply : No steps to enter home  Number of steps to enter home : 2  Do the steps have railings?: Yes  Type of Current Residence: 2 story home  Upon entering residence, is there a bedroom on the main floor (no further steps)?: Yes  Upon entering residence, is there a bathroom on the main floor (no further steps)?: Yes  In the last 12 months, was there a time when you were not able to pay the mortgage or rent on time?: No  In the last 12 months, how many places have you lived?: 1  In the last 12 months, was there a time when you did not have a steady place to sleep or slept in a shelter (including now)?: No  Homeless/housing insecurity resource given?: N/A  Living Arrangements: Lives w/ Spouse/significant other  Is patient a ?: No    Activities of Daily Living Prior to Admission  Functional Status: Independent  Completes ADLs independently?: Yes  Ambulates independently?: Yes  Does patient use assisted devices?: No  Does patient currently own DME?: Yes ( Fathers DME- [facility]- patient never had any DME billed to self)  What DME does the patient currently own?: Stra[employer] Brandie Antoinette, Wheelchair  Does patient have a history of Outpatient Therapy (PT/OT)?: No  Does the patient have a history of Short-Term Rehab?: No  Does patient have a history of HHC?: No  Does patient currently have [address]?: No         Patient Information Continued  Income Source: Pension/senior living  Does patient have prescription coverage?: Yes  Within the past 12 months, you worried that your food would run out before you got the money to buy more : Never true  Within the past 12 months, the food you bought just didn't last and you didn't have money to get more : Never true  Food insecurity resource given?: N/A  Does patient receive dialysis treatments?: No  Does patient have a history of substance abuse?: No  Does patient have a history of Mental Health Diagnosis?: No         Means of Transportation  Means of Transport to Appts[de-identified] Drives Self  In the past 12 months, has lack of transportation kept you from medical appointments or from getting medications?: No  In the past 12 months, has lack of transportation kept you from meetings, work, or from getting things needed for daily living?: No  Was application for public transport provided?: N/A        DISCHARGE DETAILS:    Discharge planning discussed with[de-identified] Patient  Freedom of Choice: Yes  Comments - Freedom of Choice: CM met with patient to assess and offer options for discharge planning needs  Patient is adamant he must be home by Kolton Palmer or he will sign out AMA  , he has missed last 2 Christmas Holidays due to hospitalization  he is aware he may be d/c home on O2   he is receptive to [facility] followup and has no preference for an agency    If STR is recommended he is not receptive- only [facility]  CM contacted family/caregiver?: No- see comments (Patient chooses to update wife to all plans)  Were Treatment Team discharge recommendations reviewed with patient/caregiver?: Yes  Did patient/caregiver verbalize understanding of patient care needs?: Yes  Were patient/caregiver advised of the risks associated with not following Treatment Team discharge recommendations?: Yes         [address]         Is the patient interested in [facility] at discharge?: Yes  [address] requested[de-identified] Physical Therapy, Occupational Therapy, [address] Agency Name[de-identified] Other ([place] referral will be made and will offer patient the accepted provider jose when available )  [address] Provider[de-identified] PCP  Home Health Services Needed[de-identified] Diabetes Management, Strengthening/Theraputic Exercises to Improve Function, Gait/ADL Training  Homebound Criteria Met[de-identified] Requires the Assistance of Another Person for Safe Ambulation or to Leave the Home  Supporting Clincal Findings[de-identified] Fatigues Easliy in [employer]    DME Referral Provided  Referral made for DME?: No    Other Referral/Resources/Interventions Provided:  Interventions: C  Referral Comments: Patient may need 02- will order if recommended    Would you like to participate in our [address] service program?  : No - Declined    Treatment Team Recommendation: Home  Discharge Destination Plan[de-identified] Home with [place] at Discharge : Family           ETA of Transport (Date): 12/22/22 (CM met with patient to review IMM  patient reported understanding of his rights )          IMM Given (Date):: 12/22/22 (Met with patient to review IMM  atient expressed understanding of his rights  Patient signed and was given a copy    IMM put in Medical Records)  IMM Given to[de-identified] Patient

## 2022-12-22 NOTE — RESPIRATORY THERAPY NOTE
RT Protocol Note  Behzad Valdez 81 y o  male MRN: [id number]  Unit/Bed#: -01 Encounter: [id number]    Assessment    Principal Problem:    Acute respiratory failure ([address] )  Active Problems:    Type 2 diabetes mellitus, with long-term current use of insulin (HCC)    HTN (hypertension)    Rheumatoid arthritis (HCC)    Squamous cell lung cancer, right (HCC)    COPD (chronic obstructive pulmonary disease) (HCC)    Prostate cancer (HCC)    CKD (chronic kidney disease)    Sepsis (HCC)    Graves disease    Influenza A      Home Pulmonary Medications:  Anoro, Albuterol MDI PRN       Past Medical History:   Diagnosis Date   • Anemia    • Aortic aneurysm (HCC)    • BPH (benign prostatic hyperplasia)    • CAD (coronary artery disease)    • Chronic kidney disease    • COPD (chronic obstructive pulmonary disease) (HCC)    • Diabetes mellitus (HCC)    • Dyslipidemia    • GERD (gastroesophageal reflux disease)    • Graves disease    • Hypertension    • Iron deficiency anemia    • Lactic acidosis    • Lung cancer (HCC)    • Lyme disease    • Neuropathy    • Pancytopenia (HCC)    • Prostate cancer (HCC)    • Rheumatoid arthritis (HCC)      Social History     Socioeconomic History   • Marital status: /Civil Union     Spouse name: None   • Number of children: None   • Years of education: None   • Highest education level: None   Occupational History   • None   Tobacco Use   • Smoking status: Former     Packs/day: 0 25     Years: 50 00     Pack years: 12 50     Types: Cigarettes     Quit date: 2022     Years since quittin 9   • Smokeless tobacco: Never   Vaping Use   • Vaping Use: Never used   Substance and Sexual Activity   • Alcohol use: No   • Drug use: No   • Sexual activity: None   Other Topics Concern   • None   Social History Narrative   • None     Social Determinants of Health     Financial Resource Strain: Not on file   Food Insecurity: Not on file   Transportation Needs: Not on file   Physical Activity: Not on file   Stress: Not on file   Social Connections: Not on file   Intimate Partner Violence: Not on file   Housing Stability: Not on file       Subjective         Objective    Physical Exam:   Assessment Type: Pre-treatment  General Appearance: Alert, Awake  Respiratory Pattern: Dyspnea with exertion  Chest Assessment: Chest expansion symmetrical  Bilateral Breath Sounds: Diminished, Coarse, Expiratory wheezes  Cough: Non-productive, Strong  O2 Device: Nasal Cannula    Vitals:  Blood pressure 95/54, pulse 75, temperature 98 3 °F (36 8 °C), resp  rate 20, SpO2 96 %  Imaging and other studies: I have personally reviewed pertinent reports        O2 Device: Nasal Cannula     Plan    Respiratory Plan: Mild Distress pathway      Will Add Xopenex to Atrovent TID

## 2022-12-22 NOTE — ASSESSMENT & PLAN NOTE
Patient presenting to the ED for worsening shortness of breath  Patient has had productive cough and shortness of breath over the last week  He also reports associated fevers/chills and fatigue  Denies any associated chest pain, abdominal pain, nausea/vomiting/diarrhea, urinary complaints at this time    · O2 tapered to room air at rest, will check pulse ox on ambualtion today   · CXR with pneumonia  · Multifactorial in the setting of influenza A, pneumonia and COPD exacerbation  · Continue with nebulizers, steroids, and ceftriaxone + azithromycin  · Follow up sputum culture and urine strep/legionella  · Respiratory protocol

## 2022-12-22 NOTE — ASSESSMENT & PLAN NOTE
· Meeting sepsis criteria for tachycardia, tachypnea, and fever  · Secondary to influenza A, COPD, pna  · on IV ceftriaxone + azithromycin  · Trend WBC and fever curve

## 2022-12-22 NOTE — ASSESSMENT & PLAN NOTE
· With acute exacerbation  · Continue pre-hospital inhalers, scheduled nebs, IV solumedrol, and ceftriaxone  · Respiratory protocol  · Currently on 4L NC, wean oxygen as tolerated

## 2022-12-22 NOTE — PLAN OF CARE
Problem: PAIN - ADULT  Goal: Verbalizes/displays adequate comfort level or baseline comfort level  Description: Interventions:  - Encourage patient to monitor pain and request assistance  - Assess pain using appropriate pain scale  - Administer analgesics based on type and severity of pain and evaluate response  - Implement non-pharmacological measures as appropriate and evaluate response  - Consider cultural and social influences on pain and pain management  - Notify physician/advanced practitioner if interventions unsuccessful or patient reports new pain  Outcome: Progressing     Problem: INFECTION - ADULT  Goal: Absence or prevention of progression during hospitalization  Description: INTERVENTIONS:  - Assess and monitor for signs and symptoms of infection  - Monitor lab/diagnostic results  - Monitor all insertion sites, i e  indwelling lines, tubes, and drains  - Monitor endotracheal if appropriate and nasal secretions for changes in amount and color  - [place] appropriate cooling/warming therapies per order  - Administer medications as ordered  - Instruct and encourage patient and family to use good hand hygiene technique  - Identify and instruct in appropriate isolation precautions for identified infection/condition  Outcome: Progressing     Problem: SAFETY ADULT  Goal: Maintain or return to baseline ADL function  Description: INTERVENTIONS:  -  Assess patient's ability to carry out ADLs; assess patient's baseline for ADL function and identify physical deficits which impact ability to perform ADLs (bathing, care of mouth/teeth, toileting, grooming, dressing, etc )  - Assess/evaluate cause of self-care deficits   - Assess range of motion  - Assess patient's mobility; develop plan if impaired  - Assess patient's need for assistive devices and provide as appropriate  - Encourage maximum independence but intervene and supervise when necessary  - Involve family in performance of ADLs  - Assess for home care needs following discharge   - Consider OT consult to assist with ADL evaluation and planning for discharge  - Provide patient education as appropriate  Outcome: Progressing

## 2022-12-22 NOTE — ASSESSMENT & PLAN NOTE
Patient presenting to the ED for worsening shortness of breath  Patient has had productive cough and shortness of breath over the last week  He also reports associated fevers/chills and fatigue  Denies any associated chest pain, abdominal pain, nausea/vomiting/diarrhea, urinary complaints at this time    · Currently requiring 4L NC  · CXR (wet read) with pneumonia; official radiology read pending  · Multifactorial in the setting of influenza A, pneumonia and COPD exacerbation  · Continue with nebulizers, steroids, and ceftriaxone + azithromycin  · Obtain sputum culture and urine strep/legionella  · Respiratory protocol  · Monitor pulse ox continuous

## 2022-12-23 VITALS
DIASTOLIC BLOOD PRESSURE: 62 MMHG | OXYGEN SATURATION: 90 % | RESPIRATION RATE: 16 BRPM | HEART RATE: 81 BPM | TEMPERATURE: 97.5 F | SYSTOLIC BLOOD PRESSURE: 106 MMHG

## 2022-12-23 LAB
ANION GAP SERPL CALCULATED.3IONS-SCNC: 10 MMOL/L (ref 4–13)
BASOPHILS # BLD AUTO: 0.01 THOUSANDS/ÂΜL (ref 0–0.1)
BASOPHILS NFR BLD AUTO: 0 % (ref 0–1)
BUN SERPL-MCNC: 34 MG/DL (ref 5–25)
CALCIUM SERPL-MCNC: 9.3 MG/DL (ref 8.3–10.1)
CHLORIDE SERPL-SCNC: 105 MMOL/L (ref 96–108)
CO2 SERPL-SCNC: 25 MMOL/L (ref 21–32)
CREAT SERPL-MCNC: 1.33 MG/DL (ref 0.6–1.3)
EOSINOPHIL # BLD AUTO: 0 THOUSAND/ÂΜL (ref 0–0.61)
EOSINOPHIL NFR BLD AUTO: 0 % (ref 0–6)
ERYTHROCYTE [DISTWIDTH] IN BLOOD BY AUTOMATED COUNT: 16.4 % (ref 11.6–15.1)
GFR SERPL CREATININE-BSD FRML MDRD: 53 ML/MIN/1.73SQ M
GLUCOSE SERPL-MCNC: 219 MG/DL (ref 65–140)
GLUCOSE SERPL-MCNC: 251 MG/DL (ref 65–140)
HCT VFR BLD AUTO: 39.1 % (ref 36.5–49.3)
HGB BLD-MCNC: 12.4 G/DL (ref 12–17)
IMM GRANULOCYTES # BLD AUTO: 0.08 THOUSAND/UL (ref 0–0.2)
IMM GRANULOCYTES NFR BLD AUTO: 1 % (ref 0–2)
LYMPHOCYTES # BLD AUTO: 0.32 THOUSANDS/ÂΜL (ref 0.6–4.47)
LYMPHOCYTES NFR BLD AUTO: 2 % (ref 14–44)
MCH RBC QN AUTO: 27 PG (ref 26.8–34.3)
MCHC RBC AUTO-ENTMCNC: 31.7 G/DL (ref 31.4–37.4)
MCV RBC AUTO: 85 FL (ref 82–98)
MONOCYTES # BLD AUTO: 0.33 THOUSAND/ÂΜL (ref 0.17–1.22)
MONOCYTES NFR BLD AUTO: 2 % (ref 4–12)
MRSA NOSE QL CULT: NORMAL
NEUTROPHILS # BLD AUTO: 14.1 THOUSANDS/ÂΜL (ref 1.85–7.62)
NEUTS SEG NFR BLD AUTO: 95 % (ref 43–75)
NRBC BLD AUTO-RTO: 0 /100 WBCS
PLATELET # BLD AUTO: 147 THOUSANDS/UL (ref 149–390)
PMV BLD AUTO: 9.3 FL (ref 8.9–12.7)
POTASSIUM SERPL-SCNC: 4.6 MMOL/L (ref 3.5–5.3)
PROCALCITONIN SERPL-MCNC: 0.91 NG/ML
RBC # BLD AUTO: 4.6 MILLION/UL (ref 3.88–5.62)
SODIUM SERPL-SCNC: 140 MMOL/L (ref 135–147)
WBC # BLD AUTO: 14.84 THOUSAND/UL (ref 4.31–10.16)

## 2022-12-23 RX ORDER — GUAIFENESIN 600 MG/1
600 TABLET, EXTENDED RELEASE ORAL EVERY 12 HOURS SCHEDULED
Qty: 20 TABLET | Refills: 0 | Status: SHIPPED | OUTPATIENT
Start: 2022-12-23

## 2022-12-23 RX ORDER — PREDNISONE 10 MG/1
TABLET ORAL
Qty: 20 TABLET | Refills: 0 | Status: SHIPPED | OUTPATIENT
Start: 2022-12-23 | End: 2022-12-31

## 2022-12-23 RX ORDER — SODIUM CHLORIDE FOR INHALATION 0.9 %
3 VIAL, NEBULIZER (ML) INHALATION
Status: DISCONTINUED | OUTPATIENT
Start: 2022-12-23 | End: 2022-12-23 | Stop reason: HOSPADM

## 2022-12-23 RX ORDER — OSELTAMIVIR PHOSPHATE 30 MG/1
30 CAPSULE ORAL EVERY 12 HOURS SCHEDULED
Qty: 6 CAPSULE | Refills: 0 | Status: SHIPPED | OUTPATIENT
Start: 2022-12-23 | End: 2022-12-26

## 2022-12-23 RX ORDER — CEFDINIR 300 MG/1
300 CAPSULE ORAL EVERY 12 HOURS SCHEDULED
Qty: 10 CAPSULE | Refills: 0 | Status: SHIPPED | OUTPATIENT
Start: 2022-12-23 | End: 2022-12-28

## 2022-12-23 RX ADMIN — UMECLIDINIUM BROMIDE AND VILANTEROL TRIFENATATE 1 PUFF: 62.5; 25 POWDER RESPIRATORY (INHALATION) at 08:25

## 2022-12-23 RX ADMIN — IPRATROPIUM BROMIDE 0.5 MG: 0.5 SOLUTION RESPIRATORY (INHALATION) at 07:49

## 2022-12-23 RX ADMIN — GABAPENTIN 300 MG: 300 CAPSULE ORAL at 08:25

## 2022-12-23 RX ADMIN — OSELTAMIVIR 30 MG: 30 CAPSULE ORAL at 08:24

## 2022-12-23 RX ADMIN — FERROUS SULFATE TAB 325 MG (65 MG ELEMENTAL FE) 325 MG: 325 (65 FE) TAB at 08:25

## 2022-12-23 RX ADMIN — METHYLPREDNISOLONE SODIUM SUCCINATE 40 MG: 40 INJECTION, POWDER, FOR SOLUTION INTRAMUSCULAR; INTRAVENOUS at 08:24

## 2022-12-23 RX ADMIN — METHIMAZOLE 5 MG: 5 TABLET ORAL at 08:25

## 2022-12-23 RX ADMIN — INSULIN LISPRO 2 UNITS: 100 INJECTION, SOLUTION INTRAVENOUS; SUBCUTANEOUS at 08:25

## 2022-12-23 RX ADMIN — ASPIRIN 81 MG: 81 TABLET, COATED ORAL at 08:25

## 2022-12-23 RX ADMIN — PANTOPRAZOLE SODIUM 40 MG: 40 TABLET, DELAYED RELEASE ORAL at 05:51

## 2022-12-23 RX ADMIN — LEVALBUTEROL HYDROCHLORIDE 1.25 MG: 1.25 SOLUTION, CONCENTRATE RESPIRATORY (INHALATION) at 07:49

## 2022-12-23 RX ADMIN — HEPARIN SODIUM 5000 UNITS: 5000 INJECTION INTRAVENOUS; SUBCUTANEOUS at 05:51

## 2022-12-23 RX ADMIN — GUAIFENESIN 600 MG: 600 TABLET ORAL at 08:25

## 2022-12-23 RX ADMIN — FENOFIBRATE 145 MG: 145 TABLET, FILM COATED ORAL at 08:25

## 2022-12-23 NOTE — PLAN OF CARE
Problem: PAIN - ADULT  Goal: Verbalizes/displays adequate comfort level or baseline comfort level  Description: Interventions:  - Encourage patient to monitor pain and request assistance  - Assess pain using appropriate pain scale  - Administer analgesics based on type and severity of pain and evaluate response  - Implement non-pharmacological measures as appropriate and evaluate response  - Consider cultural and social influences on pain and pain management  - Notify physician/advanced practitioner if interventions unsuccessful or patient reports new pain  Outcome: Progressing     Problem: INFECTION - ADULT  Goal: Absence or prevention of progression during hospitalization  Description: INTERVENTIONS:  - Assess and monitor for signs and symptoms of infection  - Monitor lab/diagnostic results  - Monitor all insertion sites, i e  indwelling lines, tubes, and drains  - Monitor endotracheal if appropriate and nasal secretions for changes in amount and color  - [place] appropriate cooling/warming therapies per order  - Administer medications as ordered  - Instruct and encourage patient and family to use good hand hygiene technique  - Identify and instruct in appropriate isolation precautions for identified infection/condition  Outcome: Progressing     Problem: SAFETY ADULT  Goal: Maintain or return to baseline ADL function  Description: INTERVENTIONS:  -  Assess patient's ability to carry out ADLs; assess patient's baseline for ADL function and identify physical deficits which impact ability to perform ADLs (bathing, care of mouth/teeth, toileting, grooming, dressing, etc )  - Assess/evaluate cause of self-care deficits   - Assess range of motion  - Assess patient's mobility; develop plan if impaired  - Assess patient's need for assistive devices and provide as appropriate  - Encourage maximum independence but intervene and supervise when necessary  - Involve family in performance of ADLs  - Assess for home care needs following discharge   - Consider OT consult to assist with ADL evaluation and planning for discharge  - Provide patient education as appropriate  Outcome: Progressing     Problem: DISCHARGE PLANNING  Goal: Discharge to home or other facility with appropriate resources  Description: INTERVENTIONS:  - Identify barriers to discharge w/patient and caregiver  - Arrange for needed discharge resources and transportation as appropriate  - Identify discharge learning needs (meds, wound care, etc )  - Arrange for interpretive services to assist at discharge as needed  - Refer to Case Management Department for coordinating discharge planning if the patient needs post-hospital services based on physician/advanced practitioner order or complex needs related to functional status, cognitive ability, or social support system  Outcome: Progressing     Problem: Knowledge Deficit  Goal: Patient/family/caregiver demonstrates understanding of disease process, treatment plan, medications, and discharge instructions  Description: Complete learning assessment and assess knowledge base    Interventions:  - Provide teaching at level of understanding  - Provide teaching via preferred learning methods  Outcome: Progressing

## 2022-12-23 NOTE — INCIDENTAL FINDINGS
The following findings require follow up:  Radiographic finding   Finding: Infiltrates on CXR    Follow up required: CXR    Follow up should be done within 6-8 week(s)    Please notify the following clinician to assist with the follow up:    JEROME Patel

## 2022-12-23 NOTE — PROGRESS NOTES
Pt ambulated 50 ft on room air  O2 sats dropped to 87%, however pt quickly recovered to 90% once stopped ambulating  Denies any shortness of breath or difficulty breathing  Slim made aware  Continue to monitor

## 2022-12-23 NOTE — RESPIRATORY THERAPY NOTE
RT Protocol Note  Ann Marie Pagan 81 y o  male MRN: [id number]  Unit/Bed#: -01 Encounter: [id number]    Assessment    Principal Problem:    Acute respiratory failure ([facility] )  Active Problems:    Type 2 diabetes mellitus, with long-term current use of insulin (HCC)    HTN (hypertension)    Rheumatoid arthritis (HCC)    Squamous cell lung cancer, right (HCC)    COPD (chronic obstructive pulmonary disease) ([place] [address] )    Prostate cancer ([place] [address] )    CKD (chronic kidney disease)    Sepsis ([place] [address] )    Graves disease    Influenza A      Home Pulmonary Medications:     12/22/22 2026   Respiratory Protocol   Protocol Initiated? Yes   Protocol Selection Respiratory   Language Barrier? No   Medical & Social History Reviewed? Yes   Diagnostic Studies Reviewed? Yes   Physical Assessment Performed? Yes   Respiratory Plan Mild Distress pathway   Respiratory Assessment   Assessment Type During-treatment   General Appearance Alert; Awake   Respiratory Pattern Normal   Chest Assessment Chest expansion symmetrical   Bilateral Breath Sounds Expiratory wheezes   R Breath Sounds Expiratory wheezes   L Breath Sounds Expiratory wheezes   Location Specific No   Cough None   Resp Comments Resp protocol complete   Will continue with current tx plan   Cough Description   Sputum Amount None   Additional Assessments   Pulse 71   Respirations 20   SpO2 91 %            Past Medical History:   Diagnosis Date    Anemia     Aortic aneurysm (HCC)     BPH (benign prostatic hyperplasia)     CAD (coronary artery disease)     Chronic kidney disease     COPD (chronic obstructive pulmonary disease) (HCC)     Diabetes mellitus (HCC)     Dyslipidemia     GERD (gastroesophageal reflux disease)     Graves disease     Hypertension     Iron deficiency anemia     Lactic acidosis     Lung cancer (HCC)     Lyme disease     Neuropathy     Pancytopenia (HCC)     Prostate cancer (HCC)     Rheumatoid arthritis ([facility] )      Social History     Socioeconomic History    Marital status: /Civil [employer]     Spouse name: None    Number of children: None    Years of education: None    Highest education level: None   Occupational History    None   Tobacco Use    Smoking status: Former     Packs/day: 0 25     Years: 50 00     Pack years: 12 50     Types: Cigarettes     Quit date: 2022     Years since quittin 9    Smokeless tobacco: Never   Vaping Use    Vaping Use: Never used   Substance and Sexual Activity    Alcohol use: No    Drug use: No    Sexual activity: None   Other Topics Concern    None   Social History Narrative    None     Social Determinants of Health     Financial Resource Strain: Not on file   Food Insecurity: No Food Insecurity    Worried About Running Out of Food in the Last Year: Never true    Ran Out of Food in the Last Year: Never true   Transportation Needs: No Transportation Needs    Lack of Transportation (Medical): No    Lack of Transportation (Non-Medical): No   Physical Activity: Not on file   Stress: Not on file   Social Connections: Not on file   Intimate Partner Violence: Not on file   Housing Stability: Low Risk     Unable to Pay for Housing in the Last Year: No    Number of Places Lived in the Last Year: 1    Unstable Housing in the Last Year: No       Subjective         Objective    Physical Exam:   Assessment Type: During-treatment  General Appearance: Alert, Awake  Respiratory Pattern: Normal  Chest Assessment: Chest expansion symmetrical  Bilateral Breath Sounds: Expiratory wheezes  R Breath Sounds: Expiratory wheezes  L Breath Sounds: Expiratory wheezes  Location Specific: No  Cough: None    Vitals:  Blood pressure 123/68, pulse 71, temperature (!) 97 4 °F (36 3 °C), resp  rate 20, SpO2 91 %  Imaging and other studies: I have personally reviewed pertinent reports  O2 Device: Nasal Cannula     Plan    Respiratory Plan: Mild Distress pathway        Resp Comments: Resp protocol complete   Will continue with current tx plan

## 2022-12-23 NOTE — ED PROVIDER NOTES
History  Chief Complaint   Patient presents with   • Shortness of Breath     Recent flu dx  Pt c/o gen weakness and hypoxic on rm air - 83%  Pt placed on 4L nasal cannula  11year-old male presents emergency department for evaluation of shortness of breath  Patient arrives by EMS, family called EMS when he was very short of breath denied, has history of COPD but has never been oxygen dependent, when EMS arrived, they found him to be satting in the mid to low 80s on room air, they applied oxygen by nasal cannula which significantly improved the patient symptoms  Patient has had flulike symptoms for the past 4 days, cough, congestion, chest tightness and wheeze as well  Prior to Admission Medications   Prescriptions Last Dose Informant Patient Reported? Taking?    Anoro Ellipta 62 5-25 MCG/INH inhaler 2022  No Yes   Sig: INHALE 1 PUFF BY MOUTH EVERY DAY   B-D ULTRAFINE III SHORT PEN 31G X 8 MM MISC   No No   Sig: Inject under the skin in the morning   Dapagliflozin Propanediol (Farxiga) 5 MG TABS   No No   Sig: Take 1 tablet (5 mg total) by mouth daily   Lancets (accu-chek soft touch) lancets   No No   Sig: bs check qid   Tresiba FlexTouch 100 units/mL injection pen 2022  No Yes   Sig: INJECT 64 UNITS OF TRESIBA DAILY, (MAY INCREASE UP TO 80 UNITS AS NEEDED DANIEL ON THE CHEMO)   albuterol (PROVENTIL HFA,VENTOLIN HFA) 90 mcg/act inhaler 2022  No Yes   Sig: Inhale 2 puffs every 4 (four) hours as needed for wheezing   amLODIPine (NORVASC) 5 mg tablet 2022  No Yes   Sig: Take 1 tablet (5 mg total) by mouth daily   ascorbic acid (VITAMIN C) 250 mg tablet 2022  Yes Yes   Sig: Take 250 mg by mouth 2 (two) times a day   aspirin (ECOTRIN LOW STRENGTH) 81 mg EC tablet 2022  Yes Yes   Si tab(s)   atenolol (TENORMIN) 25 mg tablet 2022  No Yes   Sig: Take 1 tablet (25 mg total) by mouth daily   doxycycline monohydrate (MONODOX) 100 mg capsule 2022  No Yes   Sig: Take 1 capsule (100 mg total) by mouth 2 (two) times a day for 10 days   fenofibrate (TRICOR) 145 mg tablet 12/21/2022  No Yes   Sig: Take 1 tablet (145 mg total) by mouth daily   ferrous sulfate 325 (65 Fe) mg tablet 12/21/2022  Yes Yes   Sig: Take 325 mg by mouth 2 (two) times a day with meals   gabapentin (NEURONTIN) 600 MG tablet 12/21/2022  Yes Yes   glucose blood (OneTouch Verio) test strip   No No   Sig: CHECK BS AT MORNING FASTING , AND AFTER A MEAL   metFORMIN (GLUCOPHAGE) 1000 MG tablet 12/21/2022  No Yes   Sig: Take 1 tablet (1,000 mg total) by mouth 2 (two) times a day with meals for 5000 doses   methimazole (TAPAZOLE) 5 mg tablet 12/21/2022  Yes Yes   Sig: Take 5 mg by mouth in the morning   omeprazole (PriLOSEC) 40 MG capsule 12/21/2022  No Yes   Sig: TAKE 1 CAPSULE BY MOUTH TWICE A DAY   rosuvastatin (CRESTOR) 40 MG tablet 12/21/2022  No Yes   Sig: TAKE 1 TABLET BY MOUTH EVERY DAY IN THE EVENING   sitaGLIPtin (JANUVIA) 50 mg tablet 12/21/2022  No Yes   Sig: Take 1 tablet (50 mg total) by mouth daily   tamsulosin (FLOMAX) 0 4 mg 12/21/2022  Yes Yes   Sig: Take 0 4 mg by mouth      Facility-Administered Medications: None       Past Medical History:   Diagnosis Date   • Anemia    • Aortic aneurysm (HCC)    • BPH (benign prostatic hyperplasia)    • CAD (coronary artery disease)    • Chronic kidney disease    • COPD (chronic obstructive pulmonary disease) (HCC)    • Diabetes mellitus ([facility] [address] )    • Dyslipidemia    • GERD (gastroesophageal reflux disease)    • Graves disease    • Hypertension    • Iron deficiency anemia    • Lactic acidosis    • Lung cancer ([facility] [address] )    • Lyme disease    • Neuropathy    • Pancytopenia ([facility] [address] )    • Prostate cancer ([facility] [address] )    • Rheumatoid arthritis ([facility] [address] )        Past Surgical History:   Procedure Laterality Date   • BALLOON ANGIOPLASTY, ARTERY  1996   • CATARACT EXTRACTION, BILATERAL  2019   • CLAVICLE SURGERY     • TOE AMPUTATION Left 11/20/2018    Procedure: AMPUTATION GREAT TOE;  Surgeon: Cruz Abdullahi DPM;  Location: [facility] OR;  Service: Podiatry       Family History   Problem Relation Age of Onset   • Coronary artery disease Father      I have reviewed and agree with the history as documented  E-Cigarette/Vaping   • E-Cigarette Use Never User      E-Cigarette/Vaping Substances   • Nicotine No    • THC No    • CBD No    • Flavoring No    • Other No    • Unknown No      Social History     Tobacco Use   • Smoking status: Former     Packs/day: 0 25     Years: 50 00     Pack years: 12 50     Types: Cigarettes     Quit date: 2022     Years since quittin 9   • Smokeless tobacco: Never   Vaping Use   • Vaping Use: Never used   Substance Use Topics   • Alcohol use: No   • Drug use: No       Review of Systems   Constitutional: Positive for fatigue  Negative for appetite change, chills and fever  HENT: Positive for congestion  Negative for sneezing and sore throat  Eyes: Negative for visual disturbance  Respiratory: Positive for cough, chest tightness, shortness of breath and wheezing  Negative for choking  Cardiovascular: Negative for chest pain and palpitations  Gastrointestinal: Negative for abdominal pain, constipation, diarrhea, nausea and vomiting  Genitourinary: Negative for difficulty urinating and dysuria  Neurological: Negative for dizziness, weakness, light-headedness, numbness and headaches  All other systems reviewed and are negative  Physical Exam  Physical Exam  Vitals and nursing note reviewed  Constitutional:       General: He is not in acute distress  Appearance: He is well-developed  He is not diaphoretic  HENT:      Head: Normocephalic and atraumatic  Eyes:      Pupils: Pupils are equal, round, and reactive to light  Neck:      Vascular: No JVD  Trachea: No tracheal deviation  Cardiovascular:      Rate and Rhythm: Normal rate and regular rhythm  Heart sounds: Normal heart sounds  No murmur heard  No friction rub  No gallop  Pulmonary:      Effort: Pulmonary effort is normal  No respiratory distress  Breath sounds: Wheezing present  No rales  Abdominal:      General: Bowel sounds are normal  There is no distension  Palpations: Abdomen is soft  Tenderness: There is no abdominal tenderness  There is no guarding or rebound  Skin:     General: Skin is warm and dry  Coloration: Skin is not pale  Neurological:      Mental Status: He is alert and oriented to person, place, and time  Cranial Nerves: No cranial nerve deficit  Motor: No abnormal muscle tone     Psychiatric:         Behavior: Behavior normal          Vital Signs  ED Triage Vitals   Temperature Pulse Respirations Blood Pressure SpO2   12/21/22 1923 12/21/22 1923 12/21/22 1923 12/21/22 1923 12/21/22 1922   100 3 °F (37 9 °C) (!) 106 (!) 24 124/61 (!) 83 %      Temp Source Heart Rate Source Patient Position - Orthostatic VS BP Location FiO2 (%)   12/21/22 1923 -- -- -- --   Oral          Pain Score       12/21/22 1923       No Pain           Vitals:    12/22/22 0745 12/22/22 0746 12/22/22 1422 12/22/22 2026   BP: 124/70 124/70 123/68    Pulse: 72 71 76 71         Visual Acuity      ED Medications  Medications   albuterol (PROVENTIL HFA,VENTOLIN HFA) inhaler 2 puff (0 puffs Inhalation Return to [facility] 12/22/22 0932)   amLODIPine (NORVASC) tablet 5 mg (5 mg Oral Given 12/22/22 0929)   aspirin (ECOTRIN LOW STRENGTH) EC tablet 81 mg (81 mg Oral Given 12/22/22 0930)   atenolol (TENORMIN) tablet 25 mg (25 mg Oral Given 12/22/22 0930)   fenofibrate (TRICOR) tablet 145 mg (145 mg Oral Given 12/22/22 0930)   ferrous sulfate tablet 325 mg (325 mg Oral Given 12/22/22 1734)   gabapentin (NEURONTIN) capsule 300 mg (300 mg Oral Given 12/22/22 0930)   methimazole (TAPAZOLE) tablet 5 mg (5 mg Oral Given 12/22/22 0930)   pantoprazole (PROTONIX) EC tablet 40 mg (40 mg Oral Given 12/22/22 0627)   atorvastatin (LIPITOR) tablet 80 mg (80 mg Oral Given 12/22/22 9256) tamsulosin (FLOMAX) capsule 0 4 mg (0 4 mg Oral Given 12/22/22 1734)   acetaminophen (TYLENOL) tablet 650 mg (has no administration in time range)   ipratropium (ATROVENT) 0 02 % inhalation solution 0 5 mg (0 5 mg Nebulization Given 12/22/22 2025)   heparin (porcine) subcutaneous injection 5,000 Units (5,000 Units Subcutaneous Given 12/22/22 1334)   insulin lispro (HumaLOG) 100 units/mL subcutaneous injection 1-6 Units (2 Units Subcutaneous Given 12/22/22 1734)   umeclidinium-vilanterol 62 5-25 mcg/actuation inhaler 1 puff (1 puff Inhalation Given 12/22/22 1015)   ceftriaxone (ROCEPHIN) 1 g/50 mL in dextrose IVPB (has no administration in time range)   oseltamivir (TAMIFLU) capsule 30 mg (30 mg Oral Given 12/22/22 0929)   levalbuterol (XOPENEX) inhalation solution 1 25 mg (1 25 mg Nebulization Given 12/22/22 2026)   methylPREDNISolone sodium succinate (Solu-MEDROL) injection 40 mg (has no administration in time range)   guaiFENesin (MUCINEX) 12 hr tablet 600 mg (600 mg Oral Given 12/22/22 1330)   albuterol inhalation solution 5 mg (5 mg Nebulization Given 12/21/22 1936)   ipratropium (ATROVENT) 0 02 % inhalation solution 0 5 mg (0 5 mg Nebulization Given 12/21/22 1936)   vancomycin (VANCOCIN) 1,250 mg in sodium chloride 0 9 % 250 mL IVPB (1,250 mg Intravenous New Bag 12/21/22 2113)   cefepime (MAXIPIME) 2 g/50 mL dextrose IVPB (2,000 mg Intravenous New Bag 12/21/22 2110)   methylPREDNISolone sodium succinate (Solu-MEDROL) injection 125 mg (125 mg Intravenous Given 12/21/22 2110)       Diagnostic Studies  Results Reviewed     Procedure Component Value Units Date/Time    Legionella antigen, Urine [[id number]]  (Normal) Collected: 12/22/22 0526    Lab Status: Final result Specimen: Urine, Clean Catch Updated: 12/22/22 0951     Legionella Urinary Antigen Negative    Strep Pneumoniae, Urine [[id number]]  (Normal) Collected: 12/22/22 0526    Lab Status: Final result Specimen: Urine, Clean Catch Updated: 12/22/22 6376 Strep pneumoniae antigen, urine Negative    Procalcitonin, Next Day AM Collection [[id number]]  (Abnormal) Collected: 12/22/22 0449    Lab Status: Final result Specimen: Blood from Hand, Right Updated: 12/22/22 0536     Procalcitonin 1 04 ng/ml     UA w Reflex to Microscopic w Reflex to Culture [[id number]]  (Abnormal) Collected: 12/22/22 0525    Lab Status: Final result Specimen: Urine, Clean Catch Updated: 12/22/22 0535     Color, UA Light Yellow     Clarity, UA Clear     Specific Gravity, UA 1 011     pH, UA 5 0     Leukocytes, UA Negative     Nitrite, UA Negative     Protein, UA Negative mg/dl      Glucose, UA 1000 (1%) mg/dl      Ketones, UA Negative mg/dl      Urobilinogen, UA <2 0 mg/dl      Bilirubin, UA Negative     Occult Blood, UA Negative    Comprehensive metabolic panel [[id number]]  (Abnormal) Collected: 12/22/22 0449    Lab Status: Final result Specimen: Blood from Hand, Right Updated: 12/22/22 0535     Sodium 138 mmol/L      Potassium 3 8 mmol/L      Chloride 104 mmol/L      CO2 24 mmol/L      ANION GAP 10 mmol/L      BUN 27 mg/dL      Creatinine 1 47 mg/dL      Glucose 98 mg/dL      Calcium 8 5 mg/dL      Corrected Calcium 9 4 mg/dL      AST 36 U/L      ALT 15 U/L      Alkaline Phosphatase 39 U/L      Total Protein 7 4 g/dL      Albumin 2 9 g/dL      Total Bilirubin 0 34 mg/dL      eGFR 47 ml/min/1 73sq m     Narrative:      [place] guidelines for Chronic Kidney Disease (CKD):   •  Stage 1 with normal or high GFR (GFR > 90 mL/min/1 73 square meters)  •  Stage 2 Mild CKD (GFR = 60-89 mL/min/1 73 square meters)  •  Stage 3A Moderate CKD (GFR = 45-59 mL/min/1 73 square meters)  •  Stage 3B Moderate CKD (GFR = 30-44 mL/min/1 73 square meters)  •  Stage 4 Severe CKD (GFR = 15-29 mL/min/1 73 square meters)  •  Stage 5 End Stage CKD (GFR <15 mL/min/1 73 square meters)  Note: GFR calculation is accurate only with a steady state creatinine    CBC (With Platelets) [[id number]] (Abnormal) Collected: 12/22/22 0449    Lab Status: Final result Specimen: Blood from Hand, Right Updated: 12/22/22 0514     WBC 11 71 Thousand/uL      RBC 4 51 Million/uL      Hemoglobin 12 0 g/dL      Hematocrit 37 5 %      MCV 83 fL      MCH 26 6 pg      MCHC 32 0 g/dL      RDW 16 5 %      Platelets 106 Thousands/uL      MPV 9 5 fL     HS Troponin I 4hr [[id number]]  (Normal) Collected: 12/22/22 0004    Lab Status: Final result Specimen: Blood from Arm, Left Updated: 12/22/22 0050     hs TnI 4hr 11 ng/L      Delta 4hr hsTnI 1 ng/L     Blood culture #1 [[id number]] Collected: 12/21/22 1929    Lab Status: Preliminary result Specimen: Blood from Arm, Left Updated: 12/21/22 2301     Blood Culture Received in Microbiology Lab  Culture in Progress  Blood culture #2 [[id number]] Collected: 12/21/22 1929    Lab Status: Preliminary result Specimen: Blood from Arm, Left Updated: 12/21/22 2301     Blood Culture Received in Microbiology Lab  Culture in Progress      HS Troponin I 2hr [[id number]]  (Normal) Collected: 12/21/22 2116    Lab Status: Final result Specimen: Blood from Arm, Left Updated: 12/21/22 2154     hs TnI 2hr 10 ng/L      Delta 2hr hsTnI 0 ng/L     Sputum culture and Gram stain [[id number]]     Lab Status: No result Specimen: Sputum     Procalcitonin [[id number]]  (Normal) Collected: 12/21/22 1929    Lab Status: Final result Specimen: Blood from Arm, Left Updated: 12/21/22 2015     Procalcitonin 0 15 ng/ml     HS Troponin 0hr (reflex protocol) [[id number]]  (Normal) Collected: 12/21/22 1929    Lab Status: Final result Specimen: Blood from Arm, Left Updated: 12/21/22 2008     hs TnI 0hr 10 ng/L     Protime-INR [[id number]]  (Abnormal) Collected: 12/21/22 1929    Lab Status: Final result Specimen: Blood from Arm, Left Updated: 12/21/22 2005     Protime 15 9 seconds      INR 1 30    APTT [[id number]]  (Abnormal) Collected: 12/21/22 1929    Lab Status: Final result Specimen: Blood from Arm, Left Updated: 12/21/22 2005 PTT 42 seconds     Lactic acid [[id number]]  (Normal) Collected: 12/21/22 1929    Lab Status: Final result Specimen: Blood from Arm, Left Updated: 12/21/22 2003     LACTIC ACID 0 8 mmol/L     Narrative:      Result may be elevated if tourniquet was used during collection      Comprehensive metabolic panel [[id number]]  (Abnormal) Collected: 12/21/22 1929    Lab Status: Final result Specimen: Blood from Arm, Left Updated: 12/21/22 2000     Sodium 136 mmol/L      Potassium 4 3 mmol/L      Chloride 102 mmol/L      CO2 25 mmol/L      ANION GAP 9 mmol/L      BUN 30 mg/dL      Creatinine 1 78 mg/dL      Glucose 102 mg/dL      Calcium 8 4 mg/dL      Corrected Calcium 9 0 mg/dL      AST 39 U/L      ALT 17 U/L      Alkaline Phosphatase 46 U/L      Total Protein 7 7 g/dL      Albumin 3 2 g/dL      Total Bilirubin 0 31 mg/dL      eGFR 37 ml/min/1 73sq m     Narrative:      [employer] guidelines for Chronic Kidney Disease (CKD):   •  Stage 1 with normal or high GFR (GFR > 90 mL/min/1 73 square meters)  •  Stage 2 Mild CKD (GFR = 60-89 mL/min/1 73 square meters)  •  Stage 3A Moderate CKD (GFR = 45-59 mL/min/1 73 square meters)  •  Stage 3B Moderate CKD (GFR = 30-44 mL/min/1 73 square meters)  •  Stage 4 Severe CKD (GFR = 15-29 mL/min/1 73 square meters)  •  Stage 5 End Stage CKD (GFR <15 mL/min/1 73 square meters)  Note: GFR calculation is accurate only with a steady state creatinine    CBC and differential [[id number]]  (Abnormal) Collected: 12/21/22 1929    Lab Status: Final result Specimen: Blood from Arm, Left Updated: 12/21/22 1939     WBC 6 07 Thousand/uL      RBC 4 49 Million/uL      Hemoglobin 12 2 g/dL      Hematocrit 38 5 %      MCV 86 fL      MCH 27 2 pg      MCHC 31 7 g/dL      RDW 16 6 %      MPV 9 7 fL      Platelets 944 Thousands/uL      nRBC 0 /100 WBCs      Neutrophils Relative 84 %      Immat GRANS % 0 %      Lymphocytes Relative 6 %      Monocytes Relative 9 %      Eosinophils Relative 1 %      Basophils Relative 0 %      Neutrophils Absolute 5 11 Thousands/µL      Immature Grans Absolute 0 02 Thousand/uL      Lymphocytes Absolute 0 35 Thousands/µL      Monocytes Absolute 0 54 Thousand/µL      Eosinophils Absolute 0 04 Thousand/µL      Basophils Absolute 0 01 Thousands/µL                  XR chest 1 view portable   Final Result by Mary Alice Askew MD (12/22 1657)   New Right midlung and left lower lung patchy opacity, raises concern for infiltrate/pneumonia   Follow-up suggested to demonstrate resolution 6-8 weeks               Workstation performed: [id number]                    Procedures  Procedures         ED Course                               SBIRT 20yo+    Flowsheet Row Most Recent Value   SBIRT (23 yo +)    In order to provide better care to our patients, we are screening all of our patients for alcohol and drug use  Would it be okay to ask you these screening questions? Yes Filed at: 12/21/2022 1926   Initial Alcohol Screen: US AUDIT-C     1  How often do you have a drink containing alcohol? 0 Filed at: 12/21/2022 1926   2  How many drinks containing alcohol do you have on a typical day you are drinking? 0 Filed at: 12/21/2022 1926   3a  Male UNDER 65: How often do you have five or more drinks on one occasion? 0 Filed at: 12/21/2022 1926   3b  FEMALE Any Age, or MALE 65+: How often do you have 4 or more drinks on one occassion? 0 Filed at: 12/21/2022 1926   Audit-C Score 0 Filed at: 12/21/2022 1926   GUSTABO: How many times in the past year have you    Used an illegal drug or used a prescription medication for non-medical reasons?  Never Filed at: 12/21/2022 1926                    MDM  Number of Diagnoses or Management Options  Hypoxia  Pneumonia  Diagnosis management comments: 79-year-old male presented to the emergency department with shortness of breath, has COPD exacerbation, better now with oxygen, will give nebs, steroids, check flu COVID RSV, infectious work-up, likely admit given the hypoxia on presentation  Disposition  Final diagnoses:   Hypoxia   Pneumonia     Time reflects when diagnosis was documented in both MDM as applicable and the Disposition within this note     Time User Action Codes Description Comment    12/21/2022  8:51 PM Kelin Bass [R09 [id number]] Hypoxia     12/21/2022  8:51 PM Kalpesh [facility] [[id number]] Pneumonia       ED Disposition     ED Disposition   Admit    Condition   Stable    Date/Time   Wed Dec 21, 2022  8:51 PM    Comment   Case was discussed with STEPHANI and the patient's admission status was agreed to be Admission Status: inpatient status to the service of Dr Abby Cueva   Follow-up Information    None         Current Discharge Medication List      CONTINUE these medications which have NOT CHANGED    Details   albuterol (PROVENTIL HFA,VENTOLIN HFA) 90 mcg/act inhaler Inhale 2 puffs every 4 (four) hours as needed for wheezing  Qty: 8 g, Refills: 2    Comments: Substitution to a formulary equivalent within the same pharmaceutical class is authorized    Associated Diagnoses: Chronic obstructive pulmonary disease, unspecified COPD type (HCC)      amLODIPine (NORVASC) 5 mg tablet Take 1 tablet (5 mg total) by mouth daily  Qty: 90 tablet, Refills: 1    Associated Diagnoses: Primary hypertension      Anoro Ellipta 62 5-25 MCG/INH inhaler INHALE 1 PUFF BY MOUTH EVERY DAY  Qty: 60 blister, Refills: 1    Associated Diagnoses: Chronic obstructive pulmonary disease, unspecified COPD type (HCC)      ascorbic acid (VITAMIN C) 250 mg tablet Take 250 mg by mouth 2 (two) times a day      aspirin (ECOTRIN LOW STRENGTH) 81 mg EC tablet 1 tab(s)      atenolol (TENORMIN) 25 mg tablet Take 1 tablet (25 mg total) by mouth daily  Qty: 90 tablet, Refills: 1    Associated Diagnoses: CAD (coronary artery disease)      doxycycline monohydrate (MONODOX) 100 mg capsule Take 1 capsule (100 mg total) by mouth 2 (two) times a day for 10 days  Qty: 20 capsule, Refills: 0    Associated Diagnoses: Influenza A      fenofibrate (TRICOR) 145 mg tablet Take 1 tablet (145 mg total) by mouth daily  Qty: 90 tablet, Refills: 1    Associated Diagnoses: Hyperlipidemia, unspecified hyperlipidemia type      ferrous sulfate 325 (65 Fe) mg tablet Take 325 mg by mouth 2 (two) times a day with meals      gabapentin (NEURONTIN) 600 MG tablet       metFORMIN (GLUCOPHAGE) 1000 MG tablet Take 1 tablet (1,000 mg total) by mouth 2 (two) times a day with meals for 5000 doses  Qty: 180 tablet, Refills: 1    Associated Diagnoses: Type 2 diabetes mellitus with stage 3a chronic kidney disease, with long-term current use of insulin ([facility])      methimazole (TAPAZOLE) 5 mg tablet Take 5 mg by mouth in the morning      omeprazole (PriLOSEC) 40 MG capsule TAKE 1 CAPSULE BY MOUTH TWICE A DAY  Qty: 180 capsule, Refills: 3    Associated Diagnoses: Gastroesophageal reflux disease without esophagitis      rosuvastatin (CRESTOR) 40 MG tablet TAKE 1 TABLET BY MOUTH EVERY DAY IN THE EVENING  Qty: 90 tablet, Refills: 0    Associated Diagnoses: Hyperlipidemia, unspecified hyperlipidemia type      sitaGLIPtin (JANUVIA) 50 mg tablet Take 1 tablet (50 mg total) by mouth daily  Qty: 90 tablet, Refills: 2    Associated Diagnoses: Type 2 diabetes mellitus with stage 3a chronic kidney disease, with long-term current use of insulin ([facility])      tamsulosin (FLOMAX) 0 4 mg Take 0 4 mg by mouth      Bess Monroy FlexTouch 100 units/mL injection pen INJECT 64 UNITS OF TRESIBA DAILY, (MAY INCREASE UP TO 80 UNITS AS NEEDED DANIEL ON THE CHEMO)  Qty: 3 mL, Refills: 11    Associated Diagnoses: Type 2 diabetes mellitus without complication, with long-term current use of insulin ([facility])      B-D ULTRAFINE III SHORT PEN 31G X 8 MM MISC Inject under the skin in the morning  Qty: 30 each, Refills: 0    Associated Diagnoses: Type 2 diabetes mellitus with stage 3a chronic kidney disease, with long-term current use of insulin ([facility])      Dapagliflozin Propanediol (Farxiga) 5 MG TABS Take 1 tablet (5 mg total) by mouth daily  Qty: 90 tablet, Refills: 1    Associated Diagnoses: Inadequately controlled diabetes mellitus (HCC)      glucose blood (OneTouch Verio) test strip CHECK BS AT MORNING FASTING , AND AFTER A MEAL  Qty: 100 strip, Refills: 3    Associated Diagnoses: Type 2 diabetes mellitus with stage 3a chronic kidney disease, with long-term current use of insulin (HCC)      Lancets (accu-chek soft touch) lancets bs check qid  Qty: 100 each, Refills: 2    Associated Diagnoses: Type 2 diabetes mellitus with stage 3a chronic kidney disease, with long-term current use of insulin (HCC)             No discharge procedures on file      PDMP Review     None          ED Provider  Electronically Signed by           Robert Hodges MD  12/22/22 2031

## 2022-12-23 NOTE — NURSING NOTE
Pt discharged home with family  Continent of bowel and bladder  No new skin issues  No complaints of pain  IVs removed prior to discharge  Med list and instructions reviewed and given to the patient  No questions or concerns at this time

## 2022-12-23 NOTE — CASE MANAGEMENT
Case Management Discharge Planning Note    Patient name Toña Lopez  Location /-72 MRN [id number]  : 1950 Date 2022       Current Admission Date: 2022  Current Admission Diagnosis:Acute respiratory failure [facility])   Patient Active Problem List    Diagnosis Date Noted   • CKD (chronic kidney disease) 2022   • Acute respiratory failure ([facility] ) 2022   • Sepsis ([facility] ) 2022   • Graves disease 2022   • Influenza A 2022   • Prostate cancer ([facility] ) 2021   • Injury of finger of right hand 10/28/2021   • Acquired absence of other left toe(s) ([facility] ) 2021   • Abdominal aortic aneurysm (AAA) without rupture 2021   • Pancytopenia ([facility] ) 2019   • Rheumatoid arthritis ([facility] ) 2019   • Squamous cell lung cancer, right ([facility] ) 2019   • Neuropathy 2019   • Hyperlipidemia 2019   • COPD (chronic obstructive pulmonary disease) ([facility] ) 2019   • GERD (gastroesophageal reflux disease) 2019   • Lactic acidosis 2019   • PRADEEP (acute kidney injury) ([facility] ) 2018   • Type 2 diabetes mellitus, with long-term current use of insulin ([facility] ) 2018   • HTN (hypertension) 2018   • Anemia 2018      LOS (days): 2  Geometric Mean LOS (GMLOS) (days): 5 00  Days to GMLOS:3 5     OBJECTIVE:  Risk of Unplanned Readmission Score: 26 78         Current admission status: Inpatient   Preferred Pharmacy:   [facility]/pharmacy #[id number] [address]  Phone: [phone] Fax: [phone]    Primary Care Provider: JEROME Buckner    Primary Insurance: [facility]  Secondary Insurance:     DISCHARGE DETAILS:                                          Other Referral/Resources/Interventions Provided:  Interventions: C  Referral Comments: Patient scheduled for D/C this morning, no home O2 required    He was referred to [place] [address] providers in his area- no availability , search extended and will update patient                  Transport at Discharge : Family

## 2022-12-23 NOTE — PLAN OF CARE
Problem: PAIN - ADULT  Goal: Verbalizes/displays adequate comfort level or baseline comfort level  Description: Interventions:  - Encourage patient to monitor pain and request assistance  - Assess pain using appropriate pain scale  - Administer analgesics based on type and severity of pain and evaluate response  - Implement non-pharmacological measures as appropriate and evaluate response  - Consider cultural and social influences on pain and pain management  - Notify physician/advanced practitioner if interventions unsuccessful or patient reports new pain  Outcome: Progressing     Problem: INFECTION - ADULT  Goal: Absence or prevention of progression during hospitalization  Description: INTERVENTIONS:  - Assess and monitor for signs and symptoms of infection  - Monitor lab/diagnostic results  - Monitor all insertion sites, i e  indwelling lines, tubes, and drains  - Monitor endotracheal if appropriate and nasal secretions for changes in amount and color  - [place] appropriate cooling/warming therapies per order  - Administer medications as ordered  - Instruct and encourage patient and family to use good hand hygiene technique  - Identify and instruct in appropriate isolation precautions for identified infection/condition  Outcome: Progressing     Problem: SAFETY ADULT  Goal: Maintain or return to baseline ADL function  Description: INTERVENTIONS:  -  Assess patient's ability to carry out ADLs; assess patient's baseline for ADL function and identify physical deficits which impact ability to perform ADLs (bathing, care of mouth/teeth, toileting, grooming, dressing, etc )  - Assess/evaluate cause of self-care deficits   - Assess range of motion  - Assess patient's mobility; develop plan if impaired  - Assess patient's need for assistive devices and provide as appropriate  - Encourage maximum independence but intervene and supervise when necessary  - Involve family in performance of ADLs  - Assess for home care needs following discharge   - Consider OT consult to assist with ADL evaluation and planning for discharge  - Provide patient education as appropriate  Outcome: Progressing

## 2022-12-23 NOTE — CASE MANAGEMENT
Case Management Discharge Planning Note    Patient name Nahid Short  Location [address] 310/-80 MRN [id number]  : 1950 Date 2022       Current Admission Date: 2022  Current Admission Diagnosis:Acute respiratory failure [facility])   Patient Active Problem List    Diagnosis Date Noted   • CKD (chronic kidney disease) 2022   • Acute respiratory failure ([facility] ) 2022   • Sepsis ([facility] ) 2022   • Graves disease 2022   • Influenza A 2022   • Prostate cancer ([facility] ) 2021   • Injury of finger of right hand 10/28/2021   • Acquired absence of other left toe(s) ([facility] ) 2021   • Abdominal aortic aneurysm (AAA) without rupture 2021   • Pancytopenia ([facility] ) 2019   • Rheumatoid arthritis [facility] ) 2019   • Squamous cell lung cancer, right ([facility] ) 2019   • Neuropathy 2019   • Hyperlipidemia 2019   • COPD (chronic obstructive pulmonary disease) ([facility] ) 2019   • GERD (gastroesophageal reflux disease) 2019   • Lactic acidosis 2019   • PRADEEP (acute kidney injury) ([facility] ) 2018   • Type 2 diabetes mellitus, with long-term current use of insulin ([facility] ) 2018   • HTN (hypertension) 2018   • Anemia 2018      LOS (days): 2  Geometric Mean LOS (GMLOS) (days): 5 00  Days to GMLOS:3 5     OBJECTIVE:  Risk of Unplanned Readmission Score: 26 78         Current admission status: Inpatient   Preferred Pharmacy:   [facility]/pharmacy #[id number]- [id number] [address]  Phone: [phone] Fax: [phone]    Primary Care Provider: JEROME Polk    Primary Insurance: [facility]  Secondary Insurance:     DISCHARGE DETAILS:    [address]         Is the patient interested in [facility] at discharge?: Yes  [address] requested[de-identified] Nursing, Occupational Therapy, Physical [address] Name[de-identified] [address] Provider[de-identified] PCP  Home Health Services Needed[de-identified] Evaluate Functional Status and Safety, Gait/ADL Training, Strengthening/Theraputic Exercises to Improve Function, Diabetes Management  Homebound Criteria Met[de-identified] Requires the Assistance of Another Person for Safe Ambulation or to Leave the Home  Supporting Clincal Findings[de-identified] Fatigues Easliy in [employer], Limited Endurance, Dyspnea with Exertion         Other Referral/Resources/Interventions Provided:  Interventions: HHC  Referral Comments: [facility] accepted  Reserved in [place] and [facility] updated to reflect the same           Treatment Team Recommendation: Home  Discharge Destination Plan[de-identified] Home with [place] at Discharge : Family, Automobile

## 2022-12-24 LAB — BACTERIA BLD CULT: NORMAL

## 2022-12-24 NOTE — ASSESSMENT & PLAN NOTE
Lab Results   Component Value Date    EGFR 53 12/23/2022    EGFR 47 12/22/2022    EGFR 37 12/21/2022    CREATININE 1 33 (H) 12/23/2022    CREATININE 1 47 (H) 12/22/2022    CREATININE 1 78 (H) 12/21/2022     · Creatinine stable at baseline  · Avoid hypotension and nephrotoxic agents  · Monitor BMP daily

## 2022-12-24 NOTE — DISCHARGE SUMMARY
[facility]  Discharge- Stephen Swartz 1950, 81 y o  male MRN: [id number]  Unit/Bed#: -01 Encounter: [id number]  Primary Care Provider: JEROME Gamboa   Date and time admitted to hospital: 12/21/2022  7:21 PM    * Acute respiratory failure [facility])  Assessment & Plan  Patient presenting to the ED for worsening shortness of breath  Patient has had productive cough and shortness of breath over the last week  He also reports associated fevers/chills and fatigue  Denies any associated chest pain, abdominal pain, nausea/vomiting/diarrhea, urinary complaints at this time    · O2 tapered to room air at rest, will check pulse ox on ambualtion today   · CXR with pneumonia  · Multifactorial in the setting of influenza A, pneumonia and COPD exacerbation  · Continue with nebulizers, steroids, and ceftriaxone + azithromycin  · Follow up sputum culture and urine strep/legionella  · Respiratory protocol    Influenza A  Assessment & Plan  · Tested positive for influenza A  · Seen by ED on 12/18, discharged with course of oral doxycycline  · Continue Tamiflu  · Monitor respiratory status      Graves disease  Assessment & Plan  · Continue methimazole    Sepsis ([facility] )  Assessment & Plan  · Meeting sepsis criteria for tachycardia, tachypnea, and fever  · Secondary to influenza A, COPD, pna  · on IV ceftriaxone + azithromycin  · Trend WBC and fever curve    CKD (chronic kidney disease)  Assessment & Plan  Lab Results   Component Value Date    EGFR 53 12/23/2022    EGFR 47 12/22/2022    EGFR 37 12/21/2022    CREATININE 1 33 (H) 12/23/2022    CREATININE 1 47 (H) 12/22/2022    CREATININE 1 78 (H) 12/21/2022     · Creatinine stable at baseline  · Avoid hypotension and nephrotoxic agents  · Monitor BMP daily    Prostate cancer (HCC)  Assessment & Plan  · Stable, continue outpatient follow up with Urology    COPD (chronic obstructive pulmonary disease) ([facility] )  Assessment & Plan  · With acute exacerbation  · Continue pre-hospital inhalers, scheduled nebs, IV solumedrol, and ceftriaxone  · Respiratory protocol    Squamous cell lung cancer, right ([facility] )  Assessment & Plan  · Follows outpatient with [facility]    Rheumatoid arthritis ([facility] )  Assessment & Plan  · Continue outpatient follow up Rheumatology    HTN (hypertension)  Assessment & Plan  · BP well controlled  · Continue home amlodipine and atenolol  · Monitor vitals per routine    Type 2 diabetes mellitus, with long-term current use of insulin [facility])  Assessment & Plan    Lab Results   Component Value Date    HGBA1C 6 6 (H) 11/14/2022     · Holding home anti-diabetic medications  · Continue SSI with accu checks  · Hypoglycemia protocol  · Diabetic diet      Discharging Physician / Practitioner: Gaye Vasques MD  PCP: Katherine Spring  Admission Date:   Admission Orders (From admission, onward)     Ordered        12/21/22 2052  INPATIENT ADMISSION  Once                      Discharge Date: 12/23/22    Medical Problems     Resolved Problems  Date Reviewed: 12/24/2022   None         Consultations During Hospital Stay:  · none    Procedures Performed:   · none    Significant Findings / Test Results:   XR chest 1 view portable    Result Date: 12/22/2022  Impression: New Right midlung and left lower lung patchy opacity, raises concern for infiltrate/pneumonia Follow-up suggested to demonstrate resolution 6-8 weeks Workstation performed: [id number]       Reason for Admission: Shortness of breath    Hospital Course:     Sheri Roque is a 81 y o  male patient who originally presented to the hospital on 12/21/2022 due to acute respiratory failure with hypoxia requiring oxygen on admission secondary to flu with superimposed bacterial pneumonia and mild COPD exacerbation  Patient treated with Tamiflu, IV antibiotics and IV steroid with significant improvement of patient's symptoms    Patient was ambulated on room air and pulse ox was checked and he maintained good saturations on room air therefore he did not require any oxygen on discharge  Patient remained hemodynamically stable and afebrile  Patient clinically and medically stable for discharge with course of p o  antibiotics and Tamiflu with prednisone taper  Patient advised to follow-up with primary care doctor within 1 to 2 weeks  Please see above list of diagnoses and related plan for additional information  Condition at Discharge: stable     Discharge Day Visit / Exam:     Subjective:  No acute events and patient denies any complaints  Vitals: Blood Pressure: 106/62 (12/23/22 0718)  Pulse: 81 (12/23/22 0718)  Temperature: 97 5 °F (36 4 °C) (12/23/22 0718)  Temp Source: Oral (12/21/22 1923)  Respirations: 16 (12/22/22 2302)  SpO2: 90 % (12/23/22 0749)  Exam:   Physical Exam  Vitals and nursing note reviewed  Constitutional:       General: He is not in acute distress  Appearance: He is well-developed  He is not ill-appearing or diaphoretic  HENT:      Head: Normocephalic and atraumatic  Mouth/Throat:      Mouth: Mucous membranes are moist       Pharynx: Oropharynx is clear  Eyes:      Conjunctiva/sclera: Conjunctivae normal    Cardiovascular:      Rate and Rhythm: Normal rate and regular rhythm  Heart sounds: No murmur heard  Pulmonary:      Effort: Pulmonary effort is normal  No respiratory distress  Breath sounds: Normal breath sounds  No wheezing or rales  Abdominal:      General: Bowel sounds are normal       Palpations: Abdomen is soft  Tenderness: There is no abdominal tenderness  Musculoskeletal:         General: No swelling  Cervical back: Normal range of motion and neck supple  Right lower leg: No edema  Left lower leg: No edema  Skin:     General: Skin is warm and dry  Capillary Refill: Capillary refill takes less than 2 seconds  Neurological:      General: No focal deficit present        Mental Status: He is alert and oriented to person, place, and time  Psychiatric:         Mood and Affect: Mood normal          Behavior: Behavior normal          Discussion with Family: Updated patient's family    Discharge instructions/Information to patient and family:   See after visit summary for information provided to patient and family  Provisions for Follow-Up Care:  See after visit summary for information related to follow-up care and any pertinent home health orders  Disposition:     Home    Planned Readmission: no     Discharge Statement:  I spent 45 minutes discharging the patient  This time was spent on the day of discharge  I had direct contact with the patient on the day of discharge  Greater than 50% of the total time was spent examining patient, answering all patient questions, arranging and discussing plan of care with patient as well as directly providing post-discharge instructions  Additional time then spent on discharge activities  Discharge Medications:  See after visit summary for reconciled discharge medications provided to patient and family        ** Please Note: This note has been constructed using a voice recognition system **

## 2022-12-25 LAB
BACTERIA BLD CULT: ABNORMAL
BACTERIA BLD CULT: NORMAL
GRAM STN SPEC: ABNORMAL
MECA+MECC ISLT/SPM QL: DETECTED
S EPIDERMIDIS DNA BLD POS QL NAA+NON-PRB: DETECTED

## 2022-12-26 LAB — BACTERIA BLD CULT: NORMAL

## 2022-12-27 DIAGNOSIS — J44.9 CHRONIC OBSTRUCTIVE PULMONARY DISEASE, UNSPECIFIED COPD TYPE (HCC): ICD-10-CM

## 2022-12-27 NOTE — UTILIZATION REVIEW
NOTIFICATION OF ADMISSION DISCHARGE   This is a Notification of Discharge from [facility]  Please be advised that this patient has been discharge from our facility  Below you will find the admission and discharge date and time including the patient’s disposition  UTILIZATION REVIEW CONTACT:  Pilar Flynn  Utilization   Network Utilization Review Department  Phone: [phone] x carefully listen to the prompts  All voicemails are confidential   Email: [email or web address]     ADMISSION INFORMATION  PRESENTATION DATE: 12/21/2022  7:21 PM  OBERVATION ADMISSION DATE:   INPATIENT ADMISSION DATE: 12/21/22  8:52 PM   DISCHARGE DATE: 12/23/2022 11:19 AM   DISPOSITION:Home with Home Health Care    IMPORTANT INFORMATION:  Send all requests for admission clinical reviews, approved or denied determinations and any other requests to dedicated fax number below belonging to the campus where the patient is receiving treatment   List of dedicated fax numbers:  [address] DENIALS (Administrative/Medical Necessity) [phone]   [address] (Maternity/NICU/Pediatrics) [phone]   [facility] [phone]   [address] [place] [address] [phone]   [address] [phone]   [address] [facility] [phone]   [address] [place] [address] [phone]   [address] [place] [address] 119 [phone]   [facility]  [phone]   [facility] [phone]   [facility] [address] [facility] [phone]

## 2022-12-28 ENCOUNTER — TRANSITIONAL CARE MANAGEMENT (OUTPATIENT)
Dept: FAMILY MEDICINE CLINIC | Facility: CLINIC | Age: 72
End: 2022-12-28

## 2022-12-28 DIAGNOSIS — J44.9 CHRONIC OBSTRUCTIVE PULMONARY DISEASE, UNSPECIFIED COPD TYPE (HCC): ICD-10-CM

## 2022-12-28 RX ORDER — UMECLIDINIUM BROMIDE AND VILANTEROL TRIFENATATE 62.5; 25 UG/1; UG/1
POWDER RESPIRATORY (INHALATION)
Qty: 60 EACH | Refills: 1 | Status: SHIPPED | OUTPATIENT
Start: 2022-12-28

## 2022-12-28 RX ORDER — ALBUTEROL SULFATE 90 UG/1
2 AEROSOL, METERED RESPIRATORY (INHALATION) EVERY 4 HOURS PRN
Qty: 8 G | Refills: 0 | Status: SHIPPED | OUTPATIENT
Start: 2022-12-28

## 2022-12-29 ENCOUNTER — OFFICE VISIT (OUTPATIENT)
Dept: FAMILY MEDICINE CLINIC | Facility: CLINIC | Age: 72
End: 2022-12-29

## 2022-12-29 VITALS
SYSTOLIC BLOOD PRESSURE: 118 MMHG | TEMPERATURE: 96.9 F | WEIGHT: 180.4 LBS | DIASTOLIC BLOOD PRESSURE: 62 MMHG | HEIGHT: 70 IN | HEART RATE: 62 BPM | OXYGEN SATURATION: 95 % | BODY MASS INDEX: 25.83 KG/M2

## 2022-12-29 DIAGNOSIS — B37.0 ORAL THRUSH: ICD-10-CM

## 2022-12-29 DIAGNOSIS — J10.1 INFLUENZA A: ICD-10-CM

## 2022-12-29 DIAGNOSIS — Z79.4 TYPE 2 DIABETES MELLITUS WITHOUT COMPLICATION, WITH LONG-TERM CURRENT USE OF INSULIN (HCC): Primary | ICD-10-CM

## 2022-12-29 DIAGNOSIS — J96.01 ACUTE RESPIRATORY FAILURE WITH HYPOXIA (HCC): ICD-10-CM

## 2022-12-29 DIAGNOSIS — E11.9 TYPE 2 DIABETES MELLITUS WITHOUT COMPLICATION, WITH LONG-TERM CURRENT USE OF INSULIN (HCC): Primary | ICD-10-CM

## 2022-12-29 RX ORDER — CLOTRIMAZOLE 10 MG/1
10 LOZENGE ORAL; TOPICAL 3 TIMES DAILY
Qty: 30 TROCHE | Refills: 0 | Status: SHIPPED | OUTPATIENT
Start: 2022-12-29

## 2022-12-29 RX ORDER — GLIMEPIRIDE 1 MG/1
1 TABLET ORAL
Qty: 30 TABLET | Refills: 5 | Status: SHIPPED | OUTPATIENT
Start: 2022-12-29

## 2022-12-29 NOTE — PROGRESS NOTES
Assessment & Plan     1  Type 2 diabetes mellitus without complication, with long-term current use of insulin (HCC)  Comments:  Elevations in blood sugars with steroid taper, discussed plan of care, continue current medications stress monitoring home blood sugars  Orders:  -     glimepiride (AMARYL) 1 mg tablet; Take 1 tablet (1 mg total) by mouth daily with breakfast    2  Oral thrush  Comments:  Understands causative factors, treatment Mycelex  with instructions  Orders:  -     clotrimazole (MYCELEX) 10 mg jak; Take 1 tablet (10 mg total) by mouth 3 (three) times a day    3  Acute respiratory failure with hypoxia (HCC)  Comments:  Resolving, currently on steroid taper doing quite well we will continue to monitor    4  Influenza A  Comments:  Has completed course of Tamiflu, improving we will continue to monitor         Subjective     Transitional Care Management Review:   Eric Pond is a 81 y o  male here for TCM follow up  During the TCM phone call patient stated:  TCM Call     Date and time call was made  12/27/2022  1:28 PM    Patient was hospitial[facility] at  [facility]    Date of Admission  12/21/22    Date of discharge  12/23/22    Diagnosis  hypoxia    Disposition  Home      TCM Call     Scheduled for follow up?   Yes    I have advised the patient to call PCP with any new or worsening symptoms  Dennis Damian        F/u ed / admission   Doing quite well considering   Only issue is elevation I n bs , known from prednisone , meds had been held in hosp , insulin restarted , bs 200+   Developed thrush , from prednisone or abx unsure which   Went to the ED for worsening shortness of breath, understood to have influenza A , rapidly progressed, had productive cough and shortness of breath over the last week   , has since resolved He also had  fevers/chills and fatigue, which has not had since discharge   Has rescheduled with cardiology and gi     Review of Systems   Constitutional: Negative for appetite change, chills, fever and unexpected weight change  HENT: Negative for congestion, dental problem, ear pain, hearing loss, postnasal drip, rhinorrhea, sinus pressure, sinus pain, sneezing, sore throat, tinnitus and voice change  Eyes: Negative for visual disturbance  Respiratory: Negative for apnea, cough, chest tightness and shortness of breath  Cardiovascular: Negative for chest pain, palpitations and leg swelling  Gastrointestinal: Negative for abdominal pain, blood in stool, constipation, diarrhea, nausea and vomiting  Endocrine: Negative for cold intolerance, heat intolerance, polydipsia, polyphagia and polyuria  Genitourinary: Negative for decreased urine volume, difficulty urinating, dysuria, frequency and hematuria  Musculoskeletal: Negative for arthralgias, back pain, gait problem, joint swelling and myalgias  Skin: Negative for color change, rash and wound  Allergic/Immunologic: Negative for environmental allergies and food allergies  Neurological: Negative for dizziness, syncope, weakness, light-headedness, numbness and headaches  Hematological: Negative for adenopathy  Does not bruise/bleed easily  Psychiatric/Behavioral: Negative for sleep disturbance and suicidal ideas  The patient is not nervous/anxious  Objective     /62 (BP Location: Left arm, Patient Position: Sitting, Cuff Size: Standard)   Pulse 62   Temp (!) 96 9 °F (36 1 °C) (Tympanic)   Ht 5' 10" (1 778 m)   Wt 81 8 kg (180 lb 6 4 oz)   SpO2 95%   BMI 25 88 kg/m²      Physical Exam  Constitutional:       General: He is not in acute distress  Appearance: He is not ill-appearing or toxic-appearing  HENT:      Head: Normocephalic and atraumatic  Right Ear: Tympanic membrane normal       Left Ear: Tympanic membrane normal       Nose: Nose normal       Mouth/Throat:      Mouth: Oral lesions present  Cardiovascular:      Rate and Rhythm: Normal rate  Pulses: Normal pulses  Pulmonary:      Effort: Pulmonary effort is normal  No respiratory distress  Breath sounds: No wheezing or rales  Musculoskeletal:         General: Normal range of motion  Cervical back: Normal range of motion  Lymphadenopathy:      Cervical: No cervical adenopathy  Skin:     General: Skin is warm and dry  Neurological:      Mental Status: He is oriented to person, place, and time         Medications have been reviewed by provider in current encounter    JEROME Campa

## 2023-01-13 DIAGNOSIS — J44.9 CHRONIC OBSTRUCTIVE PULMONARY DISEASE, UNSPECIFIED COPD TYPE (HCC): ICD-10-CM

## 2023-01-13 DIAGNOSIS — E11.22 TYPE 2 DIABETES MELLITUS WITH STAGE 3A CHRONIC KIDNEY DISEASE, WITH LONG-TERM CURRENT USE OF INSULIN (HCC): ICD-10-CM

## 2023-01-13 DIAGNOSIS — N18.31 TYPE 2 DIABETES MELLITUS WITH STAGE 3A CHRONIC KIDNEY DISEASE, WITH LONG-TERM CURRENT USE OF INSULIN (HCC): ICD-10-CM

## 2023-01-13 DIAGNOSIS — Z79.4 TYPE 2 DIABETES MELLITUS WITH STAGE 3A CHRONIC KIDNEY DISEASE, WITH LONG-TERM CURRENT USE OF INSULIN (HCC): ICD-10-CM

## 2023-01-13 RX ORDER — OSELTAMIVIR PHOSPHATE 30 MG/1
CAPSULE ORAL
COMMUNITY
Start: 2022-12-30

## 2023-01-15 DIAGNOSIS — N18.31 TYPE 2 DIABETES MELLITUS WITH STAGE 3A CHRONIC KIDNEY DISEASE, WITH LONG-TERM CURRENT USE OF INSULIN (HCC): ICD-10-CM

## 2023-01-15 DIAGNOSIS — E11.22 TYPE 2 DIABETES MELLITUS WITH STAGE 3A CHRONIC KIDNEY DISEASE, WITH LONG-TERM CURRENT USE OF INSULIN (HCC): ICD-10-CM

## 2023-01-15 DIAGNOSIS — Z79.4 TYPE 2 DIABETES MELLITUS WITH STAGE 3A CHRONIC KIDNEY DISEASE, WITH LONG-TERM CURRENT USE OF INSULIN (HCC): ICD-10-CM

## 2023-01-15 DIAGNOSIS — J44.9 CHRONIC OBSTRUCTIVE PULMONARY DISEASE, UNSPECIFIED COPD TYPE (HCC): ICD-10-CM

## 2023-01-15 DIAGNOSIS — J18.9 PNEUMONIA: ICD-10-CM

## 2023-01-16 ENCOUNTER — APPOINTMENT (OUTPATIENT)
Dept: LAB | Facility: CLINIC | Age: 73
End: 2023-01-16

## 2023-01-16 DIAGNOSIS — Z01.818 PREOPERATIVE TESTING: ICD-10-CM

## 2023-01-16 DIAGNOSIS — N18.31 CHRONIC KIDNEY DISEASE (CKD) STAGE G3A/A1, MODERATELY DECREASED GLOMERULAR FILTRATION RATE (GFR) BETWEEN 45-59 ML/MIN/1.73 SQUARE METER AND ALBUMINURIA CREATININE RATIO LESS THAN 30 MG/G (HCC): ICD-10-CM

## 2023-01-16 DIAGNOSIS — C34.91 PRIMARY LUNG CANCER WITH METASTASIS FROM LUNG TO OTHER SITE, RIGHT (HCC): ICD-10-CM

## 2023-01-16 LAB
ALBUMIN SERPL BCP-MCNC: 3 G/DL (ref 3.5–5)
ALP SERPL-CCNC: 49 U/L (ref 46–116)
ALT SERPL W P-5'-P-CCNC: 16 U/L (ref 12–78)
ANION GAP SERPL CALCULATED.3IONS-SCNC: 6 MMOL/L (ref 4–13)
AST SERPL W P-5'-P-CCNC: 12 U/L (ref 5–45)
BASOPHILS # BLD AUTO: 0.06 THOUSANDS/ÂΜL (ref 0–0.1)
BASOPHILS NFR BLD AUTO: 1 % (ref 0–1)
BILIRUB SERPL-MCNC: 0.26 MG/DL (ref 0.2–1)
BUN SERPL-MCNC: 24 MG/DL (ref 5–25)
CALCIUM ALBUM COR SERPL-MCNC: 10.3 MG/DL (ref 8.3–10.1)
CALCIUM SERPL-MCNC: 9.5 MG/DL (ref 8.3–10.1)
CHLORIDE SERPL-SCNC: 107 MMOL/L (ref 96–108)
CO2 SERPL-SCNC: 27 MMOL/L (ref 21–32)
CREAT SERPL-MCNC: 1.35 MG/DL (ref 0.6–1.3)
EOSINOPHIL # BLD AUTO: 0.25 THOUSAND/ÂΜL (ref 0–0.61)
EOSINOPHIL NFR BLD AUTO: 4 % (ref 0–6)
ERYTHROCYTE [DISTWIDTH] IN BLOOD BY AUTOMATED COUNT: 17.3 % (ref 11.6–15.1)
GFR SERPL CREATININE-BSD FRML MDRD: 52 ML/MIN/1.73SQ M
GLUCOSE P FAST SERPL-MCNC: 94 MG/DL (ref 65–99)
HCT VFR BLD AUTO: 38.8 % (ref 36.5–49.3)
HGB BLD-MCNC: 12.2 G/DL (ref 12–17)
IMM GRANULOCYTES # BLD AUTO: 0.11 THOUSAND/UL (ref 0–0.2)
IMM GRANULOCYTES NFR BLD AUTO: 2 % (ref 0–2)
INR PPP: 1.16 (ref 0.84–1.19)
LYMPHOCYTES # BLD AUTO: 0.61 THOUSANDS/ÂΜL (ref 0.6–4.47)
LYMPHOCYTES NFR BLD AUTO: 10 % (ref 14–44)
MCH RBC QN AUTO: 27.1 PG (ref 26.8–34.3)
MCHC RBC AUTO-ENTMCNC: 31.4 G/DL (ref 31.4–37.4)
MCV RBC AUTO: 86 FL (ref 82–98)
MONOCYTES # BLD AUTO: 0.51 THOUSAND/ÂΜL (ref 0.17–1.22)
MONOCYTES NFR BLD AUTO: 8 % (ref 4–12)
NEUTROPHILS # BLD AUTO: 4.74 THOUSANDS/ÂΜL (ref 1.85–7.62)
NEUTS SEG NFR BLD AUTO: 75 % (ref 43–75)
NRBC BLD AUTO-RTO: 0 /100 WBCS
PLATELET # BLD AUTO: 274 THOUSANDS/UL (ref 149–390)
PMV BLD AUTO: 10.1 FL (ref 8.9–12.7)
POTASSIUM SERPL-SCNC: 4.2 MMOL/L (ref 3.5–5.3)
PROT SERPL-MCNC: 7.1 G/DL (ref 6.4–8.4)
PROTHROMBIN TIME: 15.1 SECONDS (ref 11.6–14.5)
RBC # BLD AUTO: 4.51 MILLION/UL (ref 3.88–5.62)
SODIUM SERPL-SCNC: 140 MMOL/L (ref 135–147)
WBC # BLD AUTO: 6.28 THOUSAND/UL (ref 4.31–10.16)

## 2023-01-16 RX ORDER — GUAIFENESIN 600 MG/1
600 TABLET, EXTENDED RELEASE ORAL EVERY 12 HOURS SCHEDULED
Qty: 20 TABLET | Refills: 0 | Status: SHIPPED | OUTPATIENT
Start: 2023-01-16

## 2023-01-16 RX ORDER — ALBUTEROL SULFATE 90 UG/1
2 AEROSOL, METERED RESPIRATORY (INHALATION) EVERY 4 HOURS PRN
Qty: 8 G | Refills: 0 | Status: SHIPPED | OUTPATIENT
Start: 2023-01-16

## 2023-01-16 RX ORDER — UMECLIDINIUM BROMIDE AND VILANTEROL TRIFENATATE 62.5; 25 UG/1; UG/1
1 POWDER RESPIRATORY (INHALATION) DAILY
Qty: 60 EACH | Refills: 0 | Status: SHIPPED | OUTPATIENT
Start: 2023-01-16 | End: 2023-04-16

## 2023-01-16 RX ORDER — PEN NEEDLE, DIABETIC 31 GX5/16"
NEEDLE, DISPOSABLE MISCELLANEOUS DAILY
Qty: 30 EACH | Refills: 0 | Status: SHIPPED | OUTPATIENT
Start: 2023-01-16

## 2023-01-17 ENCOUNTER — OFFICE VISIT (OUTPATIENT)
Dept: GASTROENTEROLOGY | Facility: CLINIC | Age: 73
End: 2023-01-17

## 2023-01-17 VITALS
HEIGHT: 70 IN | DIASTOLIC BLOOD PRESSURE: 82 MMHG | SYSTOLIC BLOOD PRESSURE: 122 MMHG | BODY MASS INDEX: 26.6 KG/M2 | WEIGHT: 185.8 LBS | HEART RATE: 84 BPM

## 2023-01-17 DIAGNOSIS — K59.01 SLOW TRANSIT CONSTIPATION: Primary | ICD-10-CM

## 2023-01-17 NOTE — PROGRESS NOTES
Consultation - [facility]  Destiny Limon 1950 81 y o  male     ASSESSMENT @ PLAN:   He is a 81-year-old male with irregular stools with diarrhea and constipation with episodes of fecal incontinence and more stool frequency  He was having sciatic nerve pain in conjunction with rectal pain but this appears to have gone away  He reports having a normal endoscopy and colonoscopy 5 years ago at [facility]  He just recovered from pneumonia around Christmas  1 we will do colonoscopy to investigate    2 I told him to do Benefiber every day    3 we will hold on his procedure for least 4 weeks for pulmonary optimization  4 he will continue on his omeprazole which controls dyspeptic symptoms    Chief Complaint: GERD and constipation and rectal pain    HPI: He is a 81-year-old male with irregular stools who was referred to have a colonoscopy  He was having rectal pain when he was having sciatic nerve pain but his rectal pain is gone away  He reports that he has increased bowel frequency now  He goes 4-5 times a day  It is a softer stool there is no melena hematochezia  Sometimes he has urgency and he feels like he cannot hold his stools then he has had episodes of near fecal incontinence  He reports having a normal colonoscopy 5 years ago at [facility]  He just recovered from pneumonia in the week prior to Christmas and is recovering slowly from this with his COPD  Nobody in the family has Crohn's disease ulcerative colitis or family history of colon malignancy  His weight is stable  REVIEW OF SYSTEMS:     CONSTITUTIONAL: Denies any fever, chills, or rigors  Good appetite, and no recent weight loss  HEENT: No earache or tinnitus  Denies hearing loss or visual disturbances  CARDIOVASCULAR: No chest pain or palpitations  RESPIRATORY: Denies any cough, hemoptysis, shortness of breath or dyspnea on exertion  GASTROINTESTINAL: As noted in the History of Present Illness  GENITOURINARY: No problems with urination  Denies any hematuria or dysuria  NEUROLOGIC: No dizziness or vertigo, denies headaches  MUSCULOSKELETAL: Denies any muscle or joint pain  SKIN: Denies skin rashes or itching  ENDOCRINE: Denies excessive thirst  Denies intolerance to heat or cold  PSYCHOSOCIAL: Denies depression or anxiety  Denies any recent memory loss       Past Medical History:   Diagnosis Date   • Anemia    • Aortic aneurysm (HCC)    • BPH (benign prostatic hyperplasia)    • CAD (coronary artery disease)    • Chronic kidney disease    • COPD (chronic obstructive pulmonary disease) ([facility])    • Diabetes mellitus ([facility] )    • Dyslipidemia    • Flu 2022   • GERD (gastroesophageal reflux disease)    • Graves disease    • Hypertension    • Iron deficiency anemia    • Lactic acidosis    • Lung cancer ([facility])    • Lyme disease    • Neuropathy    • Pancytopenia ([facility] )    • Pneumonia 2022   • Prostate cancer ([place] [address] )    • Rheumatoid arthritis ([place] [address] )       Past Surgical History:   Procedure Laterality Date   • BALLOON ANGIOPLASTY, ARTERY     • CATARACT EXTRACTION, BILATERAL     • CLAVICLE SURGERY     • TOE AMPUTATION Left 2018    Procedure: AMPUTATION GREAT TOE;  Surgeon: Ruthie Valdez DPM;  Location: [facility];  Service: Podiatry     Social History     Socioeconomic History   • Marital status: /Civil Union     Spouse name: Not on file   • Number of children: Not on file   • Years of education: Not on file   • Highest education level: Not on file   Occupational History   • Not on file   Tobacco Use   • Smoking status: Former     Packs/day: 0 25     Years: 50 00     Pack years: 12 50     Types: Cigarettes     Quit date: 2022     Years since quittin 0   • Smokeless tobacco: Never   Vaping Use   • Vaping Use: Never used   Substance and Sexual Activity   • Alcohol use: No   • Drug use: No   • Sexual activity: Not on file   Other Topics Concern   • Not on file   Social History Narrative   • Not on file     Social Determinants of Health     Financial Resource Strain: Not on file   Food Insecurity: No Food Insecurity   • Worried About Running Out of Food in the Last Year: Never true   • Ran Out of Food in the Last Year: Never true   Transportation Needs: No Transportation Needs   • Lack of Transportation (Medical): No   • Lack of Transportation (Non-Medical):  No   Physical Activity: Not on file   Stress: Not on file   Social Connections: Not on file   Intimate Partner Violence: Not on file   Housing Stability: Low Risk    • Unable to Pay for Housing in the Last Year: No   • Number of Places Lived in the Last Year: 1   • Unstable Housing in the Last Year: No     Family History   Problem Relation Age of Onset   • No Known Problems Mother    • Coronary artery disease Father    • No Known Problems Sister    • No Known Problems Brother    • No Known Problems Maternal Grandmother    • No Known Problems Maternal Grandfather    • No Known Problems Paternal Grandmother    • No Known Problems Paternal Grandfather    • No Known Problems Maternal Aunt    • No Known Problems Maternal Uncle    • No Known Problems Paternal Aunt    • No Known Problems Paternal Uncle    • No Known Problems Cousin      Ace inhibitors, Angiotensin receptor blockers, Clindamycin, and Plaquenil [hydroxychloroquine]  Current Outpatient Medications   Medication Sig Dispense Refill   • albuterol (PROVENTIL HFA,VENTOLIN HFA) 90 mcg/act inhaler Inhale 2 puffs every 4 (four) hours as needed for wheezing 8 g 0   • amLODIPine (NORVASC) 5 mg tablet Take 1 tablet (5 mg total) by mouth daily 90 tablet 1   • ascorbic acid (VITAMIN C) 250 mg tablet Take 250 mg by mouth 2 (two) times a day     • aspirin (ECOTRIN LOW STRENGTH) 81 mg EC tablet 1 tab(s)     • atenolol (TENORMIN) 25 mg tablet Take 1 tablet (25 mg total) by mouth daily 90 tablet 1   • B-D ULTRAFINE III SHORT PEN 31G X 8 MM MISC Inject under the skin in the morning 30 each 0   • fenofibrate (TRICOR) 145 mg tablet Take 1 tablet (145 mg total) by mouth daily 90 tablet 1   • ferrous sulfate 325 (65 Fe) mg tablet Take 325 mg by mouth 2 (two) times a day with meals     • gabapentin (NEURONTIN) 600 MG tablet      • glimepiride (AMARYL) 1 mg tablet Take 1 tablet (1 mg total) by mouth daily with breakfast 30 tablet 5   • glucose blood (OneTouch Verio) test strip CHECK BS AT MORNING FASTING , AND AFTER A MEAL 100 strip 3   • guaiFENesin (MUCINEX) 600 mg 12 hr tablet Take 1 tablet (600 mg total) by mouth every 12 (twelve) hours 20 tablet 0   • Lancets (accu-chek soft touch) lancets bs check qid 100 each 2   • metFORMIN (GLUCOPHAGE) 1000 MG tablet TAKE 1 TABLET (1,000 MG TOTAL) BY MOUTH 2 (TWO) TIMES A DAY WITH MEALS FOR 5000 DOSES 180 tablet 1   • methimazole (TAPAZOLE) 5 mg tablet Take 5 mg by mouth in the morning     • omeprazole (PriLOSEC) 40 MG capsule TAKE 1 CAPSULE BY MOUTH TWICE A  capsule 3   • rosuvastatin (CRESTOR) 40 MG tablet TAKE 1 TABLET BY MOUTH EVERY DAY IN THE EVENING 90 tablet 0   • sitaGLIPtin (JANUVIA) 50 mg tablet Take 1 tablet (50 mg total) by mouth daily 90 tablet 2   • tamsulosin (FLOMAX) 0 4 mg Take 0 4 mg by mouth     • Tresiba FlexTouch 100 units/mL injection pen INJECT 64 UNITS OF TRESIBA DAILY, (MAY INCREASE UP TO 80 UNITS AS NEEDED DANIEL ON THE CHEMO) 3 mL 11   • umeclidinium-vilanterol (Anoro Ellipta) 62 5-25 mcg/actuation inhaler Inhale 1 puff daily 60 each 0   • clotrimazole (MYCELEX) 10 mg jacinda Take 1 tablet (10 mg total) by mouth 3 (three) times a day (Patient not taking: Reported on 1/17/2023) 30 Jacinda 0   • Dapagliflozin Propanediol (Farxiga) 5 MG TABS Take 1 tablet (5 mg total) by mouth daily 90 tablet 1   • oseltamivir (TAMIFLU) 30 MG capsule TAKE 1 CAPSULE (30 MG TOTAL) BY MOUTH EVERY 12 (TWELVE) HOURS FOR 3 DAYS (Patient not taking: Reported on 1/17/2023)       No current facility-administered medications for this visit         Blood pressure 122/82, pulse 84, height 5' 10" (1 778 m), weight 84 3 kg (185 lb 12 8 oz)  PHYSICAL EXAM:     General Appearance:   Alert, cooperative, no distress, appears stated age    HEENT:   Normocephalic, atraumatic, anicteric      Neck:  Supple, symmetrical, trachea midline, no adenopathy;    thyroid: no enlargement/tenderness/nodules; no carotid  bruit or JVD    Lungs:   Clear to auscultation bilaterally; no rales, rhonchi or wheezing; respirations unlabored    Heart[de-identified]   S1 and S2 normal; regular rate and rhythm; no murmur, rub, or gallop     Abdomen:   Soft, non-tender, non-distended; normal bowel sounds; no masses, no organomegaly    Genitalia:   Deferred    Rectal:   Deferred    Extremities:  No cyanosis, clubbing or edema    Pulses:  2+ and symmetric all extremities    Skin:  Skin color, texture, turgor normal, no rashes or lesions    Lymph nodes:  No palpable cervical, axillary or inguinal lymphadenopathy        Lab Results   Component Value Date    WBC 6 28 01/16/2023    HGB 12 2 01/16/2023    HCT 38 8 01/16/2023    MCV 86 01/16/2023     01/16/2023     Lab Results   Component Value Date    CALCIUM 9 5 01/16/2023    K 4 2 01/16/2023    CO2 27 01/16/2023     01/16/2023    BUN 24 01/16/2023    CREATININE 1 35 (H) 01/16/2023     Lab Results   Component Value Date    ALT 16 01/16/2023    AST 12 01/16/2023    ALKPHOS 49 01/16/2023     Lab Results   Component Value Date    INR 1 16 01/16/2023    INR 1 30 (H) 12/21/2022    INR 1 25 (H) 12/18/2022    PROTIME 15 1 (H) 01/16/2023    PROTIME 15 9 (H) 12/21/2022    PROTIME 15 5 (H) 12/18/2022           Answers for HPI/ROS submitted by the patient on 1/11/2023  Chronicity: new  Onset: more than 1 month ago  Onset quality: sudden  Frequency: constantly  Progression since onset: unchanged  Pain location: suprapubic region  Pain - numeric: 8/10  Pain quality: sharp  Radiates to: perineum, scrotum  anorexia: No  arthralgias: Yes  belching: No  constipation: No  diarrhea: No  dysuria: No  fever: No  flatus: Yes  frequency: Yes  headaches: No  hematochezia: No  hematuria: No  melena: No  myalgias: Yes  weight loss: No  vomiting: No  Aggravated by: coughing, movement  Relieved by: certain positions, recumbency  Diagnostic workup: CT scan

## 2023-01-17 NOTE — PATIENT INSTRUCTIONS
Scheduled date of colonoscopy (as of today):3/22/23  Physician performing colonoscopy: Cody  Location of colonoscopy:[place]  Bowel prep reviewed with patient:miralax/dulcolax  Instructions reviewed with patient by:Yareli HERNANDEZ  Clearances:  none

## 2023-01-21 DIAGNOSIS — Z79.4 TYPE 2 DIABETES MELLITUS WITHOUT COMPLICATION, WITH LONG-TERM CURRENT USE OF INSULIN (HCC): ICD-10-CM

## 2023-01-21 DIAGNOSIS — E11.9 TYPE 2 DIABETES MELLITUS WITHOUT COMPLICATION, WITH LONG-TERM CURRENT USE OF INSULIN (HCC): ICD-10-CM

## 2023-01-23 RX ORDER — GLIMEPIRIDE 1 MG/1
TABLET ORAL
Qty: 90 TABLET | Refills: 2 | Status: SHIPPED | OUTPATIENT
Start: 2023-01-23

## 2023-01-27 DIAGNOSIS — E78.5 HYPERLIPIDEMIA, UNSPECIFIED HYPERLIPIDEMIA TYPE: ICD-10-CM

## 2023-01-27 RX ORDER — FENOFIBRATE 145 MG/1
TABLET, COATED ORAL
Qty: 90 TABLET | Refills: 1 | Status: SHIPPED | OUTPATIENT
Start: 2023-01-27

## 2023-01-31 DIAGNOSIS — Z79.4 TYPE 2 DIABETES MELLITUS WITH STAGE 3A CHRONIC KIDNEY DISEASE, WITH LONG-TERM CURRENT USE OF INSULIN (HCC): ICD-10-CM

## 2023-01-31 DIAGNOSIS — E11.22 TYPE 2 DIABETES MELLITUS WITH STAGE 3A CHRONIC KIDNEY DISEASE, WITH LONG-TERM CURRENT USE OF INSULIN (HCC): ICD-10-CM

## 2023-01-31 DIAGNOSIS — N18.31 TYPE 2 DIABETES MELLITUS WITH STAGE 3A CHRONIC KIDNEY DISEASE, WITH LONG-TERM CURRENT USE OF INSULIN (HCC): ICD-10-CM

## 2023-01-31 RX ORDER — BLOOD SUGAR DIAGNOSTIC
STRIP MISCELLANEOUS
Qty: 100 STRIP | Refills: 3 | Status: SHIPPED | OUTPATIENT
Start: 2023-01-31

## 2023-02-01 LAB
LEFT EYE DIABETIC RETINOPATHY: NORMAL
RIGHT EYE DIABETIC RETINOPATHY: NORMAL

## 2023-02-06 DIAGNOSIS — E11.9 TYPE 2 DIABETES MELLITUS WITHOUT COMPLICATION, WITH LONG-TERM CURRENT USE OF INSULIN (HCC): ICD-10-CM

## 2023-02-06 DIAGNOSIS — Z79.4 TYPE 2 DIABETES MELLITUS WITHOUT COMPLICATION, WITH LONG-TERM CURRENT USE OF INSULIN (HCC): ICD-10-CM

## 2023-02-07 DIAGNOSIS — E11.65 INADEQUATELY CONTROLLED DIABETES MELLITUS (HCC): ICD-10-CM

## 2023-02-07 DIAGNOSIS — I10 PRIMARY HYPERTENSION: ICD-10-CM

## 2023-02-07 RX ORDER — INSULIN DEGLUDEC INJECTION 100 U/ML
INJECTION, SOLUTION SUBCUTANEOUS
Qty: 3 ML | Refills: 2 | Status: SHIPPED | OUTPATIENT
Start: 2023-02-07

## 2023-02-07 RX ORDER — DAPAGLIFLOZIN 5 MG/1
TABLET, FILM COATED ORAL
Qty: 90 TABLET | Refills: 1 | Status: SHIPPED | OUTPATIENT
Start: 2023-02-07

## 2023-02-07 RX ORDER — AMLODIPINE BESYLATE 5 MG/1
5 TABLET ORAL DAILY
Qty: 90 TABLET | Refills: 1 | Status: SHIPPED | OUTPATIENT
Start: 2023-02-07

## 2023-02-09 DIAGNOSIS — E05.00 GRAVES DISEASE: Primary | ICD-10-CM

## 2023-02-09 RX ORDER — METHIMAZOLE 5 MG/1
5 TABLET ORAL DAILY
Qty: 90 TABLET | Refills: 2 | Status: SHIPPED | OUTPATIENT
Start: 2023-02-09

## 2023-02-13 DIAGNOSIS — I25.10 CAD (CORONARY ARTERY DISEASE): ICD-10-CM

## 2023-02-13 DIAGNOSIS — J44.9 CHRONIC OBSTRUCTIVE PULMONARY DISEASE, UNSPECIFIED COPD TYPE (HCC): ICD-10-CM

## 2023-02-13 DIAGNOSIS — E78.5 HYPERLIPIDEMIA, UNSPECIFIED HYPERLIPIDEMIA TYPE: ICD-10-CM

## 2023-02-14 RX ORDER — ALBUTEROL SULFATE 90 UG/1
AEROSOL, METERED RESPIRATORY (INHALATION)
Qty: 8 G | Refills: 0 | Status: SHIPPED | OUTPATIENT
Start: 2023-02-14

## 2023-02-14 RX ORDER — ROSUVASTATIN CALCIUM 40 MG/1
TABLET, COATED ORAL
Qty: 90 TABLET | Refills: 0 | Status: SHIPPED | OUTPATIENT
Start: 2023-02-14

## 2023-02-14 RX ORDER — UMECLIDINIUM BROMIDE AND VILANTEROL TRIFENATATE 62.5; 25 UG/1; UG/1
1 POWDER RESPIRATORY (INHALATION) DAILY
Qty: 60 EACH | Refills: 0 | Status: SHIPPED | OUTPATIENT
Start: 2023-02-14 | End: 2023-05-15

## 2023-02-14 RX ORDER — ATENOLOL 25 MG/1
25 TABLET ORAL DAILY
Qty: 90 TABLET | Refills: 1 | Status: SHIPPED | OUTPATIENT
Start: 2023-02-14

## 2023-02-15 ENCOUNTER — RA CDI HCC (OUTPATIENT)
Dept: OTHER | Facility: HOSPITAL | Age: 73
End: 2023-02-15

## 2023-02-15 NOTE — PROGRESS NOTES
[address]  coding opportunities          Chart Reviewed number of suggestions sent to Provider: 2   [id number]  [id number] [phone]      Patients Insurance     Medicare Insurance: [facility]

## 2023-02-19 PROBLEM — A41.9 SEPSIS (HCC): Status: RESOLVED | Noted: 2022-12-21 | Resolved: 2023-02-19

## 2023-02-19 PROBLEM — J10.1 INFLUENZA A: Status: RESOLVED | Noted: 2022-12-21 | Resolved: 2023-02-19

## 2023-02-21 ENCOUNTER — OFFICE VISIT (OUTPATIENT)
Dept: FAMILY MEDICINE CLINIC | Facility: CLINIC | Age: 73
End: 2023-02-21

## 2023-02-21 VITALS
DIASTOLIC BLOOD PRESSURE: 62 MMHG | HEIGHT: 70 IN | HEART RATE: 93 BPM | OXYGEN SATURATION: 95 % | WEIGHT: 183 LBS | SYSTOLIC BLOOD PRESSURE: 110 MMHG | BODY MASS INDEX: 26.2 KG/M2 | TEMPERATURE: 97.7 F

## 2023-02-21 DIAGNOSIS — E78.5 HYPERLIPIDEMIA, UNSPECIFIED HYPERLIPIDEMIA TYPE: ICD-10-CM

## 2023-02-21 DIAGNOSIS — D61.818 OTHER PANCYTOPENIA (HCC): ICD-10-CM

## 2023-02-21 DIAGNOSIS — J96.01 ACUTE RESPIRATORY FAILURE WITH HYPOXIA (HCC): ICD-10-CM

## 2023-02-21 DIAGNOSIS — M05.9 RHEUMATOID ARTHRITIS WITH POSITIVE RHEUMATOID FACTOR, INVOLVING UNSPECIFIED SITE (HCC): ICD-10-CM

## 2023-02-21 DIAGNOSIS — Z79.4 TYPE 2 DIABETES MELLITUS WITHOUT COMPLICATION, WITH LONG-TERM CURRENT USE OF INSULIN (HCC): ICD-10-CM

## 2023-02-21 DIAGNOSIS — E11.9 TYPE 2 DIABETES MELLITUS WITHOUT COMPLICATION, WITH LONG-TERM CURRENT USE OF INSULIN (HCC): ICD-10-CM

## 2023-02-21 DIAGNOSIS — J44.9 CHRONIC OBSTRUCTIVE PULMONARY DISEASE, UNSPECIFIED COPD TYPE (HCC): Primary | ICD-10-CM

## 2023-02-21 DIAGNOSIS — C61 PROSTATE CANCER (HCC): ICD-10-CM

## 2023-02-21 DIAGNOSIS — E78.2 MIXED HYPERLIPIDEMIA: ICD-10-CM

## 2023-02-21 DIAGNOSIS — C34.91 SQUAMOUS CELL LUNG CANCER, RIGHT (HCC): ICD-10-CM

## 2023-02-21 NOTE — PROGRESS NOTES
BMI Counseling: Body mass index is 26 26 kg/m²  The BMI is above normal  Nutrition recommendations include decreasing portion sizes, decreasing fast food intake, limiting drinks that contain sugar, moderation in carbohydrate intake, increasing intake of lean protein, reducing intake of saturated and trans fat and reducing intake of cholesterol  Exercise recommendations include moderate physical activity 150 minutes/week and exercising 3-5 times per week  No pharmacotherapy was ordered  Rationale for BMI follow-up plan is due to patient being overweight or obese  Depression Screening and Follow-up Plan: Patient was screened for depression during today's encounter  They screened negative with a PHQ-2 score of 0  Assessment/Plan:     Chronic Problems:  Rheumatoid arthritis ([place] [address] )  Appt with rheum , [address]     Squamous cell lung cancer, right ([place] [address] )  Stable follows with [facility]     Type 2 diabetes mellitus, with long-term current use of insulin ([place] [address] )    Lab Results   Component Value Date    HGBA1C 6 6 (H) 11/14/2022   Controlled, reviewed with patient, continue present regimen per endocrinology    COPD (chronic obstructive pulmonary disease) ([place] [address] )  Stable, continue present inhalers call for any changes    Hyperlipidemia  Continue current statin obtain updated lipid profile      Visit Diagnosis:  Diagnoses and all orders for this visit:    Chronic obstructive pulmonary disease, unspecified COPD type ([place] [address] )  -     Microalbumin / creatinine urine ratio; Future  -     Lipid Panel with Direct LDL reflex; Future  -     Hemoglobin A1C; Future  -     TSH, 3rd generation; Future  -     Comprehensive metabolic panel; Future  -     CBC and differential; Future    Squamous cell lung cancer, right (HCC)  -     Microalbumin / creatinine urine ratio; Future  -     Lipid Panel with Direct LDL reflex; Future  -     Hemoglobin A1C; Future  -     TSH, 3rd generation; Future  -     Comprehensive metabolic panel;  Future  - CBC and differential; Future    Other pancytopenia (HCC)  -     Microalbumin / creatinine urine ratio; Future  -     Lipid Panel with Direct LDL reflex; Future  -     Hemoglobin A1C; Future  -     TSH, 3rd generation; Future  -     Comprehensive metabolic panel; Future  -     CBC and differential; Future    Rheumatoid arthritis with positive rheumatoid factor, involving unspecified site [facility])  -     Ambulatory Referral to Rheumatology; Future  -     Microalbumin / creatinine urine ratio; Future  -     Lipid Panel with Direct LDL reflex; Future  -     Hemoglobin A1C; Future  -     TSH, 3rd generation; Future  -     Comprehensive metabolic panel; Future  -     CBC and differential; Future    Acute respiratory failure with hypoxia (HCC)  -     Microalbumin / creatinine urine ratio; Future  -     Lipid Panel with Direct LDL reflex; Future  -     Hemoglobin A1C; Future  -     TSH, 3rd generation; Future  -     Comprehensive metabolic panel; Future  -     CBC and differential; Future    Mixed hyperlipidemia  -     Microalbumin / creatinine urine ratio; Future  -     Lipid Panel with Direct LDL reflex; Future  -     Hemoglobin A1C; Future  -     TSH, 3rd generation; Future  -     Comprehensive metabolic panel; Future  -     CBC and differential; Future    Type 2 diabetes mellitus without complication, with long-term current use of insulin (HCC)  -     Microalbumin / creatinine urine ratio; Future  -     Lipid Panel with Direct LDL reflex; Future  -     Hemoglobin A1C; Future  -     TSH, 3rd generation; Future  -     Comprehensive metabolic panel; Future  -     CBC and differential; Future    Hyperlipidemia, unspecified hyperlipidemia type  -     Microalbumin / creatinine urine ratio; Future  -     Lipid Panel with Direct LDL reflex; Future  -     Hemoglobin A1C; Future  -     TSH, 3rd generation; Future  -     Comprehensive metabolic panel;  Future  -     CBC and differential; Future    Prostate cancer [facility])          Subjective:    Patient ID: Lisa Mchugh is a 72 y o  male  F/u   In hosp dec time frame with pneumonia   Diabetes conitues to f/u with endocrine appt scheduled for march   meds farxiga , presently not taking secondary to coverage   Rheum appt with [facility] , will need ref placed , continues with have problems wwith hands, loss of  bilat , , in past seen by md alvarado rheum , seen approx 2 yrs prior , plaquenil trialed without benefit   Diabetic Review of Systems - medication compliance: compliant all of the time, home glucose monitoring: is performed regularly, values are usually normal, fasting values range 88- 120, further diabetic ROS: no polyuria or polydipsia, no chest pain, dyspnea or TIA's, no numbness, tingling or pain in extremities  Other symptoms and concerns: none   Squamous lung , first dx  approx 2019 , looking forward to having port removed , scheduled for f/u ct   Prostate ca , chem , radiation , psa leve , urology md princess canales / md avitia   Copd , neg exacerbations inhalers , daily with rare use of albuterol   Chol conitnues with crestor , neg issuee   hnt stable iwht curent meds  Negative chest pain palpitations shortness of breath difficulty breathing cough lesions rash  Current Outpatient Medications:  albuterol (PROVENTIL HFA,VENTOLIN HFA) 90 mcg/act inhaler, TAKE 2 PUFFS BY MOUTH EVERY 4 HOURS AS NEEDED FOR WHEEZE, Disp: 8 g, Rfl: 0  amLODIPine (NORVASC) 5 mg tablet, TAKE 1 TABLET (5 MG TOTAL) BY MOUTH DAILY  , Disp: 90 tablet, Rfl: 1  Anoro Ellipta 62 5-25 MCG/ACT inhaler, INHALE 1 PUFF DAILY, Disp: 60 each, Rfl: 0  ascorbic acid (VITAMIN C) 250 mg tablet, Take 250 mg by mouth 2 (two) times a day, Disp: , Rfl:   aspirin (ECOTRIN LOW STRENGTH) 81 mg EC tablet, 1 tab(s), Disp: , Rfl:   atenolol (TENORMIN) 25 mg tablet, TAKE 1 TABLET (25 MG TOTAL) BY MOUTH DAILY  , Disp: 90 tablet, Rfl: 1  B-D ULTRAFINE III SHORT PEN 31G X 8 MM MISC, Inject under the skin in the morning, Disp: 30 each, Rfl: 0  Farxiga 5 MG TABS, TAKE 1 TABLET (5 MG TOTAL) BY MOUTH DAILY  , Disp: 90 tablet, Rfl: 1  fenofibrate (TRICOR) 145 mg tablet, TAKE 1 TABLET BY MOUTH EVERY DAY, Disp: 90 tablet, Rfl: 1  ferrous sulfate 325 (65 Fe) mg tablet, Take 325 mg by mouth 2 (two) times a day with meals, Disp: , Rfl:   gabapentin (NEURONTIN) 600 MG tablet, , Disp: , Rfl:   glimepiride (AMARYL) 1 mg tablet, TAKE 1 TABLET BY MOUTH DAILY WITH BREAKFAST , Disp: 90 tablet, Rfl: 2  Lancets (accu-chek soft touch) lancets, bs check qid, Disp: 100 each, Rfl: 2   metFORMIN (GLUCOPHAGE) 1000 MG tablet, TAKE 1 TABLET (1,000 MG TOTAL) BY MOUTH 2 (TWO) TIMES A DAY WITH MEALS FOR 5000 DOSES, Disp: 180 tablet, Rfl: 1  methimazole (TAPAZOLE) 5 mg tablet, Take 1 tablet (5 mg total) by mouth in the morning, Disp: 90 tablet, Rfl: 2  omeprazole (PriLOSEC) 40 MG capsule, TAKE 1 CAPSULE BY MOUTH TWICE A DAY, Disp: 180 capsule, Rfl: 3  OneTouch Verio test strip, CHECK BS AT MORNING FASTING , AND AFTER A MEAL, Disp: 100 strip, Rfl: 3  rosuvastatin (CRESTOR) 40 MG tablet, TAKE 1 TABLET BY MOUTH EVERY DAY IN THE EVENING, Disp: 90 tablet, Rfl: 0  tamsulosin (FLOMAX) 0 4 mg, Take 0 4 mg by mouth, Disp: , Rfl:   Tresiba FlexTouch 100 units/mL injection pen, Inject up to 80 units daily, Disp: 3 mL, Rfl: 2  clotrimazole (MYCELEX) 10 mg jacinda, Take 1 tablet (10 mg total) by mouth 3 (three) times a day (Patient not taking: Reported on 1/17/2023), Disp: 30 Jacinda, Rfl: 0  sitaGLIPtin (JANUVIA) 50 mg tablet, Take 1 tablet (50 mg total) by mouth daily, Disp: 90 tablet, Rfl: 2    No current facility-administered medications for this visit              The following portions of the patient's history were reviewed and updated as appropriate: allergies, current medications, past family history, past medical history, past social history, past surgical history and problem list     Review of Systems      /62   Pulse 93   Temp 97 7 °F (36 5 °C)   Ht 5' 10" (1 778 m)   Wt 83 kg (183 lb)   SpO2 95%   BMI 26 26 kg/m²   Social History     Socioeconomic History   • Marital status: /Civil Union     Spouse name: Not on file   • Number of children: Not on file   • Years of education: Not on file   • Highest education level: Not on file   Occupational History   • Not on file   Tobacco Use   • Smoking status: Former     Packs/day: 0 25     Years: 50 00     Pack years: 12 50     Types: Cigarettes     Quit date: 2022     Years since quittin 1   • Smokeless tobacco: Never   Vaping Use   • Vaping Use: Never used   Substance and Sexual Activity   • Alcohol use: No   • Drug use: No   • Sexual activity: Not on file   Other Topics Concern   • Not on file   Social History Narrative   • Not on file     Social Determinants of Health     Financial Resource Strain: Not on file   Food Insecurity: No Food Insecurity   • Worried About Running Out of Food in the Last Year: Never true   • Ran Out of Food in the Last Year: Never true   Transportation Needs: No Transportation Needs   • Lack of Transportation (Medical): No   • Lack of Transportation (Non-Medical):  No   Physical Activity: Not on file   Stress: Not on file   Social Connections: Not on file   Intimate Partner Violence: Not on file   Housing Stability: Low Risk    • Unable to Pay for Housing in the Last Year: No   • Number of Places Lived in the Last Year: 1   • Unstable Housing in the Last Year: No     Past Medical History:   Diagnosis Date   • Anemia    • Aortic aneurysm (HCC)    • BPH (benign prostatic hyperplasia)    • CAD (coronary artery disease)    • Chronic kidney disease    • COPD (chronic obstructive pulmonary disease) (HCC)    • Diabetes mellitus ([email or web address] )    • Dyslipidemia    • Flu 2022   • GERD (gastroesophageal reflux disease)    • Graves disease    • Hypertension    • Iron deficiency anemia    • Lactic acidosis    • Lung cancer (HCC)    • Lyme disease    • Neuropathy    • Pancytopenia (HCC)    • Pneumonia 12/2022   • Prostate cancer ([facility] )    • Rheumatoid arthritis ([facility] )      Family History   Problem Relation Age of Onset   • No Known Problems Mother    • Coronary artery disease Father    • No Known Problems Sister    • No Known Problems Brother    • No Known Problems Maternal Grandmother    • No Known Problems Maternal Grandfather    • No Known Problems Paternal Grandmother    • No Known Problems Paternal Grandfather    • No Known Problems Maternal Aunt    • No Known Problems Maternal Uncle    • No Known Problems Paternal Aunt    • No Known Problems Paternal Uncle    • No Known Problems Cousin      Past Surgical History:   Procedure Laterality Date   • BALLOON ANGIOPLASTY, ARTERY  1996   • CATARACT EXTRACTION, BILATERAL  2019   • CLAVICLE SURGERY     • TOE AMPUTATION Left 11/20/2018    Procedure: AMPUTATION GREAT TOE;  Surgeon: Amparo Butcher DPM;  Location: [facility];  Service: Podiatry       Current Outpatient Medications:   •  albuterol (PROVENTIL HFA,VENTOLIN HFA) 90 mcg/act inhaler, TAKE 2 PUFFS BY MOUTH EVERY 4 HOURS AS NEEDED FOR WHEEZE, Disp: 8 g, Rfl: 0  •  amLODIPine (NORVASC) 5 mg tablet, TAKE 1 TABLET (5 MG TOTAL) BY MOUTH DAILY  , Disp: 90 tablet, Rfl: 1  •  Anoro Ellipta 62 5-25 MCG/ACT inhaler, INHALE 1 PUFF DAILY, Disp: 60 each, Rfl: 0  •  ascorbic acid (VITAMIN C) 250 mg tablet, Take 250 mg by mouth 2 (two) times a day, Disp: , Rfl:   •  aspirin (ECOTRIN LOW STRENGTH) 81 mg EC tablet, 1 tab(s), Disp: , Rfl:   •  atenolol (TENORMIN) 25 mg tablet, TAKE 1 TABLET (25 MG TOTAL) BY MOUTH DAILY  , Disp: 90 tablet, Rfl: 1  •  B-D ULTRAFINE III SHORT PEN 31G X 8 MM MISC, Inject under the skin in the morning, Disp: 30 each, Rfl: 0  •  Farxiga 5 MG TABS, TAKE 1 TABLET (5 MG TOTAL) BY MOUTH DAILY  , Disp: 90 tablet, Rfl: 1  •  fenofibrate (TRICOR) 145 mg tablet, TAKE 1 TABLET BY MOUTH EVERY DAY, Disp: 90 tablet, Rfl: 1  •  ferrous sulfate 325 (65 Fe) mg tablet, Take 325 mg by mouth 2 (two) times a day with meals, Disp: , Rfl:   •  gabapentin (NEURONTIN) 600 MG tablet, , Disp: , Rfl:   •  glimepiride (AMARYL) 1 mg tablet, TAKE 1 TABLET BY MOUTH DAILY WITH BREAKFAST , Disp: 90 tablet, Rfl: 2  •  Lancets (accu-chek soft touch) lancets, bs check qid, Disp: 100 each, Rfl: 2  •  metFORMIN (GLUCOPHAGE) 1000 MG tablet, TAKE 1 TABLET (1,000 MG TOTAL) BY MOUTH 2 (TWO) TIMES A DAY WITH MEALS FOR 5000 DOSES, Disp: 180 tablet, Rfl: 1  •  methimazole (TAPAZOLE) 5 mg tablet, Take 1 tablet (5 mg total) by mouth in the morning, Disp: 90 tablet, Rfl: 2  •  omeprazole (PriLOSEC) 40 MG capsule, TAKE 1 CAPSULE BY MOUTH TWICE A DAY, Disp: 180 capsule, Rfl: 3  •  OneTouch Verio test strip, CHECK BS AT MORNING FASTING , AND AFTER A MEAL, Disp: 100 strip, Rfl: 3  •  rosuvastatin (CRESTOR) 40 MG tablet, TAKE 1 TABLET BY MOUTH EVERY DAY IN THE EVENING, Disp: 90 tablet, Rfl: 0  •  tamsulosin (FLOMAX) 0 4 mg, Take 0 4 mg by mouth, Disp: , Rfl:   •  Tresiba FlexTouch 100 units/mL injection pen, Inject up to 80 units daily, Disp: 3 mL, Rfl: 2  •  clotrimazole (MYCELEX) 10 mg jacinda, Take 1 tablet (10 mg total) by mouth 3 (three) times a day (Patient not taking: Reported on 1/17/2023), Disp: 30 Jacinda, Rfl: 0  •  sitaGLIPtin (JANUVIA) 50 mg tablet, Take 1 tablet (50 mg total) by mouth daily, Disp: 90 tablet, Rfl: 2    Allergies   Allergen Reactions   • Ace Inhibitors Swelling   • Angiotensin Receptor Blockers Other (See Comments)     Elevated K+   • Clindamycin Diarrhea and Nausea Only   • Plaquenil [Hydroxychloroquine] Swelling     Tongue swelling          Lab Review   Appointment on 01/16/2023   Component Date Value   • Sodium 01/16/2023 140    • Potassium 01/16/2023 4 2    • Chloride 01/16/2023 107    • CO2 01/16/2023 27    • ANION GAP 01/16/2023 6    • BUN 01/16/2023 24    • Creatinine 01/16/2023 1 35 (H)    • Glucose, Fasting 01/16/2023 94    • Calcium 01/16/2023 9 5    • Corrected Calcium 01/16/2023 10 3 (H)    • AST 01/16/2023 12    • ALT 01/16/2023 16    • Alkaline Phosphatase 01/16/2023 49    • Total Protein 01/16/2023 7 1    • Albumin 01/16/2023 3 0 (L)    • Total Bilirubin 01/16/2023 0 26    • eGFR 01/16/2023 52    • WBC 01/16/2023 6 28    • RBC 01/16/2023 4 51    • Hemoglobin 01/16/2023 12 2    • Hematocrit 01/16/2023 38 8    • MCV 01/16/2023 86    • MCH 01/16/2023 27 1    • MCHC 01/16/2023 31 4    • RDW 01/16/2023 17 3 (H)    • MPV 01/16/2023 10 1    • Platelets 20/76/9170 274    • nRBC 01/16/2023 0    • Neutrophils Relative 01/16/2023 75    • Immat GRANS % 01/16/2023 2    • Lymphocytes Relative 01/16/2023 10 (L)    • Monocytes Relative 01/16/2023 8    • Eosinophils Relative 01/16/2023 4    • Basophils Relative 01/16/2023 1    • Neutrophils Absolute 01/16/2023 4 74    • Immature Grans Absolute 01/16/2023 0 11    • Lymphocytes Absolute 01/16/2023 0 61    • Monocytes Absolute 01/16/2023 0 51    • Eosinophils Absolute 01/16/2023 0 25    • Basophils Absolute 01/16/2023 0 06    • Protime 01/16/2023 15 1 (H)    • INR 01/16/2023 1 16    Admission on 12/21/2022, Discharged on 12/23/2022   Component Date Value   • Ventricular Rate 12/21/2022 107    • Atrial Rate 12/21/2022 107    • [place] Interval 12/21/2022 154    • QRSD Interval 12/21/2022 102    • QT Interval 12/21/2022 320    • QTC Interval 12/21/2022 427    • P Axis 12/21/2022 63    • QRS Axis 12/21/2022 58    • T Wave Axis 12/21/2022 41    • hs TnI 0hr 12/21/2022 10    • WBC 12/21/2022 6 07    • RBC 12/21/2022 4 49    • Hemoglobin 12/21/2022 12 2    • Hematocrit 12/21/2022 38 5    • MCV 12/21/2022 86    • MCH 12/21/2022 27 2    • MCHC 12/21/2022 31 7    • RDW 12/21/2022 16 6 (H)    • MPV 12/21/2022 9 7    • Platelets 51/79/6355 168    • nRBC 12/21/2022 0    • Neutrophils Relative 12/21/2022 84 (H)    • Immat GRANS % 12/21/2022 0    • Lymphocytes Relative 12/21/2022 6 (L)    • Monocytes Relative 12/21/2022 9    • Eosinophils Relative 12/21/2022 1    • Basophils Relative 12/21/2022 0    • Neutrophils Absolute 12/21/2022 5 11    • Immature Grans Absolute 12/21/2022 0 02    • Lymphocytes Absolute 12/21/2022 0 35 (L)    • Monocytes Absolute 12/21/2022 0 54    • Eosinophils Absolute 12/21/2022 0 04    • Basophils Absolute 12/21/2022 0 01    • Sodium 12/21/2022 136    • Potassium 12/21/2022 4 3    • Chloride 12/21/2022 102    • CO2 12/21/2022 25    • ANION GAP 12/21/2022 9    • BUN 12/21/2022 30 (H)    • Creatinine 12/21/2022 1 78 (H)    • Glucose 12/21/2022 102    • Calcium 12/21/2022 8 4    • Corrected Calcium 12/21/2022 9 0    • AST 12/21/2022 39    • ALT 12/21/2022 17    • Alkaline Phosphatase 12/21/2022 46    • Total Protein 12/21/2022 7 7    • Albumin 12/21/2022 3 2 (L)    • Total Bilirubin 12/21/2022 0 31    • eGFR 12/21/2022 37    • LACTIC ACID 12/21/2022 0 8    • Procalcitonin 12/21/2022 0 15    • Protime 12/21/2022 15 9 (H)    • INR 12/21/2022 1 30 (H)    • PTT 12/21/2022 42 (H)    • Blood Culture 12/21/2022 No Growth After 5 Days      • Blood Culture 12/21/2022 Staphylococcus coagulase negative (A)    • Gram Stain Result 12/21/2022 Gram positive cocci in clusters (A)    • Color, UA 12/22/2022 Light Yellow    • Clarity, UA 12/22/2022 Clear    • Specific Gravity, UA 12/22/2022 1 011    • pH, UA 12/22/2022 5 0    • Leukocytes, UA 12/22/2022 Negative    • Nitrite, UA 12/22/2022 Negative    • Protein, UA 12/22/2022 Negative    • Glucose, UA 12/22/2022 1000 (1%) (A)    • Ketones, UA 12/22/2022 Negative    • Urobilinogen, UA 12/22/2022 <2 0    • Bilirubin, UA 12/22/2022 Negative    • Occult Blood, UA 12/22/2022 Negative    • hs TnI 2hr 12/21/2022 10    • Delta 2hr hsTnI 12/21/2022 0    • hs TnI 4hr 12/22/2022 11    • Delta 4hr hsTnI 12/22/2022 1    • Legionella Urinary Antig* 12/22/2022 Negative    • Strep pneumoniae antigen* 12/22/2022 Negative    • POC Glucose 12/22/2022 105    • Procalcitonin 12/22/2022 1 04 (H)    • Sodium 12/22/2022 138    • Potassium 12/22/2022 3 8    • Chloride 12/22/2022 104    • CO2 12/22/2022 24 • ANION GAP 12/22/2022 10    • BUN 12/22/2022 27 (H)    • Creatinine 12/22/2022 1 47 (H)    • Glucose 12/22/2022 98    • Calcium 12/22/2022 8 5    • Corrected Calcium 12/22/2022 9 4    • AST 12/22/2022 36    • ALT 12/22/2022 15    • Alkaline Phosphatase 12/22/2022 39 (L)    • Total Protein 12/22/2022 7 4    • Albumin 12/22/2022 2 9 (L)    • Total Bilirubin 12/22/2022 0 34    • eGFR 12/22/2022 47    • WBC 12/22/2022 11 71 (H)    • RBC 12/22/2022 4 51    • Hemoglobin 12/22/2022 12 0    • Hematocrit 12/22/2022 37 5    • MCV 12/22/2022 83    • MCH 12/22/2022 26 6 (L)    • MCHC 12/22/2022 32 0    • RDW 12/22/2022 16 5 (H)    • Platelets 68/17/4311 143 (L)    • MPV 12/22/2022 9 5    • MRSA Culture Only 12/22/2022 No Methicillin Resistant Staphlyococcus aureus (MRSA) isolated    • POC Glucose 12/22/2022 118    • POC Glucose 12/22/2022 201 (H)    • POC Glucose 12/22/2022 233 (H)    • POC Glucose 12/22/2022 317 (H)    • WBC 12/23/2022 14 84 (H)    • RBC 12/23/2022 4 60    • Hemoglobin 12/23/2022 12 4    • Hematocrit 12/23/2022 39 1    • MCV 12/23/2022 85    • MCH 12/23/2022 27 0    • MCHC 12/23/2022 31 7    • RDW 12/23/2022 16 4 (H)    • MPV 12/23/2022 9 3    • Platelets 37/48/3669 147 (L)    • nRBC 12/23/2022 0    • Neutrophils Relative 12/23/2022 95 (H)    • Immat GRANS % 12/23/2022 1    • Lymphocytes Relative 12/23/2022 2 (L)    • Monocytes Relative 12/23/2022 2 (L)    • Eosinophils Relative 12/23/2022 0    • Basophils Relative 12/23/2022 0    • Neutrophils Absolute 12/23/2022 14 10 (H)    • Immature Grans Absolute 12/23/2022 0 08    • Lymphocytes Absolute 12/23/2022 0 32 (L)    • Monocytes Absolute 12/23/2022 0 33    • Eosinophils Absolute 12/23/2022 0 00    • Basophils Absolute 12/23/2022 0 01    • Sodium 12/23/2022 140    • Potassium 12/23/2022 4 6    • Chloride 12/23/2022 105    • CO2 12/23/2022 25    • ANION GAP 12/23/2022 10    • BUN 12/23/2022 34 (H)    • Creatinine 12/23/2022 1 33 (H)    • Glucose 12/23/2022 251 (H)    • Calcium 12/23/2022 9 3    • eGFR 12/23/2022 53    • Procalcitonin 12/23/2022 0 91 (H)    • POC Glucose 12/23/2022 219 (H)    • Staphylococcus epidermid* 12/21/2022 Detected (A)    • mecA/C (Methicillin-resi* 12/21/2022 Detected (A)    Admission on 12/18/2022, Discharged on 12/18/2022   Component Date Value   • WBC 12/18/2022 8 33    • RBC 12/18/2022 4 47    • Hemoglobin 12/18/2022 12 1    • Hematocrit 12/18/2022 38 5    • MCV 12/18/2022 86    • MCH 12/18/2022 27 1    • MCHC 12/18/2022 31 4    • RDW 12/18/2022 16 2 (H)    • MPV 12/18/2022 9 5    • Platelets 57/14/6510 212    • nRBC 12/18/2022 0    • Neutrophils Relative 12/18/2022 84 (H)    • Immat GRANS % 12/18/2022 0    • Lymphocytes Relative 12/18/2022 3 (L)    • Monocytes Relative 12/18/2022 10    • Eosinophils Relative 12/18/2022 2    • Basophils Relative 12/18/2022 1    • Neutrophils Absolute 12/18/2022 6 97    • Immature Grans Absolute 12/18/2022 0 02    • Lymphocytes Absolute 12/18/2022 0 28 (L)    • Monocytes Absolute 12/18/2022 0 80    • Eosinophils Absolute 12/18/2022 0 19    • Basophils Absolute 12/18/2022 0 07    • Protime 12/18/2022 15 5 (H)    • INR 12/18/2022 1 25 (H)    • PTT 12/18/2022 33    • SARS-CoV-2 12/18/2022 Negative    • INFLUENZA A PCR 12/18/2022 Positive (A)    • INFLUENZA B PCR 12/18/2022 Negative    • RSV PCR 12/18/2022 Negative    • Sodium 12/18/2022 139    • Potassium 12/18/2022 3 9    • Chloride 12/18/2022 102    • CO2 12/18/2022 27    • ANION GAP 12/18/2022 10    • BUN 12/18/2022 35 (H)    • Creatinine 12/18/2022 1 60 (H)    • Glucose 12/18/2022 107    • Calcium 12/18/2022 8 9    • eGFR 12/18/2022 43    • Total Bilirubin 12/18/2022 0 25    • Bilirubin, Direct 12/18/2022 0 08    • Alkaline Phosphatase 12/18/2022 47    • AST 12/18/2022 22    • ALT 12/18/2022 14    • Total Protein 12/18/2022 7 6    • Albumin 12/18/2022 3 2 (L)    • LACTIC ACID 12/18/2022 1 9    • hs TnI 0hr 12/18/2022 5    • Blood Culture 12/18/2022 No Growth After 5 Days      • Blood Culture 12/18/2022 Staphylococcus coagulase negative (A)    • Gram Stain Result 12/18/2022 Gram positive cocci in clusters (A)    • Ventricular Rate 12/18/2022 98    • Atrial Rate 12/18/2022 98    • [place] Interval 12/18/2022 174    • QRSD Interval 12/18/2022 96    • QT Interval 12/18/2022 360    • QTC Interval 12/18/2022 459    • P Axis 12/18/2022 50    • QRS Axis 12/18/2022 56    • T Wave Axis 12/18/2022 54    • Staphylococcus epidermid* 12/18/2022 Detected (A)    • mecA/C (Methicillin-resi* 12/18/2022 Detected (A)    Appointment on 11/21/2022   Component Date Value   • Lyme Total Antibodies 11/21/2022 0 9 (H)    • Lyme 18 kD IgG 11/21/2022 Present (A)    • Lyme 23 kD IgG 11/21/2022 Absent    • Lyme 28 kD IgG 11/21/2022 Present (A)    • Lyme 30 kD IgG 11/21/2022 Absent    • Lyme 39 kD IgG 11/21/2022 Absent    • Lyme 41 kD IgG 11/21/2022 Absent    • Lyme 45 kD IgG 11/21/2022 Absent    • Lyme 58 kD IgG 11/21/2022 Present (A)    • Lyme 66 kD IgG 11/21/2022 Absent    • Lyme 93 kD IgG 11/21/2022 Present (A)    • Lyme 23 kD IgM 11/21/2022 Absent    • Lyme 39 kD IgM 11/21/2022 Absent    • Lyme 41 kD IgM 11/21/2022 Absent    • Lyme IgG WB Interp  11/21/2022 Negative    • Lyme IgM WB Interp  11/21/2022 Negative    Appointment on 11/14/2022   Component Date Value   • Hemoglobin A1C 11/14/2022 6 6 (H)    • EAG 11/14/2022 143    • Sodium 11/14/2022 141    • Potassium 11/14/2022 3 9    • Chloride 11/14/2022 109 (H)    • CO2 11/14/2022 27    • ANION GAP 11/14/2022 5    • BUN 11/14/2022 26 (H)    • Creatinine 11/14/2022 1 40 (H)    • Glucose, Fasting 11/14/2022 52 (L)    • Calcium 11/14/2022 9 7    • Corrected Calcium 11/14/2022 10 3 (H)    • AST 11/14/2022 18    • ALT 11/14/2022 17    • Alkaline Phosphatase 11/14/2022 54    • Total Protein 11/14/2022 8 2    • Albumin 11/14/2022 3 3 (L)    • Total Bilirubin 11/14/2022 0 45    • eGFR 11/14/2022 50    • WBC 11/14/2022 8 90    • RBC 11/14/2022 4 94    • Hemoglobin 11/14/2022 13 7    • Hematocrit 11/14/2022 44 3    • MCV 11/14/2022 90    • MCH 11/14/2022 27 7    • MCHC 11/14/2022 30 9 (L)    • RDW 11/14/2022 16 4 (H)    • MPV 11/14/2022 10 3    • Platelets 47/08/6664 230    • nRBC 11/14/2022 0    • Neutrophils Relative 11/14/2022 75    • Immat GRANS % 11/14/2022 0    • Lymphocytes Relative 11/14/2022 9 (L)    • Monocytes Relative 11/14/2022 10    • Eosinophils Relative 11/14/2022 5    • Basophils Relative 11/14/2022 1    • Neutrophils Absolute 11/14/2022 6 68    • Immature Grans Absolute 11/14/2022 0 03    • Lymphocytes Absolute 11/14/2022 0 78    • Monocytes Absolute 11/14/2022 0 88    • Eosinophils Absolute 11/14/2022 0 43    • Basophils Absolute 11/14/2022 0 10    • Free T4 11/14/2022 1 00    • TSH 3RD GENERATON 11/14/2022 2 120    Appointment on 10/17/2022   Component Date Value   • Sodium 10/17/2022 139    • Potassium 10/17/2022 4 5    • Chloride 10/17/2022 107    • CO2 10/17/2022 25    • ANION GAP 10/17/2022 7    • BUN 10/17/2022 27 (H)    • Creatinine 10/17/2022 1 52 (H)    • Glucose, Fasting 10/17/2022 149 (H)    • Calcium 10/17/2022 9 4    • Corrected Calcium 10/17/2022 10 0    • AST 10/17/2022 21    • ALT 10/17/2022 20    • Alkaline Phosphatase 10/17/2022 55    • Total Protein 10/17/2022 7 8    • Albumin 10/17/2022 3 2 (L)    • Total Bilirubin 10/17/2022 0 25    • eGFR 10/17/2022 45    • WBC 10/17/2022 7 30    • RBC 10/17/2022 4 70    • Hemoglobin 10/17/2022 13 2    • Hematocrit 10/17/2022 42 2    • MCV 10/17/2022 90    • MCH 10/17/2022 28 1    • MCHC 10/17/2022 31 3 (L)    • RDW 10/17/2022 16 7 (H)    • MPV 10/17/2022 10 6    • Platelets 43/88/7018 228    • nRBC 10/17/2022 0    • Neutrophils Relative 10/17/2022 76 (H)    • Immat GRANS % 10/17/2022 0    • Lymphocytes Relative 10/17/2022 9 (L)    • Monocytes Relative 10/17/2022 10    • Eosinophils Relative 10/17/2022 4    • Basophils Relative 10/17/2022 1    • Neutrophils Absolute 10/17/2022 5 48    • Immature Grans Absolute 10/17/2022 0 03    • Lymphocytes Absolute 10/17/2022 0 68    • Monocytes Absolute 10/17/2022 0 72    • Eosinophils Absolute 10/17/2022 0 31    • Basophils Absolute 10/17/2022 0 08    Appointment on 10/03/2022   Component Date Value   • PSA, Diagnostic 10/03/2022 0 7    Telephone on 09/06/2022   Component Date Value   • Right Eye Diabetic Retin* 01/31/2022 None    • Left Eye Diabetic Retino* 01/31/2022 None         Imaging: No results found  Objective:     Physical Exam      There are no Patient Instructions on file for this visit  JEROME Linares    Portions of the record may have been created with voice recognition software  Occasional wrong word or "sound a like" substitutions may have occurred due to the inherent limitations of voice recognition software  Read the chart carefully and recognize, using context, where substitutions have occurred

## 2023-02-21 NOTE — ASSESSMENT & PLAN NOTE
Lab Results   Component Value Date    HGBA1C 6 6 (H) 11/14/2022   Controlled, reviewed with patient, continue present regimen per endocrinology

## 2023-02-22 ENCOUNTER — TELEPHONE (OUTPATIENT)
Dept: ADMINISTRATIVE | Facility: OTHER | Age: 73
End: 2023-02-22

## 2023-02-22 NOTE — TELEPHONE ENCOUNTER
----- Message from Bart Segura sent at 2/21/2023  9:28 AM EST -----  Regarding: Dm [facility]  02/21/23 9:29 AM    Hello, our patient Juan Cohn has had Diabetic Eye Exam completed/performed   Please assist in updating the patient chart by making an External outreach to [facility]     Thank you,  Shaylee Wilson MA   [facility] [address] [facility]

## 2023-02-22 NOTE — LETTER
Diabetic Eye Exam Form    Date Requested: 23  Patient: Joseph Mosqueda  Patient : 1950   Referring Provider: JEROME Cross      DIABETIC Eye Exam Date _______________________________      Type of Exam MUST be documented for Diabetic Eye Exams  Please CHECK ONE  Retinal Exam       Dilated Retinal Exam       OCT       Optomap-Iris Exam      Fundus Photography       Left Eye - Please check Retinopathy or No Retinopathy        Exam did show retinopathy    Exam did not show retinopathy       Right Eye - Please check Retinopathy or No Retinopathy       Exam did show retinopathy    Exam did not show retinopathy       Comments __________________________________________________________    Practice Providing Exam ______________________________________________    Exam Performed By (print name) _______________________________________      Provider Signature ___________________________________________________      These reports are needed for  compliance  Please fax this completed form and a copy of the Diabetic Eye Exam report to our office located at [place] [address] as soon as possible via Fax [phone] dina Nagy Harm: Phone [phone]  We thank you for your assistance in treating our mutual patient

## 2023-02-23 NOTE — TELEPHONE ENCOUNTER
Upon review of the In Basket request and the patient's chart, initial outreach has been made via fax to facility  Please see Contacts section for details       Thank you  Areli Batistaasant

## 2023-02-27 NOTE — TELEPHONE ENCOUNTER
Upon review of the In Basket request we were able to locate, review, and update the patient chart as requested for Diabetic Eye Exam     Any additional questions or concerns should be emailed to the Practice Liaisons via the appropriate education email address, please do not reply via In Basket      Thank you  Kwesi Chirinos

## 2023-03-07 ENCOUNTER — APPOINTMENT (OUTPATIENT)
Dept: LAB | Facility: CLINIC | Age: 73
End: 2023-03-07

## 2023-03-07 DIAGNOSIS — E11.22 TYPE 2 DIABETES MELLITUS WITH STAGE 3A CHRONIC KIDNEY DISEASE, WITH LONG-TERM CURRENT USE OF INSULIN (HCC): ICD-10-CM

## 2023-03-07 DIAGNOSIS — I10 PRIMARY HYPERTENSION: Chronic | ICD-10-CM

## 2023-03-07 DIAGNOSIS — Z79.4 TYPE 2 DIABETES MELLITUS WITH STAGE 3A CHRONIC KIDNEY DISEASE, WITH LONG-TERM CURRENT USE OF INSULIN (HCC): ICD-10-CM

## 2023-03-07 DIAGNOSIS — E83.52 HYPERCALCEMIA: ICD-10-CM

## 2023-03-07 DIAGNOSIS — E78.5 DYSLIPIDEMIA: ICD-10-CM

## 2023-03-07 DIAGNOSIS — E05.90 HYPERTHYROIDISM: ICD-10-CM

## 2023-03-07 DIAGNOSIS — N18.31 TYPE 2 DIABETES MELLITUS WITH STAGE 3A CHRONIC KIDNEY DISEASE, WITH LONG-TERM CURRENT USE OF INSULIN (HCC): ICD-10-CM

## 2023-03-07 LAB
25(OH)D3 SERPL-MCNC: 34.1 NG/ML (ref 30–100)
ALBUMIN SERPL BCP-MCNC: 2.9 G/DL (ref 3.5–5)
ANION GAP SERPL CALCULATED.3IONS-SCNC: 4 MMOL/L (ref 4–13)
BUN SERPL-MCNC: 29 MG/DL (ref 5–25)
CALCIUM SERPL-MCNC: 9.5 MG/DL (ref 8.3–10.1)
CHLORIDE SERPL-SCNC: 107 MMOL/L (ref 96–108)
CHOLEST SERPL-MCNC: 120 MG/DL
CO2 SERPL-SCNC: 25 MMOL/L (ref 21–32)
CREAT SERPL-MCNC: 1.26 MG/DL (ref 0.6–1.3)
CREAT UR-MCNC: 132 MG/DL
EST. AVERAGE GLUCOSE BLD GHB EST-MCNC: 157 MG/DL
GFR SERPL CREATININE-BSD FRML MDRD: 57 ML/MIN/1.73SQ M
GLUCOSE P FAST SERPL-MCNC: 74 MG/DL (ref 65–99)
HBA1C MFR BLD: 7.1 %
HDLC SERPL-MCNC: 27 MG/DL
LDLC SERPL CALC-MCNC: 72 MG/DL (ref 0–100)
MICROALBUMIN UR-MCNC: 16.2 MG/L (ref 0–20)
MICROALBUMIN/CREAT 24H UR: 12 MG/G CREATININE (ref 0–30)
NONHDLC SERPL-MCNC: 93 MG/DL
POTASSIUM SERPL-SCNC: 4.2 MMOL/L (ref 3.5–5.3)
PTH-INTACT SERPL-MCNC: 10.5 PG/ML (ref 18.4–80.1)
SODIUM SERPL-SCNC: 136 MMOL/L (ref 135–147)
TRIGL SERPL-MCNC: 106 MG/DL
TSH SERPL DL<=0.05 MIU/L-ACNC: 1.55 UIU/ML (ref 0.45–4.5)

## 2023-03-09 DIAGNOSIS — J44.9 CHRONIC OBSTRUCTIVE PULMONARY DISEASE, UNSPECIFIED COPD TYPE (HCC): ICD-10-CM

## 2023-03-09 NOTE — TELEPHONE ENCOUNTER
[facility]  Please forward to your office staff for further review as this medication was reviewed by a [facility] RN

## 2023-03-13 RX ORDER — ALBUTEROL SULFATE 90 UG/1
AEROSOL, METERED RESPIRATORY (INHALATION)
Qty: 8 G | Refills: 1 | Status: SHIPPED | OUTPATIENT
Start: 2023-03-13

## 2023-03-15 RX ORDER — MIDAZOLAM HYDROCHLORIDE 1 MG/ML
INJECTION INTRAMUSCULAR; INTRAVENOUS
COMMUNITY
Start: 2023-02-24 | End: 2023-03-22 | Stop reason: ALTCHOICE

## 2023-03-20 ENCOUNTER — OFFICE VISIT (OUTPATIENT)
Dept: ENDOCRINOLOGY | Age: 73
End: 2023-03-20

## 2023-03-20 VITALS
DIASTOLIC BLOOD PRESSURE: 62 MMHG | HEIGHT: 70 IN | OXYGEN SATURATION: 92 % | SYSTOLIC BLOOD PRESSURE: 118 MMHG | TEMPERATURE: 98.5 F | BODY MASS INDEX: 26.14 KG/M2 | WEIGHT: 182.6 LBS | HEART RATE: 79 BPM

## 2023-03-20 DIAGNOSIS — Z89.422 ACQUIRED ABSENCE OF OTHER LEFT TOE(S) (HCC): ICD-10-CM

## 2023-03-20 DIAGNOSIS — E11.65 TYPE 2 DIABETES MELLITUS WITH HYPERGLYCEMIA, WITH LONG-TERM CURRENT USE OF INSULIN (HCC): ICD-10-CM

## 2023-03-20 DIAGNOSIS — E11.65 INADEQUATELY CONTROLLED DIABETES MELLITUS (HCC): ICD-10-CM

## 2023-03-20 DIAGNOSIS — I25.10 CAD (CORONARY ARTERY DISEASE): ICD-10-CM

## 2023-03-20 DIAGNOSIS — E05.00 GRAVES DISEASE: Primary | ICD-10-CM

## 2023-03-20 DIAGNOSIS — E78.2 MIXED HYPERLIPIDEMIA: Chronic | ICD-10-CM

## 2023-03-20 DIAGNOSIS — I10 PRIMARY HYPERTENSION: Chronic | ICD-10-CM

## 2023-03-20 DIAGNOSIS — E78.5 HYPERLIPIDEMIA, UNSPECIFIED HYPERLIPIDEMIA TYPE: ICD-10-CM

## 2023-03-20 DIAGNOSIS — I10 PRIMARY HYPERTENSION: ICD-10-CM

## 2023-03-20 DIAGNOSIS — Z79.4 TYPE 2 DIABETES MELLITUS WITH HYPERGLYCEMIA, WITH LONG-TERM CURRENT USE OF INSULIN (HCC): ICD-10-CM

## 2023-03-20 RX ORDER — METHIMAZOLE 5 MG/1
2.5 TABLET ORAL DAILY
Qty: 45 TABLET | Refills: 2 | Status: SHIPPED | OUTPATIENT
Start: 2023-03-20 | End: 2023-06-18

## 2023-03-20 RX ORDER — ATENOLOL 25 MG/1
25 TABLET ORAL DAILY
Qty: 90 TABLET | Refills: 0 | Status: SHIPPED | OUTPATIENT
Start: 2023-03-20

## 2023-03-20 RX ORDER — ROSUVASTATIN CALCIUM 40 MG/1
40 TABLET, COATED ORAL EVERY EVENING
Qty: 90 TABLET | Refills: 0 | Status: SHIPPED | OUTPATIENT
Start: 2023-03-20 | End: 2023-03-24 | Stop reason: SDUPTHER

## 2023-03-20 RX ORDER — FENOFIBRATE 145 MG/1
145 TABLET, COATED ORAL DAILY
Qty: 90 TABLET | Refills: 0 | Status: SHIPPED | OUTPATIENT
Start: 2023-03-20

## 2023-03-20 RX ORDER — AMLODIPINE BESYLATE 5 MG/1
5 TABLET ORAL DAILY
Qty: 90 TABLET | Refills: 0 | Status: SHIPPED | OUTPATIENT
Start: 2023-03-20

## 2023-03-20 NOTE — PROGRESS NOTES
Yamila Herbert 72 y o  male MRN: [id number]    Encounter: [id number]      Assessment/Plan     Assessment: This is a 72y o -year-old male for follow up on    1-T2DM: nice BS log but A1c is higher than before 7 1%  He had been troubled with pneumonia and now has recovered  He denies having hypoglycemic symptoms  He monitors his BS x3-4/d  His insurance has denied CGM  He is due for eye exam and foot exam soon  He could not afford taking Judeth Bent but mentions at the time he was on it his BS log was "superb"  2-hyperthyroidism : on methimazole (2 5 mg/d) and normal TSH level and exam  He will finish up 2 yrs treatment period in 08/2023  His TRab/ TSi will be checked before taking him off methimazole  3-dyslipidemia:acceptable LDL level on crestor  4-RA: under care of his rheumatologist     5-HTN: allergic to ACE inh on CCB and well controlled with no proteinuria  Plan:  All meds same but gave him vouchers to re try Carl Severe and if can use it then will drop his [place] [facility] [place] and continue with metformin/ farxiga and insulin  Methimazole 2 5 mg/d   RTV in 4 months with A1c, TRab, TSH    CC: Diabetes    History of Present Illness     HPI:  T2DM with no pancreatitis/ Graves disease/ HTN  Dyslipidemia ( aortic aneurysm)     Review of Systems   Constitutional: Negative for appetite change, fatigue and unexpected weight change  HENT: Negative for trouble swallowing  Eyes: Negative for visual disturbance  Respiratory: Positive for cough  Negative for chest tightness and shortness of breath  Cardiovascular: Negative for chest pain, palpitations and leg swelling  Gastrointestinal: Negative for blood in stool, constipation, diarrhea, nausea and vomiting  Endocrine: Negative for polyphagia and polyuria  Genitourinary: Negative for dysuria and frequency  Musculoskeletal: Positive for arthralgias and joint swelling  Skin: Negative for rash and wound     Neurological: Negative for weakness, numbness and headaches  Hematological: Does not bruise/bleed easily  Psychiatric/Behavioral: Negative for confusion         Historical Information   Past Medical History:   Diagnosis Date   • Anemia    • Aortic aneurysm (HCC)    • BPH (benign prostatic hyperplasia)    • CAD (coronary artery disease)    • Chronic kidney disease    • COPD (chronic obstructive pulmonary disease) (HCC)    • Diabetes mellitus ([facility] )    • Dyslipidemia    • Flu 2022   • GERD (gastroesophageal reflux disease)    • Graves disease    • Hypertension    • Iron deficiency anemia    • Lactic acidosis    • Lung cancer (HCC)    • Lyme disease    • Neuropathy    • Pancytopenia ([facility] )    • Pneumonia 2022   • Prostate cancer ([facility] [address] )    • Rheumatoid arthritis ([facility] )      Past Surgical History:   Procedure Laterality Date   • BALLOON ANGIOPLASTY, ARTERY     • CATARACT EXTRACTION, BILATERAL     • CLAVICLE SURGERY     • TOE AMPUTATION Left 2018    Procedure: AMPUTATION GREAT TOE;  Surgeon: Eulalia Richardson DPM;  Location: [facility] OR;  Service: Podiatry     Social History   Social History     Substance and Sexual Activity   Alcohol Use No     Social History     Substance and Sexual Activity   Drug Use No     Social History     Tobacco Use   Smoking Status Former   • Packs/day: 0 25   • Years: 50 00   • Pack years: 12 50   • Types: Cigarettes   • Quit date: 2022   • Years since quittin 2   Smokeless Tobacco Never     Family History:   Family History   Problem Relation Age of Onset   • No Known Problems Mother    • Coronary artery disease Father    • No Known Problems Sister    • No Known Problems Brother    • No Known Problems Maternal Grandmother    • No Known Problems Maternal Grandfather    • No Known Problems Paternal Grandmother    • No Known Problems Paternal Grandfather    • No Known Problems Maternal Aunt    • No Known Problems Maternal Uncle    • No Known Problems Paternal Aunt    • No Known Problems Paternal Uncle    • No Known Problems Cousin        Meds/Allergies   Current Outpatient Medications   Medication Sig Dispense Refill   • albuterol (PROVENTIL HFA,VENTOLIN HFA) 90 mcg/act inhaler INHALE 2 PUFFS BY MOUTH EVERY 4 HOURS AS NEEDED FOR WHEEZE 8 g 1   • amLODIPine (NORVASC) 5 mg tablet Take 1 tablet (5 mg total) by mouth daily 90 tablet 0   • ascorbic acid (VITAMIN C) 250 mg tablet Take 250 mg by mouth 2 (two) times a day     • aspirin (ECOTRIN LOW STRENGTH) 81 mg EC tablet 1 tab(s)     • atenolol (TENORMIN) 25 mg tablet Take 1 tablet (25 mg total) by mouth daily 90 tablet 0   • B-D ULTRAFINE III SHORT PEN 31G X 8 MM MISC Inject under the skin in the morning 30 each 0   • dapagliflozin (Farxiga) 5 MG TABS Take 1 tablet (5 mg total) by mouth daily 30 tablet 4   • fenofibrate (TRICOR) 145 mg tablet Take 1 tablet (145 mg total) by mouth daily 90 tablet 0   • ferrous sulfate 325 (65 Fe) mg tablet Take 325 mg by mouth 2 (two) times a day with meals     • gabapentin (NEURONTIN) 600 MG tablet      • Lancets (accu-chek soft touch) lancets bs check qid 100 each 2   • metFORMIN (GLUCOPHAGE) 1000 MG tablet TAKE 1 TABLET (1,000 MG TOTAL) BY MOUTH 2 (TWO) TIMES A DAY WITH MEALS FOR 5000 DOSES 180 tablet 1   • methimazole (TAPAZOLE) 5 mg tablet Take 0 5 tablets (2 5 mg total) by mouth in the morning 45 tablet 2   • omeprazole (PriLOSEC) 40 MG capsule TAKE 1 CAPSULE BY MOUTH TWICE A  capsule 3   • OneTouch Verio test strip CHECK BS AT MORNING FASTING , AND AFTER A MEAL 100 strip 3   • rosuvastatin (CRESTOR) 40 MG tablet Take 1 tablet (40 mg total) by mouth every evening 90 tablet 0   • sitaGLIPtin (JANUVIA) 50 mg tablet Take 1 tablet (50 mg total) by mouth daily 90 tablet 2   • tamsulosin (FLOMAX) 0 4 mg Take 0 4 mg by mouth     • Tresiba FlexTouch 100 units/mL injection pen Inject up to 80 units daily 3 mL 2   • umeclidinium-vilanterol (Anoro Ellipta) 62 5-25 mcg/actuation inhaler Inhale 1 puff daily 60 each 5   • clotrimazole (MYCELEX) 10 mg jak Take 1 tablet (10 mg total) by mouth 3 (three) times a day (Patient not taking: Reported on 3/20/2023) 30 Jacinda 0   • glimepiride (AMARYL) 1 mg tablet TAKE 1 TABLET BY MOUTH DAILY WITH BREAKFAST  (Patient not taking: Reported on 3/20/2023) 90 tablet 2   • midazolam (VERSED) 2 mg/2 mL Use as directed (Patient not taking: Reported on 3/20/2023)       No current facility-administered medications for this visit  Allergies   Allergen Reactions   • Ace Inhibitors Swelling   • Angiotensin Receptor Blockers Other (See Comments)     Elevated K+   • Clindamycin Diarrhea and Nausea Only   • Plaquenil [Hydroxychloroquine] Swelling     Tongue swelling       Objective   Vitals: Blood pressure 118/62, pulse 79, temperature 98 5 °F (36 9 °C), temperature source Temporal, height 5' 10" (1 778 m), weight 82 8 kg (182 lb 9 6 oz), SpO2 92 %  Physical Exam  Vitals reviewed  Constitutional:       Appearance: He is obese  HENT:      Head: Normocephalic and atraumatic  Eyes:      Extraocular Movements: Extraocular movements intact  Neck:      Thyroid: No thyromegaly  Vascular: No carotid bruit  Cardiovascular:      Rate and Rhythm: Normal rate and regular rhythm  Pulses: Normal pulses  Heart sounds: Normal heart sounds  No murmur heard  Pulmonary:      Effort: Pulmonary effort is normal       Breath sounds: Rales present  No wheezing or rhonchi  Abdominal:      General: Bowel sounds are normal       Palpations: Abdomen is soft  There is no mass  Musculoskeletal:         General: Deformity present  Cervical back: No tenderness  Right lower leg: No edema  Left lower leg: No edema  Comments: 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)   Feet:      Comments: Under care of a podiatrist  Lymphadenopathy:      Cervical: No cervical adenopathy  Skin:     General: Skin is warm  Coloration: Skin is not jaundiced  Findings: No rash     Neurological:      General: No focal deficit present  Mental Status: He is alert and oriented to person, place, and time  Cranial Nerves: No cranial nerve deficit  Psychiatric:         Mood and Affect: Mood normal          Behavior: Behavior normal          The history was obtained from the review of the chart, patient      Lab Results:   Lab Results   Component Value Date/Time    Hemoglobin A1C 7 1 (H) 03/07/2023 09:21 AM    Hemoglobin A1C 6 6 (H) 11/14/2022 08:21 AM    Hemoglobin A1C 7 9 (H) 07/15/2022 09:38 AM    WBC 6 28 01/16/2023 12:01 PM    WBC 14 84 (H) 12/23/2022 05:55 AM    WBC 11 71 (H) 12/22/2022 04:49 AM    Hemoglobin 12 2 01/16/2023 12:01 PM    Hemoglobin 12 4 12/23/2022 05:55 AM    Hemoglobin 12 0 12/22/2022 04:49 AM    Hematocrit 38 8 01/16/2023 12:01 PM    Hematocrit 39 1 12/23/2022 05:55 AM    Hematocrit 37 5 12/22/2022 04:49 AM    MCV 86 01/16/2023 12:01 PM    MCV 85 12/23/2022 05:55 AM    MCV 83 12/22/2022 04:49 AM    Platelets 088 37/43/7627 12:01 PM    Platelets 273 (L) 42/13/5932 05:55 AM    Platelets 038 (L) 90/42/4169 04:49 AM    BUN 29 (H) 03/07/2023 09:21 AM    BUN 24 01/16/2023 12:01 PM    BUN 34 (H) 12/23/2022 05:55 AM    Potassium 4 2 03/07/2023 09:21 AM    Potassium 4 2 01/16/2023 12:01 PM    Potassium 4 6 12/23/2022 05:55 AM    Chloride 107 03/07/2023 09:21 AM    Chloride 107 01/16/2023 12:01 PM    Chloride 105 12/23/2022 05:55 AM    CO2 25 03/07/2023 09:21 AM    CO2 27 01/16/2023 12:01 PM    CO2 25 12/23/2022 05:55 AM    Creatinine 1 26 03/07/2023 09:21 AM    Creatinine 1 35 (H) 01/16/2023 12:01 PM    Creatinine 1 33 (H) 12/23/2022 05:55 AM    AST 12 01/16/2023 12:01 PM    AST 36 12/22/2022 04:49 AM    AST 39 12/21/2022 07:29 PM    ALT 16 01/16/2023 12:01 PM    ALT 15 12/22/2022 04:49 AM    ALT 17 12/21/2022 07:29 PM    Albumin 2 9 (L) 03/07/2023 09:21 AM    Albumin 3 0 (L) 01/16/2023 12:01 PM    Albumin 2 9 (L) 12/22/2022 04:49 AM    HDL, Direct 27 (L) 03/07/2023 09:21 AM    HDL, Direct 30 (L) 07/15/2022 09:38 AM    HDL, Direct 30 (L) 04/11/2022 01:21 PM    Triglycerides 106 03/07/2023 09:21 AM    Triglycerides 104 07/15/2022 09:38 AM    Triglycerides 115 04/11/2022 01:21 PM           Imaging Studies: I have personally reviewed pertinent reports  Portions of the record may have been created with voice recognition software  Occasional wrong word or "sound a like" substitutions may have occurred due to the inherent limitations of voice recognition software  Read the chart carefully and recognize, using context, where substitutions have occurred

## 2023-03-21 RX ORDER — SODIUM CHLORIDE, SODIUM LACTATE, POTASSIUM CHLORIDE, CALCIUM CHLORIDE 600; 310; 30; 20 MG/100ML; MG/100ML; MG/100ML; MG/100ML
125 INJECTION, SOLUTION INTRAVENOUS CONTINUOUS
Status: CANCELLED | OUTPATIENT
Start: 2023-03-21

## 2023-03-21 RX ORDER — LIDOCAINE HYDROCHLORIDE 10 MG/ML
0.5 INJECTION, SOLUTION EPIDURAL; INFILTRATION; INTRACAUDAL; PERINEURAL ONCE AS NEEDED
Status: CANCELLED | OUTPATIENT
Start: 2023-03-21

## 2023-03-22 ENCOUNTER — ANESTHESIA (OUTPATIENT)
Dept: GASTROENTEROLOGY | Facility: HOSPITAL | Age: 73
End: 2023-03-22

## 2023-03-22 ENCOUNTER — ANESTHESIA EVENT (OUTPATIENT)
Dept: GASTROENTEROLOGY | Facility: HOSPITAL | Age: 73
End: 2023-03-22

## 2023-03-22 ENCOUNTER — HOSPITAL ENCOUNTER (OUTPATIENT)
Dept: GASTROENTEROLOGY | Facility: HOSPITAL | Age: 73
Setting detail: OUTPATIENT SURGERY
Discharge: HOME | End: 2023-03-22
Attending: INTERNAL MEDICINE | Admitting: INTERNAL MEDICINE

## 2023-03-22 VITALS
HEART RATE: 75 BPM | OXYGEN SATURATION: 95 % | BODY MASS INDEX: 25.69 KG/M2 | RESPIRATION RATE: 16 BRPM | DIASTOLIC BLOOD PRESSURE: 62 MMHG | WEIGHT: 179.45 LBS | SYSTOLIC BLOOD PRESSURE: 101 MMHG | HEIGHT: 70 IN | TEMPERATURE: 97.9 F

## 2023-03-22 DIAGNOSIS — K59.1 FUNCTIONAL DIARRHEA: Primary | ICD-10-CM

## 2023-03-22 DIAGNOSIS — K59.01 SLOW TRANSIT CONSTIPATION: ICD-10-CM

## 2023-03-22 LAB — GLUCOSE SERPL-MCNC: 142 MG/DL (ref 65–140)

## 2023-03-22 RX ORDER — LIDOCAINE HYDROCHLORIDE 10 MG/ML
0.5 INJECTION, SOLUTION EPIDURAL; INFILTRATION; INTRACAUDAL; PERINEURAL ONCE AS NEEDED
Status: DISCONTINUED | OUTPATIENT
Start: 2023-03-22 | End: 2023-03-26 | Stop reason: HOSPADM

## 2023-03-22 RX ORDER — PROPOFOL 10 MG/ML
INJECTION, EMULSION INTRAVENOUS AS NEEDED
Status: DISCONTINUED | OUTPATIENT
Start: 2023-03-22 | End: 2023-03-22

## 2023-03-22 RX ORDER — SODIUM CHLORIDE, SODIUM LACTATE, POTASSIUM CHLORIDE, CALCIUM CHLORIDE 600; 310; 30; 20 MG/100ML; MG/100ML; MG/100ML; MG/100ML
125 INJECTION, SOLUTION INTRAVENOUS CONTINUOUS
Status: DISCONTINUED | OUTPATIENT
Start: 2023-03-22 | End: 2023-03-26 | Stop reason: HOSPADM

## 2023-03-22 RX ORDER — CHOLESTYRAMINE 4 G/9G
1 POWDER, FOR SUSPENSION ORAL 2 TIMES DAILY WITH MEALS
Qty: 60 PACKET | Refills: 3 | Status: SHIPPED | OUTPATIENT
Start: 2023-03-22

## 2023-03-22 RX ADMIN — PROPOFOL 20 MG: 10 INJECTION, EMULSION INTRAVENOUS at 08:05

## 2023-03-22 RX ADMIN — PROPOFOL 80 MG: 10 INJECTION, EMULSION INTRAVENOUS at 07:59

## 2023-03-22 RX ADMIN — PROPOFOL 20 MG: 10 INJECTION, EMULSION INTRAVENOUS at 08:08

## 2023-03-22 RX ADMIN — PROPOFOL 40 MG: 10 INJECTION, EMULSION INTRAVENOUS at 08:02

## 2023-03-22 RX ADMIN — SODIUM CHLORIDE, SODIUM LACTATE, POTASSIUM CHLORIDE, AND CALCIUM CHLORIDE: .6; .31; .03; .02 INJECTION, SOLUTION INTRAVENOUS at 07:30

## 2023-03-22 NOTE — H&P
History and Physical - [facility]  Jonn Johnson 72 y o  male MRN: [id number]                  HPI: Jonn Johnson is a 72y o  year old male who presents for colonoscopy for evaluation of diarrhea abdominal pain rectal pain and irregular stools      REVIEW OF SYSTEMS: Per the HPI, and otherwise unremarkable      Historical Information   Past Medical History:   Diagnosis Date   • Anemia    • Aortic aneurysm (HCC)    • BPH (benign prostatic hyperplasia)    • CAD (coronary artery disease)    • Chronic kidney disease    • COPD (chronic obstructive pulmonary disease) (HCC)    • Diabetes mellitus ([facility] [address] )    • Dyslipidemia    • Flu 2022   • GERD (gastroesophageal reflux disease)    • Graves disease    • Hypertension    • Iron deficiency anemia    • Lactic acidosis    • Lung cancer (HCC)    • Lyme disease    • Neuropathy    • Pancytopenia ([facility] [address] )    • Pneumonia 2022   • [facility] )    • Rheumatoid arthritis ([facility] )      Past Surgical History:   Procedure Laterality Date   • BALLOON ANGIOPLASTY, ARTERY     • CATARACT EXTRACTION, BILATERAL     • CLAVICLE SURGERY     • TOE AMPUTATION Left 2018    Procedure: AMPUTATION GREAT TOE;  Surgeon: Violet Rayo DPM;  Location: [facility];  Service: Podiatry     Social History   Social History     Substance and Sexual Activity   Alcohol Use No     Social History     Substance and Sexual Activity   Drug Use No     Social History     Tobacco Use   Smoking Status Every Day   • Packs/day: 0 50   • Years: 50 00   • Pack years: 25 00   • Types: Cigarettes   • Last attempt to quit: 2022   • Years since quittin 2   Smokeless Tobacco Never     Family History   Problem Relation Age of Onset   • No Known Problems Mother    • Coronary artery disease Father    • No Known Problems Sister    • No Known Problems Brother    • No Known Problems Maternal Grandmother    • No Known Problems Maternal Grandfather    • No Known Problems Paternal "Grandmother    • No Known Problems Paternal Grandfather    • No Known Problems Maternal Aunt    • No Known Problems Maternal Uncle    • No Known Problems Paternal Aunt    • No Known Problems Paternal Uncle    • No Known Problems Cousin        Meds/Allergies     (Not in a hospital admission)      Allergies   Allergen Reactions   • Ace Inhibitors Swelling   • Angiotensin Receptor Blockers Other (See Comments)     Elevated K+   • Clindamycin Diarrhea and Nausea Only   • Plaquenil [Hydroxychloroquine] Swelling     Tongue swelling       Objective     Blood pressure 120/66, pulse 85, temperature 97 5 °F (36 4 °C), temperature source Temporal, resp  rate 18, height 5' 10\" (1 778 m), weight 81 4 kg (179 lb 7 3 oz), SpO2 94 %  PHYSICAL EXAM    /66   Pulse 85   Temp 97 5 °F (36 4 °C) (Temporal)   Resp 18   Ht 5' 10\" (1 778 m)   Wt 81 4 kg (179 lb 7 3 oz)   SpO2 94%   BMI 25 75 kg/m²       Gen: NAD  CV: RRR  CHEST: Clear  ABD: soft, NT/ND  EXT: no edema      ASSESSMENT/PLAN:  This is a 72y o  year old male here for colonoscopy, and he is stable and optimized for his procedure        "

## 2023-03-22 NOTE — ANESTHESIA PREPROCEDURE EVALUATION
Procedure:  COLONOSCOPY    Relevant Problems   CARDIO   (+) Abdominal aortic aneurysm (AAA) without rupture   (+) HTN (hypertension)   (+) Hyperlipidemia      ENDO   (+) Type 2 diabetes mellitus, with long-term current use of insulin (HCC)      GI/HEPATIC   (+) GERD (gastroesophageal reflux disease)      /RENAL   (+) PRADEEP (acute kidney injury) (HCC)   (+) CKD (chronic kidney disease)   (+) Prostate cancer (HCC)      HEMATOLOGY   (+) Anemia   (+) Pancytopenia (HCC)      MUSCULOSKELETAL   (+) Rheumatoid arthritis (HCC)      PULMONARY   (+) Acute respiratory failure (HCC)   (+) COPD (chronic obstructive pulmonary disease) (HCC)        Physical Exam    Airway    Mallampati score: II         Dental   No notable dental hx     Cardiovascular      Pulmonary      Other Findings        Anesthesia Plan  ASA Score- 3     Anesthesia Type- IV sedation with anesthesia with ASA Monitors  Additional Monitors:   Airway Plan:     Comment: I, Dr Laurel Carvajal, the attending physician, have personally seen and evaluated the patient prior to anesthetic care  I have reviewed the pre-anesthetic record, and other medical records if appropriate to the anesthetic care  If a CRNA is involved in the case, I have reviewed the CRNA assessment, if present, and agree  The patient is in a suitable condition to proceed with my formulated anesthetic plan          Plan Factors-    Chart reviewed  Induction- intravenous  Postoperative Plan-     Informed Consent- Anesthetic plan and risks discussed with patient  I personally reviewed this patient with the CRNA  Discussed and agreed on the Anesthesia Plan with the CRNA  Jayant Neil

## 2023-03-24 DIAGNOSIS — E78.5 HYPERLIPIDEMIA, UNSPECIFIED HYPERLIPIDEMIA TYPE: ICD-10-CM

## 2023-03-24 RX ORDER — ROSUVASTATIN CALCIUM 40 MG/1
40 TABLET, COATED ORAL EVERY EVENING
Qty: 90 TABLET | Refills: 0 | Status: SHIPPED | OUTPATIENT
Start: 2023-03-24

## 2023-04-04 DIAGNOSIS — Z79.4 TYPE 2 DIABETES MELLITUS WITHOUT COMPLICATION, WITH LONG-TERM CURRENT USE OF INSULIN (HCC): ICD-10-CM

## 2023-04-04 DIAGNOSIS — I25.10 CAD (CORONARY ARTERY DISEASE): ICD-10-CM

## 2023-04-04 DIAGNOSIS — J44.9 CHRONIC OBSTRUCTIVE PULMONARY DISEASE, UNSPECIFIED COPD TYPE (HCC): ICD-10-CM

## 2023-04-04 DIAGNOSIS — E11.22 TYPE 2 DIABETES MELLITUS WITH STAGE 3A CHRONIC KIDNEY DISEASE, WITH LONG-TERM CURRENT USE OF INSULIN (HCC): ICD-10-CM

## 2023-04-04 DIAGNOSIS — E78.5 HYPERLIPIDEMIA, UNSPECIFIED HYPERLIPIDEMIA TYPE: ICD-10-CM

## 2023-04-04 DIAGNOSIS — N18.31 TYPE 2 DIABETES MELLITUS WITH STAGE 3A CHRONIC KIDNEY DISEASE, WITH LONG-TERM CURRENT USE OF INSULIN (HCC): ICD-10-CM

## 2023-04-04 DIAGNOSIS — I10 PRIMARY HYPERTENSION: ICD-10-CM

## 2023-04-04 DIAGNOSIS — E11.9 TYPE 2 DIABETES MELLITUS WITHOUT COMPLICATION, WITH LONG-TERM CURRENT USE OF INSULIN (HCC): ICD-10-CM

## 2023-04-04 DIAGNOSIS — Z79.4 TYPE 2 DIABETES MELLITUS WITH STAGE 3A CHRONIC KIDNEY DISEASE, WITH LONG-TERM CURRENT USE OF INSULIN (HCC): ICD-10-CM

## 2023-04-04 RX ORDER — AMLODIPINE BESYLATE 5 MG/1
5 TABLET ORAL DAILY
Qty: 90 TABLET | Refills: 0 | Status: SHIPPED | OUTPATIENT
Start: 2023-04-04 | End: 2023-04-06 | Stop reason: SDUPTHER

## 2023-04-04 RX ORDER — GLIMEPIRIDE 1 MG/1
1 TABLET ORAL
Qty: 90 TABLET | Refills: 0 | Status: SHIPPED | OUTPATIENT
Start: 2023-04-04

## 2023-04-04 RX ORDER — ATENOLOL 25 MG/1
25 TABLET ORAL DAILY
Qty: 90 TABLET | Refills: 0 | Status: SHIPPED | OUTPATIENT
Start: 2023-04-04 | End: 2023-04-06 | Stop reason: SDUPTHER

## 2023-04-04 RX ORDER — ROSUVASTATIN CALCIUM 40 MG/1
40 TABLET, COATED ORAL EVERY EVENING
Qty: 90 TABLET | Refills: 0 | Status: SHIPPED | OUTPATIENT
Start: 2023-04-04 | End: 2023-04-06 | Stop reason: SDUPTHER

## 2023-04-04 RX ORDER — FENOFIBRATE 145 MG/1
145 TABLET, COATED ORAL DAILY
Qty: 90 TABLET | Refills: 0 | Status: SHIPPED | OUTPATIENT
Start: 2023-04-04

## 2023-04-04 RX ORDER — UMECLIDINIUM BROMIDE AND VILANTEROL TRIFENATATE 62.5; 25 UG/1; UG/1
1 POWDER RESPIRATORY (INHALATION) DAILY
Qty: 60 EACH | Refills: 0 | Status: SHIPPED | OUTPATIENT
Start: 2023-04-04 | End: 2023-07-03

## 2023-04-06 DIAGNOSIS — Z79.4 TYPE 2 DIABETES MELLITUS WITH STAGE 3A CHRONIC KIDNEY DISEASE, WITH LONG-TERM CURRENT USE OF INSULIN (HCC): ICD-10-CM

## 2023-04-06 DIAGNOSIS — K21.9 GASTROESOPHAGEAL REFLUX DISEASE WITHOUT ESOPHAGITIS: ICD-10-CM

## 2023-04-06 DIAGNOSIS — E78.5 HYPERLIPIDEMIA, UNSPECIFIED HYPERLIPIDEMIA TYPE: ICD-10-CM

## 2023-04-06 DIAGNOSIS — I10 PRIMARY HYPERTENSION: ICD-10-CM

## 2023-04-06 DIAGNOSIS — N18.31 TYPE 2 DIABETES MELLITUS WITH STAGE 3A CHRONIC KIDNEY DISEASE, WITH LONG-TERM CURRENT USE OF INSULIN (HCC): ICD-10-CM

## 2023-04-06 DIAGNOSIS — I25.10 CAD (CORONARY ARTERY DISEASE): ICD-10-CM

## 2023-04-06 DIAGNOSIS — E11.22 TYPE 2 DIABETES MELLITUS WITH STAGE 3A CHRONIC KIDNEY DISEASE, WITH LONG-TERM CURRENT USE OF INSULIN (HCC): ICD-10-CM

## 2023-04-06 RX ORDER — AMLODIPINE BESYLATE 5 MG/1
5 TABLET ORAL DAILY
Qty: 90 TABLET | Refills: 0 | Status: SHIPPED | OUTPATIENT
Start: 2023-04-06

## 2023-04-06 RX ORDER — ROSUVASTATIN CALCIUM 40 MG/1
40 TABLET, COATED ORAL EVERY EVENING
Qty: 90 TABLET | Refills: 0 | Status: SHIPPED | OUTPATIENT
Start: 2023-04-06

## 2023-04-06 RX ORDER — PEN NEEDLE, DIABETIC 31 GX5/16"
NEEDLE, DISPOSABLE MISCELLANEOUS DAILY
Qty: 30 EACH | Refills: 0 | Status: SHIPPED | OUTPATIENT
Start: 2023-04-06

## 2023-04-06 RX ORDER — ATENOLOL 25 MG/1
25 TABLET ORAL DAILY
Qty: 90 TABLET | Refills: 0 | Status: SHIPPED | OUTPATIENT
Start: 2023-04-06

## 2023-04-06 RX ORDER — OMEPRAZOLE 40 MG/1
40 CAPSULE, DELAYED RELEASE ORAL 2 TIMES DAILY
Qty: 180 CAPSULE | Refills: 0 | Status: SHIPPED | OUTPATIENT
Start: 2023-04-06

## 2023-05-10 DIAGNOSIS — E11.9 TYPE 2 DIABETES MELLITUS WITHOUT COMPLICATION, WITH LONG-TERM CURRENT USE OF INSULIN (HCC): ICD-10-CM

## 2023-05-10 DIAGNOSIS — Z79.4 TYPE 2 DIABETES MELLITUS WITHOUT COMPLICATION, WITH LONG-TERM CURRENT USE OF INSULIN (HCC): ICD-10-CM

## 2023-05-11 ENCOUNTER — TELEPHONE (OUTPATIENT)
Age: 73
End: 2023-05-11

## 2023-05-11 DIAGNOSIS — Z79.4 TYPE 2 DIABETES MELLITUS WITHOUT COMPLICATION, WITH LONG-TERM CURRENT USE OF INSULIN (HCC): ICD-10-CM

## 2023-05-11 DIAGNOSIS — E11.9 TYPE 2 DIABETES MELLITUS WITHOUT COMPLICATION, WITH LONG-TERM CURRENT USE OF INSULIN (HCC): ICD-10-CM

## 2023-05-11 RX ORDER — INSULIN DEGLUDEC INJECTION 100 U/ML
INJECTION, SOLUTION SUBCUTANEOUS
Qty: 3 ML | Refills: 2 | Status: SHIPPED | OUTPATIENT
Start: 2023-05-11

## 2023-05-11 RX ORDER — INSULIN DEGLUDEC INJECTION 100 U/ML
INJECTION, SOLUTION SUBCUTANEOUS
Qty: 27 ML | Refills: 4 | Status: SHIPPED | OUTPATIENT
Start: 2023-05-11

## 2023-05-17 ENCOUNTER — OFFICE VISIT (OUTPATIENT)
Dept: GASTROENTEROLOGY | Facility: CLINIC | Age: 73
End: 2023-05-17

## 2023-05-17 VITALS
SYSTOLIC BLOOD PRESSURE: 132 MMHG | DIASTOLIC BLOOD PRESSURE: 68 MMHG | BODY MASS INDEX: 26.69 KG/M2 | OXYGEN SATURATION: 96 % | HEIGHT: 70 IN | HEART RATE: 70 BPM | WEIGHT: 186.4 LBS

## 2023-05-17 DIAGNOSIS — R15.9 INCONTINENCE OF FECES, UNSPECIFIED FECAL INCONTINENCE TYPE: ICD-10-CM

## 2023-05-17 DIAGNOSIS — Z86.010 HISTORY OF COLON POLYPS: ICD-10-CM

## 2023-05-17 DIAGNOSIS — K59.1 FUNCTIONAL DIARRHEA: Primary | ICD-10-CM

## 2023-05-17 RX ORDER — RITUXIMAB 10 MG/ML
INJECTION, SOLUTION INTRAVENOUS
COMMUNITY
Start: 2023-05-10

## 2023-05-17 RX ORDER — MONTELUKAST SODIUM 4 MG/1
2 TABLET, CHEWABLE ORAL DAILY
Qty: 60 TABLET | Refills: 0 | Status: SHIPPED | OUTPATIENT
Start: 2023-05-17 | End: 2023-06-16

## 2023-05-17 NOTE — PROGRESS NOTES
[address] [facility]  Mayme Delay 72 y o  male MRN: [id number]       CC: Follow-up after colonoscopy    HPI: Caro Haley is a 68-year-old male with history of type 2 diabetes, Graves' disease, COPD, previous prostate cancer, BPH, CKD, and rheumatoid arthritis on Rituxan  Patient was seen in the office in January with Dr Grewal Her for fecal incontinence  Patient was encouraged by his PCP to schedule a colonoscopy as well  Patient reports that he has both fecal and urinary incontinence with urgency  She reports that recently, his fecal incontinence has not been as bothersome  Patient did trial Questran for short time, but did not enjoy the consistency of the medication  He denies abdominal pain, no unintentional weight loss  Patient's colonoscopy was performed in March 2023 by Dr Grewal Her  Diverticulosis noted, colon mucosa appeared normal, and 4 colon polyps removed  These biopsied as tubular adenoma without dysplasia  Random colon biopsy was negative for microscopic colitis  He denies family history of colon cancer  Review of Systems:    CONSTITUTIONAL: Denies any fever, chills, or rigors  Good appetite, and no recent weight loss  HEENT: No earache or tinnitus  Denies hearing loss or visual disturbances  CARDIOVASCULAR: No chest pain or palpitations  RESPIRATORY: Denies any cough, hemoptysis, shortness of breath or dyspnea on exertion  GASTROINTESTINAL: As noted in the History of Present Illness  GENITOURINARY: No problems with urination  Denies any hematuria or dysuria  NEUROLOGIC: No dizziness or vertigo, denies headaches  MUSCULOSKELETAL: Denies any muscle or joint pain  SKIN: Denies skin rashes or itching  ENDOCRINE: Denies excessive thirst  Denies intolerance to heat or cold  PSYCHOSOCIAL: Denies depression or anxiety  Denies any recent memory loss         Current Outpatient Medications   Medication Sig Dispense Refill   • albuterol (PROVENTIL HFA,VENTOLIN HFA) 90 mcg/act inhaler INHALE 2 PUFFS BY MOUTH EVERY 4 HOURS AS NEEDED FOR WHEEZE 8 g 1   • amLODIPine (NORVASC) 5 mg tablet Take 1 tablet (5 mg total) by mouth daily 90 tablet 0   • ascorbic acid (VITAMIN C) 250 mg tablet Take 250 mg by mouth 2 (two) times a day     • aspirin (ECOTRIN LOW STRENGTH) 81 mg EC tablet 1 tab(s)     • atenolol (TENORMIN) 25 mg tablet Take 1 tablet (25 mg total) by mouth daily 90 tablet 0   • B-D ULTRAFINE III SHORT PEN 31G X 8 MM MISC Inject under the skin in the morning 30 each 0   • colestipol (COLESTID) 1 g tablet Take 2 tablets (2 g total) by mouth daily Before bedtime 60 tablet 0   • dapagliflozin (Farxiga) 5 MG TABS Take 1 tablet (5 mg total) by mouth daily 90 tablet 2   • fenofibrate (TRICOR) 145 mg tablet Take 1 tablet (145 mg total) by mouth daily 90 tablet 0   • ferrous sulfate 325 (65 Fe) mg tablet Take 325 mg by mouth 2 (two) times a day with meals     • gabapentin (NEURONTIN) 600 MG tablet      • glimepiride (AMARYL) 1 mg tablet Take 1 tablet (1 mg total) by mouth daily with breakfast 90 tablet 0   • Lancets (accu-chek soft touch) lancets bs check qid 100 each 2   • metFORMIN (GLUCOPHAGE) 1000 MG tablet Take 1 tablet (1,000 mg total) by mouth 2 (two) times a day with meals for 5000 doses 180 tablet 0   • methimazole (TAPAZOLE) 5 mg tablet Take 0 5 tablets (2 5 mg total) by mouth in the morning 45 tablet 2   • mupirocin (BACTROBAN) 2 % ointment PLEASE SEE ATTACHED FOR DETAILED DIRECTIONS     • omeprazole (PriLOSEC) 40 MG capsule Take 1 capsule (40 mg total) by mouth 2 (two) times a day 180 capsule 0   • OneTouch Verio test strip CHECK BS AT MORNING FASTING , AND AFTER A MEAL 100 strip 3   • riTUXimab (Rituxan) injection      • rosuvastatin (CRESTOR) 40 MG tablet Take 1 tablet (40 mg total) by mouth every evening 90 tablet 0   • Tresiba FlexTouch 100 units/mL injection pen Inject up to 80 units daily 3 mL 2   • Tresiba FlexTouch 100 units/mL injection pen Inject up to 80 units daily 27 mL 4 • umeclidinium-vilanterol (Anoro Ellipta) 62 5-25 mcg/actuation inhaler Inhale 1 puff daily 60 each 0   • sitaGLIPtin (JANUVIA) 50 mg tablet Take 1 tablet (50 mg total) by mouth daily (Patient not taking: Reported on 2023) 90 tablet 2     No current facility-administered medications for this visit       Past Medical History:   Diagnosis Date   • Anemia    • Aortic aneurysm ([facility])    • BPH (benign prostatic hyperplasia)    • CAD (coronary artery disease)    • Chronic kidney disease    • COPD (chronic obstructive pulmonary disease) ([facility])    • Diabetes mellitus ([facility] )    • Dyslipidemia    • Flu 2022   • GERD (gastroesophageal reflux disease)    • Graves disease    • Hypertension    • Iron deficiency anemia    • Lactic acidosis    • Lung cancer ([facility])    • Lyme disease    • Neuropathy    • Pancytopenia ([facility] )    • Pneumonia 2022   • Prostate cancer ([facility] )    • Rheumatoid arthritis ([facility] )      Past Surgical History:   Procedure Laterality Date   • BALLOON ANGIOPLASTY, ARTERY     • CATARACT EXTRACTION, BILATERAL     • CLAVICLE SURGERY     • TOE AMPUTATION Left 2018    Procedure: AMPUTATION GREAT TOE;  Surgeon: Coco River DPM;  Location: [facility] OR;  Service: Podiatry     Social History     Socioeconomic History   • Marital status: /Civil Union     Spouse name: None   • Number of children: None   • Years of education: None   • Highest education level: None   Occupational History   • None   Tobacco Use   • Smoking status: Every Day     Packs/day: 0 50     Years: 50 00     Pack years: 25 00     Types: Cigarettes     Last attempt to quit: 2022     Years since quittin 3   • Smokeless tobacco: Never   Vaping Use   • Vaping Use: Never used   Substance and Sexual Activity   • Alcohol use: No   • Drug use: No   • Sexual activity: None   Other Topics Concern   • None   Social History Narrative   • None     Social Determinants of Health     Financial Resource Strain: Not on file   Food "Insecurity: No Food Insecurity   • Worried About Running Out of Food in the Last Year: Never true   • Ran Out of Food in the Last Year: Never true   Transportation Needs: No Transportation Needs   • Lack of Transportation (Medical): No   • Lack of Transportation (Non-Medical): No   Physical Activity: Not on file   Stress: Not on file   Social Connections: Not on file   Intimate Partner Violence: Not on file   Housing Stability: Low Risk    • Unable to Pay for Housing in the Last Year: No   • Number of Places Lived in the Last Year: 1   • Unstable Housing in the Last Year: No     Family History   Problem Relation Age of Onset   • No Known Problems Mother    • Coronary artery disease Father    • No Known Problems Sister    • No Known Problems Brother    • No Known Problems Maternal Grandmother    • No Known Problems Maternal Grandfather    • No Known Problems Paternal Grandmother    • No Known Problems Paternal Grandfather    • No Known Problems Maternal Aunt    • No Known Problems Maternal Uncle    • No Known Problems Paternal Aunt    • No Known Problems Paternal Uncle    • No Known Problems Cousin             PHYSICAL EXAM:    Vitals:    05/17/23 0924   BP: 132/68   BP Location: Left arm   Patient Position: Sitting   Cuff Size: Standard   Pulse: 70   SpO2: 96%   Weight: 84 6 kg (186 lb 6 4 oz)   Height: 5' 10\" (1 778 m)     General Appearance:   Alert and oriented x 3   Cooperative, and in no respiratory distress   HEENT:   Normocephalic, atraumatic, anicteric      Neck:  Supple, symmetrical, trachea midline   Lungs:   Clear to auscultation bilaterally    Heart[de-identified]   Regular rate and rhythm   Abdomen:   Soft, non-tender, non-distended; normal bowel sounds; no masses, no organomegaly    Genitalia:   Deferred    Rectal:   Deferred    Extremities:  No cyanosis, clubbing or edema    Pulses:  2+ and symmetric all extremities    Skin:  Skin color, texture, turgor normal, no rashes or lesions    Lymph nodes:  No palpable " cervical or supraclavicular lymphadenopathy        Lab Results:   Results from last 6 Months   Lab Units 01/16/23  1201   WBC Thousand/uL 6 28   HEMOGLOBIN g/dL 12 2   HEMATOCRIT % 38 8   PLATELETS Thousands/uL 274   NEUTROS PCT % 75   LYMPHS PCT % 10*   MONOS PCT % 8   EOS PCT % 4     Results from last 6 Months   Lab Units 03/07/23  0921 01/16/23  1201   POTASSIUM mmol/L 4 2 4 2   CHLORIDE mmol/L 107 107   CO2 mmol/L 25 27   BUN mg/dL 29* 24   CREATININE mg/dL 1 26 1 35*   CALCIUM mg/dL 9 5 9 5   ALK PHOS U/L  --  49   ALT U/L  --  16   AST U/L  --  12     Results from last 6 Months   Lab Units 01/16/23  1201   INR  1 16           Imaging Studies: I have personally reviewed pertinent imaging studies  Colonoscopy    Result Date: 3/22/2023  Impression: Diverticulosis Normal colon mucosa 4 Polyps status post cold snare polypectomy Internal hemorrhoids RECOMMENDATION: Await pathology Continue Benefiber every day Cholestyramine 1-2 packets daily  Bud Andrew MD       ASSESSMENT and PLAN:      1) Fecal and urinary incontinence - Patient did not like the consistency of Questran  Otherwise, patient reports that he has been staying close to his bathroom in case he has urgency  Patient reports that his fecal urgency has not been as frequent as his urinary frequency  Sees urology at [facility]  - We will send patient a 30-day supply of colestipol to take prior to bedtime, can be adjusted based on patient's need  - For precancerous colon polyps, will be due for recall in 3 years      Follow up in August as needed

## 2023-05-22 ENCOUNTER — OFFICE VISIT (OUTPATIENT)
Dept: FAMILY MEDICINE CLINIC | Facility: CLINIC | Age: 73
End: 2023-05-22

## 2023-05-22 VITALS
HEART RATE: 90 BPM | DIASTOLIC BLOOD PRESSURE: 58 MMHG | BODY MASS INDEX: 26.34 KG/M2 | SYSTOLIC BLOOD PRESSURE: 112 MMHG | HEIGHT: 70 IN | OXYGEN SATURATION: 96 % | WEIGHT: 184 LBS | TEMPERATURE: 99.5 F

## 2023-05-22 DIAGNOSIS — E11.65 TYPE 2 DIABETES MELLITUS WITH HYPERGLYCEMIA, WITH LONG-TERM CURRENT USE OF INSULIN (HCC): ICD-10-CM

## 2023-05-22 DIAGNOSIS — M05.9 RHEUMATOID ARTHRITIS WITH POSITIVE RHEUMATOID FACTOR, INVOLVING UNSPECIFIED SITE (HCC): ICD-10-CM

## 2023-05-22 DIAGNOSIS — Z79.4 TYPE 2 DIABETES MELLITUS WITH HYPERGLYCEMIA, WITH LONG-TERM CURRENT USE OF INSULIN (HCC): ICD-10-CM

## 2023-05-22 DIAGNOSIS — M25.461 EFFUSION OF BURSA OF RIGHT KNEE: Primary | ICD-10-CM

## 2023-05-22 NOTE — PROGRESS NOTES
Tobacco Cessation Counseling: Tobacco cessation counseling was provided  The patient is sincerely urged to quit consumption of tobacco  He is not ready to quit tobacco        Assessment/Plan:     Chronic Problems:  Type 2 diabetes mellitus, with long-term current use of insulin ([facility] [address] )  Continue current care endocrinology  Lab Results   Component Value Date    HGBA1C 7 1 (H) 03/07/2023       Visit Diagnosis:  Diagnoses and all orders for this visit:    Effusion of bursa of right knee  Comments:  mild , would rec continued use of compression / ace sleeve, obtain baseline films, orthopedic  Orders:  -     XR knee 3 vw right non injury; Future    Rheumatoid arthritis with positive rheumatoid factor, involving unspecified site [facility])  -     Ambulatory Referral to Orthopedic Surgery; Future    Type 2 diabetes mellitus with hyperglycemia, with long-term current use of insulin ([facility])          Subjective:    Patient ID: Cecil Garsia is a 72 y o  male  C/o right knee swelling weeks  Has anne-marie using compression sleeve which works , swelling is not bad now   Denies trauma, denies erythema or warmth point tenderness negative loss of range of motion  ra , currently being treated for RA with infusion retuxin   Endocrine , diabetes , co pays for meds have been prohibitive , wokring thru insurance       The following portions of the patient's history were reviewed and updated as appropriate: allergies, current medications, past family history, past medical history, past social history, past surgical history and problem list     Review of Systems   Constitutional: Negative for appetite change, chills, fever and unexpected weight change  HENT: Negative for congestion, dental problem, ear pain, hearing loss, postnasal drip, rhinorrhea, sinus pressure, sinus pain, sneezing, sore throat, tinnitus and voice change  Eyes: Negative for visual disturbance     Respiratory: Negative for apnea, cough, chest tightness and shortness of "breath  Cardiovascular: Negative for chest pain, palpitations and leg swelling  Gastrointestinal: Negative for abdominal pain, blood in stool, constipation, diarrhea, nausea and vomiting  Endocrine: Negative for cold intolerance, heat intolerance, polydipsia, polyphagia and polyuria  Genitourinary: Negative for decreased urine volume, difficulty urinating, dysuria, frequency and hematuria  Musculoskeletal: Negative for arthralgias, back pain, gait problem, joint swelling and myalgias  Skin: Negative for color change, rash and wound  Allergic/Immunologic: Negative for environmental allergies and food allergies  Neurological: Negative for dizziness, syncope, weakness, light-headedness, numbness and headaches  Hematological: Negative for adenopathy  Does not bruise/bleed easily  Psychiatric/Behavioral: Negative for sleep disturbance and suicidal ideas  The patient is not nervous/anxious            /58   Pulse 90   Temp 99 5 °F (37 5 °C)   Ht 5' 10\" (1 778 m)   Wt 83 5 kg (184 lb)   SpO2 96%   BMI 26 40 kg/m²   Social History     Socioeconomic History   • Marital status: /Civil Union     Spouse name: Not on file   • Number of children: Not on file   • Years of education: Not on file   • Highest education level: Not on file   Occupational History   • Not on file   Tobacco Use   • Smoking status: Every Day     Packs/day: 0 50     Years: 50 00     Pack years: 25 00     Types: Cigarettes     Last attempt to quit: 2022     Years since quittin 3   • Smokeless tobacco: Never   Vaping Use   • Vaping Use: Never used   Substance and Sexual Activity   • Alcohol use: No   • Drug use: No   • Sexual activity: Not on file   Other Topics Concern   • Not on file   Social History Narrative   • Not on file     Social Determinants of Health     Financial Resource Strain: Not on file   Food Insecurity: No Food Insecurity   • Worried About Running Out of Food in the Last Year: Never true   • Ran " Out of Food in the Last Year: Never true   Transportation Needs: No Transportation Needs   • Lack of Transportation (Medical): No   • Lack of Transportation (Non-Medical):  No   Physical Activity: Not on file   Stress: Not on file   Social Connections: Not on file   Intimate Partner Violence: Not on file   Housing Stability: Low Risk    • Unable to Pay for Housing in the Last Year: No   • Number of Places Lived in the Last Year: 1   • Unstable Housing in the Last Year: No     Past Medical History:   Diagnosis Date   • Anemia    • Aortic aneurysm ([facility])    • BPH (benign prostatic hyperplasia)    • CAD (coronary artery disease)    • Chronic kidney disease    • COPD (chronic obstructive pulmonary disease) ([facility] )    • Diabetes mellitus ([facility] )    • Dyslipidemia    • Flu 12/2022   • GERD (gastroesophageal reflux disease)    • Graves disease    • Hypertension    • Iron deficiency anemia    • Lactic acidosis    • Lung cancer ([facility])    • Lyme disease    • Neuropathy    • Pancytopenia (H[facility])    • Pneumonia 12/2022   • Prostate cancer ([facility] )    • Rheumatoid arthritis ([place] [address] )      Family History   Problem Relation Age of Onset   • No Known Problems Mother    • Coronary artery disease Father    • No Known Problems Sister    • No Known Problems Brother    • No Known Problems Maternal Grandmother    • No Known Problems Maternal Grandfather    • No Known Problems Paternal Grandmother    • No Known Problems Paternal Grandfather    • No Known Problems Maternal Aunt    • No Known Problems Maternal Uncle    • No Known Problems Paternal Aunt    • No Known Problems Paternal Uncle    • No Known Problems Cousin      Past Surgical History:   Procedure Laterality Date   • BALLOON ANGIOPLASTY, ARTERY  1996   • CATARACT EXTRACTION, BILATERAL  2019   • CLAVICLE SURGERY     • TOE AMPUTATION Left 11/20/2018    Procedure: AMPUTATION GREAT TOE;  Surgeon: Brigette Tinsley DPM;  Location: [facility];  Service: Podiatry       Current Outpatient Medications:   • albuterol (PROVENTIL HFA,VENTOLIN HFA) 90 mcg/act inhaler, INHALE 2 PUFFS BY MOUTH EVERY 4 HOURS AS NEEDED FOR WHEEZE, Disp: 8 g, Rfl: 1  •  amLODIPine (NORVASC) 5 mg tablet, Take 1 tablet (5 mg total) by mouth daily, Disp: 90 tablet, Rfl: 0  •  ascorbic acid (VITAMIN C) 250 mg tablet, Take 250 mg by mouth 2 (two) times a day, Disp: , Rfl:   •  aspirin (ECOTRIN LOW STRENGTH) 81 mg EC tablet, 1 tab(s), Disp: , Rfl:   •  atenolol (TENORMIN) 25 mg tablet, Take 1 tablet (25 mg total) by mouth daily, Disp: 90 tablet, Rfl: 0  •  colestipol (COLESTID) 1 g tablet, Take 2 tablets (2 g total) by mouth daily Before bedtime, Disp: 60 tablet, Rfl: 0  •  dapagliflozin (Farxiga) 5 MG TABS, Take 1 tablet (5 mg total) by mouth daily, Disp: 90 tablet, Rfl: 2  •  fenofibrate (TRICOR) 145 mg tablet, Take 1 tablet (145 mg total) by mouth daily, Disp: 90 tablet, Rfl: 0  •  ferrous sulfate 325 (65 Fe) mg tablet, Take 325 mg by mouth 2 (two) times a day with meals, Disp: , Rfl:   •  gabapentin (NEURONTIN) 600 MG tablet, , Disp: , Rfl:   •  glimepiride (AMARYL) 1 mg tablet, Take 1 tablet (1 mg total) by mouth daily with breakfast, Disp: 90 tablet, Rfl: 0  •  metFORMIN (GLUCOPHAGE) 1000 MG tablet, Take 1 tablet (1,000 mg total) by mouth 2 (two) times a day with meals for 5000 doses, Disp: 180 tablet, Rfl: 0  •  methimazole (TAPAZOLE) 5 mg tablet, Take 0 5 tablets (2 5 mg total) by mouth in the morning, Disp: 45 tablet, Rfl: 2  •  omeprazole (PriLOSEC) 40 MG capsule, Take 1 capsule (40 mg total) by mouth 2 (two) times a day, Disp: 180 capsule, Rfl: 0  •  riTUXimab (Rituxan) injection, , Disp: , Rfl:   •  rosuvastatin (CRESTOR) 40 MG tablet, Take 1 tablet (40 mg total) by mouth every evening, Disp: 90 tablet, Rfl: 0  •  Tresiba FlexTouch 100 units/mL injection pen, Inject up to 80 units daily, Disp: 27 mL, Rfl: 4  •  B-D ULTRAFINE III SHORT PEN 31G X 8 MM MISC, Inject under the skin in the morning, Disp: 30 each, Rfl: 0  •  Lancets (accu-chek soft touch) lancets, bs check qid, Disp: 100 each, Rfl: 2  •  mupirocin (BACTROBAN) 2 % ointment, PLEASE SEE ATTACHED FOR DETAILED DIRECTIONS, Disp: , Rfl:   •  OneTouch Verio test strip, CHECK BS AT MORNING FASTING , AND AFTER A MEAL, Disp: 100 strip, Rfl: 3  •  umeclidinium-vilanterol (Anoro Ellipta) 62 5-25 mcg/actuation inhaler, Inhale 1 puff daily, Disp: 60 each, Rfl: 0    Allergies   Allergen Reactions   • Ace Inhibitors Swelling   • Angiotensin Receptor Blockers Other (See Comments)     Elevated K+   • Clindamycin Diarrhea and Nausea Only   • Medical Tape Blisters   • Plaquenil [Hydroxychloroquine] Swelling     Tongue swelling          Lab Review   No visits with results within 2 Month(s) from this visit  Latest known visit with results is:   Hospital Outpatient Visit on 03/22/2023   Component Date Value   • POC Glucose 03/22/2023 142 (H)    • Case Report 03/22/2023                      Value:Surgical Pathology Report                         Case: [id number]                                   Authorizing Provider:  Kevin Palmer MD   Collected:           03/22/2023 6247              Ordering Location:      [facility]       Received:            03/22/2023 [facility] Endoscopy                                                             Pathologist:           Susan Herzog MD                                                         Specimens:   A) - Polyp, Colorectal, ascending x4                                                                B) - Colon, random colon                                                                  • Final Diagnosis 03/22/2023                      Value: This result contains rich text formatting which cannot be displayed here  • Additional Information 03/22/2023                      Value: This result contains rich text formatting which cannot be displayed here     • Synoptic Checklist 03/22/2023 "Value:                            COLON/RECTUM POLYP FORM - GI - A                                                                                     :    Adenoma(s)     • Gross Description 03/22/2023                      Value: This result contains rich text formatting which cannot be displayed here  • Clinical Information 03/22/2023                      Value:Cold snare polypectomy        Imaging: No results found  Objective:     Physical Exam  Constitutional:       General: He is not in acute distress  Appearance: He is well-developed  He is not ill-appearing or toxic-appearing  HENT:      Head: Normocephalic and atraumatic  Cardiovascular:      Rate and Rhythm: Normal rate and regular rhythm  Heart sounds: Normal heart sounds  Pulmonary:      Effort: Pulmonary effort is normal       Breath sounds: Normal breath sounds  Musculoskeletal:         General: No tenderness  Normal range of motion  Cervical back: Normal range of motion and neck supple  Skin:     General: Skin is warm and dry  Neurological:      Mental Status: He is alert and oriented to person, place, and time  Deep Tendon Reflexes: Reflexes are normal and symmetric  Psychiatric:         Behavior: Behavior normal          Thought Content: Thought content normal          Judgment: Judgment normal            There are no Patient Instructions on file for this visit  JEROME Black    Portions of the record may have been created with voice recognition software  Occasional wrong word or \"sound a like\" substitutions may have occurred due to the inherent limitations of voice recognition software  Read the chart carefully and recognize, using context, where substitutions have occurred    "

## 2023-05-22 NOTE — ASSESSMENT & PLAN NOTE
Continue current care endocrinology  Lab Results   Component Value Date    HGBA1C 7 1 (H) 03/07/2023

## 2023-05-23 DIAGNOSIS — J44.9 CHRONIC OBSTRUCTIVE PULMONARY DISEASE, UNSPECIFIED COPD TYPE (HCC): ICD-10-CM

## 2023-05-23 RX ORDER — ALBUTEROL SULFATE 90 UG/1
AEROSOL, METERED RESPIRATORY (INHALATION)
Qty: 8.5 G | Refills: 1 | Status: SHIPPED | OUTPATIENT
Start: 2023-05-23

## 2023-05-24 ENCOUNTER — APPOINTMENT (OUTPATIENT)
Dept: RADIOLOGY | Facility: CLINIC | Age: 73
End: 2023-05-24

## 2023-06-01 DIAGNOSIS — M17.9 OSTEOARTHRITIS OF KNEE, UNSPECIFIED LATERALITY, UNSPECIFIED OSTEOARTHRITIS TYPE: Primary | ICD-10-CM

## 2023-06-01 RX ORDER — MELOXICAM 7.5 MG/1
7.5 TABLET ORAL DAILY PRN
Qty: 30 TABLET | Refills: 5 | Status: SHIPPED | OUTPATIENT
Start: 2023-06-01

## 2023-06-13 DIAGNOSIS — K59.1 FUNCTIONAL DIARRHEA: ICD-10-CM

## 2023-06-13 RX ORDER — MONTELUKAST SODIUM 4 MG/1
2 TABLET, CHEWABLE ORAL DAILY
Qty: 180 TABLET | Refills: 1 | Status: SHIPPED | OUTPATIENT
Start: 2023-06-13 | End: 2023-07-13

## 2023-06-29 DIAGNOSIS — E11.22 TYPE 2 DIABETES MELLITUS WITH STAGE 3A CHRONIC KIDNEY DISEASE, WITH LONG-TERM CURRENT USE OF INSULIN (HCC): ICD-10-CM

## 2023-06-29 DIAGNOSIS — N18.31 TYPE 2 DIABETES MELLITUS WITH STAGE 3A CHRONIC KIDNEY DISEASE, WITH LONG-TERM CURRENT USE OF INSULIN (HCC): ICD-10-CM

## 2023-06-29 DIAGNOSIS — Z79.4 TYPE 2 DIABETES MELLITUS WITH STAGE 3A CHRONIC KIDNEY DISEASE, WITH LONG-TERM CURRENT USE OF INSULIN (HCC): ICD-10-CM

## 2023-06-29 DIAGNOSIS — J44.9 CHRONIC OBSTRUCTIVE PULMONARY DISEASE, UNSPECIFIED COPD TYPE (HCC): ICD-10-CM

## 2023-06-29 RX ORDER — UMECLIDINIUM BROMIDE AND VILANTEROL TRIFENATATE 62.5; 25 UG/1; UG/1
1 POWDER RESPIRATORY (INHALATION) DAILY
Qty: 60 EACH | Refills: 0 | Status: SHIPPED | OUTPATIENT
Start: 2023-06-29 | End: 2023-09-27

## 2023-07-03 DIAGNOSIS — I25.10 CAD (CORONARY ARTERY DISEASE): ICD-10-CM

## 2023-07-03 RX ORDER — ATENOLOL 25 MG/1
25 TABLET ORAL DAILY
Qty: 90 TABLET | Refills: 0 | Status: SHIPPED | OUTPATIENT
Start: 2023-07-03

## 2023-07-17 DIAGNOSIS — E11.22 TYPE 2 DIABETES MELLITUS WITH STAGE 3A CHRONIC KIDNEY DISEASE, WITH LONG-TERM CURRENT USE OF INSULIN (HCC): ICD-10-CM

## 2023-07-17 DIAGNOSIS — Z79.4 TYPE 2 DIABETES MELLITUS WITH STAGE 3A CHRONIC KIDNEY DISEASE, WITH LONG-TERM CURRENT USE OF INSULIN (HCC): ICD-10-CM

## 2023-07-17 DIAGNOSIS — E78.5 HYPERLIPIDEMIA, UNSPECIFIED HYPERLIPIDEMIA TYPE: ICD-10-CM

## 2023-07-17 DIAGNOSIS — N18.31 TYPE 2 DIABETES MELLITUS WITH STAGE 3A CHRONIC KIDNEY DISEASE, WITH LONG-TERM CURRENT USE OF INSULIN (HCC): ICD-10-CM

## 2023-07-17 RX ORDER — PEN NEEDLE, DIABETIC 31 GX5/16"
NEEDLE, DISPOSABLE MISCELLANEOUS DAILY
Qty: 30 EACH | Refills: 0 | Status: SHIPPED | OUTPATIENT
Start: 2023-07-17

## 2023-07-18 DIAGNOSIS — I25.10 CAD (CORONARY ARTERY DISEASE): ICD-10-CM

## 2023-07-18 DIAGNOSIS — M05.9 RHEUMATOID ARTHRITIS WITH POSITIVE RHEUMATOID FACTOR, INVOLVING UNSPECIFIED SITE (HCC): Primary | ICD-10-CM

## 2023-07-18 DIAGNOSIS — E78.5 HYPERLIPIDEMIA, UNSPECIFIED HYPERLIPIDEMIA TYPE: ICD-10-CM

## 2023-07-18 RX ORDER — GABAPENTIN 600 MG/1
TABLET ORAL
Refills: 0 | Status: CANCELLED | OUTPATIENT
Start: 2023-07-18

## 2023-07-18 RX ORDER — FENOFIBRATE 145 MG/1
145 TABLET, COATED ORAL DAILY
Qty: 90 TABLET | Refills: 0 | Status: SHIPPED | OUTPATIENT
Start: 2023-07-18

## 2023-07-18 RX ORDER — FENOFIBRATE 145 MG/1
TABLET, COATED ORAL
Qty: 90 TABLET | Refills: 0 | Status: SHIPPED | OUTPATIENT
Start: 2023-07-18

## 2023-07-18 RX ORDER — ATENOLOL 25 MG/1
25 TABLET ORAL DAILY
Qty: 90 TABLET | Refills: 0 | Status: SHIPPED | OUTPATIENT
Start: 2023-07-18

## 2023-07-19 RX ORDER — GABAPENTIN 600 MG/1
TABLET ORAL
Status: CANCELLED | OUTPATIENT
Start: 2023-07-19

## 2023-07-19 NOTE — TELEPHONE ENCOUNTER
Pt called and left message on our Medline. Requested refill on his gabapentin 600 mg medication.  Wants it sent to [facility] in [address]

## 2023-07-20 ENCOUNTER — APPOINTMENT (OUTPATIENT)
Dept: LAB | Facility: CLINIC | Age: 73
End: 2023-07-20
Payer: COMMERCIAL

## 2023-07-20 DIAGNOSIS — E05.00 GRAVES DISEASE: ICD-10-CM

## 2023-07-20 DIAGNOSIS — E11.65 TYPE 2 DIABETES MELLITUS WITH HYPERGLYCEMIA, WITH LONG-TERM CURRENT USE OF INSULIN (HCC): ICD-10-CM

## 2023-07-20 DIAGNOSIS — E05.00 BASEDOW'S DISEASE: Primary | ICD-10-CM

## 2023-07-20 DIAGNOSIS — Z79.4 TYPE 2 DIABETES MELLITUS WITH HYPERGLYCEMIA, WITH LONG-TERM CURRENT USE OF INSULIN (HCC): ICD-10-CM

## 2023-07-20 LAB
EST. AVERAGE GLUCOSE BLD GHB EST-MCNC: 183 MG/DL
HBA1C MFR BLD: 8 %
TSH SERPL DL<=0.05 MIU/L-ACNC: 1.64 UIU/ML (ref 0.45–4.5)

## 2023-07-20 PROCEDURE — 83520 IMMUNOASSAY QUANT NOS NONAB: CPT

## 2023-07-20 PROCEDURE — 83036 HEMOGLOBIN GLYCOSYLATED A1C: CPT

## 2023-07-20 PROCEDURE — 36415 COLL VENOUS BLD VENIPUNCTURE: CPT

## 2023-07-20 PROCEDURE — 84443 ASSAY THYROID STIM HORMONE: CPT

## 2023-07-20 RX ORDER — GABAPENTIN 600 MG/1
TABLET ORAL
Status: CANCELLED | OUTPATIENT
Start: 2023-07-20

## 2023-07-20 NOTE — TELEPHONE ENCOUNTER
Patient called in and left a message stating that, "he has been calling in for 4 days now. His feet are ready to fall off they hurt so bad." He takes the medication 3 times a day and he would like it called into [facility] in either [facility] or [place].     Please advise, thank you!

## 2023-07-21 ENCOUNTER — TELEPHONE (OUTPATIENT)
Dept: FAMILY MEDICINE CLINIC | Facility: CLINIC | Age: 73
End: 2023-07-21

## 2023-07-21 DIAGNOSIS — M06.9 RHEUMATOID ARTHRITIS, INVOLVING UNSPECIFIED SITE, UNSPECIFIED WHETHER RHEUMATOID FACTOR PRESENT (HCC): Primary | Chronic | ICD-10-CM

## 2023-07-21 DIAGNOSIS — G62.9 NEUROPATHY: Chronic | ICD-10-CM

## 2023-07-21 RX ORDER — GABAPENTIN 600 MG/1
600 TABLET ORAL 3 TIMES DAILY
Qty: 90 TABLET | Refills: 1 | Status: SHIPPED | OUTPATIENT
Start: 2023-07-21 | End: 2023-07-24 | Stop reason: SDUPTHER

## 2023-07-21 NOTE — TELEPHONE ENCOUNTER
Pt has called again asking for a refill on his gabapentin medication. Addressed a message yesterday to Sameera Larson that a Rosie Aquino MD had filled this for him the last time, a pain doctor. Not sure if we are still the ones to refill this or this doctor would be the ones to refill the gabapentin for him. Please advise, thank you!

## 2023-07-22 LAB — TSH RECEP AB SER-ACNC: 12.7 IU/L (ref 0–1.75)

## 2023-07-23 DIAGNOSIS — J44.9 CHRONIC OBSTRUCTIVE PULMONARY DISEASE, UNSPECIFIED COPD TYPE (HCC): ICD-10-CM

## 2023-07-23 RX ORDER — ALBUTEROL SULFATE 90 UG/1
AEROSOL, METERED RESPIRATORY (INHALATION)
Qty: 8.5 G | Refills: 1 | Status: SHIPPED | OUTPATIENT
Start: 2023-07-23

## 2023-07-24 ENCOUNTER — OFFICE VISIT (OUTPATIENT)
Age: 73
End: 2023-07-24
Payer: COMMERCIAL

## 2023-07-24 VITALS
WEIGHT: 190.6 LBS | SYSTOLIC BLOOD PRESSURE: 124 MMHG | HEIGHT: 70 IN | DIASTOLIC BLOOD PRESSURE: 70 MMHG | BODY MASS INDEX: 27.29 KG/M2

## 2023-07-24 DIAGNOSIS — M06.9 RHEUMATOID ARTHRITIS, INVOLVING UNSPECIFIED SITE, UNSPECIFIED WHETHER RHEUMATOID FACTOR PRESENT (HCC): Chronic | ICD-10-CM

## 2023-07-24 DIAGNOSIS — Z79.4 TYPE 2 DIABETES MELLITUS WITH HYPERGLYCEMIA, WITH LONG-TERM CURRENT USE OF INSULIN (HCC): Primary | ICD-10-CM

## 2023-07-24 DIAGNOSIS — E11.65 TYPE 2 DIABETES MELLITUS WITH HYPERGLYCEMIA, WITH LONG-TERM CURRENT USE OF INSULIN (HCC): Primary | ICD-10-CM

## 2023-07-24 DIAGNOSIS — E05.00 GRAVES DISEASE: ICD-10-CM

## 2023-07-24 DIAGNOSIS — G62.9 NEUROPATHY: Chronic | ICD-10-CM

## 2023-07-24 PROCEDURE — 99214 OFFICE O/P EST MOD 30 MIN: CPT | Performed by: INTERNAL MEDICINE

## 2023-07-24 RX ORDER — GABAPENTIN 600 MG/1
600 TABLET ORAL 3 TIMES DAILY
Qty: 90 TABLET | Refills: 1 | Status: SHIPPED | OUTPATIENT
Start: 2023-07-24 | End: 2023-08-10 | Stop reason: SDUPTHER

## 2023-07-24 RX ORDER — TAMSULOSIN HYDROCHLORIDE 0.4 MG/1
0.4 CAPSULE ORAL EVERY EVENING
COMMUNITY
Start: 2023-07-18

## 2023-07-24 NOTE — PROGRESS NOTES
Salvador Cordero 72 y.o. male MRN: [id number]    Encounter: [id number]      Assessment/Plan     Assessment: This is a 72y.o.-year-old male for follow up    1-T2DM: he has been on steroid shots for his RA and also off farxiga ( logestic reasons)   His A1c has increased to 8%. He monitors his BS x1/d . Now he is back on farxiga. See plan    2-Graves disease: euthyroid with nl TSH but his TRab level indicates better be ket on low dose methimazole. .    Plan:  amaryl 1 mg/d+metformin 2000 mg/d+farxiga 5 mg/d+ tresiba 40 U at bed time  Methimazole 2.5 mg/d  Follow up with other doctors  RTV in 3 mo with A1c    CC: Diabetes    History of Present Illness     HPI:  T2DM with no pancreatitis , Graves disease, dyslipidemia, HTN, RA     Review of Systems   Constitutional: Negative for appetite change, fatigue and unexpected weight change. HENT: Negative for trouble swallowing. Eyes: Negative for visual disturbance. Respiratory: Negative for cough, chest tightness and shortness of breath. Cardiovascular: Negative for chest pain, palpitations and leg swelling. Gastrointestinal: Negative for abdominal distention, abdominal pain, blood in stool, constipation, diarrhea, nausea and vomiting. Endocrine: Negative for polyphagia and polyuria. Genitourinary: Negative for dysuria, frequency and genital sores. Musculoskeletal: Negative for arthralgias. Skin: Negative for rash and wound. Neurological: Negative for weakness, numbness and headaches. Hematological: Does not bruise/bleed easily. Psychiatric/Behavioral: Negative for confusion.        Historical Information   Past Medical History:   Diagnosis Date   • Anemia    • Aortic aneurysm (HCC)    • BPH (benign prostatic hyperplasia)    • CAD (coronary artery disease)    • Chronic kidney disease    • COPD (chronic obstructive pulmonary disease) ([facility])    • Diabetes mellitus ([address])    • Dyslipidemia    • Flu 12/2022   • GERD (gastroesophageal reflux disease)    • Graves disease    • Hypertension    • Iron deficiency anemia    • Lactic acidosis    • Lung cancer (HCC)    • Lyme disease    • Neuropathy    • Pancytopenia (HCC)    • Pneumonia 2022   • Prostate cancer ([address])    • Rheumatoid arthritis ([address])      Past Surgical History:   Procedure Laterality Date   • BALLOON ANGIOPLASTY, ARTERY     • CATARACT EXTRACTION, BILATERAL     • CLAVICLE SURGERY     • TOE AMPUTATION Left 2018    Procedure: AMPUTATION GREAT TOE;  Surgeon: Herbert Perdue DPM;  Location: [facility];  Service: Podiatry     Social History   Social History     Substance and Sexual Activity   Alcohol Use No     Social History     Substance and Sexual Activity   Drug Use No     Social History     Tobacco Use   Smoking Status Every Day   • Packs/day: 0.50   • Years: 50.00   • Total pack years: 25.00   • Types: Cigarettes   • Last attempt to quit: 2022   • Years since quittin.5   Smokeless Tobacco Never     Family History:   Family History   Problem Relation Age of Onset   • No Known Problems Mother    • Coronary artery disease Father    • No Known Problems Sister    • No Known Problems Brother    • No Known Problems Maternal Grandmother    • No Known Problems Maternal Grandfather    • No Known Problems Paternal Grandmother    • No Known Problems Paternal Grandfather    • No Known Problems Maternal Aunt    • No Known Problems Maternal Uncle    • No Known Problems Paternal Aunt    • No Known Problems Paternal Uncle    • No Known Problems Cousin        Meds/Allergies   Current Outpatient Medications   Medication Sig Dispense Refill   • albuterol (PROVENTIL HFA,VENTOLIN HFA) 90 mcg/act inhaler TAKE 2 PUFFS BY MOUTH EVERY 4 HOURS AS NEEDED FOR WHEEZE 8.5 g 1   • amLODIPine (NORVASC) 5 mg tablet Take 1 tablet (5 mg total) by mouth daily 90 tablet 0   • Anoro Ellipta 62.5-25 MCG/ACT inhaler INHALE 1 PUFF DAILY 60 each 0   • ascorbic acid (VITAMIN C) 250 mg tablet Take 250 mg by mouth 2 (two) times a day     • aspirin (ECOTRIN LOW STRENGTH) 81 mg EC tablet 1 tab(s)     • atenolol (TENORMIN) 25 mg tablet Take 1 tablet (25 mg total) by mouth daily 90 tablet 0   • B-D ULTRAFINE III SHORT PEN 31G X 8 MM MISC Inject under the skin in the morning 30 each 0   • colestipol (COLESTID) 1 g tablet TAKE 2 TABLETS (2 G TOTAL) BY MOUTH DAILY BEFORE BEDTIME 180 tablet 1   • dapagliflozin (Farxiga) 5 MG TABS Take 1 tablet (5 mg total) by mouth daily 90 tablet 2   • fenofibrate (TRICOR) 145 mg tablet Take 1 tablet (145 mg total) by mouth daily 90 tablet 0   • ferrous sulfate 325 (65 Fe) mg tablet Take 325 mg by mouth 2 (two) times a day with meals     • gabapentin (NEURONTIN) 600 MG tablet Take 1 tablet (600 mg total) by mouth 3 (three) times a day 90 tablet 1   • glimepiride (AMARYL) 1 mg tablet Take 1 tablet (1 mg total) by mouth daily with breakfast 90 tablet 0   • Lancets (accu-chek soft touch) lancets bs check qid 100 each 2   • meloxicam (Mobic) 7.5 mg tablet Take 1 tablet (7.5 mg total) by mouth daily as needed for moderate pain 30 tablet 5   • metFORMIN (GLUCOPHAGE) 1000 MG tablet TAKE 1 TABLET (1,000 MG TOTAL) BY MOUTH 2 (TWO) TIMES A DAY WITH MEALS FOR 5000 DOSES 180 tablet 0   • methimazole (TAPAZOLE) 5 mg tablet Take 0.5 tablets (2.5 mg total) by mouth in the morning 45 tablet 2   • omeprazole (PriLOSEC) 40 MG capsule Take 1 capsule (40 mg total) by mouth 2 (two) times a day 180 capsule 0   • OneTouch Verio test strip CHECK BS AT MORNING FASTING , AND AFTER A MEAL 100 strip 3   • riTUXimab (Rituxan) injection      • rosuvastatin (CRESTOR) 40 MG tablet Take 1 tablet (40 mg total) by mouth every evening 90 tablet 0   • tamsulosin (FLOMAX) 0.4 mg Take 0.4 mg by mouth every evening     • Tresiba FlexTouch 100 units/mL injection pen Inject up to 80 units daily 27 mL 4   • fenofibrate (TRICOR) 145 mg tablet TAKE 1 TABLET BY MOUTH EVERY DAY 90 tablet 0   • mupirocin (BACTROBAN) 2 % ointment PLEASE SEE ATTACHED FOR DETAILED DIRECTIONS (Patient not taking: Reported on 7/24/2023)       No current facility-administered medications for this visit. Allergies   Allergen Reactions   • Ace Inhibitors Swelling   • Angiotensin Receptor Blockers Other (See Comments)     Elevated K+   • Clindamycin Diarrhea and Nausea Only   • Medical Tape Blisters   • Plaquenil [Hydroxychloroquine] Swelling     Tongue swelling       Objective   Vitals: Blood pressure 124/70, height 5' 10" (1.778 m), weight 86.5 kg (190 lb 9.6 oz). Physical Exam  Vitals reviewed. Constitutional:       Appearance: He is obese. HENT:      Head: Normocephalic and atraumatic. Eyes:      Extraocular Movements: Extraocular movements intact. Neck:      Vascular: No carotid bruit. Cardiovascular:      Rate and Rhythm: Normal rate and regular rhythm. Pulses: Normal pulses. Dorsalis pedis pulses are 2+ on the right side and 2+ on the left side. Heart sounds: No murmur heard. Friction rub present. No gallop. Pulmonary:      Effort: Pulmonary effort is normal.      Breath sounds: Stridor present. Rhonchi present. No wheezing or rales. Abdominal:      General: Bowel sounds are normal.      Palpations: Abdomen is soft. Musculoskeletal:         General: Swelling and deformity present. Cervical back: No tenderness. Right lower leg: No edema. Left lower leg: No edema. Right foot: No deformity. Left foot: Deformity present. Feet:      Right foot:      Protective Sensation: 8 sites tested. 8 sites sensed. Toenail Condition: Right toenails are normal.      Left foot:      Protective Sensation: 8 sites tested. 8 sites sensed. Skin integrity: No dry skin. Toenail Condition: Left toenails are normal.   Lymphadenopathy:      Cervical: No cervical adenopathy. Skin:     General: Skin is warm. Coloration: Skin is not jaundiced. Findings: No rash. Neurological:      General: No focal deficit present. Mental Status: He is alert and oriented to person, place, and time. Psychiatric:         Mood and Affect: Mood normal.         Behavior: Behavior normal.         The history was obtained from the review of the chart, patient.     Lab Results:   Lab Results   Component Value Date/Time    Hemoglobin A1C 8.0 (H) 07/20/2023 12:41 PM    Hemoglobin A1C 7.1 (H) 03/07/2023 09:21 AM    Hemoglobin A1C 6.6 (H) 11/14/2022 08:21 AM    WBC 6.28 01/16/2023 12:01 PM    WBC 14.84 (H) 12/23/2022 05:55 AM    WBC 11.71 (H) 12/22/2022 04:49 AM    Hemoglobin 12.2 01/16/2023 12:01 PM    Hemoglobin 12.4 12/23/2022 05:55 AM    Hemoglobin 12.0 12/22/2022 04:49 AM    Hematocrit 38.8 01/16/2023 12:01 PM    Hematocrit 39.1 12/23/2022 05:55 AM    Hematocrit 37.5 12/22/2022 04:49 AM    MCV 86 01/16/2023 12:01 PM    MCV 85 12/23/2022 05:55 AM    MCV 83 12/22/2022 04:49 AM    Platelets 035 16/02/1291 12:01 PM    Platelets 651 (L) 01/03/3002 05:55 AM    Platelets 674 (L) 92/26/6805 04:49 AM    BUN 29 (H) 03/07/2023 09:21 AM    BUN 24 01/16/2023 12:01 PM    BUN 34 (H) 12/23/2022 05:55 AM    Potassium 4.2 03/07/2023 09:21 AM    Potassium 4.2 01/16/2023 12:01 PM    Potassium 4.6 12/23/2022 05:55 AM    Chloride 107 03/07/2023 09:21 AM    Chloride 107 01/16/2023 12:01 PM    Chloride 105 12/23/2022 05:55 AM    CO2 25 03/07/2023 09:21 AM    CO2 27 01/16/2023 12:01 PM    CO2 25 12/23/2022 05:55 AM    Creatinine 1.26 03/07/2023 09:21 AM    Creatinine 1.35 (H) 01/16/2023 12:01 PM    Creatinine 1.33 (H) 12/23/2022 05:55 AM    AST 12 01/16/2023 12:01 PM    AST 36 12/22/2022 04:49 AM    AST 39 12/21/2022 07:29 PM    ALT 16 01/16/2023 12:01 PM    ALT 15 12/22/2022 04:49 AM    ALT 17 12/21/2022 07:29 PM    Total Protein 7.1 01/16/2023 12:01 PM    Total Protein 7.4 12/22/2022 04:49 AM    Total Protein 7.7 12/21/2022 07:29 PM    Albumin 2.9 (L) 03/07/2023 09:21 AM    Albumin 3.0 (L) 01/16/2023 12:01 PM    Albumin 2.9 (L) 12/22/2022 04:49 AM    HDL, Direct 27 (L) 03/07/2023 09:21 AM    Triglycerides 106 03/07/2023 09:21 AM           Imaging Studies: I have personally reviewed pertinent reports. Portions of the record may have been created with voice recognition software. Occasional wrong word or "sound a like" substitutions may have occurred due to the inherent limitations of voice recognition software. Read the chart carefully and recognize, using context, where substitutions have occurred.

## 2023-07-29 DIAGNOSIS — I10 PRIMARY HYPERTENSION: ICD-10-CM

## 2023-07-29 RX ORDER — AMLODIPINE BESYLATE 5 MG/1
5 TABLET ORAL DAILY
Qty: 90 TABLET | Refills: 0 | Status: SHIPPED | OUTPATIENT
Start: 2023-07-29

## 2023-07-31 DIAGNOSIS — E11.9 TYPE 2 DIABETES MELLITUS WITHOUT COMPLICATION, WITH LONG-TERM CURRENT USE OF INSULIN (HCC): ICD-10-CM

## 2023-07-31 DIAGNOSIS — Z79.4 TYPE 2 DIABETES MELLITUS WITHOUT COMPLICATION, WITH LONG-TERM CURRENT USE OF INSULIN (HCC): ICD-10-CM

## 2023-07-31 RX ORDER — INSULIN DEGLUDEC INJECTION 100 U/ML
INJECTION, SOLUTION SUBCUTANEOUS
Qty: 27 ML | Refills: 4 | Status: SHIPPED | OUTPATIENT
Start: 2023-07-31

## 2023-08-02 ENCOUNTER — OFFICE VISIT (OUTPATIENT)
Dept: GASTROENTEROLOGY | Facility: CLINIC | Age: 73
End: 2023-08-02
Payer: COMMERCIAL

## 2023-08-02 VITALS
HEIGHT: 70 IN | DIASTOLIC BLOOD PRESSURE: 78 MMHG | HEART RATE: 88 BPM | WEIGHT: 191.2 LBS | BODY MASS INDEX: 27.37 KG/M2 | SYSTOLIC BLOOD PRESSURE: 122 MMHG

## 2023-08-02 DIAGNOSIS — K21.9 GASTROESOPHAGEAL REFLUX DISEASE WITHOUT ESOPHAGITIS: ICD-10-CM

## 2023-08-02 DIAGNOSIS — J44.9 CHRONIC OBSTRUCTIVE PULMONARY DISEASE, UNSPECIFIED COPD TYPE (HCC): ICD-10-CM

## 2023-08-02 DIAGNOSIS — K59.1 FUNCTIONAL DIARRHEA: ICD-10-CM

## 2023-08-02 PROCEDURE — 99213 OFFICE O/P EST LOW 20 MIN: CPT | Performed by: PHYSICIAN ASSISTANT

## 2023-08-02 RX ORDER — MONTELUKAST SODIUM 4 MG/1
3 TABLET, CHEWABLE ORAL 2 TIMES DAILY
Qty: 180 TABLET | Refills: 3 | Status: SHIPPED | OUTPATIENT
Start: 2023-08-02 | End: 2023-09-01

## 2023-08-02 NOTE — PROGRESS NOTES
[address] [place] [address] [facility]  Thalia Mckeon 72 y.o. male MRN: [id number]       CC: Follow-up, medication review    HPI: Jackie Mariee is a 79-year-old male with history of type 2 diabetes, Graves' disease, COPD, previous prostate cancer, BPH, CKD, and rheumatoid arthritis on Rituxan. Patient is here to follow-up for medication review. He initially saw Dr. Steph Wiley in January for fecal incontinence/fecal smearing. Patient did not enjoy the consistency of Questran. Instead, I had put him on colestipol 2 pills prior to bedtime. He reports that he still having issues with fecal smearing especially at night. He denies abdominal pain, diarrhea or unintentional weight loss. Patient's colonoscopy was performed in March 2023 by Dr. Steph Wiley. Diverticulosis noted, colon mucosa appeared normal, and 4 colon polyps removed. These biopsied as tubular adenoma without dysplasia. Random colon biopsy was negative for microscopic colitis. He denies family history of colon cancer    Review of Systems:    CONSTITUTIONAL: Denies any fever, chills, or rigors. Good appetite, and no recent weight loss. HEENT: No earache or tinnitus. Denies hearing loss or visual disturbances. CARDIOVASCULAR: No chest pain or palpitations. RESPIRATORY: Denies any cough, hemoptysis, shortness of breath or dyspnea on exertion. GASTROINTESTINAL: As noted in the History of Present Illness. GENITOURINARY: No problems with urination. Denies any hematuria or dysuria. NEUROLOGIC: No dizziness or vertigo, denies headaches. MUSCULOSKELETAL: Denies any muscle or joint pain. SKIN: Denies skin rashes or itching. ENDOCRINE: Denies excessive thirst. Denies intolerance to heat or cold. PSYCHOSOCIAL: Denies depression or anxiety. Denies any recent memory loss.        Current Outpatient Medications   Medication Sig Dispense Refill   • albuterol (PROVENTIL HFA,VENTOLIN HFA) 90 mcg/act inhaler TAKE 2 PUFFS BY MOUTH EVERY 4 HOURS AS NEEDED FOR WHEEZE 8.5 g 1 • amLODIPine (NORVASC) 5 mg tablet TAKE 1 TABLET (5 MG TOTAL) BY MOUTH DAILY.  90 tablet 0   • Anoro Ellipta 62.5-25 MCG/ACT inhaler INHALE 1 PUFF DAILY 60 each 0   • ascorbic acid (VITAMIN C) 250 mg tablet Take 250 mg by mouth 2 (two) times a day     • aspirin (ECOTRIN LOW STRENGTH) 81 mg EC tablet 1 tab(s)     • atenolol (TENORMIN) 25 mg tablet Take 1 tablet (25 mg total) by mouth daily 90 tablet 0   • B-D ULTRAFINE III SHORT PEN 31G X 8 MM MISC Inject under the skin in the morning 30 each 0   • colestipol (COLESTID) 1 g tablet Take 3 tablets (3 g total) by mouth 2 (two) times a day 180 tablet 3   • dapagliflozin (Farxiga) 5 MG TABS Take 1 tablet (5 mg total) by mouth daily 90 tablet 2   • fenofibrate (TRICOR) 145 mg tablet Take 1 tablet (145 mg total) by mouth daily 90 tablet 0   • ferrous sulfate 325 (65 Fe) mg tablet Take 325 mg by mouth 2 (two) times a day with meals     • gabapentin (NEURONTIN) 600 MG tablet Take 1 tablet (600 mg total) by mouth 3 (three) times a day 90 tablet 1   • glimepiride (AMARYL) 1 mg tablet Take 1 tablet (1 mg total) by mouth daily with breakfast 90 tablet 0   • Lancets (accu-chek soft touch) lancets bs check qid 100 each 2   • meloxicam (Mobic) 7.5 mg tablet Take 1 tablet (7.5 mg total) by mouth daily as needed for moderate pain 30 tablet 5   • metFORMIN (GLUCOPHAGE) 1000 MG tablet TAKE 1 TABLET (1,000 MG TOTAL) BY MOUTH 2 (TWO) TIMES A DAY WITH MEALS FOR 5000 DOSES 180 tablet 0   • omeprazole (PriLOSEC) 40 MG capsule Take 1 capsule (40 mg total) by mouth 2 (two) times a day 180 capsule 0   • OneTouch Verio test strip CHECK BS AT MORNING FASTING , AND AFTER A MEAL 100 strip 3   • riTUXimab (Rituxan) injection      • rosuvastatin (CRESTOR) 40 MG tablet Take 1 tablet (40 mg total) by mouth every evening 90 tablet 0   • tamsulosin (FLOMAX) 0.4 mg Take 0.4 mg by mouth every evening     • Tresiba FlexTouch 100 units/mL injection pen Inject up to 80 units daily 27 mL 4   • fenofibrate (TRICOR) 145 mg tablet TAKE 1 TABLET BY MOUTH EVERY DAY 90 tablet 0   • methimazole (TAPAZOLE) 5 mg tablet Take 0.5 tablets (2.5 mg total) by mouth in the morning 45 tablet 2   • mupirocin (BACTROBAN) 2 % ointment PLEASE SEE ATTACHED FOR DETAILED DIRECTIONS (Patient not taking: Reported on 2023)       No current facility-administered medications for this visit.      Past Medical History:   Diagnosis Date   • Anemia    • Aortic aneurysm (HCC)    • BPH (benign prostatic hyperplasia)    • CAD (coronary artery disease)    • Chronic kidney disease    • COPD (chronic obstructive pulmonary disease) ([facility])    • Diabetes mellitus ([address])    • Dyslipidemia    • Flu 2022   • GERD (gastroesophageal reflux disease)    • Graves disease    • Hypertension    • Iron deficiency anemia    • Lactic acidosis    • Lung cancer (HCC)    • Lyme disease    • Neuropathy    • Pancytopenia ([address])    • Pneumonia 2022   • Prostate cancer ([address])    • Rheumatoid arthritis ([address])      Past Surgical History:   Procedure Laterality Date   • BALLOON ANGIOPLASTY, ARTERY     • CATARACT EXTRACTION, BILATERAL     • CLAVICLE SURGERY     • TOE AMPUTATION Left 2018    Procedure: AMPUTATION GREAT TOE;  Surgeon: Issac Venegas DPM;  Location: [facility] OR;  Service: Podiatry     Social History     Socioeconomic History   • Marital status: /Civil Union     Spouse name: None   • Number of children: None   • Years of education: None   • Highest education level: None   Occupational History   • None   Tobacco Use   • Smoking status: Every Day     Packs/day: 0.50     Years: 50.00     Total pack years: 25.00     Types: Cigarettes     Last attempt to quit: 2022     Years since quittin.5   • Smokeless tobacco: Never   Vaping Use   • Vaping Use: Never used   Substance and Sexual Activity   • Alcohol use: No   • Drug use: No   • Sexual activity: None   Other Topics Concern   • None   Social History Narrative   • None     Social Determinants of Health     Financial Resource Strain: Not on file   Food Insecurity: No Food Insecurity (12/22/2022)    Hunger Vital Sign    • Worried About Running Out of Food in the Last Year: Never true    • Ran Out of Food in the Last Year: Never true   Transportation Needs: No Transportation Needs (12/22/2022)    PRAPARE - Transportation    • Lack of Transportation (Medical): No    • Lack of Transportation (Non-Medical): No   Physical Activity: Not on file   Stress: Not on file   Social Connections: Not on file   Intimate Partner Violence: Not on file   Housing Stability: Low Risk  (12/22/2022)    Housing Stability Vital Sign    • Unable to Pay for Housing in the Last Year: No    • Number of Places Lived in the Last Year: 1    • Unstable Housing in the Last Year: No     Family History   Problem Relation Age of Onset   • No Known Problems Mother    • Coronary artery disease Father    • No Known Problems Sister    • No Known Problems Brother    • No Known Problems Maternal Grandmother    • No Known Problems Maternal Grandfather    • No Known Problems Paternal Grandmother    • No Known Problems Paternal Grandfather    • No Known Problems Maternal Aunt    • No Known Problems Maternal Uncle    • No Known Problems Paternal Aunt    • No Known Problems Paternal Uncle    • No Known Problems Cousin             PHYSICAL EXAM:    Vitals:    08/02/23 0855   BP: 122/78   BP Location: Left arm   Patient Position: Sitting   Pulse: 88   Weight: 86.7 kg (191 lb 3.2 oz)   Height: 5' 10" (1.778 m)     General Appearance:   Alert and oriented x 3. Cooperative, and in no respiratory distress   HEENT:   Normocephalic, atraumatic, anicteric.      Neck:  Supple, symmetrical, trachea midline   Lungs:   Clear to auscultation bilaterally    Heart[de-identified]   Regular rate and rhythm   Abdomen:   Soft, non-tender, non-distended; normal bowel sounds; no masses, no organomegaly    Genitalia:   Deferred    Rectal:   Deferred    Extremities:  No cyanosis, clubbing or edema    Pulses:  2+ and symmetric all extremities    Skin:  Skin color, texture, turgor normal, no rashes or lesions    Lymph nodes:  No palpable cervical or supraclavicular lymphadenopathy        Lab Results:       Results from last 6 Months   Lab Units 03/07/23  0921   POTASSIUM mmol/L 4.2   CHLORIDE mmol/L 107   CO2 mmol/L 25   BUN mg/dL 29*   CREATININE mg/dL 1.26   CALCIUM mg/dL 9.5               Imaging Studies:   Colonoscopy    Result Date: 3/22/2023  Impression: Diverticulosis Normal colon mucosa 4 Polyps status post cold snare polypectomy Internal hemorrhoids RECOMMENDATION: Await pathology Continue Benefiber every day Cholestyramine 1-2 packets daily  Emily Kirkpatrick III, MD       ASSESSMENT and PLAN:      1) Fecal incontinence - Fortunately, no diarrhea. Last colonoscopy was in 2023 with negative colon biopsy for microscopic colitis. He reports that he is still having fecal smearing at night. We will increase his colestipol to 2 pills with lunch and 2 pills with dinner, he can also experiment and try taking 2 pills before bedtime instead of before dinner depending on his response. May need to add Imodium in the future. Follow up in 8 to 12 weeks. Portions of the record may have been created with voice recognition software. Occasional wrong word or "sound a like" substitutions may have occurred due to the inherent limitations of voice recognition software. Read the chart carefully and recognize, using context, where substitutions have occurred.

## 2023-08-03 RX ORDER — UMECLIDINIUM BROMIDE AND VILANTEROL TRIFENATATE 62.5; 25 UG/1; UG/1
1 POWDER RESPIRATORY (INHALATION) DAILY
Qty: 60 EACH | Refills: 0 | Status: SHIPPED | OUTPATIENT
Start: 2023-08-03 | End: 2023-11-01

## 2023-08-03 RX ORDER — OMEPRAZOLE 40 MG/1
40 CAPSULE, DELAYED RELEASE ORAL 2 TIMES DAILY
Qty: 180 CAPSULE | Refills: 0 | Status: SHIPPED | OUTPATIENT
Start: 2023-08-03

## 2023-08-10 DIAGNOSIS — K21.9 GASTROESOPHAGEAL REFLUX DISEASE WITHOUT ESOPHAGITIS: ICD-10-CM

## 2023-08-10 DIAGNOSIS — M06.9 RHEUMATOID ARTHRITIS, INVOLVING UNSPECIFIED SITE, UNSPECIFIED WHETHER RHEUMATOID FACTOR PRESENT (HCC): Chronic | ICD-10-CM

## 2023-08-10 DIAGNOSIS — G62.9 NEUROPATHY: Chronic | ICD-10-CM

## 2023-08-10 RX ORDER — OMEPRAZOLE 40 MG/1
40 CAPSULE, DELAYED RELEASE ORAL 2 TIMES DAILY
Qty: 180 CAPSULE | Refills: 0 | OUTPATIENT
Start: 2023-08-10

## 2023-08-10 RX ORDER — GABAPENTIN 600 MG/1
600 TABLET ORAL 3 TIMES DAILY
Qty: 90 TABLET | Refills: 0 | Status: SHIPPED | OUTPATIENT
Start: 2023-08-10

## 2023-08-17 ENCOUNTER — APPOINTMENT (OUTPATIENT)
Dept: LAB | Facility: CLINIC | Age: 73
End: 2023-08-17
Payer: COMMERCIAL

## 2023-08-17 DIAGNOSIS — M05.9 RHEUMATOID ARTHRITIS WITH POSITIVE RHEUMATOID FACTOR, INVOLVING UNSPECIFIED SITE (HCC): ICD-10-CM

## 2023-08-17 DIAGNOSIS — Z79.4 TYPE 2 DIABETES MELLITUS WITHOUT COMPLICATION, WITH LONG-TERM CURRENT USE OF INSULIN (HCC): ICD-10-CM

## 2023-08-17 DIAGNOSIS — D61.818 OTHER PANCYTOPENIA (HCC): ICD-10-CM

## 2023-08-17 DIAGNOSIS — E11.9 TYPE 2 DIABETES MELLITUS WITHOUT COMPLICATION, WITH LONG-TERM CURRENT USE OF INSULIN (HCC): ICD-10-CM

## 2023-08-17 DIAGNOSIS — E78.2 MIXED HYPERLIPIDEMIA: ICD-10-CM

## 2023-08-17 DIAGNOSIS — E78.5 HYPERLIPIDEMIA, UNSPECIFIED HYPERLIPIDEMIA TYPE: ICD-10-CM

## 2023-08-17 DIAGNOSIS — J96.01 ACUTE RESPIRATORY FAILURE WITH HYPOXIA (HCC): ICD-10-CM

## 2023-08-17 DIAGNOSIS — C34.91 SQUAMOUS CELL LUNG CANCER, RIGHT (HCC): ICD-10-CM

## 2023-08-17 DIAGNOSIS — J44.9 CHRONIC OBSTRUCTIVE PULMONARY DISEASE, UNSPECIFIED COPD TYPE (HCC): ICD-10-CM

## 2023-08-17 LAB
ALBUMIN SERPL BCP-MCNC: 3.5 G/DL (ref 3.5–5)
ALP SERPL-CCNC: 53 U/L (ref 46–116)
ALT SERPL W P-5'-P-CCNC: 16 U/L (ref 12–78)
ANION GAP SERPL CALCULATED.3IONS-SCNC: 5 MMOL/L
AST SERPL W P-5'-P-CCNC: 16 U/L (ref 5–45)
BASOPHILS # BLD AUTO: 0.14 THOUSANDS/ÂΜL (ref 0–0.1)
BASOPHILS NFR BLD AUTO: 2 % (ref 0–1)
BILIRUB SERPL-MCNC: 0.31 MG/DL (ref 0.2–1)
BUN SERPL-MCNC: 24 MG/DL (ref 5–25)
CALCIUM SERPL-MCNC: 9.5 MG/DL (ref 8.3–10.1)
CHLORIDE SERPL-SCNC: 110 MMOL/L (ref 96–108)
CHOLEST SERPL-MCNC: 114 MG/DL
CO2 SERPL-SCNC: 28 MMOL/L (ref 21–32)
CREAT SERPL-MCNC: 1.48 MG/DL (ref 0.6–1.3)
CREAT UR-MCNC: 82.7 MG/DL
EOSINOPHIL # BLD AUTO: 0.44 THOUSAND/ÂΜL (ref 0–0.61)
EOSINOPHIL NFR BLD AUTO: 6 % (ref 0–6)
ERYTHROCYTE [DISTWIDTH] IN BLOOD BY AUTOMATED COUNT: 18.5 % (ref 11.6–15.1)
GFR SERPL CREATININE-BSD FRML MDRD: 46 ML/MIN/1.73SQ M
GLUCOSE P FAST SERPL-MCNC: 86 MG/DL (ref 65–99)
HCT VFR BLD AUTO: 41.2 % (ref 36.5–49.3)
HDLC SERPL-MCNC: 23 MG/DL
HGB BLD-MCNC: 12.8 G/DL (ref 12–17)
IMM GRANULOCYTES # BLD AUTO: 0.03 THOUSAND/UL (ref 0–0.2)
IMM GRANULOCYTES NFR BLD AUTO: 0 % (ref 0–2)
LDLC SERPL CALC-MCNC: 55 MG/DL (ref 0–100)
LYMPHOCYTES # BLD AUTO: 0.51 THOUSANDS/ÂΜL (ref 0.6–4.47)
LYMPHOCYTES NFR BLD AUTO: 7 % (ref 14–44)
MCH RBC QN AUTO: 26 PG (ref 26.8–34.3)
MCHC RBC AUTO-ENTMCNC: 31.1 G/DL (ref 31.4–37.4)
MCV RBC AUTO: 84 FL (ref 82–98)
MICROALBUMIN UR-MCNC: 10.1 MG/L (ref 0–20)
MICROALBUMIN/CREAT 24H UR: 12 MG/G CREATININE (ref 0–30)
MONOCYTES # BLD AUTO: 0.93 THOUSAND/ÂΜL (ref 0.17–1.22)
MONOCYTES NFR BLD AUTO: 13 % (ref 4–12)
NEUTROPHILS # BLD AUTO: 5.2 THOUSANDS/ÂΜL (ref 1.85–7.62)
NEUTS SEG NFR BLD AUTO: 72 % (ref 43–75)
NRBC BLD AUTO-RTO: 0 /100 WBCS
PLATELET # BLD AUTO: 249 THOUSANDS/UL (ref 149–390)
PMV BLD AUTO: 10.1 FL (ref 8.9–12.7)
POTASSIUM SERPL-SCNC: 4.1 MMOL/L (ref 3.5–5.3)
PROT SERPL-MCNC: 7.4 G/DL (ref 6.4–8.4)
RBC # BLD AUTO: 4.92 MILLION/UL (ref 3.88–5.62)
SODIUM SERPL-SCNC: 143 MMOL/L (ref 135–147)
TRIGL SERPL-MCNC: 178 MG/DL
TSH SERPL DL<=0.05 MIU/L-ACNC: 2.68 UIU/ML (ref 0.45–4.5)
WBC # BLD AUTO: 7.25 THOUSAND/UL (ref 4.31–10.16)

## 2023-08-17 PROCEDURE — 85025 COMPLETE CBC W/AUTO DIFF WBC: CPT

## 2023-08-17 PROCEDURE — 80053 COMPREHEN METABOLIC PANEL: CPT

## 2023-08-17 PROCEDURE — 84443 ASSAY THYROID STIM HORMONE: CPT

## 2023-08-17 PROCEDURE — 36415 COLL VENOUS BLD VENIPUNCTURE: CPT

## 2023-08-17 PROCEDURE — 83036 HEMOGLOBIN GLYCOSYLATED A1C: CPT

## 2023-08-17 PROCEDURE — 82570 ASSAY OF URINE CREATININE: CPT

## 2023-08-17 PROCEDURE — 82043 UR ALBUMIN QUANTITATIVE: CPT

## 2023-08-17 PROCEDURE — 80061 LIPID PANEL: CPT

## 2023-08-18 LAB
EST. AVERAGE GLUCOSE BLD GHB EST-MCNC: 197 MG/DL
HBA1C MFR BLD: 8.5 %

## 2023-08-22 DIAGNOSIS — E78.5 HYPERLIPIDEMIA, UNSPECIFIED HYPERLIPIDEMIA TYPE: ICD-10-CM

## 2023-08-22 RX ORDER — ROSUVASTATIN CALCIUM 40 MG/1
40 TABLET, COATED ORAL EVERY EVENING
Qty: 90 TABLET | Refills: 0 | Status: SHIPPED | OUTPATIENT
Start: 2023-08-22

## 2023-08-22 RX ORDER — ROSUVASTATIN CALCIUM 40 MG/1
40 TABLET, COATED ORAL EVERY EVENING
Qty: 90 TABLET | Refills: 0 | OUTPATIENT
Start: 2023-08-22

## 2023-08-23 ENCOUNTER — RA CDI HCC (OUTPATIENT)
Dept: OTHER | Facility: HOSPITAL | Age: 73
End: 2023-08-23

## 2023-08-23 NOTE — PROGRESS NOTES
[address] [facility] coding opportunities          Chart Reviewed number of suggestions sent to Provider: 2   E11.42  E11.51  See 4/13/23 podiatry assessment    Patients Insurance     Medicare Insurance: [facility]

## 2023-08-26 DIAGNOSIS — K59.1 FUNCTIONAL DIARRHEA: ICD-10-CM

## 2023-08-28 RX ORDER — MONTELUKAST SODIUM 4 MG/1
3 TABLET, CHEWABLE ORAL 2 TIMES DAILY
Qty: 540 TABLET | Refills: 2 | Status: SHIPPED | OUTPATIENT
Start: 2023-08-28 | End: 2023-09-27

## 2023-08-29 ENCOUNTER — OFFICE VISIT (OUTPATIENT)
Dept: FAMILY MEDICINE CLINIC | Facility: CLINIC | Age: 73
End: 2023-08-29
Payer: COMMERCIAL

## 2023-08-29 VITALS
SYSTOLIC BLOOD PRESSURE: 122 MMHG | BODY MASS INDEX: 27.49 KG/M2 | HEIGHT: 70 IN | TEMPERATURE: 97.8 F | OXYGEN SATURATION: 99 % | WEIGHT: 192 LBS | HEART RATE: 80 BPM | DIASTOLIC BLOOD PRESSURE: 74 MMHG

## 2023-08-29 DIAGNOSIS — E11.22 TYPE 2 DIABETES MELLITUS WITH STAGE 3A CHRONIC KIDNEY DISEASE, WITH LONG-TERM CURRENT USE OF INSULIN (HCC): ICD-10-CM

## 2023-08-29 DIAGNOSIS — Z00.00 MEDICARE ANNUAL WELLNESS VISIT, SUBSEQUENT: Primary | ICD-10-CM

## 2023-08-29 DIAGNOSIS — I71.40 ABDOMINAL AORTIC ANEURYSM (AAA) WITHOUT RUPTURE, UNSPECIFIED PART (HCC): ICD-10-CM

## 2023-08-29 DIAGNOSIS — M06.9 RHEUMATOID ARTHRITIS, INVOLVING UNSPECIFIED SITE, UNSPECIFIED WHETHER RHEUMATOID FACTOR PRESENT (HCC): ICD-10-CM

## 2023-08-29 DIAGNOSIS — Z79.4 TYPE 2 DIABETES MELLITUS WITH STAGE 3A CHRONIC KIDNEY DISEASE, WITH LONG-TERM CURRENT USE OF INSULIN (HCC): ICD-10-CM

## 2023-08-29 DIAGNOSIS — N18.31 TYPE 2 DIABETES MELLITUS WITH STAGE 3A CHRONIC KIDNEY DISEASE, WITH LONG-TERM CURRENT USE OF INSULIN (HCC): ICD-10-CM

## 2023-08-29 DIAGNOSIS — E78.2 MIXED HYPERLIPIDEMIA: ICD-10-CM

## 2023-08-29 PROCEDURE — G0439 PPPS, SUBSEQ VISIT: HCPCS | Performed by: FAMILY MEDICINE

## 2023-08-29 PROCEDURE — 99214 OFFICE O/P EST MOD 30 MIN: CPT | Performed by: FAMILY MEDICINE

## 2023-08-29 RX ORDER — BLOOD SUGAR DIAGNOSTIC
STRIP MISCELLANEOUS
Qty: 100 STRIP | Refills: 3 | Status: SHIPPED | OUTPATIENT
Start: 2023-08-29

## 2023-08-29 NOTE — PATIENT INSTRUCTIONS
Medicare Preventive Visit Patient Instructions  Thank you for completing your Welcome to Medicare Visit or Medicare Annual Wellness Visit today. Your next wellness visit will be due in one year (8/29/2024). The screening/preventive services that you may require over the next 5-10 years are detailed below. Some tests may not apply to you based off risk factors and/or age. Screening tests ordered at today's visit but not completed yet may show as past due. Also, please note that scanned in results may not display below. Preventive Screenings:  Service Recommendations Previous Testing/Comments   Colorectal Cancer Screening  · Colonoscopy    · Fecal Occult Blood Test (FOBT)/Fecal Immunochemical Test (FIT)  · Fecal DNA/Cologuard Test  · Flexible Sigmoidoscopy Age: 43-73 years old   Colonoscopy: every 10 years (May be performed more frequently if at higher risk)  OR  FOBT/FIT: every 1 year  OR  Cologuard: every 3 years  OR  Sigmoidoscopy: every 5 years  Screening may be recommended earlier than age 39 if at higher risk for colorectal cancer. Also, an individualized decision between you and your healthcare provider will decide whether screening between the ages of 77-80 would be appropriate.  Colonoscopy: 03/22/2023  FOBT/FIT: Not on file  Cologuard: Not on file  Sigmoidoscopy: Not on file    Screening Current     Prostate Cancer Screening Individualized decision between patient and health care provider in men between ages of 53-66   Medicare will cover every 12 months beginning on the day after your 50th birthday PSA: 0.7 ng/mL     History Prostate Cancer     Hepatitis C Screening Once for adults born between 1945 and 1965  More frequently in patients at high risk for Hepatitis C Hep C Antibody: Not on file        Diabetes Screening 1-2 times per year if you're at risk for diabetes or have pre-diabetes Fasting glucose: 86 mg/dL (8/17/2023)  A1C: 8.5 % (8/17/2023)  Screening Not Indicated  History Diabetes   Cholesterol Screening Once every 5 years if you don't have a lipid disorder. May order more often based on risk factors. Lipid panel: 08/17/2023  Screening Not Indicated  History Lipid Disorder      Other Preventive Screenings Covered by Medicare:  1. Abdominal Aortic Aneurysm (AAA) Screening: covered once if your at risk. You're considered to be at risk if you have a family history of AAA or a male between the age of 70-76 who smoking at least 100 cigarettes in your lifetime. 2. Lung Cancer Screening: covers low dose CT scan once per year if you meet all of the following conditions: (1) Age 48-67; (2) No signs or symptoms of lung cancer; (3) Current smoker or have quit smoking within the last 15 years; (4) You have a tobacco smoking history of at least 20 pack years (packs per day x number of years you smoked); (5) You get a written order from a healthcare provider. 3. Glaucoma Screening: covered annually if you're considered high risk: (1) You have diabetes OR (2) Family history of glaucoma OR (3)  aged 48 and older OR (3)  American aged 72 and older  3. Osteoporosis Screening: covered every 2 years if you meet one of the following conditions: (1) Have a vertebral abnormality; (2) On glucocorticoid therapy for more than 3 months; (3) Have primary hyperparathyroidism; (4) On osteoporosis medications and need to assess response to drug therapy. 5. HIV Screening: covered annually if you're between the age of 14-79. Also covered annually if you are younger than 13 and older than 72 with risk factors for HIV infection. For pregnant patients, it is covered up to 3 times per pregnancy.     Immunizations:  Immunization Recommendations   Influenza Vaccine Annual influenza vaccination during flu season is recommended for all persons aged >= 6 months who do not have contraindications   Pneumococcal Vaccine   * Pneumococcal conjugate vaccine = PCV13 (Prevnar 13), PCV15 (Vaxneuvance), PCV20 (Prevnar 20)  * Pneumococcal polysaccharide vaccine = PPSV23 (Pneumovax) Adults 2364 years old: 1-3 doses may be recommended based on certain risk factors  Adults 72 years old: 1-2 doses may be recommended based off what pneumonia vaccine you previously received   Hepatitis B Vaccine 3 dose series if at intermediate or high risk (ex: diabetes, end stage renal disease, liver disease)   Tetanus (Td) Vaccine - COST NOT COVERED BY MEDICARE PART B Following completion of primary series, a booster dose should be given every 10 years to maintain immunity against tetanus. Td may also be given as tetanus wound prophylaxis. Tdap Vaccine - COST NOT COVERED BY MEDICARE PART B Recommended at least once for all adults. For pregnant patients, recommended with each pregnancy. Shingles Vaccine (Shingrix) - COST NOT COVERED BY MEDICARE PART B  2 shot series recommended in those aged 48 and above     Health Maintenance Due:      Topic Date Due   • Hepatitis C Screening  Never done   • Colorectal Cancer Screening  03/19/2033     Immunizations Due:      Topic Date Due   • COVID-19 Vaccine (1) Never done   • Influenza Vaccine (1) 09/01/2023     Advance Directives   What are advance directives? Advance directives are legal documents that state your wishes and plans for medical care. These plans are made ahead of time in case you lose your ability to make decisions for yourself. Advance directives can apply to any medical decision, such as the treatments you want, and if you want to donate organs. What are the types of advance directives? There are many types of advance directives, and each state has rules about how to use them. You may choose a combination of any of the following:  · Living will: This is a written record of the treatment you want. You can also choose which treatments you do not want, which to limit, and which to stop at a certain time. This includes surgery, medicine, IV fluid, and tube feedings.    · Durable power of  [facility]): This is a written record that states who you want to make healthcare choices for you when you are unable to make them for yourself. This person, called a proxy, is usually a family member or a friend. You may choose more than 1 proxy. · Do not resuscitate (DNR) order:  A DNR order is used in case your heart stops beating or you stop breathing. It is a request not to have certain forms of treatment, such as CPR. A DNR order may be included in other types of advance directives. · Medical directive: This covers the care that you want if you are in a coma, near death, or unable to make decisions for yourself. You can list the treatments you want for each condition. Treatment may include pain medicine, surgery, blood transfusions, dialysis, IV or tube feedings, and a ventilator (breathing machine). · Values history: This document has questions about your views, beliefs, and how you feel and think about life. This information can help others choose the care that you would choose. Why are advance directives important? An advance directive helps you control your care. Although spoken wishes may be used, it is better to have your wishes written down. Spoken wishes can be misunderstood, or not followed. Treatments may be given even if you do not want them. An advance directive may make it easier for your family to make difficult choices about your care. Cigarette Smoking and Your Health   Risks to your health if you smoke:  Nicotine and other chemicals found in tobacco damage every cell in your body. Even if you are a light smoker, you have an increased risk for cancer, heart disease, and lung disease. If you are pregnant or have diabetes, smoking increases your risk for complications. Benefits to your health if you stop smoking:   · You decrease respiratory symptoms such as coughing, wheezing, and shortness of breath.    · You reduce your risk for cancers of the lung, mouth, throat, kidney, bladder, pancreas, stomach, and cervix. If you already have cancer, you increase the benefits of chemotherapy. You also reduce your risk for cancer returning or a second cancer from developing. · You reduce your risk for heart disease, blood clots, heart attack, and stroke. · You reduce your risk for lung infections, and diseases such as pneumonia, asthma, chronic bronchitis, and emphysema. · Your circulation improves. More oxygen can be delivered to your body. If you have diabetes, you lower your risk for complications, such as kidney, artery, and eye diseases. You also lower your risk for nerve damage. Nerve damage can lead to amputations, poor vision, and blindness. · You improve your body's ability to heal and to fight infections. For more information and support to stop smoking:   · [employer]  Phone: 3- 617 - 347-4747  Web Address: www.[employer]  Weight Management   Why it is important to manage your weight:  Being overweight increases your risk of health conditions such as heart disease, high blood pressure, type 2 diabetes, and certain types of cancer. It can also increase your risk for osteoarthritis, sleep apnea, and other respiratory problems. Aim for a slow, steady weight loss. Even a small amount of weight loss can lower your risk of health problems. How to lose weight safely:  A safe and healthy way to lose weight is to eat fewer calories and get regular exercise. You can lose up about 1 pound a week by decreasing the number of calories you eat by 500 calories each day. Healthy meal plan for weight management:  A healthy meal plan includes a variety of foods, contains fewer calories, and helps you stay healthy. A healthy meal plan includes the following:  · Eat whole-grain foods more often. A healthy meal plan should contain fiber. Fiber is the part of grains, fruits, and vegetables that is not broken down by your body.  Whole-grain foods are healthy and provide extra fiber in your diet. Some examples of whole-grain foods are whole-wheat breads and pastas, oatmeal, brown rice, and bulgur. · Eat a variety of vegetables every day. Include dark, leafy greens such as spinach, kale, munira greens, and mustard greens. Eat yellow and orange vegetables such as carrots, sweet potatoes, and winter squash. · Eat a variety of fruits every day. Choose fresh or canned fruit (canned in its own juice or light syrup) instead of juice. Fruit juice has very little or no fiber. · Eat low-fat dairy foods. Drink fat-free (skim) milk or 1% milk. Eat fat-free yogurt and low-fat cottage cheese. Try low-fat cheeses such as mozzarella and other reduced-fat cheeses. · Choose meat and other protein foods that are low in fat. Choose beans or other legumes such as split peas or lentils. Choose fish, skinless poultry (chicken or turkey), or lean cuts of red meat (beef or pork). Before you cook meat or poultry, cut off any visible fat. · Use less fat and oil. Try baking foods instead of frying them. Add less fat, such as margarine, sour cream, regular salad dressing and mayonnaise to foods. Eat fewer high-fat foods. Some examples of high-fat foods include french fries, doughnuts, ice cream, and cakes. · Eat fewer sweets. Limit foods and drinks that are high in sugar. This includes candy, cookies, regular soda, and sweetened drinks. Exercise:  Exercise at least 30 minutes per day on most days of the week. Some examples of exercise include walking, biking, dancing, and swimming. You can also fit in more physical activity by taking the stairs instead of the elevator or parking farther away from stores. Ask your healthcare provider about the best exercise plan for you. © Copyright [employer] 2018 Information is for End User's use only and may not be sold, redistributed or otherwise used for commercial purposes.  All illustrations and images included in CareNotes® are the copyrighted property of [employer] or [facility]

## 2023-08-29 NOTE — PROGRESS NOTES
Assessment and Plan:     Problem List Items Addressed This Visit    None       Preventive health issues were discussed with patient, and age appropriate screening tests were ordered as noted in patient's After Visit Summary. Personalized health advice and appropriate referrals for health education or preventive services given if needed, as noted in patient's After Visit Summary.      History of Present Illness:     Patient presents for a Medicare Wellness Visit    F/u   Generally doing well denies any specific issues or concerns remains active engaged grandson  Negative chest pain palpitations shortness of breath difficulty breathing cough lesions rash, negative activity intolerance swelling to extremities, denies any hypoglycemic episodes  Diabetes , thyroid , continues to f/u with endocrine   Last a1c 8 , relates to ra treatment and infusion therapy , after which sugars do become elevated   Checks sugars levels returning to normal   Monitor diet   meds unchanged   Home blood sugars   Fathers dimentia slowly progressing , living on own , its a struggle with family and care giver, ,   ra , feels some improvement in functionality with hands wrist , knees intermittant in discomfort ,ongoing issue for the past 8 yr   md narayanan , urology , prostae , unchanged       Patient Care Team:  JEROME Miles as PCP - General (Family Medicine)     Review of Systems:     Review of Systems     Problem List:     Patient Active Problem List   Diagnosis   • PRADEEP (acute kidney injury) ([address])   • Type 2 diabetes mellitus, with long-term current use of insulin ([address])   • HTN (hypertension)   • Anemia   • Pancytopenia ([address])   • Rheumatoid arthritis ([address])   • Squamous cell lung cancer, right ([address])   • Neuropathy   • Hyperlipidemia   • COPD (chronic obstructive pulmonary disease) ([address])   • GERD (gastroesophageal reflux disease)   • Lactic acidosis   • Acquired absence of other left toe(s) ([address])   • Abdominal aortic aneurysm (AAA) without rupture [facility])   • Injury of finger of right hand   • Prostate cancer ([address])   • CKD (chronic kidney disease)   • Acute respiratory failure (HCC)   • Graves disease   • Slow transit constipation      Past Medical and Surgical History:     Past Medical History:   Diagnosis Date   • Anemia    • Aortic aneurysm (HCC)    • BPH (benign prostatic hyperplasia)    • CAD (coronary artery disease)    • Chronic kidney disease    • COPD (chronic obstructive pulmonary disease) (HCC)    • Diabetes mellitus ([address])    • Dyslipidemia    • Flu 12/2022   • GERD (gastroesophageal reflux disease)    • Graves disease    • Hypertension    • Iron deficiency anemia    • Lactic acidosis    • Lung cancer (HCC)    • Lyme disease    • Neuropathy    • Pancytopenia ([address])    • Pneumonia 12/2022   • Prostate cancer ([address])    • Rheumatoid arthritis ([address])      Past Surgical History:   Procedure Laterality Date   • BALLOON ANGIOPLASTY, ARTERY  1996   • CATARACT EXTRACTION, BILATERAL  2019   • CLAVICLE SURGERY     • TOE AMPUTATION Left 11/20/2018    Procedure: AMPUTATION GREAT TOE;  Surgeon: Radha Lee DPM;  Location: [facility];  Service: Podiatry      Family History:     Family History   Problem Relation Age of Onset   • No Known Problems Mother    • Coronary artery disease Father    • No Known Problems Sister    • No Known Problems Brother    • No Known Problems Maternal Grandmother    • No Known Problems Maternal Grandfather    • No Known Problems Paternal Grandmother    • No Known Problems Paternal Grandfather    • No Known Problems Maternal Aunt    • No Known Problems Maternal Uncle    • No Known Problems Paternal Aunt    • No Known Problems Paternal Uncle    • No Known Problems Cousin       Social History:     Social History     Socioeconomic History   • Marital status: /Civil Union     Spouse name: None   • Number of children: None   • Years of education: None   • Highest education level: None   Occupational History   • None   Tobacco Use   • Smoking status: Every Day     Packs/day: 0.50     Years: 50.00     Total pack years: 25.00     Types: Cigarettes     Start date:      Last attempt to quit: 2022     Years since quittin.6   • Smokeless tobacco: Never   Vaping Use   • Vaping Use: Never used   Substance and Sexual Activity   • Alcohol use: No   • Drug use: No   • Sexual activity: None   Other Topics Concern   • None   Social History Narrative   • None     Social Determinants of Health     Financial Resource Strain: Low Risk  (2023)    Overall Financial Resource Strain (CARDIA)    • Difficulty of Paying Living Expenses: Not very hard   Food Insecurity: No Food Insecurity (2022)    Hunger Vital Sign    • Worried About Running Out of Food in the Last Year: Never true    • Ran Out of Food in the Last Year: Never true   Transportation Needs: No Transportation Needs (2023)    PRAPARE - Transportation    • Lack of Transportation (Medical): No    • Lack of Transportation (Non-Medical): No   Physical Activity: Not on file   Stress: Not on file   Social Connections: Not on file   Intimate Partner Violence: Not on file   Housing Stability: Low Risk  (2022)    Housing Stability Vital Sign    • Unable to Pay for Housing in the Last Year: No    • Number of Places Lived in the Last Year: 1    • Unstable Housing in the Last Year: No      Medications and Allergies:     Current Outpatient Medications   Medication Sig Dispense Refill   • albuterol (PROVENTIL HFA,VENTOLIN HFA) 90 mcg/act inhaler TAKE 2 PUFFS BY MOUTH EVERY 4 HOURS AS NEEDED FOR WHEEZE 8.5 g 1   • amLODIPine (NORVASC) 5 mg tablet TAKE 1 TABLET (5 MG TOTAL) BY MOUTH DAILY.  90 tablet 0   • ascorbic acid (VITAMIN C) 250 mg tablet Take 250 mg by mouth 2 (two) times a day     • aspirin (ECOTRIN LOW STRENGTH) 81 mg EC tablet 1 tab(s)     • atenolol (TENORMIN) 25 mg tablet Take 1 tablet (25 mg total) by mouth daily 90 tablet 0   • B-D ULTRAFINE III SHORT PEN 31G X 8 MM • Angiotensin Receptor Blockers Other (See Comments)     Elevated K+   • Clindamycin Diarrhea and Nausea Only   • Medical Tape Blisters   • Plaquenil [Hydroxychloroquine] Swelling     Tongue swelling      Immunizations:     Immunization History   Administered Date(s) Administered   • Hep B, adult 11/07/2012   • INFLUENZA 12/14/1999, 09/18/2000, 10/09/2000, 10/11/2001, 10/08/2002, 10/10/2003, 09/27/2004, 12/08/2005, 10/30/2007, 10/02/2008, 10/02/2009, 10/18/2010, 09/28/2011, 10/24/2012, 09/25/2015, 10/31/2016, 08/29/2018, 11/07/2018, 10/16/2019, 10/06/2020   • Influenza Split High Dose Preservative Free IM 10/27/2017   • Influenza, high dose seasonal 0.7 mL 09/20/2021   • Pneumococcal Conjugate 13-Valent 03/11/2015   • Pneumococcal Polysaccharide PPV23 10/02/2008, 01/04/2019   • Tdap 10/24/2012   • Zoster 11/07/2012   • Zoster Vaccine Recombinant 10/20/2019, 01/02/2020   • influenza, injectable, quadrivalent 10/06/2020      Health Maintenance:         Topic Date Due   • Hepatitis C Screening  Never done   • Colorectal Cancer Screening  03/19/2033         Topic Date Due   • COVID-19 Vaccine (1) Never done   • Influenza Vaccine (1) 09/01/2023      Medicare Screening Tests and Risk Assessments:     Marit Councilman is here for his Subsequent Wellness visit. Health Risk Assessment:   Patient rates overall health as good. Patient feels that their physical health rating is same. Patient is satisfied with their life. Eyesight was rated as same. Hearing was rated as same. Patient feels that their emotional and mental health rating is same. Patients states they are never, rarely angry. Patient states they are never, rarely unusually tired/fatigued. Pain experienced in the last 7 days has been a lot. Patient's pain rating has been 6/10. Patient states that he has experienced no weight loss or gain in last 6 months. Fall Risk Screening:    In the past year, patient has experienced: no history of falling in past year      Home Safety:  Patient does not have trouble with stairs inside or outside of their home. Patient has working smoke alarms and has working carbon monoxide detector. Home safety hazards include: none. Nutrition:   Current diet is Diabetic. Medications:   Patient is not currently taking any over-the-counter supplements. Patient is able to manage medications. Activities of Daily Living (ADLs)/Instrumental Activities of Daily Living (IADLs):   Walk and transfer into and out of bed and chair?: Yes  Dress and groom yourself?: Yes    Bathe or shower yourself?: Yes    Feed yourself? Yes  Do your laundry/housekeeping?: Yes  Manage your money, pay your bills and track your expenses?: Yes  Make your own meals?: Yes    Do your own shopping?: Yes    Previous Hospitalizations:   Any hospitalizations or ED visits within the last 12 months?: Yes    How many hospitalizations have you had in the last year?: 1-2    Advance Care Planning:   Living will: Yes    Advanced directive: Yes      Cognitive Screening:   Provider or family/friend/caregiver concerned regarding cognition?: No    PREVENTIVE SCREENINGS      Cardiovascular Screening:    General: Screening Not Indicated and History Lipid Disorder      Diabetes Screening:     General: Screening Not Indicated and History Diabetes      Colorectal Cancer Screening:     General: Screening Current      Prostate Cancer Screening:    General: History Prostate Cancer      Abdominal Aortic Aneurysm (AAA) Screening:    Risk factors include: age between 70-75 yo and tobacco use        General: Screening Not Indicated and History AAA      Lung Cancer Screening:     General: Screening Not Indicated and History Lung Cancer      Hepatitis C Screening:    General: Screening Not Indicated    Screening, Brief Intervention, and Referral to Treatment (SBIRT)    Screening  Typical number of drinks in a day: 0  Typical number of drinks in a week: 0  Interpretation: Low risk drinking behavior.     No results found. Physical Exam:     /74 (BP Location: Left arm, Patient Position: Sitting)   Pulse 80   Temp 97.8 °F (36.6 °C)   Ht 5' 10" (1.778 m)   Wt 87.1 kg (192 lb)   SpO2 99%   BMI 27.55 kg/m²     Physical Exam  Vitals and nursing note reviewed. Constitutional:       General: He is not in acute distress. Appearance: He is well-developed. He is not ill-appearing. HENT:      Head: Normocephalic and atraumatic. Eyes:      Conjunctiva/sclera: Conjunctivae normal.   Cardiovascular:      Rate and Rhythm: Normal rate and regular rhythm. Heart sounds: No murmur heard. Pulmonary:      Effort: Pulmonary effort is normal. No respiratory distress. Breath sounds: Normal breath sounds. Abdominal:      Tenderness: There is no abdominal tenderness. Musculoskeletal:         General: No swelling. Cervical back: Neck supple. Skin:     General: Skin is warm and dry. Capillary Refill: Capillary refill takes less than 2 seconds. Neurological:      Mental Status: He is alert.    Psychiatric:         Mood and Affect: Mood normal.          JEROME Momin

## 2023-09-01 DIAGNOSIS — J44.9 CHRONIC OBSTRUCTIVE PULMONARY DISEASE, UNSPECIFIED COPD TYPE (HCC): ICD-10-CM

## 2023-09-01 RX ORDER — UMECLIDINIUM BROMIDE AND VILANTEROL TRIFENATATE 62.5; 25 UG/1; UG/1
1 POWDER RESPIRATORY (INHALATION) DAILY
Qty: 60 EACH | Refills: 0 | Status: SHIPPED | OUTPATIENT
Start: 2023-09-01 | End: 2023-11-30

## 2023-09-09 DIAGNOSIS — J44.9 CHRONIC OBSTRUCTIVE PULMONARY DISEASE, UNSPECIFIED COPD TYPE (HCC): ICD-10-CM

## 2023-09-09 RX ORDER — ALBUTEROL SULFATE 90 UG/1
AEROSOL, METERED RESPIRATORY (INHALATION)
Qty: 8.5 G | Refills: 1 | Status: SHIPPED | OUTPATIENT
Start: 2023-09-09

## 2023-09-26 DIAGNOSIS — N18.31 TYPE 2 DIABETES MELLITUS WITH STAGE 3A CHRONIC KIDNEY DISEASE, WITH LONG-TERM CURRENT USE OF INSULIN (HCC): ICD-10-CM

## 2023-09-26 DIAGNOSIS — E11.9 TYPE 2 DIABETES MELLITUS WITHOUT COMPLICATION, WITH LONG-TERM CURRENT USE OF INSULIN (HCC): ICD-10-CM

## 2023-09-26 DIAGNOSIS — Z79.4 TYPE 2 DIABETES MELLITUS WITH STAGE 3A CHRONIC KIDNEY DISEASE, WITH LONG-TERM CURRENT USE OF INSULIN (HCC): ICD-10-CM

## 2023-09-26 DIAGNOSIS — Z79.4 TYPE 2 DIABETES MELLITUS WITHOUT COMPLICATION, WITH LONG-TERM CURRENT USE OF INSULIN (HCC): ICD-10-CM

## 2023-09-26 DIAGNOSIS — E11.22 TYPE 2 DIABETES MELLITUS WITH STAGE 3A CHRONIC KIDNEY DISEASE, WITH LONG-TERM CURRENT USE OF INSULIN (HCC): ICD-10-CM

## 2023-09-27 RX ORDER — GLIMEPIRIDE 1 MG/1
1 TABLET ORAL
Qty: 90 TABLET | Refills: 0 | Status: SHIPPED | OUTPATIENT
Start: 2023-09-27

## 2023-09-28 DIAGNOSIS — E78.5 HYPERLIPIDEMIA, UNSPECIFIED HYPERLIPIDEMIA TYPE: ICD-10-CM

## 2023-09-28 RX ORDER — FENOFIBRATE 145 MG/1
145 TABLET, COATED ORAL DAILY
Qty: 90 TABLET | Refills: 0 | Status: SHIPPED | OUTPATIENT
Start: 2023-09-28

## 2023-10-10 DIAGNOSIS — J44.9 CHRONIC OBSTRUCTIVE PULMONARY DISEASE, UNSPECIFIED COPD TYPE (HCC): ICD-10-CM

## 2023-10-10 RX ORDER — UMECLIDINIUM BROMIDE AND VILANTEROL TRIFENATATE 62.5; 25 UG/1; UG/1
1 POWDER RESPIRATORY (INHALATION) DAILY
Qty: 60 EACH | Refills: 0 | Status: SHIPPED | OUTPATIENT
Start: 2023-10-10 | End: 2024-01-08

## 2023-10-14 DIAGNOSIS — I10 PRIMARY HYPERTENSION: ICD-10-CM

## 2023-10-14 RX ORDER — AMLODIPINE BESYLATE 5 MG/1
5 TABLET ORAL DAILY
Qty: 90 TABLET | Refills: 0 | Status: SHIPPED | OUTPATIENT
Start: 2023-10-14

## 2023-10-20 DIAGNOSIS — E11.65 INADEQUATELY CONTROLLED DIABETES MELLITUS (HCC): ICD-10-CM

## 2023-10-25 ENCOUNTER — OFFICE VISIT (OUTPATIENT)
Dept: GASTROENTEROLOGY | Facility: CLINIC | Age: 73
End: 2023-10-25
Payer: COMMERCIAL

## 2023-10-25 ENCOUNTER — APPOINTMENT (OUTPATIENT)
Dept: LAB | Facility: HOSPITAL | Age: 73
End: 2023-10-25
Payer: COMMERCIAL

## 2023-10-25 ENCOUNTER — TELEPHONE (OUTPATIENT)
Dept: GASTROENTEROLOGY | Facility: CLINIC | Age: 73
End: 2023-10-25

## 2023-10-25 VITALS
SYSTOLIC BLOOD PRESSURE: 132 MMHG | DIASTOLIC BLOOD PRESSURE: 82 MMHG | HEART RATE: 80 BPM | BODY MASS INDEX: 27.52 KG/M2 | WEIGHT: 192.2 LBS | HEIGHT: 70 IN

## 2023-10-25 DIAGNOSIS — K21.9 GASTROESOPHAGEAL REFLUX DISEASE WITHOUT ESOPHAGITIS: ICD-10-CM

## 2023-10-25 DIAGNOSIS — Z79.4 TYPE 2 DIABETES MELLITUS WITH HYPERGLYCEMIA, WITH LONG-TERM CURRENT USE OF INSULIN (HCC): ICD-10-CM

## 2023-10-25 DIAGNOSIS — E11.65 TYPE 2 DIABETES MELLITUS WITH HYPERGLYCEMIA, WITH LONG-TERM CURRENT USE OF INSULIN (HCC): ICD-10-CM

## 2023-10-25 DIAGNOSIS — R30.0 DYSURIA: Primary | ICD-10-CM

## 2023-10-25 DIAGNOSIS — K59.1 FUNCTIONAL DIARRHEA: ICD-10-CM

## 2023-10-25 LAB
ALBUMIN SERPL BCP-MCNC: 3.9 G/DL (ref 3.5–5)
ALP SERPL-CCNC: 58 U/L (ref 34–104)
ALT SERPL W P-5'-P-CCNC: 5 U/L (ref 7–52)
ANION GAP SERPL CALCULATED.3IONS-SCNC: 10 MMOL/L
AST SERPL W P-5'-P-CCNC: 9 U/L (ref 13–39)
BACTERIA UR QL AUTO: NORMAL /HPF
BILIRUB SERPL-MCNC: 0.26 MG/DL (ref 0.2–1)
BILIRUB UR QL STRIP: NEGATIVE
BUN SERPL-MCNC: 29 MG/DL (ref 5–25)
CALCIUM SERPL-MCNC: 9.6 MG/DL (ref 8.4–10.2)
CHLORIDE SERPL-SCNC: 105 MMOL/L (ref 96–108)
CLARITY UR: CLEAR
CO2 SERPL-SCNC: 25 MMOL/L (ref 21–32)
COLOR UR: COLORLESS
CREAT SERPL-MCNC: 1.25 MG/DL (ref 0.6–1.3)
CREAT UR-MCNC: 36.8 MG/DL
EST. AVERAGE GLUCOSE BLD GHB EST-MCNC: 189 MG/DL
GFR SERPL CREATININE-BSD FRML MDRD: 57 ML/MIN/1.73SQ M
GLUCOSE P FAST SERPL-MCNC: 286 MG/DL (ref 65–99)
GLUCOSE UR STRIP-MCNC: ABNORMAL MG/DL
HBA1C MFR BLD: 8.2 %
HGB UR QL STRIP.AUTO: NEGATIVE
KETONES UR STRIP-MCNC: NEGATIVE MG/DL
LEUKOCYTE ESTERASE UR QL STRIP: ABNORMAL
MICROALBUMIN UR-MCNC: <7 MG/L
MICROALBUMIN/CREAT 24H UR: <19 MG/G CREATININE (ref 0–30)
NITRITE UR QL STRIP: NEGATIVE
NON-SQ EPI CELLS URNS QL MICRO: NORMAL /HPF
PH UR STRIP.AUTO: 5 [PH]
POTASSIUM SERPL-SCNC: 4.6 MMOL/L (ref 3.5–5.3)
PROT SERPL-MCNC: 7.5 G/DL (ref 6.4–8.4)
PROT UR STRIP-MCNC: NEGATIVE MG/DL
RBC #/AREA URNS AUTO: NORMAL /HPF
SODIUM SERPL-SCNC: 140 MMOL/L (ref 135–147)
SP GR UR STRIP.AUTO: 1.03 (ref 1–1.03)
UROBILINOGEN UR STRIP-ACNC: <2 MG/DL
WBC #/AREA URNS AUTO: NORMAL /HPF

## 2023-10-25 PROCEDURE — 81001 URINALYSIS AUTO W/SCOPE: CPT | Performed by: PHYSICIAN ASSISTANT

## 2023-10-25 PROCEDURE — 82043 UR ALBUMIN QUANTITATIVE: CPT

## 2023-10-25 PROCEDURE — 99214 OFFICE O/P EST MOD 30 MIN: CPT | Performed by: PHYSICIAN ASSISTANT

## 2023-10-25 PROCEDURE — 3052F HG A1C>EQUAL 8.0%<EQUAL 9.0%: CPT | Performed by: PHYSICIAN ASSISTANT

## 2023-10-25 PROCEDURE — 80053 COMPREHEN METABOLIC PANEL: CPT

## 2023-10-25 PROCEDURE — 83036 HEMOGLOBIN GLYCOSYLATED A1C: CPT

## 2023-10-25 PROCEDURE — 36415 COLL VENOUS BLD VENIPUNCTURE: CPT

## 2023-10-25 PROCEDURE — 82570 ASSAY OF URINE CREATININE: CPT

## 2023-10-25 RX ORDER — DIPHENOXYLATE HYDROCHLORIDE AND ATROPINE SULFATE 2.5; .025 MG/1; MG/1
1 TABLET ORAL
Qty: 30 TABLET | Refills: 0 | Status: SHIPPED | OUTPATIENT
Start: 2023-10-25

## 2023-10-25 RX ORDER — OMEPRAZOLE 40 MG/1
40 CAPSULE, DELAYED RELEASE ORAL 2 TIMES DAILY
Qty: 180 CAPSULE | Refills: 0 | Status: SHIPPED | OUTPATIENT
Start: 2023-10-25

## 2023-10-25 NOTE — PROGRESS NOTES
[address] [place] [address] [facility]  Esthela Fagan 72 y.o. male MRN: [id number]       CC: Follow-up for fecal incontinence    HPI: Codey Allison is a pleasant 68-year-old male with history of type 2 diabetes, Graves' disease, COPD, previous prostate cancer, CKD and rheumatoid arthritis on Rituxan. Patient presents to the office today for follow-up as he was last seen in August for fecal incontinence/fecal smearing. Patient and I had discussed increasing his colestipol to 2 pills with lunch and 2 pills with dinner. Patient reports that he is having 2 bowel movements during the day and, and 1 at night. Patient reports that he feels that he might be developing a urinary tract infection as he is having burning in his urine that began yesterday. Patient's colonoscopy was performed in March 2023 by Dr. Sinan Centeno. Diverticulosis noted, colon mucosa appeared normal, and 4 colon polyps removed. These biopsied as tubular adenoma without dysplasia. Random colon biopsy was negative for microscopic colitis. He denies family history of colon cancer   Review of Systems:    CONSTITUTIONAL: Denies any fever, chills, or rigors. Good appetite, and no recent weight loss. HEENT: No earache or tinnitus. Denies hearing loss or visual disturbances. CARDIOVASCULAR: No chest pain or palpitations. RESPIRATORY: Denies any cough, hemoptysis, shortness of breath or dyspnea on exertion. GASTROINTESTINAL: As noted in the History of Present Illness. GENITOURINARY: No problems with urination. Denies any hematuria or dysuria. NEUROLOGIC: No dizziness or vertigo, denies headaches. MUSCULOSKELETAL: Denies any muscle or joint pain. SKIN: Denies skin rashes or itching. ENDOCRINE: Denies excessive thirst. Denies intolerance to heat or cold. PSYCHOSOCIAL: Denies depression or anxiety. Denies any recent memory loss.        Current Outpatient Medications   Medication Sig Dispense Refill    albuterol (PROVENTIL HFA,VENTOLIN HFA) 90 mcg/act inhaler INHALE 2 PUFFS BY MOUTH EVERY 4 HOURS AS NEEDED FOR WHEEZE 8.5 g 1    amLODIPine (NORVASC) 5 mg tablet TAKE 1 TABLET (5 MG TOTAL) BY MOUTH DAILY.  90 tablet 0    Anoro Ellipta 62.5-25 MCG/ACT inhaler INHALE 1 PUFF DAILY 60 each 0    ascorbic acid (VITAMIN C) 250 mg tablet Take 250 mg by mouth 2 (two) times a day      aspirin (ECOTRIN LOW STRENGTH) 81 mg EC tablet 1 tab(s)      atenolol (TENORMIN) 25 mg tablet Take 1 tablet (25 mg total) by mouth daily 90 tablet 0    B-D ULTRAFINE III SHORT PEN 31G X 8 MM MISC Inject under the skin in the morning 30 each 0    colestipol (COLESTID) 1 g tablet TAKE 3 TABLETS (3 G TOTAL) BY MOUTH 2 (TWO) TIMES A  tablet 2    dapagliflozin (Farxiga) 5 MG TABS Take 1 tablet (5 mg total) by mouth daily 90 tablet 0    fenofibrate (TRICOR) 145 mg tablet TAKE 1 TABLET BY MOUTH EVERY DAY 90 tablet 0    ferrous sulfate 325 (65 Fe) mg tablet Take 325 mg by mouth 2 (two) times a day with meals      gabapentin (NEURONTIN) 600 MG tablet Take 1 tablet (600 mg total) by mouth 3 (three) times a day 90 tablet 0    glimepiride (AMARYL) 1 mg tablet Take 1 tablet (1 mg total) by mouth daily with breakfast 90 tablet 0    glucose blood (OneTouch Verio) test strip Use as instructed 100 strip 3    Lancets (accu-chek soft touch) lancets bs check qid 100 each 2    meloxicam (Mobic) 7.5 mg tablet Take 1 tablet (7.5 mg total) by mouth daily as needed for moderate pain 30 tablet 5    metFORMIN (GLUCOPHAGE) 1000 MG tablet Take 1 tablet (1,000 mg total) by mouth 2 (two) times a day with meals for 5000 doses 180 tablet 0    methimazole (TAPAZOLE) 5 mg tablet Take 0.5 tablets (2.5 mg total) by mouth in the morning 45 tablet 2    mupirocin (BACTROBAN) 2 % ointment PLEASE SEE ATTACHED FOR DETAILED DIRECTIONS (Patient not taking: Reported on 7/24/2023)      omeprazole (PriLOSEC) 40 MG capsule Take 1 capsule (40 mg total) by mouth 2 (two) times a day 180 capsule 0    riTUXimab (Rituxan) injection       rosuvastatin (CRESTOR) 40 MG tablet Take 1 tablet (40 mg total) by mouth every evening 90 tablet 0    tamsulosin (FLOMAX) 0.4 mg Take 0.4 mg by mouth every evening      Tresiba FlexTouch 100 units/mL injection pen Inject up to 80 units daily 27 mL 4     No current facility-administered medications for this visit.      Past Medical History:   Diagnosis Date    Anemia     Aortic aneurysm (HCC)     BPH (benign prostatic hyperplasia)     CAD (coronary artery disease)     Chronic kidney disease     COPD (chronic obstructive pulmonary disease) (HCC)     Diabetes mellitus ([facility])     Dyslipidemia     Flu 2022    GERD (gastroesophageal reflux disease)     Graves disease     Hypertension     Iron deficiency anemia     Lactic acidosis     Lung cancer (HCC)     Lyme disease     Neuropathy     Pancytopenia (HCC)     Pneumonia 2022    Prostate cancer ([facility])     Rheumatoid arthritis ([facility])      Past Surgical History:   Procedure Laterality Date    BALLOON ANGIOPLASTY, ARTERY      CATARACT EXTRACTION, BILATERAL  2019    CLAVICLE SURGERY      TOE AMPUTATION Left 2018    Procedure: AMPUTATION GREAT TOE;  Surgeon: Papi Perales DPM;  Location: [facility] OR;  Service: Podiatry     Social History     Socioeconomic History    Marital status: /Civil Union     Spouse name: Not on file    Number of children: Not on file    Years of education: Not on file    Highest education level: Not on file   Occupational History    Not on file   Tobacco Use    Smoking status: Every Day     Packs/day: 0.50     Years: 50.00     Total pack years: 25.00     Types: Cigarettes     Start date:      Last attempt to quit: 2022     Years since quittin.8    Smokeless tobacco: Never   Vaping Use    Vaping Use: Never used   Substance and Sexual Activity    Alcohol use: No    Drug use: No    Sexual activity: Not on file   Other Topics Concern    Not on file   Social History Narrative    Not on file     Social Determinants of Health     Financial Resource Strain: Low Risk  (8/29/2023)    Overall Financial Resource Strain (CARDIA)     Difficulty of Paying Living Expenses: Not very hard   Food Insecurity: No Food Insecurity (12/22/2022)    Hunger Vital Sign     Worried About Running Out of Food in the Last Year: Never true     Ran Out of Food in the Last Year: Never true   Transportation Needs: No Transportation Needs (8/29/2023)    PRAPARE - Transportation     Lack of Transportation (Medical): No     Lack of Transportation (Non-Medical): No   Physical Activity: Not on file   Stress: Not on file   Social Connections: Not on file   Intimate Partner Violence: Not on file   Housing Stability: Low Risk  (12/22/2022)    Housing Stability Vital Sign     Unable to Pay for Housing in the Last Year: No     Number of Places Lived in the Last Year: 1     Unstable Housing in the Last Year: No     Family History   Problem Relation Age of Onset    No Known Problems Mother     Coronary artery disease Father     No Known Problems Sister     No Known Problems Brother     No Known Problems Maternal Grandmother     No Known Problems Maternal Grandfather     No Known Problems Paternal Grandmother     No Known Problems Paternal Grandfather     No Known Problems Maternal Aunt     No Known Problems Maternal Uncle     No Known Problems Paternal Aunt     No Known Problems Paternal Uncle     No Known Problems Cousin             PHYSICAL EXAM:    There were no vitals filed for this visit. General Appearance:   Alert and oriented x 3. Cooperative, and in no respiratory distress   HEENT:   Normocephalic, atraumatic, anicteric.      Neck:  Supple, symmetrical, trachea midline   Lungs:   Clear to auscultation bilaterally    Heart[de-identified]   Regular rate and rhythm   Abdomen:   Soft, non-tender, non-distended; normal bowel sounds; no masses, no organomegaly    Genitalia:   Deferred    Rectal:   Deferred    Extremities:  No cyanosis, clubbing or edema    Pulses:  2+ and symmetric all extremities Skin:  Skin color, texture, turgor normal, no rashes or lesions    Lymph nodes:  No palpable cervical or supraclavicular lymphadenopathy        Lab Results:   Results from last 6 Months   Lab Units 08/17/23  0844   WBC Thousand/uL 7.25   HEMOGLOBIN g/dL 12.8   HEMATOCRIT % 41.2   PLATELETS Thousands/uL 249   NEUTROS PCT % 72   LYMPHS PCT % 7*   MONOS PCT % 13*   EOS PCT % 6     Results from last 6 Months   Lab Units 08/17/23  0844   POTASSIUM mmol/L 4.1   CHLORIDE mmol/L 110*   CO2 mmol/L 28   BUN mg/dL 24   CREATININE mg/dL 1.48*   CALCIUM mg/dL 9.5   ALK PHOS U/L 53   ALT U/L 16   AST U/L 16               Imaging Studies:   Colonoscopy    Result Date: 3/22/2023  Impression: Diverticulosis Normal colon mucosa 4 Polyps status post cold snare polypectomy Internal hemorrhoids RECOMMENDATION: Await pathology Continue Benefiber every day Cholestyramine 1-2 packets daily  Inessa Carroll III, MD       ASSESSMENT and PLAN:      1) Loose stools, fecal incontinence and dysuria - Overall tolerates colestipol better than Questran. Patient could be at risk for UTI if he has been having fecal smearing.  - We will send patient for urinalysis with culture, and will alert his PCP regarding symptoms  - Continue colestipol 2 pills with lunch or dinner, and additional 2 pills before bedtime  - Start Lomotil before bedtime, and skip dosing if constipation occurs  - Patient agrees with above plan  - If his symptoms do not continue to improve, could refer to colorectal surgery or pelvic floor therapy      Follow up in 3 months. Portions of the record may have been created with voice recognition software. Occasional wrong word or "sound a like" substitutions may have occurred due to the inherent limitations of voice recognition software. Read the chart carefully and recognize, using context, where substitutions have occurred.

## 2023-10-25 NOTE — TELEPHONE ENCOUNTER
Called and spoke to patient. Gave patient message as per Brittaney.  Patient voiced understanding and had no other questions or concerns

## 2023-10-25 NOTE — TELEPHONE ENCOUNTER
Please let patient know that his urinalysis is not consistent with urinary tract infection. Please have him follow-up with his PCP, Yamilex Oquendo. Thank you.

## 2023-10-28 PROBLEM — Z00.00 MEDICARE ANNUAL WELLNESS VISIT, SUBSEQUENT: Status: RESOLVED | Noted: 2023-08-29 | Resolved: 2023-10-28

## 2023-10-31 DIAGNOSIS — E11.22 TYPE 2 DIABETES MELLITUS WITH STAGE 3A CHRONIC KIDNEY DISEASE, WITH LONG-TERM CURRENT USE OF INSULIN (HCC): ICD-10-CM

## 2023-10-31 DIAGNOSIS — Z79.4 TYPE 2 DIABETES MELLITUS WITH STAGE 3A CHRONIC KIDNEY DISEASE, WITH LONG-TERM CURRENT USE OF INSULIN (HCC): ICD-10-CM

## 2023-10-31 DIAGNOSIS — N18.31 TYPE 2 DIABETES MELLITUS WITH STAGE 3A CHRONIC KIDNEY DISEASE, WITH LONG-TERM CURRENT USE OF INSULIN (HCC): ICD-10-CM

## 2023-11-01 RX ORDER — LANCETS
EACH MISCELLANEOUS
Qty: 100 EACH | Refills: 3 | Status: SHIPPED | OUTPATIENT
Start: 2023-11-01

## 2023-11-01 RX ORDER — PEN NEEDLE, DIABETIC 31 GX5/16"
NEEDLE, DISPOSABLE MISCELLANEOUS DAILY
Qty: 30 EACH | Refills: 0 | Status: SHIPPED | OUTPATIENT
Start: 2023-11-01

## 2023-11-10 ENCOUNTER — VBI (OUTPATIENT)
Dept: ADMINISTRATIVE | Facility: OTHER | Age: 73
End: 2023-11-10

## 2023-11-17 ENCOUNTER — TELEPHONE (OUTPATIENT)
Dept: FAMILY MEDICINE CLINIC | Facility: CLINIC | Age: 73
End: 2023-11-17

## 2023-11-17 DIAGNOSIS — N18.31 TYPE 2 DIABETES MELLITUS WITH STAGE 3A CHRONIC KIDNEY DISEASE, WITH LONG-TERM CURRENT USE OF INSULIN (HCC): Primary | ICD-10-CM

## 2023-11-17 DIAGNOSIS — E78.5 HYPERLIPIDEMIA, UNSPECIFIED HYPERLIPIDEMIA TYPE: ICD-10-CM

## 2023-11-17 DIAGNOSIS — Z79.4 TYPE 2 DIABETES MELLITUS WITH STAGE 3A CHRONIC KIDNEY DISEASE, WITH LONG-TERM CURRENT USE OF INSULIN (HCC): Primary | ICD-10-CM

## 2023-11-17 DIAGNOSIS — J44.9 CHRONIC OBSTRUCTIVE PULMONARY DISEASE, UNSPECIFIED COPD TYPE (HCC): ICD-10-CM

## 2023-11-17 DIAGNOSIS — E11.22 TYPE 2 DIABETES MELLITUS WITH STAGE 3A CHRONIC KIDNEY DISEASE, WITH LONG-TERM CURRENT USE OF INSULIN (HCC): Primary | ICD-10-CM

## 2023-11-17 RX ORDER — ROSUVASTATIN CALCIUM 40 MG/1
40 TABLET, COATED ORAL EVERY EVENING
Qty: 90 TABLET | Refills: 0 | Status: SHIPPED | OUTPATIENT
Start: 2023-11-17

## 2023-11-17 RX ORDER — LANCETS 33 GAUGE
EACH MISCELLANEOUS
Qty: 200 EACH | Refills: 3 | Status: SHIPPED | OUTPATIENT
Start: 2023-11-17

## 2023-11-17 RX ORDER — UMECLIDINIUM BROMIDE AND VILANTEROL TRIFENATATE 62.5; 25 UG/1; UG/1
1 POWDER RESPIRATORY (INHALATION) DAILY
Qty: 60 EACH | Refills: 0 | Status: SHIPPED | OUTPATIENT
Start: 2023-11-17 | End: 2024-02-15

## 2023-11-17 RX ORDER — LANCETS 30 GAUGE
EACH MISCELLANEOUS
Qty: 1 KIT | Refills: 0 | Status: SHIPPED | OUTPATIENT
Start: 2023-11-17

## 2023-11-17 RX ORDER — BLOOD-GLUCOSE METER
EACH MISCELLANEOUS
Qty: 200 EACH | Refills: 3 | Status: SHIPPED | OUTPATIENT
Start: 2023-11-17

## 2023-11-17 NOTE — TELEPHONE ENCOUNTER
Patient called in regards tfor a new script for  the One Touch Delica Plus. The  one that was previously sent Accu-Check his Insurance doesn't cover it.   Thanks

## 2023-11-21 ENCOUNTER — OFFICE VISIT (OUTPATIENT)
Age: 73
End: 2023-11-21
Payer: COMMERCIAL

## 2023-11-21 VITALS
HEIGHT: 70 IN | WEIGHT: 194 LBS | SYSTOLIC BLOOD PRESSURE: 114 MMHG | DIASTOLIC BLOOD PRESSURE: 66 MMHG | BODY MASS INDEX: 27.77 KG/M2

## 2023-11-21 DIAGNOSIS — Z79.4 TYPE 2 DIABETES MELLITUS WITHOUT COMPLICATION, WITH LONG-TERM CURRENT USE OF INSULIN (HCC): ICD-10-CM

## 2023-11-21 DIAGNOSIS — E11.9 TYPE 2 DIABETES MELLITUS WITHOUT COMPLICATION, WITH LONG-TERM CURRENT USE OF INSULIN (HCC): ICD-10-CM

## 2023-11-21 DIAGNOSIS — E05.00 GRAVES DISEASE: ICD-10-CM

## 2023-11-21 DIAGNOSIS — Z79.4 TYPE 2 DIABETES MELLITUS WITH HYPERGLYCEMIA, WITH LONG-TERM CURRENT USE OF INSULIN (HCC): Primary | ICD-10-CM

## 2023-11-21 DIAGNOSIS — E11.65 TYPE 2 DIABETES MELLITUS WITH HYPERGLYCEMIA, WITH LONG-TERM CURRENT USE OF INSULIN (HCC): Primary | ICD-10-CM

## 2023-11-21 PROCEDURE — 99214 OFFICE O/P EST MOD 30 MIN: CPT | Performed by: INTERNAL MEDICINE

## 2023-11-21 RX ORDER — GLIMEPIRIDE 1 MG/1
2 TABLET ORAL
Qty: 180 TABLET | Refills: 1 | Status: SHIPPED | OUTPATIENT
Start: 2023-11-21 | End: 2023-11-21 | Stop reason: SDUPTHER

## 2023-11-21 RX ORDER — GLIMEPIRIDE 1 MG/1
2 TABLET ORAL
Qty: 180 TABLET | Refills: 1 | Status: SHIPPED | OUTPATIENT
Start: 2023-11-21

## 2023-11-21 NOTE — PROGRESS NOTES
Yasmine Up 72 y.o. male MRN: [id number]    Encounter: [id number]      Assessment/Plan     Assessment: This is a 72y.o.-year-old male with h/o lung/ prostate cancer, HTN, dyslipidemia , COPD for follow up on    1-T2DM : he monitors his BS x2/d. FBS -100 liana and mid day=160. He has been getting steroid shots for his arthritis in past few weeks. His A1c has dropped to 8.2% but he is suffering from dysuria ascribed to fungus infection and he has urine incontinence also. Knowing this I prefer to take him off farxiga. GFR of 57. No proteinuria. 2- h/o Graves disease on small dose of methimazole and seems euthyroid. last TSH (08/23) wnl. Plan:  Stop Farxiga  Increase glimipiride to 2 mg /d  Rest of meds same  RTV in 4 months with A1c, TSH, lipid panel, BMP    CC: Diabetes    History of Present Illness     HPI:  T2DM with no h/o pancreatitis since 2001 and Graves disease ( 2016)     Review of Systems   Constitutional:  Negative for appetite change, fatigue and unexpected weight change. HENT:  Negative for trouble swallowing. Eyes:  Negative for visual disturbance. Respiratory:  Negative for cough, chest tightness and shortness of breath. Cardiovascular:  Negative for chest pain, palpitations and leg swelling. Gastrointestinal:  Negative for blood in stool, constipation, diarrhea, nausea and vomiting. Endocrine: Negative for polyuria. Genitourinary:  Positive for dysuria. Negative for frequency. Skin:  Negative for rash and wound. Neurological:  Negative for weakness, numbness and headaches. Psychiatric/Behavioral:  Negative for confusion.         Historical Information   Past Medical History:   Diagnosis Date    Anemia     Aortic aneurysm (HCC)     BPH (benign prostatic hyperplasia)     CAD (coronary artery disease)     Chronic kidney disease     COPD (chronic obstructive pulmonary disease) ([facility])     Diabetes mellitus ([facility])     Dyslipidemia     Flu 12/2022    GERD (gastroesophageal reflux disease)     Graves disease     Hypertension     Iron deficiency anemia     Lactic acidosis     Lung cancer (HCC)     Lyme disease     Neuropathy     Pancytopenia ([address])     Pneumonia 2022    Prostate cancer ([address])     Rheumatoid arthritis ([address])      Past Surgical History:   Procedure Laterality Date    BALLOON ANGIOPLASTY, ARTERY      CATARACT EXTRACTION, BILATERAL  2019    CLAVICLE SURGERY      TOE AMPUTATION Left 2018    Procedure: AMPUTATION GREAT TOE;  Surgeon: Le Skaggs DPM;  Location: [facility];  Service: Podiatry     Social History   Social History     Substance and Sexual Activity   Alcohol Use No     Social History     Substance and Sexual Activity   Drug Use No     Social History     Tobacco Use   Smoking Status Every Day    Packs/day: 0.50    Years: 50.00    Total pack years: 25.00    Types: Cigarettes    Start date:     Last attempt to quit: 2022    Years since quittin.8   Smokeless Tobacco Never     Family History:   Family History   Problem Relation Age of Onset    No Known Problems Mother     Coronary artery disease Father     No Known Problems Sister     No Known Problems Brother     No Known Problems Maternal Grandmother     No Known Problems Maternal Grandfather     No Known Problems Paternal Grandmother     No Known Problems Paternal Grandfather     No Known Problems Maternal Aunt     No Known Problems Maternal Uncle     No Known Problems Paternal Aunt     No Known Problems Paternal Uncle     No Known Problems Cousin        Meds/Allergies   Current Outpatient Medications   Medication Sig Dispense Refill    albuterol (PROVENTIL HFA,VENTOLIN HFA) 90 mcg/act inhaler INHALE 2 PUFFS BY MOUTH EVERY 4 HOURS AS NEEDED FOR WHEEZE 8.5 g 1    amLODIPine (NORVASC) 5 mg tablet TAKE 1 TABLET (5 MG TOTAL) BY MOUTH DAILY.  90 tablet 0    Anoro Ellipta 62.5-25 MCG/ACT inhaler INHALE 1 PUFF DAILY 60 each 0    ascorbic acid (VITAMIN C) 250 mg tablet Take 250 mg by mouth 2 (two) times a day      aspirin (ECOTRIN LOW STRENGTH) 81 mg EC tablet 1 tab(s)      atenolol (TENORMIN) 25 mg tablet Take 1 tablet (25 mg total) by mouth daily 90 tablet 0    B-D ULTRAFINE III SHORT PEN 31G X 8 MM MISC Inject under the skin in the morning 30 each 0    colestipol (COLESTID) 1 g tablet TAKE 3 TABLETS (3 G TOTAL) BY MOUTH 2 (TWO) TIMES A  tablet 2    dapagliflozin (Farxiga) 5 MG TABS Take 1 tablet (5 mg total) by mouth daily 90 tablet 0    diphenoxylate-atropine (LOMOTIL) 2.5-0.025 mg per tablet Take 1 tablet by mouth daily at bedtime Skip dose if constipation occurs 30 tablet 0    fenofibrate (TRICOR) 145 mg tablet TAKE 1 TABLET BY MOUTH EVERY DAY 90 tablet 0    ferrous sulfate 325 (65 Fe) mg tablet Take 325 mg by mouth 2 (two) times a day with meals      gabapentin (NEURONTIN) 600 MG tablet Take 1 tablet (600 mg total) by mouth 3 (three) times a day 90 tablet 0    glimepiride (AMARYL) 1 mg tablet Take 1 tablet (1 mg total) by mouth daily with breakfast 90 tablet 0    glucose blood (OneTouch Verio) test strip Check blood sugars twice daily. Please substitute with appropriate alternative as covered by patient's insurance. Dx: E11.65 200 each 3    meloxicam (Mobic) 7.5 mg tablet Take 1 tablet (7.5 mg total) by mouth daily as needed for moderate pain 30 tablet 5    metFORMIN (GLUCOPHAGE) 1000 MG tablet Take 1 tablet (1,000 mg total) by mouth 2 (two) times a day with meals for 5000 doses 180 tablet 0    methimazole (TAPAZOLE) 5 mg tablet Take 0.5 tablets (2.5 mg total) by mouth in the morning 45 tablet 2    omeprazole (PriLOSEC) 40 MG capsule TAKE 1 CAPSULE BY MOUTH TWICE A  capsule 0    OneTouch Delica Lancets 09Z MISC Check blood sugars twice daily. Please substitute with appropriate alternative as covered by patient's insurance.  Dx: E11.65 200 each 3    riTUXimab (Rituxan) injection       rosuvastatin (CRESTOR) 40 MG tablet TAKE 1 TABLET BY MOUTH EVERY DAY IN THE EVENING 90 tablet 0 tamsulosin (FLOMAX) 0.4 mg Take 0.4 mg by mouth every evening      Tresiba FlexTouch 100 units/mL injection pen Inject up to 80 units daily 27 mL 4    Accu-Chek FastClix Lancets MISC Inject under the skin 2 (two) times a day 100 each 3    Blood Glucose Monitoring Suppl (OneTouch Verio Reflect) w/Device KIT Check blood sugars twice daily. Please substitute with appropriate alternative as covered by patient's insurance. Dx: E11.65 1 kit 0    glucose blood (OneTouch Verio) test strip Use as instructed 100 strip 3    Lancets (accu-chek soft touch) lancets bs check qid 100 each 2    mupirocin (BACTROBAN) 2 % ointment PLEASE SEE ATTACHED FOR DETAILED DIRECTIONS (Patient not taking: Reported on 7/24/2023)       No current facility-administered medications for this visit. Allergies   Allergen Reactions    Ace Inhibitors Swelling    Angiotensin Receptor Blockers Other (See Comments)     Elevated K+    Clindamycin Diarrhea and Nausea Only    Medical Tape Blisters    Plaquenil [Hydroxychloroquine] Swelling     Tongue swelling       Objective   Vitals: Blood pressure 114/66, height 5' 10" (1.778 m), weight 88 kg (194 lb). Physical Exam  Vitals reviewed. Constitutional:       Appearance: Normal appearance. HENT:      Head: Normocephalic and atraumatic. Eyes:      Extraocular Movements: Extraocular movements intact. Neck:      Thyroid: No thyromegaly. Vascular: No carotid bruit. Cardiovascular:      Rate and Rhythm: Normal rate and regular rhythm. Pulses: Normal pulses. Heart sounds: Normal heart sounds. No murmur heard. Pulmonary:      Effort: Pulmonary effort is normal.      Breath sounds: Normal breath sounds. No wheezing. Abdominal:      General: Bowel sounds are normal.      Palpations: Abdomen is soft. Tenderness: There is no abdominal tenderness. Musculoskeletal:      Cervical back: No tenderness. Right lower leg: No edema. Left lower leg: No edema.    Lymphadenopathy: Cervical: No cervical adenopathy. Skin:     General: Skin is dry. Findings: Rash present. Comments: Pre tibial rash   Neurological:      General: No focal deficit present. Mental Status: He is alert and oriented to person, place, and time. Psychiatric:         Mood and Affect: Mood normal.         Behavior: Behavior normal.         The history was obtained from the review of the chart, patient.     Lab Results:   Lab Results   Component Value Date/Time    Hemoglobin A1C 8.2 (H) 10/25/2023 10:10 AM    Hemoglobin A1C 8.5 (H) 08/17/2023 08:44 AM    Hemoglobin A1C 8.0 (H) 07/20/2023 12:41 PM    WBC 7.25 08/17/2023 08:44 AM    WBC 6.28 01/16/2023 12:01 PM    WBC 14.84 (H) 12/23/2022 05:55 AM    Hemoglobin 12.8 08/17/2023 08:44 AM    Hemoglobin 12.2 01/16/2023 12:01 PM    Hemoglobin 12.4 12/23/2022 05:55 AM    Hematocrit 41.2 08/17/2023 08:44 AM    Hematocrit 38.8 01/16/2023 12:01 PM    Hematocrit 39.1 12/23/2022 05:55 AM    MCV 84 08/17/2023 08:44 AM    MCV 86 01/16/2023 12:01 PM    MCV 85 12/23/2022 05:55 AM    Platelets 903 92/96/4735 08:44 AM    Platelets 979 45/88/9636 12:01 PM    Platelets 168 (L) 86/70/2086 05:55 AM    BUN 29 (H) 10/25/2023 10:10 AM    BUN 24 08/17/2023 08:44 AM    BUN 29 (H) 03/07/2023 09:21 AM    Potassium 4.6 10/25/2023 10:10 AM    Potassium 4.1 08/17/2023 08:44 AM    Potassium 4.2 03/07/2023 09:21 AM    Chloride 105 10/25/2023 10:10 AM    Chloride 110 (H) 08/17/2023 08:44 AM    Chloride 107 03/07/2023 09:21 AM    CO2 25 10/25/2023 10:10 AM    CO2 28 08/17/2023 08:44 AM    CO2 25 03/07/2023 09:21 AM    Creatinine 1.25 10/25/2023 10:10 AM    Creatinine 1.48 (H) 08/17/2023 08:44 AM    Creatinine 1.26 03/07/2023 09:21 AM    AST 9 (L) 10/25/2023 10:10 AM    AST 16 08/17/2023 08:44 AM    AST 12 01/16/2023 12:01 PM    ALT 5 (L) 10/25/2023 10:10 AM    ALT 16 08/17/2023 08:44 AM    ALT 16 01/16/2023 12:01 PM    Total Protein 7.5 10/25/2023 10:10 AM    Total Protein 7.4 08/17/2023 08:44 AM    Total Protein 7.1 01/16/2023 12:01 PM    Albumin 3.9 10/25/2023 10:10 AM    Albumin 3.5 08/17/2023 08:44 AM    Albumin 2.9 (L) 03/07/2023 09:21 AM    HDL, Direct 23 (L) 08/17/2023 08:44 AM    HDL, Direct 27 (L) 03/07/2023 09:21 AM    Triglycerides 178 (H) 08/17/2023 08:44 AM    Triglycerides 106 03/07/2023 09:21 AM           Imaging Studies: I have personally reviewed pertinent reports. Portions of the record may have been created with voice recognition software. Occasional wrong word or "sound a like" substitutions may have occurred due to the inherent limitations of voice recognition software. Read the chart carefully and recognize, using context, where substitutions have occurred.

## 2023-11-29 DIAGNOSIS — M06.9 RHEUMATOID ARTHRITIS, INVOLVING UNSPECIFIED SITE, UNSPECIFIED WHETHER RHEUMATOID FACTOR PRESENT (HCC): Chronic | ICD-10-CM

## 2023-11-29 DIAGNOSIS — G62.9 NEUROPATHY: Chronic | ICD-10-CM

## 2023-11-29 RX ORDER — GABAPENTIN 600 MG/1
600 TABLET ORAL 3 TIMES DAILY
Qty: 90 TABLET | Refills: 0 | Status: SHIPPED | OUTPATIENT
Start: 2023-11-29

## 2023-12-08 ENCOUNTER — APPOINTMENT (EMERGENCY)
Dept: RADIOLOGY | Facility: HOSPITAL | Age: 73
End: 2023-12-08
Payer: COMMERCIAL

## 2023-12-08 ENCOUNTER — HOSPITAL ENCOUNTER (EMERGENCY)
Facility: HOSPITAL | Age: 73
Discharge: HOME | End: 2023-12-08
Attending: EMERGENCY MEDICINE
Payer: COMMERCIAL

## 2023-12-08 VITALS
RESPIRATION RATE: 22 BRPM | DIASTOLIC BLOOD PRESSURE: 66 MMHG | HEART RATE: 92 BPM | SYSTOLIC BLOOD PRESSURE: 111 MMHG | OXYGEN SATURATION: 92 % | TEMPERATURE: 98.1 F

## 2023-12-08 DIAGNOSIS — J44.1 COPD WITH ACUTE EXACERBATION (HCC): Primary | ICD-10-CM

## 2023-12-08 LAB
FLUAV RNA RESP QL NAA+PROBE: NEGATIVE
FLUBV RNA RESP QL NAA+PROBE: NEGATIVE
RSV RNA RESP QL NAA+PROBE: NEGATIVE
SARS-COV-2 RNA RESP QL NAA+PROBE: NEGATIVE

## 2023-12-08 PROCEDURE — 99284 EMERGENCY DEPT VISIT MOD MDM: CPT

## 2023-12-08 PROCEDURE — 99284 EMERGENCY DEPT VISIT MOD MDM: CPT | Performed by: PHYSICIAN ASSISTANT

## 2023-12-08 PROCEDURE — 87801 DETECT AGNT MULT DNA AMPLI: CPT | Performed by: PHYSICIAN ASSISTANT

## 2023-12-08 PROCEDURE — 71045 X-RAY EXAM CHEST 1 VIEW: CPT

## 2023-12-08 PROCEDURE — 0241U HB NFCT DS VIR RESP RNA 4 TRGT: CPT | Performed by: EMERGENCY MEDICINE

## 2023-12-08 RX ORDER — CEFUROXIME AXETIL 500 MG/1
500 TABLET ORAL EVERY 12 HOURS SCHEDULED
Qty: 20 TABLET | Refills: 0 | Status: SHIPPED | OUTPATIENT
Start: 2023-12-08 | End: 2023-12-18

## 2023-12-08 RX ORDER — PREDNISONE 5 MG/1
TABLET ORAL
Qty: 21 TABLET | Refills: 0 | Status: SHIPPED | OUTPATIENT
Start: 2023-12-08

## 2023-12-08 RX ORDER — CODEINE PHOSPHATE/GUAIFENESIN 10-100MG/5
5 LIQUID (ML) ORAL 3 TIMES DAILY PRN
Qty: 90 ML | Refills: 0 | Status: SHIPPED | OUTPATIENT
Start: 2023-12-08 | End: 2023-12-15

## 2023-12-08 NOTE — ED PROVIDER NOTES
History  Chief Complaint   Patient presents with    Cough     Pt c/o worsening cough that started yesterday      74-year-old male with past medical history significant for COPD, hypertension, diabetes presents emergency department chief complaint of cough with congestion. Onset reported as 1 day ago. Location of symptoms reported is the upper chest.  Quality is reported as dry repetitive cough. Associated symptoms: Denies fevers. Denies chest pain. Denies nausea or vomiting. Denies dizziness or syncope. Modifying factors: Patient reports cough is worse when he lies flat at night. Context: Patient reports he has baseline cough that he notices consistent with his COPD. He reports 1 day ago of the quality of his cough changed, and became a dry cough and was worse at night while lying flat. Denies any recent sick contacts. Has been taking his albuterol inhaler and Anoro Ellipta without any change in symptoms. Here today concerned for pneumonia or other causes for cough. Well appearing, talking in full sentences, no hypoxia on arrival.        History provided by:  Patient and relative   used: No    Cough  Associated symptoms: no chest pain, no fever, no rash and no shortness of breath        Prior to Admission Medications   Prescriptions Last Dose Informant Patient Reported? Taking? Accu-Chek FastClix Lancets MISC   No No   Sig: Inject under the skin 2 (two) times a day   Anoro Ellipta 62.5-25 MCG/ACT inhaler   No No   Sig: INHALE 1 PUFF DAILY   B-D ULTRAFINE III SHORT PEN 31G X 8 MM MISC   No No   Sig: Inject under the skin in the morning   Blood Glucose Monitoring Suppl (OneTouch Verio Reflect) w/Device KIT   No No   Sig: Check blood sugars twice daily. Please substitute with appropriate alternative as covered by patient's insurance.  Dx: E11.65   Lancets (accu-chek soft touch) lancets  Self No No   Sig: bs check qid   OneTouch Delica Lancets 33L MISC   No No   Sig: Check blood sugars twice daily. Please substitute with appropriate alternative as covered by patient's insurance. Dx: E11.65   Majo Dillon FlexTouch 100 units/mL injection pen   No No   Sig: Inject up to 80 units daily   albuterol (PROVENTIL HFA,VENTOLIN HFA) 90 mcg/act inhaler   No No   Sig: INHALE 2 PUFFS BY MOUTH EVERY 4 HOURS AS NEEDED FOR WHEEZE   amLODIPine (NORVASC) 5 mg tablet   No No   Sig: TAKE 1 TABLET (5 MG TOTAL) BY MOUTH DAILY. ascorbic acid (VITAMIN C) 250 mg tablet  Self Yes No   Sig: Take 250 mg by mouth 2 (two) times a day   aspirin (ECOTRIN LOW STRENGTH) 81 mg EC tablet  Self Yes No   Si tab(s)   atenolol (TENORMIN) 25 mg tablet   No No   Sig: Take 1 tablet (25 mg total) by mouth daily   colestipol (COLESTID) 1 g tablet   No No   Sig: TAKE 3 TABLETS (3 G TOTAL) BY MOUTH 2 (TWO) TIMES A DAY   diphenoxylate-atropine (LOMOTIL) 2.5-0.025 mg per tablet   No No   Sig: Take 1 tablet by mouth daily at bedtime Skip dose if constipation occurs   fenofibrate (TRICOR) 145 mg tablet   No No   Sig: TAKE 1 TABLET BY MOUTH EVERY DAY   ferrous sulfate 325 (65 Fe) mg tablet  Self Yes No   Sig: Take 325 mg by mouth 2 (two) times a day with meals   gabapentin (NEURONTIN) 600 MG tablet   No No   Sig: TAKE 1 TABLET BY MOUTH THREE TIMES A DAY   glimepiride (AMARYL) 1 mg tablet   No No   Sig: Take 2 tablets (2 mg total) by mouth daily with breakfast   glucose blood (OneTouch Verio) test strip   No No   Sig: Use as instructed   glucose blood (OneTouch Verio) test strip   No No   Sig: Check blood sugars twice daily. Please substitute with appropriate alternative as covered by patient's insurance.  Dx: E11.65   meloxicam (Mobic) 7.5 mg tablet   No No   Sig: Take 1 tablet (7.5 mg total) by mouth daily as needed for moderate pain   metFORMIN (GLUCOPHAGE) 1000 MG tablet   No No   Sig: Take 1 tablet (1,000 mg total) by mouth 2 (two) times a day with meals for 5000 doses   methimazole (TAPAZOLE) 5 mg tablet  Self No No   Sig: Take 0.5 tablets (2.5 mg total) by mouth in the morning   mupirocin (BACTROBAN) 2 % ointment  Self Yes No   Sig: PLEASE SEE ATTACHED FOR DETAILED DIRECTIONS   Patient not taking: Reported on 7/24/2023   omeprazole (PriLOSEC) 40 MG capsule   No No   Sig: TAKE 1 CAPSULE BY MOUTH TWICE A DAY   riTUXimab (Rituxan) injection  Self Yes No   rosuvastatin (CRESTOR) 40 MG tablet   No No   Sig: TAKE 1 TABLET BY MOUTH EVERY DAY IN THE EVENING   tamsulosin (FLOMAX) 0.4 mg   Yes No   Sig: Take 0.4 mg by mouth every evening      Facility-Administered Medications: None       Past Medical History:   Diagnosis Date    Anemia     Aortic aneurysm (HCC)     BPH (benign prostatic hyperplasia)     CAD (coronary artery disease)     Chronic kidney disease     COPD (chronic obstructive pulmonary disease) ([address])     Diabetes mellitus ([address])     Dyslipidemia     Flu 12/2022    GERD (gastroesophageal reflux disease)     Graves disease     Hypertension     Iron deficiency anemia     Lactic acidosis     Lung cancer ([address])     Lyme disease     Neuropathy     Pancytopenia ([address])     Pneumonia 12/2022    Prostate cancer ([address])     Rheumatoid arthritis ([address])        Past Surgical History:   Procedure Laterality Date    BALLOON ANGIOPLASTY, ARTERY  1996    CATARACT EXTRACTION, BILATERAL  2019    CLAVICLE SURGERY      TOE AMPUTATION Left 11/20/2018    Procedure: AMPUTATION GREAT TOE;  Surgeon: Rose Mary Mendez DPM;  Location: [facility];  Service: Podiatry       Family History   Problem Relation Age of Onset    No Known Problems Mother     Coronary artery disease Father     No Known Problems Sister     No Known Problems Brother     No Known Problems Maternal Grandmother     No Known Problems Maternal Grandfather     No Known Problems Paternal Grandmother     No Known Problems Paternal Grandfather     No Known Problems Maternal Aunt     No Known Problems Maternal Uncle     No Known Problems Paternal Aunt     No Known Problems Paternal Uncle     No Known Problems Cousin I have reviewed and agree with the history as documented. E-Cigarette/Vaping    E-Cigarette Use Never User      E-Cigarette/Vaping Substances    Nicotine No     THC No     CBD No     Flavoring No     Other No     Unknown No      Social History     Tobacco Use    Smoking status: Every Day     Packs/day: 0.50     Years: 50.00     Total pack years: 25.00     Types: Cigarettes     Start date:      Last attempt to quit: 2022     Years since quittin.9    Smokeless tobacco: Never   Vaping Use    Vaping Use: Never used   Substance Use Topics    Alcohol use: No    Drug use: No       Review of Systems   Constitutional:  Negative for fever. HENT:  Positive for congestion. Respiratory:  Positive for cough. Negative for chest tightness, shortness of breath and stridor. Cardiovascular:  Negative for chest pain. Musculoskeletal:  Negative for gait problem. Skin:  Negative for rash. Neurological:  Negative for seizures, syncope, facial asymmetry and numbness. All other systems reviewed and are negative. Physical Exam  Physical Exam  Vitals and nursing note reviewed. Constitutional:       General: He is not in acute distress. Appearance: Normal appearance. He is well-developed. He is not ill-appearing, toxic-appearing or diaphoretic. HENT:      Head: Normocephalic and atraumatic. Eyes:      Conjunctiva/sclera: Conjunctivae normal.      Pupils: Pupils are equal, round, and reactive to light. Neck:      Vascular: No JVD. Cardiovascular:      Rate and Rhythm: Normal rate and regular rhythm. Pulses: Normal pulses. Heart sounds: Normal heart sounds. No murmur heard. No friction rub. No gallop. Pulmonary:      Effort: Pulmonary effort is normal. No respiratory distress. Breath sounds: Normal breath sounds. No stridor. No wheezing or rales. Abdominal:      General: There is no distension. Palpations: Abdomen is soft. Tenderness:  There is no abdominal tenderness. There is no guarding or rebound. Musculoskeletal:         General: No tenderness or deformity. Normal range of motion. Cervical back: Normal range of motion and neck supple. Skin:     General: Skin is warm and dry. Capillary Refill: Capillary refill takes less than 2 seconds. Neurological:      General: No focal deficit present. Mental Status: He is alert and oriented to person, place, and time. Cranial Nerves: No cranial nerve deficit. Sensory: No sensory deficit. Motor: No weakness or abnormal muscle tone. Coordination: Coordination normal.      Gait: Gait normal.         Vital Signs  ED Triage Vitals [12/08/23 1034]   Temperature Pulse Respirations Blood Pressure SpO2   98.1 °F (36.7 °C) 92 22 111/66 92 %      Temp Source Heart Rate Source Patient Position - Orthostatic VS BP Location FiO2 (%)   Oral Monitor Sitting Left arm --      Pain Score       --           Vitals:    12/08/23 1034   BP: 111/66   Pulse: 92   Patient Position - Orthostatic VS: Sitting         Visual Acuity      ED Medications  Medications - No data to display    Diagnostic Studies  Results Reviewed       Procedure Component Value Units Date/Time    Bordetella pertussis / parapertussis PCR [[id number]] Collected: 12/08/23 1147    Lab Status: In process Specimen: Nares from Nose Updated: 12/08/23 1343    FLU/RSV/COVID - if FLU/RSV clinically relevant [[id number]]  (Normal) Collected: 12/08/23 1147    Lab Status: Final result Specimen: Nares from Nose Updated: 12/08/23 1231     SARS-CoV-2 Negative     INFLUENZA A PCR Negative     INFLUENZA B PCR Negative     RSV PCR Negative    Narrative:      FOR PEDIATRIC PATIENTS - copy/paste COVID Guidelines URL to browser: [email or web address]    SARS-CoV-2 assay is a Nucleic Acid Amplification assay intended for the  qualitative detection of nucleic acid from SARS-CoV-2 in nasopharyngeal  swabs.  Results are for the presumptive identification of SARS-CoV-2 RNA. Positive results are indicative of infection with SARS-CoV-2, the virus  causing COVID-19, but do not rule out bacterial infection or co-infection  with other viruses. Laboratories within the [facility] and its  territories are required to report all positive results to the appropriate  public health authorities. Negative results do not preclude SARS-CoV-2  infection and should not be used as the sole basis for treatment or other  patient management decisions. Negative results must be combined with  clinical observations, patient history, and epidemiological information. This test has not been FDA cleared or approved. This test has been authorized by FDA under an Emergency Use Authorization  (EUA). This test is only authorized for the duration of time the  declaration that circumstances exist justifying the authorization of the  emergency use of an in vitro diagnostic tests for detection of SARS-CoV-2  virus and/or diagnosis of COVID-19 infection under section 564(b)(1) of  the Act, 21 U. S.C. 323VBB-5(P)(5), unless the authorization is terminated  or revoked sooner. The test has been validated but independent review by FDA  and CLIA is pending. Test performed using [employer] GeneXpert: This RT-PCR assay targets N2,  a region unique to SARS-CoV-2. A conserved region in the E-gene was chosen  for pan-Sarbecovirus detection which includes SARS-CoV-2. According to CMS-2020-01-R, this platform meets the definition of high-throughput technology. XR chest 1 view portable   Final Result by Janusz Harris MD (12/08 1242)      No acute cardiopulmonary disease. Redemonstration of multiple lung nodules. Workstation performed: [id number]                    Procedures  Procedures         ED Course  ED Course as of 12/08/23 1452   Fri Dec 08, 2023   1236 COVID flu and RSV are negative.                                SBIRT 22yo+      Flowsheet Row Most Recent Value   Initial Alcohol Screen: US AUDIT-C     1. How often do you have a drink containing alcohol? 0 Filed at: 12/08/2023 1035   2. How many drinks containing alcohol do you have on a typical day you are drinking? 0 Filed at: 12/08/2023 1035   3b. FEMALE Any Age, or MALE 65+: How often do you have 4 or more drinks on one occassion? 0 Filed at: 12/08/2023 1035   Audit-C Score 0 Filed at: 12/08/2023 1035   GUSTABO: How many times in the past year have you. .. Used an illegal drug or used a prescription medication for non-medical reasons? Never Filed at: 12/08/2023 1035                      Medical Decision Making  ED Coarse: 72year old male with history of COPD presents with 1 day history of repetative dry cough that is different from the cough he gets from his COPD. Reports repetitive cough that starts and persists for 5 or more minutes continuously. DDX:  ddx includes but is not limited to:  COVID, pertussis, RSV, URI, flu, allergies, asthma, environmental exposures, foreign body, GERD, bronchitis, pneumonia, consider but doubt chf, pe, interstitial lung disease, pertussis. Initial ED Plan: covid, flu, rsv, pertussis, CXR    MDM:  I have reviewed the patient's vital signs, nursing notes, and other relevant ancillary testing/information. I have had a detailed discussion with the patient regarding the historical points, examination findings, and any diagnostic results    Results:  Reviewed at bedside: covid/flu/rsv negative. My initial chest x-ray interpretation: No acute infiltrate or pneumothorax. Chronic changes present. Sharla Herr given quality of repetitive cough pertussis screen was sent and pending at time of discharge    ED Final Assessment:  1) cough secondary to acute COPD exacerbation, with concern for early or subacute pneumonia. Xray negative but symptom onset 1 day and clinical symptoms may precede xray findings.    Well appearing, normal vitals, no fevers or hypoxia on exam.  Covid/flu/rsv negative. Pertussis swab pending at discharge. Will treat with prednisone taper, guaifenesin with codeine for cough suppression Ceftin for COPD, possible early treatment of pneumonia. Discussed continued use of inhalers at home. Instructed to closely monitor symptoms and follow-up with primary care physician for recheck in 3 to 5 days. I discussed diagnosis and treatment plan with patient at bedside. Extended discussion with patient regarding the diagnosis, pathophysiology, expectant coarse and treatment plan. Instructed to follow up with pcp in 3-5 days. Reviewed reasons to return to ed. Patient verbalized understanding of diagnosis and agreement with discharge plan of care as well as understanding of reasons to return to ed. Amount and/or Complexity of Data Reviewed  Labs: ordered. Radiology: ordered. Risk  OTC drugs. Prescription drug management. Disposition  Final diagnoses:   COPD with acute exacerbation ([address])     Time reflects when diagnosis was documented in both MDM as applicable and the Disposition within this note       Time User Action Codes Description Comment    12/8/2023 12:45 PM Dayana Loredo Add [J44.1] COPD with acute exacerbation [facility])           ED Disposition       ED Disposition   Discharge    Condition   Stable    Date/Time   Fri Dec 8, 2023 1245    [address] [facility] discharge to home/self care.                    Follow-up Information       Follow up With Specialties Details Why Contact Info Additional Information    Corbin Seymour, Roe8 Delilah Anderson, Nurse Practitioner Call in 3 days for further evaluation of symptoms [address] [facility] [address] [facility]  [address] [facility] Emergency Department Emergency Medicine Go to  If symptoms worsen [address] Emergency Department, [address] [place] [address] [place], [place], [address]            Discharge Medication List as of 12/8/2023 12:47 PM        START taking these medications    Details   cefuroxime (CEFTIN) 500 mg tablet Take 1 tablet (500 mg total) by mouth every 12 (twelve) hours for 10 days, Starting Fri 12/8/2023, Until Mon 12/18/2023, Normal      guaifenesin-codeine (GUAIFENESIN AC) 100-10 MG/5ML liquid Take 5 mL by mouth 3 (three) times a day as needed for cough or congestion for up to 7 days, Starting Fri 12/8/2023, Until Fri 12/15/2023 at 2359, Normal      predniSONE 5 mg tablet 40 mg PO day 1, then 30 mg PO day 2, 20 mg PO day 3, 10 mg PO day 4, 5 mg PO day 5 then stop, Normal           CONTINUE these medications which have NOT CHANGED    Details   ! ! Accu-Chek FastClix Lancets MISC Inject under the skin 2 (two) times a day, Starting Wed 11/1/2023, Normal      albuterol (PROVENTIL HFA,VENTOLIN HFA) 90 mcg/act inhaler INHALE 2 PUFFS BY MOUTH EVERY 4 HOURS AS NEEDED FOR WHEEZE, Normal      amLODIPine (NORVASC) 5 mg tablet TAKE 1 TABLET (5 MG TOTAL) BY MOUTH DAILY. , Starting Sat 10/14/2023, Normal      Anoro Ellipta 62.5-25 MCG/ACT inhaler INHALE 1 PUFF DAILY, Starting Fri 11/17/2023, Until Thu 2/15/2024, Normal      ascorbic acid (VITAMIN C) 250 mg tablet Take 250 mg by mouth 2 (two) times a day, Historical Med      aspirin (ECOTRIN LOW STRENGTH) 81 mg EC tablet 1 tab(s), Historical Med      atenolol (TENORMIN) 25 mg tablet Take 1 tablet (25 mg total) by mouth daily, Starting Tue 7/18/2023, Normal      B-D ULTRAFINE III SHORT PEN 31G X 8 MM MISC Inject under the skin in the morning, Starting Wed 11/1/2023, Normal      Blood Glucose Monitoring Suppl (OneTouch Verio Reflect) w/Device KIT Check blood sugars twice daily. Please substitute with appropriate alternative as covered by patient's insurance.  Dx: E11.65, Normal      colestipol (COLESTID) 1 g tablet TAKE 3 TABLETS (3 G TOTAL) BY MOUTH 2 (TWO) TIMES A DAY, Starting Mon 8/28/2023, Until Tue 11/21/2023, Normal      diphenoxylate-atropine (LOMOTIL) 2.5-0.025 mg per tablet Take 1 tablet by mouth daily at bedtime Skip dose if constipation occurs, Starting Wed 10/25/2023, Normal      fenofibrate (TRICOR) 145 mg tablet TAKE 1 TABLET BY MOUTH EVERY DAY, Starting Thu 9/28/2023, Normal      ferrous sulfate 325 (65 Fe) mg tablet Take 325 mg by mouth 2 (two) times a day with meals, Historical Med      gabapentin (NEURONTIN) 600 MG tablet TAKE 1 TABLET BY MOUTH THREE TIMES A DAY, Starting Wed 11/29/2023, Normal      glimepiride (AMARYL) 1 mg tablet Take 2 tablets (2 mg total) by mouth daily with breakfast, Starting Tue 11/21/2023, Normal      !! glucose blood (OneTouch Verio) test strip Use as instructed, Normal      !! glucose blood (OneTouch Verio) test strip Check blood sugars twice daily. Please substitute with appropriate alternative as covered by patient's insurance. Dx: E11.65, Normal      !! Lancets (accu-chek soft touch) lancets bs check qid, Normal      meloxicam (Mobic) 7.5 mg tablet Take 1 tablet (7.5 mg total) by mouth daily as needed for moderate pain, Starting Thu 6/1/2023, Normal      metFORMIN (GLUCOPHAGE) 1000 MG tablet Take 1 tablet (1,000 mg total) by mouth 2 (two) times a day with meals for 5000 doses, Starting Wed 9/27/2023, Until Thu 8/1/2030, Normal      methimazole (TAPAZOLE) 5 mg tablet Take 0.5 tablets (2.5 mg total) by mouth in the morning, Starting Mon 3/20/2023, Until Tue 11/21/2023, Fill Later      mupirocin (BACTROBAN) 2 % ointment PLEASE SEE ATTACHED FOR DETAILED DIRECTIONS, Historical Med      omeprazole (PriLOSEC) 40 MG capsule TAKE 1 CAPSULE BY MOUTH TWICE A DAY, Starting Wed 10/25/2023, Normal      !! OneTouch Delica Lancets 49V MISC Check blood sugars twice daily. Please substitute with appropriate alternative as covered by patient's insurance.  Dx: E11.65, Normal      riTUXimab (Rituxan) injection Starting Wed 5/10/2023, Historical Med      rosuvastatin (CRESTOR) 40 MG tablet TAKE 1 TABLET BY MOUTH EVERY DAY IN THE EVENING, Starting Fri 11/17/2023, Normal      tamsulosin (FLOMAX) 0.4 mg Take 0.4 mg by mouth every evening, Starting Tue 7/18/2023, Historical Med      Tresiba FlexTouch 100 units/mL injection pen Inject up to 80 units daily, Normal       !! - Potential duplicate medications found. Please discuss with provider. No discharge procedures on file.     PDMP Review       None            ED Provider  Electronically Signed by             Mallory Fisher PA-C  12/08/23 2814

## 2023-12-10 DIAGNOSIS — E11.22 TYPE 2 DIABETES MELLITUS WITH STAGE 3A CHRONIC KIDNEY DISEASE, WITH LONG-TERM CURRENT USE OF INSULIN (HCC): ICD-10-CM

## 2023-12-10 DIAGNOSIS — Z79.4 TYPE 2 DIABETES MELLITUS WITH STAGE 3A CHRONIC KIDNEY DISEASE, WITH LONG-TERM CURRENT USE OF INSULIN (HCC): ICD-10-CM

## 2023-12-10 DIAGNOSIS — N18.31 TYPE 2 DIABETES MELLITUS WITH STAGE 3A CHRONIC KIDNEY DISEASE, WITH LONG-TERM CURRENT USE OF INSULIN (HCC): ICD-10-CM

## 2023-12-11 ENCOUNTER — VBI (OUTPATIENT)
Dept: FAMILY MEDICINE CLINIC | Facility: CLINIC | Age: 73
End: 2023-12-11

## 2023-12-11 LAB
B PARAPERT DNA SPEC QL NAA+PROBE: NOT DETECTED
B PERT DNA SPEC QL NAA+PROBE: NOT DETECTED

## 2023-12-11 RX ORDER — LANCETS 33 GAUGE
EACH MISCELLANEOUS
Qty: 200 EACH | Refills: 0 | Status: SHIPPED | OUTPATIENT
Start: 2023-12-11 | End: 2023-12-14 | Stop reason: SDUPTHER

## 2023-12-11 RX ORDER — BLOOD-GLUCOSE METER
KIT MISCELLANEOUS
Qty: 1 KIT | Refills: 0 | Status: SHIPPED | OUTPATIENT
Start: 2023-12-11

## 2023-12-11 NOTE — TELEPHONE ENCOUNTER
12/11/23 12:05 PM    Patient contacted post ED visit, [facility] department spoke with patient/caregiver and outreach questions were declined. Thank you.   Gini Kowalski MA  [facility]

## 2023-12-14 DIAGNOSIS — N18.31 TYPE 2 DIABETES MELLITUS WITH STAGE 3A CHRONIC KIDNEY DISEASE, WITH LONG-TERM CURRENT USE OF INSULIN (HCC): ICD-10-CM

## 2023-12-14 DIAGNOSIS — E78.5 HYPERLIPIDEMIA, UNSPECIFIED HYPERLIPIDEMIA TYPE: ICD-10-CM

## 2023-12-14 DIAGNOSIS — E11.22 TYPE 2 DIABETES MELLITUS WITH STAGE 3A CHRONIC KIDNEY DISEASE, WITH LONG-TERM CURRENT USE OF INSULIN (HCC): ICD-10-CM

## 2023-12-14 DIAGNOSIS — Z79.4 TYPE 2 DIABETES MELLITUS WITH STAGE 3A CHRONIC KIDNEY DISEASE, WITH LONG-TERM CURRENT USE OF INSULIN (HCC): ICD-10-CM

## 2023-12-14 DIAGNOSIS — J44.9 CHRONIC OBSTRUCTIVE PULMONARY DISEASE, UNSPECIFIED COPD TYPE (HCC): ICD-10-CM

## 2023-12-14 DIAGNOSIS — C61 PROSTATE CANCER (HCC): Primary | ICD-10-CM

## 2023-12-14 RX ORDER — UMECLIDINIUM BROMIDE AND VILANTEROL TRIFENATATE 62.5; 25 UG/1; UG/1
1 POWDER RESPIRATORY (INHALATION) DAILY
Qty: 60 EACH | Refills: 0 | Status: SHIPPED | OUTPATIENT
Start: 2023-12-14 | End: 2024-03-13

## 2023-12-14 RX ORDER — TAMSULOSIN HYDROCHLORIDE 0.4 MG/1
0.4 CAPSULE ORAL EVERY EVENING
Qty: 30 CAPSULE | Refills: 3 | Status: SHIPPED | OUTPATIENT
Start: 2023-12-14

## 2023-12-14 RX ORDER — LANCETS 33 GAUGE
EACH MISCELLANEOUS
Qty: 200 EACH | Refills: 0 | Status: SHIPPED | OUTPATIENT
Start: 2023-12-14

## 2023-12-14 RX ORDER — FENOFIBRATE 145 MG/1
145 TABLET, COATED ORAL DAILY
Qty: 90 TABLET | Refills: 0 | Status: SHIPPED | OUTPATIENT
Start: 2023-12-14

## 2023-12-26 DIAGNOSIS — I25.10 CAD (CORONARY ARTERY DISEASE): ICD-10-CM

## 2023-12-26 RX ORDER — ATENOLOL 25 MG/1
25 TABLET ORAL DAILY
Qty: 90 TABLET | Refills: 0 | Status: SHIPPED | OUTPATIENT
Start: 2023-12-26

## 2023-12-30 DIAGNOSIS — Z79.4 TYPE 2 DIABETES MELLITUS WITHOUT COMPLICATION, WITH LONG-TERM CURRENT USE OF INSULIN (HCC): ICD-10-CM

## 2023-12-30 DIAGNOSIS — E11.9 TYPE 2 DIABETES MELLITUS WITHOUT COMPLICATION, WITH LONG-TERM CURRENT USE OF INSULIN (HCC): ICD-10-CM

## 2024-01-01 ENCOUNTER — HOSPITAL ENCOUNTER (INPATIENT)
Facility: HOSPITAL | Age: 74
LOS: 1 days | DRG: 637 | End: 2024-06-09
Attending: EMERGENCY MEDICINE | Admitting: ANESTHESIOLOGY
Payer: COMMERCIAL

## 2024-01-01 ENCOUNTER — APPOINTMENT (EMERGENCY)
Dept: RADIOLOGY | Facility: HOSPITAL | Age: 74
DRG: 637 | End: 2024-01-01
Payer: COMMERCIAL

## 2024-01-01 ENCOUNTER — APPOINTMENT (EMERGENCY)
Dept: CT IMAGING | Facility: HOSPITAL | Age: 74
DRG: 637 | End: 2024-01-01
Payer: COMMERCIAL

## 2024-01-01 VITALS
SYSTOLIC BLOOD PRESSURE: 95 MMHG | WEIGHT: 192 LBS | HEART RATE: 75 BPM | BODY MASS INDEX: 27.49 KG/M2 | TEMPERATURE: 97 F | HEIGHT: 70 IN | DIASTOLIC BLOOD PRESSURE: 55 MMHG | RESPIRATION RATE: 21 BRPM | OXYGEN SATURATION: 94 %

## 2024-01-01 DIAGNOSIS — C34.90 METASTATIC PRIMARY LUNG CANCER (HCC): ICD-10-CM

## 2024-01-01 DIAGNOSIS — N19 UREMIA: ICD-10-CM

## 2024-01-01 DIAGNOSIS — R79.1 ELEVATED INR: ICD-10-CM

## 2024-01-01 DIAGNOSIS — E80.6 HYPERBILIRUBINEMIA: ICD-10-CM

## 2024-01-01 DIAGNOSIS — G93.40 ACUTE ENCEPHALOPATHY: Primary | ICD-10-CM

## 2024-01-01 DIAGNOSIS — N17.9 AKI (ACUTE KIDNEY INJURY) (HCC): ICD-10-CM

## 2024-01-01 DIAGNOSIS — E16.2 HYPOGLYCEMIA: ICD-10-CM

## 2024-01-01 DIAGNOSIS — D64.9 ACUTE ANEMIA: ICD-10-CM

## 2024-01-01 DIAGNOSIS — K72.00 ACUTE LIVER FAILURE: ICD-10-CM

## 2024-01-01 LAB
ABO GROUP BLD BPU: NORMAL
ABO GROUP BLD: NORMAL
ALBUMIN SERPL BCP-MCNC: 2.8 G/DL (ref 3.5–5)
ALP SERPL-CCNC: 221 U/L (ref 34–104)
ALT SERPL W P-5'-P-CCNC: 89 U/L (ref 7–52)
AMMONIA PLAS-SCNC: 45 UMOL/L (ref 18–72)
ANION GAP SERPL CALCULATED.3IONS-SCNC: 14 MMOL/L (ref 4–13)
APAP SERPL-MCNC: <2 UG/ML (ref 10–20)
APTT PPP: 47 SECONDS (ref 23–37)
AST SERPL W P-5'-P-CCNC: 299 U/L (ref 13–39)
ATRIAL RATE: 79 BPM
BACTERIA UR QL AUTO: ABNORMAL /HPF
BASOPHILS # BLD AUTO: 0.01 THOUSANDS/ÂΜL (ref 0–0.1)
BASOPHILS NFR BLD AUTO: 0 % (ref 0–1)
BILIRUB DIRECT SERPL-MCNC: 3.67 MG/DL (ref 0–0.2)
BILIRUB SERPL-MCNC: 5.51 MG/DL (ref 0.2–1)
BILIRUB UR QL STRIP: ABNORMAL
BLD GP AB SCN SERPL QL: NEGATIVE
BNP SERPL-MCNC: 28 PG/ML (ref 0–100)
BPU ID: NORMAL
BUN SERPL-MCNC: 75 MG/DL (ref 5–25)
CALCIUM SERPL-MCNC: 8.1 MG/DL (ref 8.4–10.2)
CARDIAC TROPONIN I PNL SERPL HS: 5 NG/L
CHLORIDE SERPL-SCNC: 110 MMOL/L (ref 96–108)
CLARITY UR: ABNORMAL
CO2 SERPL-SCNC: 16 MMOL/L (ref 21–32)
COLOR UR: ABNORMAL
CREAT SERPL-MCNC: 3.84 MG/DL (ref 0.6–1.3)
CROSSMATCH: NORMAL
EOSINOPHIL # BLD AUTO: 0 THOUSAND/ÂΜL (ref 0–0.61)
EOSINOPHIL NFR BLD AUTO: 0 % (ref 0–6)
ERYTHROCYTE [DISTWIDTH] IN BLOOD BY AUTOMATED COUNT: 23.8 % (ref 11.6–15.1)
ERYTHROCYTE [SEDIMENTATION RATE] IN BLOOD: <1 MM/HOUR (ref 0–19)
ETHANOL SERPL-MCNC: <10 MG/DL
GFR SERPL CREATININE-BSD FRML MDRD: 14 ML/MIN/1.73SQ M
GLUCOSE SERPL-MCNC: 133 MG/DL (ref 65–140)
GLUCOSE SERPL-MCNC: 66 MG/DL (ref 65–140)
GLUCOSE SERPL-MCNC: 66 MG/DL (ref 65–140)
GLUCOSE SERPL-MCNC: 67 MG/DL (ref 65–140)
GLUCOSE SERPL-MCNC: 74 MG/DL (ref 65–140)
GLUCOSE SERPL-MCNC: 99 MG/DL (ref 65–140)
GLUCOSE UR STRIP-MCNC: NEGATIVE MG/DL
HCT VFR BLD AUTO: 25.1 % (ref 36.5–49.3)
HGB BLD-MCNC: 7.8 G/DL (ref 12–17)
HGB UR QL STRIP.AUTO: ABNORMAL
IMM GRANULOCYTES # BLD AUTO: 0.13 THOUSAND/UL (ref 0–0.2)
IMM GRANULOCYTES NFR BLD AUTO: 1 % (ref 0–2)
INR PPP: 3.56 (ref 0.84–1.19)
KETONES UR STRIP-MCNC: NEGATIVE MG/DL
LACTATE SERPL-SCNC: 2 MMOL/L (ref 0.5–2)
LEUKOCYTE ESTERASE UR QL STRIP: ABNORMAL
LIPASE SERPL-CCNC: 5507 U/L (ref 11–82)
LYMPHOCYTES # BLD AUTO: 0.09 THOUSANDS/ÂΜL (ref 0.6–4.47)
LYMPHOCYTES NFR BLD AUTO: 1 % (ref 14–44)
MCH RBC QN AUTO: 29.4 PG (ref 26.8–34.3)
MCHC RBC AUTO-ENTMCNC: 31.1 G/DL (ref 31.4–37.4)
MCV RBC AUTO: 95 FL (ref 82–98)
MONOCYTES # BLD AUTO: 0.79 THOUSAND/ÂΜL (ref 0.17–1.22)
MONOCYTES NFR BLD AUTO: 6 % (ref 4–12)
NEUTROPHILS # BLD AUTO: 11.41 THOUSANDS/ÂΜL (ref 1.85–7.62)
NEUTS SEG NFR BLD AUTO: 92 % (ref 43–75)
NITRITE UR QL STRIP: NEGATIVE
NON-SQ EPI CELLS URNS QL MICRO: ABNORMAL /HPF
NRBC BLD AUTO-RTO: 1 /100 WBCS
P AXIS: 53 DEGREES
PH UR STRIP.AUTO: 5.5 [PH]
PLATELET # BLD AUTO: 166 THOUSANDS/UL (ref 149–390)
PMV BLD AUTO: 11.8 FL (ref 8.9–12.7)
POTASSIUM SERPL-SCNC: 5.2 MMOL/L (ref 3.5–5.3)
PR INTERVAL: 146 MS
PROCALCITONIN SERPL-MCNC: 1.25 NG/ML
PROT SERPL-MCNC: 5.3 G/DL (ref 6.4–8.4)
PROT UR STRIP-MCNC: ABNORMAL MG/DL
PROTHROMBIN TIME: 36.7 SECONDS (ref 11.6–14.5)
QRS AXIS: 45 DEGREES
QRSD INTERVAL: 94 MS
QT INTERVAL: 418 MS
QTC INTERVAL: 479 MS
RBC # BLD AUTO: 2.65 MILLION/UL (ref 3.88–5.62)
RBC #/AREA URNS AUTO: ABNORMAL /HPF
RH BLD: POSITIVE
SALICYLATES SERPL-MCNC: <5 MG/DL (ref 3–20)
SODIUM SERPL-SCNC: 140 MMOL/L (ref 135–147)
SP GR UR STRIP.AUTO: 1.02 (ref 1–1.03)
SPECIMEN EXPIRATION DATE: NORMAL
T WAVE AXIS: 28 DEGREES
T4 FREE SERPL-MCNC: 0.64 NG/DL (ref 0.61–1.12)
TSH SERPL DL<=0.05 MIU/L-ACNC: 0.28 UIU/ML (ref 0.45–4.5)
UNIT DISPENSE STATUS: NORMAL
UNIT PRODUCT CODE: NORMAL
UNIT PRODUCT VOLUME: 350 ML
UNIT RH: NORMAL
UROBILINOGEN UR STRIP-ACNC: 4 MG/DL
VENTRICULAR RATE: 79 BPM
WBC # BLD AUTO: 12.43 THOUSAND/UL (ref 4.31–10.16)
WBC #/AREA URNS AUTO: ABNORMAL /HPF

## 2024-01-01 PROCEDURE — 80143 DRUG ASSAY ACETAMINOPHEN: CPT | Performed by: EMERGENCY MEDICINE

## 2024-01-01 PROCEDURE — 36415 COLL VENOUS BLD VENIPUNCTURE: CPT | Performed by: EMERGENCY MEDICINE

## 2024-01-01 PROCEDURE — 85730 THROMBOPLASTIN TIME PARTIAL: CPT | Performed by: EMERGENCY MEDICINE

## 2024-01-01 PROCEDURE — 71045 X-RAY EXAM CHEST 1 VIEW: CPT

## 2024-01-01 PROCEDURE — 85652 RBC SED RATE AUTOMATED: CPT | Performed by: EMERGENCY MEDICINE

## 2024-01-01 PROCEDURE — 83690 ASSAY OF LIPASE: CPT | Performed by: EMERGENCY MEDICINE

## 2024-01-01 PROCEDURE — 74176 CT ABD & PELVIS W/O CONTRAST: CPT

## 2024-01-01 PROCEDURE — 80048 BASIC METABOLIC PNL TOTAL CA: CPT | Performed by: EMERGENCY MEDICINE

## 2024-01-01 PROCEDURE — 86923 COMPATIBILITY TEST ELECTRIC: CPT

## 2024-01-01 PROCEDURE — 86900 BLOOD TYPING SEROLOGIC ABO: CPT | Performed by: EMERGENCY MEDICINE

## 2024-01-01 PROCEDURE — 96361 HYDRATE IV INFUSION ADD-ON: CPT

## 2024-01-01 PROCEDURE — 99238 HOSP IP/OBS DSCHRG MGMT 30/<: CPT | Performed by: NURSE PRACTITIONER

## 2024-01-01 PROCEDURE — 85610 PROTHROMBIN TIME: CPT | Performed by: EMERGENCY MEDICINE

## 2024-01-01 PROCEDURE — 87040 BLOOD CULTURE FOR BACTERIA: CPT | Performed by: EMERGENCY MEDICINE

## 2024-01-01 PROCEDURE — 96365 THER/PROPH/DIAG IV INF INIT: CPT

## 2024-01-01 PROCEDURE — 80076 HEPATIC FUNCTION PANEL: CPT | Performed by: EMERGENCY MEDICINE

## 2024-01-01 PROCEDURE — 84439 ASSAY OF FREE THYROXINE: CPT | Performed by: EMERGENCY MEDICINE

## 2024-01-01 PROCEDURE — 84443 ASSAY THYROID STIM HORMONE: CPT | Performed by: EMERGENCY MEDICINE

## 2024-01-01 PROCEDURE — 82077 ASSAY SPEC XCP UR&BREATH IA: CPT | Performed by: EMERGENCY MEDICINE

## 2024-01-01 PROCEDURE — 36430 TRANSFUSION BLD/BLD COMPNT: CPT

## 2024-01-01 PROCEDURE — 93005 ELECTROCARDIOGRAM TRACING: CPT

## 2024-01-01 PROCEDURE — 84145 PROCALCITONIN (PCT): CPT | Performed by: EMERGENCY MEDICINE

## 2024-01-01 PROCEDURE — 84484 ASSAY OF TROPONIN QUANT: CPT | Performed by: EMERGENCY MEDICINE

## 2024-01-01 PROCEDURE — 80179 DRUG ASSAY SALICYLATE: CPT | Performed by: EMERGENCY MEDICINE

## 2024-01-01 PROCEDURE — 71250 CT THORAX DX C-: CPT

## 2024-01-01 PROCEDURE — 82140 ASSAY OF AMMONIA: CPT | Performed by: EMERGENCY MEDICINE

## 2024-01-01 PROCEDURE — 83880 ASSAY OF NATRIURETIC PEPTIDE: CPT | Performed by: EMERGENCY MEDICINE

## 2024-01-01 PROCEDURE — 70450 CT HEAD/BRAIN W/O DYE: CPT

## 2024-01-01 PROCEDURE — 96367 TX/PROPH/DG ADDL SEQ IV INF: CPT

## 2024-01-01 PROCEDURE — 86901 BLOOD TYPING SEROLOGIC RH(D): CPT | Performed by: EMERGENCY MEDICINE

## 2024-01-01 PROCEDURE — P9016 RBC LEUKOCYTES REDUCED: HCPCS

## 2024-01-01 PROCEDURE — 82948 REAGENT STRIP/BLOOD GLUCOSE: CPT

## 2024-01-01 PROCEDURE — 99291 CRITICAL CARE FIRST HOUR: CPT | Performed by: EMERGENCY MEDICINE

## 2024-01-01 PROCEDURE — 99291 CRITICAL CARE FIRST HOUR: CPT

## 2024-01-01 PROCEDURE — 96368 THER/DIAG CONCURRENT INF: CPT

## 2024-01-01 PROCEDURE — 87086 URINE CULTURE/COLONY COUNT: CPT | Performed by: EMERGENCY MEDICINE

## 2024-01-01 PROCEDURE — 86850 RBC ANTIBODY SCREEN: CPT | Performed by: EMERGENCY MEDICINE

## 2024-01-01 PROCEDURE — 83605 ASSAY OF LACTIC ACID: CPT | Performed by: EMERGENCY MEDICINE

## 2024-01-01 PROCEDURE — 81001 URINALYSIS AUTO W/SCOPE: CPT | Performed by: EMERGENCY MEDICINE

## 2024-01-01 PROCEDURE — NC001 PR NO CHARGE: Performed by: NURSE PRACTITIONER

## 2024-01-01 PROCEDURE — 85025 COMPLETE CBC W/AUTO DIFF WBC: CPT | Performed by: EMERGENCY MEDICINE

## 2024-01-01 RX ORDER — DEXTROSE MONOHYDRATE 100 MG/ML
125 INJECTION, SOLUTION INTRAVENOUS CONTINUOUS
Status: DISCONTINUED | OUTPATIENT
Start: 2024-01-01 | End: 2024-01-01

## 2024-01-01 RX ORDER — DEXTROSE 50 % IN WATER 50 %
1 SYRINGE (ML) INTRAVENOUS ONCE
Status: COMPLETED | OUTPATIENT
Start: 2024-01-01 | End: 2024-01-01

## 2024-01-01 RX ORDER — LORAZEPAM 2 MG/ML
1 INJECTION INTRAMUSCULAR
Status: DISCONTINUED | OUTPATIENT
Start: 2024-01-01 | End: 2024-01-01 | Stop reason: HOSPADM

## 2024-01-01 RX ORDER — DEXTROSE 10 % IN WATER 10 %
250 INTRAVENOUS SOLUTION INTRAVENOUS ONCE
Status: COMPLETED | OUTPATIENT
Start: 2024-01-01 | End: 2024-01-01

## 2024-01-01 RX ORDER — GLYCOPYRROLATE 0.2 MG/ML
0.1 INJECTION INTRAMUSCULAR; INTRAVENOUS EVERY 4 HOURS PRN
Status: DISCONTINUED | OUTPATIENT
Start: 2024-01-01 | End: 2024-01-01 | Stop reason: HOSPADM

## 2024-01-01 RX ORDER — EPINEPHRINE 0.1 MG/ML
1 SYRINGE (ML) INJECTION ONCE
Status: COMPLETED | OUTPATIENT
Start: 2024-01-01 | End: 2024-01-01

## 2024-01-01 RX ORDER — HALOPERIDOL 5 MG/ML
0.5 INJECTION INTRAMUSCULAR EVERY 2 HOUR PRN
Status: DISCONTINUED | OUTPATIENT
Start: 2024-01-01 | End: 2024-01-01 | Stop reason: HOSPADM

## 2024-01-01 RX ADMIN — DEXTROSE 125 ML/HR: 10 SOLUTION INTRAVENOUS at 21:55

## 2024-01-01 RX ADMIN — SODIUM CHLORIDE, SODIUM LACTATE, POTASSIUM CHLORIDE, AND CALCIUM CHLORIDE 1000 ML: .6; .31; .03; .02 INJECTION, SOLUTION INTRAVENOUS at 21:55

## 2024-01-01 RX ADMIN — MORPHINE SULFATE 2 MG: 2 INJECTION, SOLUTION INTRAMUSCULAR; INTRAVENOUS at 07:38

## 2024-01-01 RX ADMIN — DEXTROSE 250 ML: 10 SOLUTION INTRAVENOUS at 22:37

## 2024-01-01 RX ADMIN — MORPHINE SULFATE 2 MG: 2 INJECTION, SOLUTION INTRAMUSCULAR; INTRAVENOUS at 04:10

## 2024-01-01 RX ADMIN — CEFTRIAXONE SODIUM 1000 MG: 10 INJECTION, POWDER, FOR SOLUTION INTRAVENOUS at 22:40

## 2024-01-01 RX ADMIN — DEXTROSE 125 ML/HR: 10 SOLUTION INTRAVENOUS at 01:37

## 2024-01-04 ENCOUNTER — OFFICE VISIT (OUTPATIENT)
Dept: GASTROENTEROLOGY | Facility: CLINIC | Age: 74
End: 2024-01-04
Payer: COMMERCIAL

## 2024-01-04 VITALS
OXYGEN SATURATION: 97 % | HEART RATE: 76 BPM | WEIGHT: 196.8 LBS | DIASTOLIC BLOOD PRESSURE: 70 MMHG | SYSTOLIC BLOOD PRESSURE: 130 MMHG | HEIGHT: 70 IN | BODY MASS INDEX: 28.18 KG/M2

## 2024-01-04 DIAGNOSIS — M62.89 PELVIC FLOOR DYSFUNCTION: ICD-10-CM

## 2024-01-04 DIAGNOSIS — R30.0 DYSURIA: Primary | ICD-10-CM

## 2024-01-04 DIAGNOSIS — I10 PRIMARY HYPERTENSION: ICD-10-CM

## 2024-01-04 DIAGNOSIS — M06.9 RHEUMATOID ARTHRITIS, INVOLVING UNSPECIFIED SITE, UNSPECIFIED WHETHER RHEUMATOID FACTOR PRESENT (HCC): Chronic | ICD-10-CM

## 2024-01-04 DIAGNOSIS — G62.9 NEUROPATHY: Chronic | ICD-10-CM

## 2024-01-04 PROCEDURE — 99214 OFFICE O/P EST MOD 30 MIN: CPT | Performed by: PHYSICIAN ASSISTANT

## 2024-01-04 RX ORDER — GLIMEPIRIDE 1 MG/1
1 TABLET ORAL
Qty: 90 TABLET | Refills: 0 | Status: SHIPPED | OUTPATIENT
Start: 2024-01-04

## 2024-01-04 RX ORDER — AMLODIPINE BESYLATE 5 MG/1
5 TABLET ORAL DAILY
Qty: 90 TABLET | Refills: 0 | Status: SHIPPED | OUTPATIENT
Start: 2024-01-04

## 2024-01-04 RX ORDER — GABAPENTIN 600 MG/1
600 TABLET ORAL 3 TIMES DAILY
Qty: 90 TABLET | Refills: 0 | Status: SHIPPED | OUTPATIENT
Start: 2024-01-04

## 2024-01-04 NOTE — PROGRESS NOTES
[facility]  Everardo Renae 73 y.o. male MRN: [id number]       CC: Medication review    HPI: Jaswinder is a pleasant 73-year-old male with history of type 2 diabetes, Graves' disease, COPD, previous prostate cancer, CKD and rheumatoid arthritis on Rituxan. Patient presents for follow-up and medication review. Patient reports less diarrhea, but reports pain in the perineal region extending into the tip of the penis. He follows with [facility] and prescribed an anti-fungal cream he reports, however no change in symptoms. Is interested in seeing another provider for second opinion.      Patient's colonoscopy was performed in March 2023 by Dr. Ross.  Diverticulosis noted, colon mucosa appeared normal, and 4 colon polyps removed.  These biopsied as tubular adenoma without dysplasia.  Random colon biopsy was negative for microscopic colitis.  He denies family history of colon cancer      Review of Systems:    CONSTITUTIONAL: Denies any fever, chills, or rigors. Good appetite, and no recent weight loss.  HEENT: No earache or tinnitus. Denies hearing loss or visual disturbances.  CARDIOVASCULAR: No chest pain or palpitations.   RESPIRATORY: Denies any cough, hemoptysis, shortness of breath or dyspnea on exertion.  GASTROINTESTINAL: As noted in the History of Present Illness.   GENITOURINARY: No problems with urination. Denies any hematuria or dysuria.  NEUROLOGIC: No dizziness or vertigo, denies headaches.   MUSCULOSKELETAL: Denies any muscle or joint pain.   SKIN: Denies skin rashes or itching.   ENDOCRINE: Denies excessive thirst. Denies intolerance to heat or cold.  PSYCHOSOCIAL: Denies depression or anxiety. Denies any recent memory loss.       Current Outpatient Medications   Medication Sig Dispense Refill    Accu-Chek FastClix Lancets MISC Inject under the skin 2 (two) times a day 100 each 3    albuterol (PROVENTIL HFA,VENTOLIN HFA) 90 mcg/act inhaler INHALE 2 PUFFS BY MOUTH EVERY 4 HOURS AS NEEDED  FOR WHEEZE 8.5 g 1    amLODIPine (NORVASC) 5 mg tablet TAKE 1 TABLET (5 MG TOTAL) BY MOUTH DAILY. 90 tablet 0    Anoro Ellipta 62.5-25 MCG/ACT inhaler INHALE 1 PUFF DAILY 60 each 0    ascorbic acid (VITAMIN C) 250 mg tablet Take 250 mg by mouth 2 (two) times a day      aspirin (ECOTRIN LOW STRENGTH) 81 mg EC tablet 1 tab(s)      atenolol (TENORMIN) 25 mg tablet TAKE 1 TABLET (25 MG TOTAL) BY MOUTH DAILY. 90 tablet 0    B-D ULTRAFINE III SHORT PEN 31G X 8 MM MISC Inject under the skin in the morning 30 each 0    Blood Glucose Monitoring Suppl (OneTouch Verio Reflect) w/Device KIT Check blood sugars twice daily. Please substitute with appropriate alternative as covered by patient's insurance. Dx: E11.65 1 kit 0    colestipol (COLESTID) 1 g tablet TAKE 3 TABLETS (3 G TOTAL) BY MOUTH 2 (TWO) TIMES A  tablet 2    diphenoxylate-atropine (LOMOTIL) 2.5-0.025 mg per tablet Take 1 tablet by mouth daily at bedtime Skip dose if constipation occurs 30 tablet 0    fenofibrate (TRICOR) 145 mg tablet TAKE 1 TABLET BY MOUTH EVERY DAY 90 tablet 0    ferrous sulfate 325 (65 Fe) mg tablet Take 325 mg by mouth 2 (two) times a day with meals      gabapentin (NEURONTIN) 600 MG tablet TAKE 1 TABLET BY MOUTH THREE TIMES A DAY 90 tablet 0    glimepiride (AMARYL) 1 mg tablet TAKE 1 TABLET BY MOUTH DAILY WITH BREAKFAST. 90 tablet 0    glucose blood (OneTouch Verio) test strip Use as instructed 100 strip 3    glucose blood (OneTouch Verio) test strip Check blood sugars twice daily. Please substitute with appropriate alternative as covered by patient's insurance. Dx: E11.65 200 each 3    Lancets (accu-chek soft touch) lancets bs check qid 100 each 2    meloxicam (Mobic) 7.5 mg tablet Take 1 tablet (7.5 mg total) by mouth daily as needed for moderate pain 30 tablet 5    metFORMIN (GLUCOPHAGE) 1000 MG tablet TAKE 1 TABLET (1,000 MG TOTAL) BY MOUTH 2 (TWO) TIMES A DAY WITH MEALS FOR 5000 DOSES 180 tablet 0    methimazole (TAPAZOLE) 5 mg  tablet Take 0.5 tablets (2.5 mg total) by mouth in the morning 45 tablet 2    mupirocin (BACTROBAN) 2 % ointment PLEASE SEE ATTACHED FOR DETAILED DIRECTIONS (Patient not taking: Reported on 7/24/2023)      omeprazole (PriLOSEC) 40 MG capsule TAKE 1 CAPSULE BY MOUTH TWICE A  capsule 0    OneTouch Delica Lancets 33G MISC Check blood sugars twice daily. Please substitute with appropriate alternative as covered by patient's insurance. Dx: E11.65 200 each 0    predniSONE 5 mg tablet 40 mg PO day 1, then 30 mg PO day 2, 20 mg PO day 3, 10 mg PO day 4, 5 mg PO day 5 then stop 21 tablet 0    riTUXimab (Rituxan) injection       rosuvastatin (CRESTOR) 40 MG tablet TAKE 1 TABLET BY MOUTH EVERY DAY IN THE EVENING 90 tablet 0    tamsulosin (FLOMAX) 0.4 mg Take 1 capsule (0.4 mg total) by mouth every evening 30 capsule 3    Tresiba FlexTouch 100 units/mL injection pen Inject up to 80 units daily 27 mL 4     No current facility-administered medications for this visit.     Past Medical History:   Diagnosis Date    Anemia     Aortic aneurysm (HCC)     BPH (benign prostatic hyperplasia)     CAD (coronary artery disease)     Chronic kidney disease     COPD (chronic obstructive pulmonary disease) (HCC)     Diabetes mellitus (HCC)     Dyslipidemia     Flu 12/2022    GERD (gastroesophageal reflux disease)     Graves disease     Hypertension     Iron deficiency anemia     Lactic acidosis     Lung cancer (HCC)     Lyme disease     Neuropathy     Pancytopenia (HCC)     Pneumonia 12/2022    Prostate cancer (HCC)     Rheumatoid arthritis (HCC)      Past Surgical History:   Procedure Laterality Date    BALLOON ANGIOPLASTY, ARTERY  1996    CATARACT EXTRACTION, BILATERAL  2019    CLAVICLE SURGERY      TOE AMPUTATION Left 11/20/2018    Procedure: AMPUTATION GREAT TOE;  Surgeon: Bettye Delgado DPM;  Location: [facility];  Service: Podiatry     Social History     Socioeconomic History    Marital status: /Civil Union     Spouse name:  Not on file    Number of children: Not on file    Years of education: Not on file    Highest education level: Not on file   Occupational History    Not on file   Tobacco Use    Smoking status: Every Day     Current packs/day: 0.00     Average packs/day: 0.5 packs/day for 54.0 years (27.0 ttl pk-yrs)     Types: Cigarettes     Start date:      Last attempt to quit: 2022     Years since quittin.0    Smokeless tobacco: Never   Vaping Use    Vaping status: Never Used   Substance and Sexual Activity    Alcohol use: No    Drug use: No    Sexual activity: Not on file   Other Topics Concern    Not on file   Social History Narrative    Not on file     Social Determinants of Health     Financial Resource Strain: Low Risk  (2023)    Overall Financial Resource Strain (CARDIA)     Difficulty of Paying Living Expenses: Not very hard   Food Insecurity: No Food Insecurity (2022)    Hunger Vital Sign     Worried About Running Out of Food in the Last Year: Never true     Ran Out of Food in the Last Year: Never true   Transportation Needs: No Transportation Needs (2023)    PRAPARE - Transportation     Lack of Transportation (Medical): No     Lack of Transportation (Non-Medical): No   Physical Activity: Not on file   Stress: Not on file   Social Connections: Not on file   Intimate Partner Violence: Not on file   Housing Stability: Low Risk  (2022)    Housing Stability Vital Sign     Unable to Pay for Housing in the Last Year: No     Number of Places Lived in the Last Year: 1     Unstable Housing in the Last Year: No     Family History   Problem Relation Age of Onset    No Known Problems Mother     Coronary artery disease Father     No Known Problems Sister     No Known Problems Brother     No Known Problems Maternal Grandmother     No Known Problems Maternal Grandfather     No Known Problems Paternal Grandmother     No Known Problems Paternal Grandfather     No Known Problems Maternal Aunt     No Known  Problems Maternal Uncle     No Known Problems Paternal Aunt     No Known Problems Paternal Uncle     No Known Problems Cousin             PHYSICAL EXAM:    There were no vitals filed for this visit.  General Appearance:   Alert and oriented x 3. Cooperative, and in no respiratory distress   HEENT:   Normocephalic, atraumatic, anicteric.     Neck:  Supple, symmetrical, trachea midline   Lungs:   Clear to auscultation bilaterally    Heart::   Regular rate and rhythm   Abdomen:   Soft, non-tender, non-distended; normal bowel sounds; no masses, no organomegaly    Genitalia:   Deferred    Rectal:   Deferred    Extremities:  No cyanosis, clubbing or edema    Pulses:  2+ and symmetric all extremities    Skin:  Skin color, texture, turgor normal, no rashes or lesions    Lymph nodes:  No palpable cervical or supraclavicular lymphadenopathy        Lab Results:   Results from last 6 Months   Lab Units 08/17/23  0844   WBC Thousand/uL 7.25   HEMOGLOBIN g/dL 12.8   HEMATOCRIT % 41.2   PLATELETS Thousands/uL 249   NEUTROS PCT % 72   LYMPHS PCT % 7*   MONOS PCT % 13*   EOS PCT % 6     Results from last 6 Months   Lab Units 10/25/23  1010   POTASSIUM mmol/L 4.6   CHLORIDE mmol/L 105   CO2 mmol/L 25   BUN mg/dL 29*   CREATININE mg/dL 1.25   CALCIUM mg/dL 9.6   ALK PHOS U/L 58   ALT U/L 5*   AST U/L 9*               Imaging Studies:   XR chest 1 view portable    Result Date: 12/8/2023  Impression: No acute cardiopulmonary disease. Redemonstration of multiple lung nodules. Workstation performed: [id number]       ASSESSMENT and PLAN:      1) Fecal incontinence, dysuria and penile pain - Diarrhea improved, however with pelvic pain.   - Will refer for pelvic floor therapy   - Will refer to one of our urologists for additional recommendations given pain in the penis and dysuria   - Continue regimen of Lomotil and Colestipol   - If not improving, may need to see colorectal surgery for anal manometry       Follow up after pelvic floor  "therapy.       Portions of the record may have been created with voice recognition software.  Occasional wrong word or \"sound a like\" substitutions may have occurred due to the inherent limitations of voice recognition software.  Read the chart carefully and recognize, using context, where substitutions have occurred.  "

## 2024-01-10 DIAGNOSIS — J44.9 CHRONIC OBSTRUCTIVE PULMONARY DISEASE, UNSPECIFIED COPD TYPE (HCC): ICD-10-CM

## 2024-01-10 DIAGNOSIS — K21.9 GASTROESOPHAGEAL REFLUX DISEASE WITHOUT ESOPHAGITIS: ICD-10-CM

## 2024-01-10 RX ORDER — UMECLIDINIUM BROMIDE AND VILANTEROL TRIFENATATE 62.5; 25 UG/1; UG/1
1 POWDER RESPIRATORY (INHALATION) DAILY
Qty: 180 BLISTER | Refills: 0 | Status: SHIPPED | OUTPATIENT
Start: 2024-01-10 | End: 2024-04-09

## 2024-01-10 RX ORDER — OMEPRAZOLE 40 MG/1
40 CAPSULE, DELAYED RELEASE ORAL 2 TIMES DAILY
Qty: 180 CAPSULE | Refills: 0 | Status: SHIPPED | OUTPATIENT
Start: 2024-01-10

## 2024-01-10 NOTE — PROGRESS NOTES
1/11/2024      Chief Complaint   Patient presents with    Advice Only    Prostate Cancer         Assessment and Plan    73 y.o. male -- New patient    1. [place] 6 prostate cancer s/p radiation in 2021  - PSA (10/27/23) 0.25  - PSA every 6 months, due in April 2024    2. Dysuria r/o urethritis   - UA today negative for nitrites, leukocytes, blood  - PVR today 77 mL  - Discussed timed voiding and double voiding  - Conservative measures with adequate hydration, avoidance of bladder irritants, avoidance of constipation, daily cranberry, daily probiotics  - Discussed hydration with water in AM and in between each cup of coffee.   - Trial of empiric antibiotics for presumed urethritis due to symptoms; Keflex sent to pharmacy. Discussed symptoms very likely due to bladder irritants  - Continue Flomax     3. Stress urinary incontinence  - Pelvic floor PT as referred to previously by GI  - Call with any questions or concerns in the meantime  - All questions answered; patient understands and agrees with plan       History of Present Illness  Everardo Renae is a 73 y.o. male new patient here for transition of care.     Previously saw urology at [facility] for [place] 6 prostate cancer s/p radiation, yeast dermatitis, dysuria, urinary urgency and frequency.  Most recent PSA (10/27/23) 0.25. Urine testing negative for infection. Patient was previously given Lotrimin and has been using this without much benefit. Denies rash. States symptoms are burning in urethra with urination.    Denies gross hematuria, flank pain, suprapubic pressure, fever, chills, nausea, vomiting. Does have urinary urgency and incontinence. Patient was referred recently to pelvic floor PT. Previously tried anticholinergics and beta 3 without benefit. Currently taking Flomax. Drinks 4-5 bottles of water daily as well as 4-5 cups of coffee daily.     Review of Systems   Constitutional:  Negative for activity change, appetite change, chills and fever.   HENT:   "Negative for congestion and trouble swallowing.    Respiratory:  Negative for cough and shortness of breath.    Cardiovascular:  Negative for chest pain, palpitations and leg swelling.   Gastrointestinal:  Negative for abdominal pain, constipation, diarrhea, nausea and vomiting.   Genitourinary:  Positive for difficulty urinating, dysuria and urgency. Negative for flank pain, frequency and hematuria.   Musculoskeletal:  Negative for back pain and gait problem.   Skin:  Negative for wound.   Allergic/Immunologic: Negative for immunocompromised state.   Neurological:  Negative for dizziness and syncope.   Hematological:  Does not bruise/bleed easily.   Psychiatric/Behavioral:  Negative for confusion.    All other systems reviewed and are negative.          AUA SYMPTOM SCORE      Flowsheet Row Most Recent Value   AUA SYMPTOM SCORE    How often have you had a sensation of not emptying your bladder completely after you finished urinating? 5 (P)    How often have you had to urinate again less than two hours after you finished urinating? 5 (P)    How often have you found you stopped and started again several times when you urinate? 5 (P)    How often have you found it difficult to postpone urination? 5 (P)    How often have you had a weak urinary stream? 5 (P)    How often have you had to push or strain to begin urination? 0 (P)    How many times did you most typically get up to urinate from the time you went to bed at night until the time you got up in the morning? 2 (P)    Quality of Life: If you were to spend the rest of your life with your urinary condition just the way it is now, how would you feel about that? 5 (P)    AUA SYMPTOM SCORE 27 (P)              Vitals  Vitals:    01/11/24 0751   BP: 140/80   Pulse: 93   Resp: 16   SpO2: 94%   Weight: 86.2 kg (190 lb)   Height: 5' 10\" (1.778 m)       Physical Exam  Constitutional:       General: He is not in acute distress.     Appearance: Normal appearance. He is not " ill-appearing, toxic-appearing or diaphoretic.   HENT:      Head: Normocephalic.      Nose: No congestion.   Eyes:      General: No scleral icterus.        Right eye: No discharge.         Left eye: No discharge.      Conjunctiva/sclera: Conjunctivae normal.      Pupils: Pupils are equal, round, and reactive to light.   Pulmonary:      Effort: Pulmonary effort is normal.   Musculoskeletal:      Cervical back: Normal range of motion.   Skin:     General: Skin is warm and dry.      Coloration: Skin is not jaundiced or pale.      Findings: No bruising, erythema, lesion or rash.   Neurological:      General: No focal deficit present.      Mental Status: He is alert and oriented to person, place, and time. Mental status is at baseline.      Gait: Gait normal.   Psychiatric:         Mood and Affect: Mood normal.         Behavior: Behavior normal.         Thought Content: Thought content normal.         Judgment: Judgment normal.           Past History  Past Medical History:   Diagnosis Date    Anemia     Aortic aneurysm (HCC)     BPH (benign prostatic hyperplasia)     CAD (coronary artery disease)     Chronic kidney disease     COPD (chronic obstructive pulmonary disease) (HCC)     Diabetes mellitus (HCC)     Dyslipidemia     Flu 12/2022    GERD (gastroesophageal reflux disease)     Graves disease     Hypertension     Iron deficiency anemia     Lactic acidosis     Lung cancer (HCC)     Lyme disease     Neuropathy     Pancytopenia (HCC)     Pneumonia 12/2022    Prostate cancer (HCC)     Rheumatoid arthritis (HCC)      Social History     Socioeconomic History    Marital status: /Civil Union     Spouse name: None    Number of children: None    Years of education: None    Highest education level: None   Occupational History    None   Tobacco Use    Smoking status: Every Day     Current packs/day: 0.00     Average packs/day: 0.5 packs/day for 54.0 years (27.0 ttl pk-yrs)     Types: Cigarettes     Start date: 1968      Last attempt to quit: 2022     Years since quittin.0    Smokeless tobacco: Never   Vaping Use    Vaping status: Never Used   Substance and Sexual Activity    Alcohol use: No    Drug use: No    Sexual activity: None   Other Topics Concern    None   Social History Narrative    None     Social Determinants of Health     Financial Resource Strain: Low Risk  (2023)    Overall Financial Resource Strain (CARDIA)     Difficulty of Paying Living Expenses: Not very hard   Food Insecurity: No Food Insecurity (2022)    Hunger Vital Sign     Worried About Running Out of Food in the Last Year: Never true     Ran Out of Food in the Last Year: Never true   Transportation Needs: No Transportation Needs (2023)    PRAPARE - Transportation     Lack of Transportation (Medical): No     Lack of Transportation (Non-Medical): No   Physical Activity: Not on file   Stress: Not on file   Social Connections: Not on file   Intimate Partner Violence: Not on file   Housing Stability: Low Risk  (2022)    Housing Stability Vital Sign     Unable to Pay for Housing in the Last Year: No     Number of Places Lived in the Last Year: 1     Unstable Housing in the Last Year: No     Social History     Tobacco Use   Smoking Status Every Day    Current packs/day: 0.00    Average packs/day: 0.5 packs/day for 54.0 years (27.0 ttl pk-yrs)    Types: Cigarettes    Start date:     Last attempt to quit: 2022    Years since quittin.0   Smokeless Tobacco Never     Family History   Problem Relation Age of Onset    No Known Problems Mother     Coronary artery disease Father     No Known Problems Sister     No Known Problems Brother     No Known Problems Maternal Grandmother     No Known Problems Maternal Grandfather     No Known Problems Paternal Grandmother     No Known Problems Paternal Grandfather     No Known Problems Maternal Aunt     No Known Problems Maternal Uncle     No Known Problems Paternal Aunt     No Known Problems  Paternal Uncle     No Known Problems Cousin        The following portions of the patient's history were reviewed and updated as appropriate: allergies, current medications, past medical history, past social history, past surgical history and problem list.    Results  Recent Results (from the past 1 hour(s))   POCT Measure PVR    Collection Time: 01/11/24  7:56 AM   Result Value Ref Range    POST-VOID RESIDUAL VOLUME, ML POC 77 mL   POCT urine dip    Collection Time: 01/11/24  7:56 AM   Result Value Ref Range    LEUKOCYTE ESTERASE,UA -     NITRITE,UA -     SL AMB POCT UROBILINOGEN 0.2     POCT URINE PROTEIN 15      PH,UA 5.0     BLOOD,UA -     SPECIFIC GRAVITY,UA 1.015     KETONES,UA -     BILIRUBIN,UA -     GLUCOSE, UA ++++      COLOR,UA yellow     CLARITY,UA clear    ]  Lab Results   Component Value Date    PSA 0.7 10/03/2022    PSA 2.5 06/08/2022    PSA 9.4 (H) 04/11/2022    PSA 5.2 (H) 03/16/2022     Lab Results   Component Value Date    CALCIUM 9.6 10/25/2023    K 4.6 10/25/2023    CO2 25 10/25/2023     10/25/2023    BUN 29 (H) 10/25/2023    CREATININE 1.25 10/25/2023     Lab Results   Component Value Date    WBC 7.25 08/17/2023    HGB 12.8 08/17/2023    HCT 41.2 08/17/2023    MCV 84 08/17/2023     08/17/2023       Celina Kenney PA-C

## 2024-01-11 ENCOUNTER — CONSULT (OUTPATIENT)
Dept: UROLOGY | Facility: CLINIC | Age: 74
End: 2024-01-11
Payer: COMMERCIAL

## 2024-01-11 VITALS
HEIGHT: 70 IN | SYSTOLIC BLOOD PRESSURE: 140 MMHG | DIASTOLIC BLOOD PRESSURE: 80 MMHG | BODY MASS INDEX: 27.2 KG/M2 | HEART RATE: 93 BPM | RESPIRATION RATE: 16 BRPM | OXYGEN SATURATION: 94 % | WEIGHT: 190 LBS

## 2024-01-11 DIAGNOSIS — C61 PROSTATE CANCER (HCC): Primary | ICD-10-CM

## 2024-01-11 DIAGNOSIS — R30.0 DYSURIA: ICD-10-CM

## 2024-01-11 LAB
POST-VOID RESIDUAL VOLUME, ML POC: 77 ML
SL AMB  POCT GLUCOSE, UA: NORMAL
SL AMB LEUKOCYTE ESTERASE,UA: NORMAL
SL AMB POCT BILIRUBIN,UA: NORMAL
SL AMB POCT BLOOD,UA: NORMAL
SL AMB POCT CLARITY,UA: CLEAR
SL AMB POCT COLOR,UA: YELLOW
SL AMB POCT KETONES,UA: NORMAL
SL AMB POCT NITRITE,UA: NORMAL
SL AMB POCT PH,UA: 5
SL AMB POCT SPECIFIC GRAVITY,UA: 1.01
SL AMB POCT URINE PROTEIN: 15
SL AMB POCT UROBILINOGEN: 0.2

## 2024-01-11 PROCEDURE — 81002 URINALYSIS NONAUTO W/O SCOPE: CPT | Performed by: PHYSICIAN ASSISTANT

## 2024-01-11 PROCEDURE — 99204 OFFICE O/P NEW MOD 45 MIN: CPT | Performed by: PHYSICIAN ASSISTANT

## 2024-01-11 PROCEDURE — 51798 US URINE CAPACITY MEASURE: CPT | Performed by: PHYSICIAN ASSISTANT

## 2024-01-11 RX ORDER — CEPHALEXIN 500 MG/1
500 CAPSULE ORAL EVERY 12 HOURS SCHEDULED
Qty: 24 CAPSULE | Refills: 0 | Status: SHIPPED | OUTPATIENT
Start: 2024-01-11 | End: 2024-01-23

## 2024-01-22 DIAGNOSIS — J44.9 CHRONIC OBSTRUCTIVE PULMONARY DISEASE, UNSPECIFIED COPD TYPE (HCC): ICD-10-CM

## 2024-01-22 RX ORDER — ALBUTEROL SULFATE 90 UG/1
AEROSOL, METERED RESPIRATORY (INHALATION)
Qty: 18 G | Refills: 1 | Status: SHIPPED | OUTPATIENT
Start: 2024-01-22

## 2024-01-25 DIAGNOSIS — E11.9 TYPE 2 DIABETES MELLITUS WITHOUT COMPLICATION, WITH LONG-TERM CURRENT USE OF INSULIN (HCC): ICD-10-CM

## 2024-01-25 DIAGNOSIS — Z79.4 TYPE 2 DIABETES MELLITUS WITHOUT COMPLICATION, WITH LONG-TERM CURRENT USE OF INSULIN (HCC): ICD-10-CM

## 2024-01-25 RX ORDER — INSULIN DEGLUDEC INJECTION 100 U/ML
INJECTION, SOLUTION SUBCUTANEOUS
Qty: 30 ML | Refills: 4 | Status: SHIPPED | OUTPATIENT
Start: 2024-01-25

## 2024-01-29 DIAGNOSIS — J44.9 CHRONIC OBSTRUCTIVE PULMONARY DISEASE, UNSPECIFIED COPD TYPE (HCC): ICD-10-CM

## 2024-01-29 RX ORDER — UMECLIDINIUM BROMIDE AND VILANTEROL TRIFENATATE 62.5; 25 UG/1; UG/1
1 POWDER RESPIRATORY (INHALATION) DAILY
Qty: 180 BLISTER | Refills: 0 | Status: SHIPPED | OUTPATIENT
Start: 2024-01-29 | End: 2024-04-28

## 2024-01-31 DIAGNOSIS — E78.5 HYPERLIPIDEMIA, UNSPECIFIED HYPERLIPIDEMIA TYPE: ICD-10-CM

## 2024-01-31 RX ORDER — ROSUVASTATIN CALCIUM 40 MG/1
40 TABLET, COATED ORAL EVERY EVENING
Qty: 90 TABLET | Refills: 0 | Status: SHIPPED | OUTPATIENT
Start: 2024-01-31

## 2024-02-04 DIAGNOSIS — G62.9 NEUROPATHY: Chronic | ICD-10-CM

## 2024-02-04 DIAGNOSIS — N18.31 TYPE 2 DIABETES MELLITUS WITH STAGE 3A CHRONIC KIDNEY DISEASE, WITH LONG-TERM CURRENT USE OF INSULIN (HCC): ICD-10-CM

## 2024-02-04 DIAGNOSIS — M06.9 RHEUMATOID ARTHRITIS, INVOLVING UNSPECIFIED SITE, UNSPECIFIED WHETHER RHEUMATOID FACTOR PRESENT (HCC): Chronic | ICD-10-CM

## 2024-02-04 DIAGNOSIS — Z79.4 TYPE 2 DIABETES MELLITUS WITH STAGE 3A CHRONIC KIDNEY DISEASE, WITH LONG-TERM CURRENT USE OF INSULIN (HCC): ICD-10-CM

## 2024-02-04 DIAGNOSIS — E11.22 TYPE 2 DIABETES MELLITUS WITH STAGE 3A CHRONIC KIDNEY DISEASE, WITH LONG-TERM CURRENT USE OF INSULIN (HCC): ICD-10-CM

## 2024-02-05 RX ORDER — PEN NEEDLE, DIABETIC 31 GX5/16"
NEEDLE, DISPOSABLE MISCELLANEOUS DAILY
Qty: 30 EACH | Refills: 0 | Status: SHIPPED | OUTPATIENT
Start: 2024-02-05

## 2024-02-05 RX ORDER — GABAPENTIN 600 MG/1
600 TABLET ORAL 3 TIMES DAILY
Qty: 90 TABLET | Refills: 0 | Status: SHIPPED | OUTPATIENT
Start: 2024-02-05

## 2024-02-21 ENCOUNTER — OFFICE VISIT (OUTPATIENT)
Dept: GASTROENTEROLOGY | Facility: CLINIC | Age: 74
End: 2024-02-21
Payer: COMMERCIAL

## 2024-02-21 VITALS
WEIGHT: 192 LBS | DIASTOLIC BLOOD PRESSURE: 82 MMHG | HEART RATE: 88 BPM | BODY MASS INDEX: 27.49 KG/M2 | HEIGHT: 70 IN | SYSTOLIC BLOOD PRESSURE: 128 MMHG

## 2024-02-21 DIAGNOSIS — R05.9 COUGH, UNSPECIFIED TYPE: ICD-10-CM

## 2024-02-21 DIAGNOSIS — J44.9 CHRONIC OBSTRUCTIVE PULMONARY DISEASE, UNSPECIFIED COPD TYPE (HCC): Primary | ICD-10-CM

## 2024-02-21 DIAGNOSIS — R13.19 ESOPHAGEAL DYSPHAGIA: ICD-10-CM

## 2024-02-21 PROCEDURE — 99214 OFFICE O/P EST MOD 30 MIN: CPT | Performed by: PHYSICIAN ASSISTANT

## 2024-02-21 NOTE — PATIENT INSTRUCTIONS
Scheduled date of EGD(as of today):3/7/24  Physician performing EGD:Cody  Location of EGD:[place]  Instructions reviewed with patient by:Yareli HERNANDEZ  Clearances:  none

## 2024-02-21 NOTE — PROGRESS NOTES
[facility]  Everardo Renae 74 y.o. male MRN: [id number]       CC: EGD consult    HPI: Jaswinder is a pleasant 73-year-old male with history of type 2 diabetes, Graves' disease, COPD, previous lung cancer status post chemoradiation, previous prostate cancer, CKD and rheumatoid arthritis on Rituxan.  Patient presents the office for chronic cough, worsening over the last 2 weeks.  He had similar complaints in December, had negative chest x-ray.  Patient reports that his symptoms are worse at night, and are better when he is sitting upwards.  He denies heartburn or regurgitation.  Patient reports that he does have mild dysphagia especially to course consistency foods like bread. Last CT chest was in November revealing stable multifocal bilateral pulmonary nodules.    Patient's last endoscopy was performed by Dr. Roman in 2016 revealing an esophageal ring that was dilated, and exudates in the esophagus consistent with Candida. Patient's colonoscopy was performed in March 2023 by Dr. Ross.  Diverticulosis noted, colon mucosa appeared normal, and 4 colon polyps removed.  These biopsied as tubular adenoma without dysplasia.  Random colon biopsy was negative for microscopic colitis.  He denies family history of colon cancer       Review of Systems:    CONSTITUTIONAL: Denies any fever, chills, or rigors. Good appetite, and no recent weight loss.  HEENT: No earache or tinnitus. Denies hearing loss or visual disturbances.  CARDIOVASCULAR: No chest pain or palpitations.   RESPIRATORY: Denies any cough, hemoptysis, shortness of breath or dyspnea on exertion.  GASTROINTESTINAL: As noted in the History of Present Illness.   GENITOURINARY: No problems with urination. Denies any hematuria or dysuria.  NEUROLOGIC: No dizziness or vertigo, denies headaches.   MUSCULOSKELETAL: Denies any muscle or joint pain.   SKIN: Denies skin rashes or itching.   ENDOCRINE: Denies excessive thirst. Denies intolerance to heat or  cold.  PSYCHOSOCIAL: Denies depression or anxiety. Denies any recent memory loss.       Current Outpatient Medications   Medication Sig Dispense Refill    albuterol (PROVENTIL HFA,VENTOLIN HFA) 90 mcg/act inhaler INHALE 2 PUFFS BY MOUTH EVERY 4 HOURS AS NEEDED FOR WHEEZING 18 g 1    amLODIPine (NORVASC) 5 mg tablet TAKE 1 TABLET (5 MG TOTAL) BY MOUTH DAILY. 90 tablet 0    ascorbic acid (VITAMIN C) 250 mg tablet Take 250 mg by mouth 2 (two) times a day      aspirin (ECOTRIN LOW STRENGTH) 81 mg EC tablet 1 tab(s)      atenolol (TENORMIN) 25 mg tablet TAKE 1 TABLET (25 MG TOTAL) BY MOUTH DAILY. 90 tablet 0    B-D ULTRAFINE III SHORT PEN 31G X 8 MM MISC Inject under the skin in the morning 30 each 0    Blood Glucose Monitoring Suppl (OneTouch Verio Reflect) w/Device KIT Check blood sugars twice daily. Please substitute with appropriate alternative as covered by patient's insurance. Dx: E11.65 1 kit 0    fenofibrate (TRICOR) 145 mg tablet TAKE 1 TABLET BY MOUTH EVERY DAY 90 tablet 0    ferrous sulfate 325 (65 Fe) mg tablet Take 325 mg by mouth 2 (two) times a day with meals      gabapentin (NEURONTIN) 600 MG tablet TAKE 1 TABLET BY MOUTH THREE TIMES A DAY 90 tablet 0    glimepiride (AMARYL) 1 mg tablet TAKE 1 TABLET BY MOUTH DAILY WITH BREAKFAST. (Patient taking differently: Take 1 mg by mouth daily with breakfast 2 tabs daily) 90 tablet 0    glucose blood (OneTouch Verio) test strip Use as instructed 100 strip 3    glucose blood (OneTouch Verio) test strip Check blood sugars twice daily. Please substitute with appropriate alternative as covered by patient's insurance. Dx: E11.65 200 each 3    metFORMIN (GLUCOPHAGE) 1000 MG tablet TAKE 1 TABLET (1,000 MG TOTAL) BY MOUTH 2 (TWO) TIMES A DAY WITH MEALS FOR 5000 DOSES 180 tablet 0    omeprazole (PriLOSEC) 40 MG capsule TAKE 1 CAPSULE BY MOUTH TWICE A  capsule 0    OneTouch Delica Lancets 33G MISC Check blood sugars twice daily. Please substitute with appropriate  alternative as covered by patient's insurance. Dx: E11.65 200 each 0    riTUXimab (Rituxan) injection       rosuvastatin (CRESTOR) 40 MG tablet TAKE 1 TABLET BY MOUTH EVERY DAY IN THE EVENING 90 tablet 0    tamsulosin (FLOMAX) 0.4 mg Take 1 capsule (0.4 mg total) by mouth every evening 30 capsule 3    Tresiba FlexTouch 100 units/mL injection pen INJECT UP TO 80 UNITS DAILY 30 mL 4    umeclidinium-vilanterol (Anoro Ellipta) 62.5-25 mcg/actuation inhaler Inhale 1 puff daily 180 blister 0    diphenoxylate-atropine (LOMOTIL) 2.5-0.025 mg per tablet Take 1 tablet by mouth daily at bedtime Skip dose if constipation occurs 30 tablet 0    meloxicam (Mobic) 7.5 mg tablet Take 1 tablet (7.5 mg total) by mouth daily as needed for moderate pain 30 tablet 5    methimazole (TAPAZOLE) 5 mg tablet Take 0.5 tablets (2.5 mg total) by mouth in the morning 45 tablet 2     No current facility-administered medications for this visit.     Past Medical History:   Diagnosis Date    Anemia     Aortic aneurysm (HCC)     BPH (benign prostatic hyperplasia)     CAD (coronary artery disease)     Chronic kidney disease     COPD (chronic obstructive pulmonary disease) (HCC)     Diabetes mellitus (HCC)     Dyslipidemia     Flu 12/2022    GERD (gastroesophageal reflux disease)     Graves disease     Hypertension     Iron deficiency anemia     Lactic acidosis     Lung cancer (HCC)     Lyme disease     Neuropathy     Pancytopenia (HCC)     Pneumonia 12/2022    Prostate cancer (HCC)     Rheumatoid arthritis (HCC)      Past Surgical History:   Procedure Laterality Date    BALLOON ANGIOPLASTY, ARTERY  1996    CATARACT EXTRACTION, BILATERAL  2019    CLAVICLE SURGERY      TOE AMPUTATION Left 11/20/2018    Procedure: AMPUTATION GREAT TOE;  Surgeon: Bettye Delgado DPM;  Location: [facility];  Service: Podiatry     Social History     Socioeconomic History    Marital status: /Civil Union     Spouse name: None    Number of children: None    Years of  education: None    Highest education level: None   Occupational History    None   Tobacco Use    Smoking status: Every Day     Current packs/day: 0.00     Average packs/day: 0.5 packs/day for 54.0 years (27.0 ttl pk-yrs)     Types: Cigarettes     Start date:      Last attempt to quit: 2022     Years since quittin.1    Smokeless tobacco: Never   Vaping Use    Vaping status: Never Used   Substance and Sexual Activity    Alcohol use: No    Drug use: No    Sexual activity: None   Other Topics Concern    None   Social History Narrative    None     Social Determinants of Health     Financial Resource Strain: Low Risk  (2023)    Overall Financial Resource Strain (CARDIA)     Difficulty of Paying Living Expenses: Not very hard   Food Insecurity: No Food Insecurity (2022)    Hunger Vital Sign     Worried About Running Out of Food in the Last Year: Never true     Ran Out of Food in the Last Year: Never true   Transportation Needs: No Transportation Needs (2023)    PRAPARE - Transportation     Lack of Transportation (Medical): No     Lack of Transportation (Non-Medical): No   Physical Activity: Not on file   Stress: Not on file   Social Connections: Not on file   Intimate Partner Violence: Not on file   Housing Stability: Low Risk  (2022)    Housing Stability Vital Sign     Unable to Pay for Housing in the Last Year: No     Number of Places Lived in the Last Year: 1     Unstable Housing in the Last Year: No     Family History   Problem Relation Age of Onset    No Known Problems Mother     Coronary artery disease Father     No Known Problems Sister     No Known Problems Brother     No Known Problems Maternal Grandmother     No Known Problems Maternal Grandfather     No Known Problems Paternal Grandmother     No Known Problems Paternal Grandfather     No Known Problems Maternal Aunt     No Known Problems Maternal Uncle     No Known Problems Paternal Aunt     No Known Problems Paternal Uncle      "No Known Problems Cousin             PHYSICAL EXAM:    Vitals:    02/21/24 1016   BP: 128/82   BP Location: Left arm   Patient Position: Sitting   Pulse: 88   Weight: 87.1 kg (192 lb)   Height: 5' 10\" (1.778 m)     General Appearance:   Alert and oriented x 3. Cooperative, and in no respiratory distress   HEENT:   Normocephalic, atraumatic, anicteric.     Neck:  Supple, symmetrical, trachea midline   Lungs:   Clear to auscultation bilaterally    Heart::   Regular rate and rhythm   Abdomen:   Soft, non-tender, non-distended; normal bowel sounds; no masses, no organomegaly    Genitalia:   Deferred    Rectal:   Deferred    Extremities:  No cyanosis, clubbing or edema    Pulses:  2+ and symmetric all extremities    Skin:  Skin color, texture, turgor normal, no rashes or lesions    Lymph nodes:  No palpable cervical or supraclavicular lymphadenopathy        Lab Results:       Results from last 6 Months   Lab Units 10/25/23  1010   POTASSIUM mmol/L 4.6   CHLORIDE mmol/L 105   CO2 mmol/L 25   BUN mg/dL 29*   CREATININE mg/dL 1.25   CALCIUM mg/dL 9.6   ALK PHOS U/L 58   ALT U/L 5*   AST U/L 9*               Imaging Studies:   XR chest 1 view portable    Result Date: 12/8/2023  Impression: No acute cardiopulmonary disease. Redemonstration of multiple lung nodules. Workstation performed: [id number]       ASSESSMENT and PLAN:      1) Dysphagia, chronic cough, history of esophageal ring and Candida esophagitis - Lungs sound clear on exam today.  He had similar complaints in December, had a negative chest x-ray.  He follows with oncology for history of lung cancer and pulmonary nodules.  His symptoms could be secondary to recurrence of esophageal ring, silent reflux, Hernández's esophagus, etc.  - We went over risks and benefits of EGD, patient will schedule procedure  - Continue omeprazole twice daily as prescribed by patient's PCP  - Referral to pulmonary for history of COPD and lung nodules    2) Fecal incontinence, dysuria and " "penile pain - Referred to pelvic floor physical therapy, patient was recently seen by urology who agrees with this plan.  Encourage patient to schedule consultation.  Continue regimen of Lomotil and colestipol.    Follow up after EGD.      Portions of the record may have been created with voice recognition software.  Occasional wrong word or \"sound a like\" substitutions may have occurred due to the inherent limitations of voice recognition software.  Read the chart carefully and recognize, using context, where substitutions have occurred.  "

## 2024-02-22 ENCOUNTER — RA CDI HCC (OUTPATIENT)
Dept: OTHER | Facility: HOSPITAL | Age: 74
End: 2024-02-22

## 2024-02-22 NOTE — PROGRESS NOTES
E11.42, e11.51, e11.22  HCC coding opportunities          Chart Reviewed number of suggestions sent to Provider: 3     Patients Insurance     Medicare Insurance: [employer]vantage

## 2024-02-23 ENCOUNTER — APPOINTMENT (OUTPATIENT)
Dept: LAB | Facility: CLINIC | Age: 74
End: 2024-02-23
Payer: COMMERCIAL

## 2024-02-23 DIAGNOSIS — E11.65 TYPE 2 DIABETES MELLITUS WITH HYPERGLYCEMIA, WITH LONG-TERM CURRENT USE OF INSULIN (HCC): ICD-10-CM

## 2024-02-23 DIAGNOSIS — N18.31 TYPE 2 DIABETES MELLITUS WITH STAGE 3A CHRONIC KIDNEY DISEASE, WITH LONG-TERM CURRENT USE OF INSULIN (HCC): ICD-10-CM

## 2024-02-23 DIAGNOSIS — Z79.4 TYPE 2 DIABETES MELLITUS WITH HYPERGLYCEMIA, WITH LONG-TERM CURRENT USE OF INSULIN (HCC): ICD-10-CM

## 2024-02-23 DIAGNOSIS — Z79.4 TYPE 2 DIABETES MELLITUS WITH STAGE 3A CHRONIC KIDNEY DISEASE, WITH LONG-TERM CURRENT USE OF INSULIN (HCC): ICD-10-CM

## 2024-02-23 DIAGNOSIS — M06.9 RHEUMATOID ARTHRITIS, INVOLVING UNSPECIFIED SITE, UNSPECIFIED WHETHER RHEUMATOID FACTOR PRESENT (HCC): ICD-10-CM

## 2024-02-23 DIAGNOSIS — E11.22 TYPE 2 DIABETES MELLITUS WITH STAGE 3A CHRONIC KIDNEY DISEASE, WITH LONG-TERM CURRENT USE OF INSULIN (HCC): ICD-10-CM

## 2024-02-23 DIAGNOSIS — E05.00 GRAVES DISEASE: ICD-10-CM

## 2024-02-23 DIAGNOSIS — I71.40 ABDOMINAL AORTIC ANEURYSM (AAA) WITHOUT RUPTURE, UNSPECIFIED PART (HCC): Primary | ICD-10-CM

## 2024-02-23 DIAGNOSIS — Z00.00 MEDICARE ANNUAL WELLNESS VISIT, SUBSEQUENT: ICD-10-CM

## 2024-02-23 DIAGNOSIS — E78.2 MIXED HYPERLIPIDEMIA: ICD-10-CM

## 2024-02-23 LAB
ALBUMIN SERPL BCP-MCNC: 4.1 G/DL (ref 3.5–5)
ALP SERPL-CCNC: 48 U/L (ref 34–104)
ALT SERPL W P-5'-P-CCNC: 10 U/L (ref 7–52)
ANION GAP SERPL CALCULATED.3IONS-SCNC: 10 MMOL/L
AST SERPL W P-5'-P-CCNC: 14 U/L (ref 13–39)
BASOPHILS # BLD AUTO: 0.11 THOUSANDS/ÂΜL (ref 0–0.1)
BASOPHILS NFR BLD AUTO: 1 % (ref 0–1)
BILIRUB SERPL-MCNC: 0.43 MG/DL (ref 0.2–1)
BUN SERPL-MCNC: 22 MG/DL (ref 5–25)
CALCIUM SERPL-MCNC: 9.5 MG/DL (ref 8.4–10.2)
CHLORIDE SERPL-SCNC: 103 MMOL/L (ref 96–108)
CHOLEST SERPL-MCNC: 147 MG/DL
CO2 SERPL-SCNC: 29 MMOL/L (ref 21–32)
CREAT SERPL-MCNC: 1.27 MG/DL (ref 0.6–1.3)
CREAT UR-MCNC: 98.7 MG/DL
EOSINOPHIL # BLD AUTO: 0.57 THOUSAND/ÂΜL (ref 0–0.61)
EOSINOPHIL NFR BLD AUTO: 7 % (ref 0–6)
ERYTHROCYTE [DISTWIDTH] IN BLOOD BY AUTOMATED COUNT: 18.7 % (ref 11.6–15.1)
EST. AVERAGE GLUCOSE BLD GHB EST-MCNC: 206 MG/DL
GFR SERPL CREATININE-BSD FRML MDRD: 56 ML/MIN/1.73SQ M
GLUCOSE P FAST SERPL-MCNC: 50 MG/DL (ref 65–99)
HBA1C MFR BLD: 8.8 %
HCT VFR BLD AUTO: 44.8 % (ref 36.5–49.3)
HDLC SERPL-MCNC: 28 MG/DL
HGB BLD-MCNC: 13.9 G/DL (ref 12–17)
IMM GRANULOCYTES # BLD AUTO: 0.03 THOUSAND/UL (ref 0–0.2)
IMM GRANULOCYTES NFR BLD AUTO: 0 % (ref 0–2)
LDLC SERPL CALC-MCNC: 81 MG/DL (ref 0–100)
LYMPHOCYTES # BLD AUTO: 0.73 THOUSANDS/ÂΜL (ref 0.6–4.47)
LYMPHOCYTES NFR BLD AUTO: 8 % (ref 14–44)
MCH RBC QN AUTO: 26.3 PG (ref 26.8–34.3)
MCHC RBC AUTO-ENTMCNC: 31 G/DL (ref 31.4–37.4)
MCV RBC AUTO: 85 FL (ref 82–98)
MICROALBUMIN UR-MCNC: 33 MG/L
MICROALBUMIN/CREAT 24H UR: 33 MG/G CREATININE (ref 0–30)
MONOCYTES # BLD AUTO: 0.88 THOUSAND/ÂΜL (ref 0.17–1.22)
MONOCYTES NFR BLD AUTO: 10 % (ref 4–12)
NEUTROPHILS # BLD AUTO: 6.42 THOUSANDS/ÂΜL (ref 1.85–7.62)
NEUTS SEG NFR BLD AUTO: 74 % (ref 43–75)
NRBC BLD AUTO-RTO: 0 /100 WBCS
PLATELET # BLD AUTO: 250 THOUSANDS/UL (ref 149–390)
PMV BLD AUTO: 10.4 FL (ref 8.9–12.7)
POTASSIUM SERPL-SCNC: 4.4 MMOL/L (ref 3.5–5.3)
PROT SERPL-MCNC: 6.6 G/DL (ref 6.4–8.4)
RBC # BLD AUTO: 5.29 MILLION/UL (ref 3.88–5.62)
SODIUM SERPL-SCNC: 142 MMOL/L (ref 135–147)
TRIGL SERPL-MCNC: 192 MG/DL
TSH SERPL DL<=0.05 MIU/L-ACNC: 2.98 UIU/ML (ref 0.45–4.5)
WBC # BLD AUTO: 8.74 THOUSAND/UL (ref 4.31–10.16)

## 2024-02-23 PROCEDURE — 80061 LIPID PANEL: CPT

## 2024-02-23 PROCEDURE — 82043 UR ALBUMIN QUANTITATIVE: CPT

## 2024-02-23 PROCEDURE — 85025 COMPLETE CBC W/AUTO DIFF WBC: CPT

## 2024-02-23 PROCEDURE — 83036 HEMOGLOBIN GLYCOSYLATED A1C: CPT

## 2024-02-23 PROCEDURE — 84443 ASSAY THYROID STIM HORMONE: CPT

## 2024-02-23 PROCEDURE — 36415 COLL VENOUS BLD VENIPUNCTURE: CPT

## 2024-02-23 PROCEDURE — 80053 COMPREHEN METABOLIC PANEL: CPT

## 2024-02-23 PROCEDURE — 82570 ASSAY OF URINE CREATININE: CPT

## 2024-02-23 PROCEDURE — 83520 IMMUNOASSAY QUANT NOS NONAB: CPT

## 2024-02-26 DIAGNOSIS — E78.5 HYPERLIPIDEMIA, UNSPECIFIED HYPERLIPIDEMIA TYPE: ICD-10-CM

## 2024-02-26 DIAGNOSIS — Z79.4 TYPE 2 DIABETES MELLITUS WITH STAGE 3A CHRONIC KIDNEY DISEASE, WITH LONG-TERM CURRENT USE OF INSULIN (HCC): ICD-10-CM

## 2024-02-26 DIAGNOSIS — N18.31 TYPE 2 DIABETES MELLITUS WITH STAGE 3A CHRONIC KIDNEY DISEASE, WITH LONG-TERM CURRENT USE OF INSULIN (HCC): ICD-10-CM

## 2024-02-26 DIAGNOSIS — E11.22 TYPE 2 DIABETES MELLITUS WITH STAGE 3A CHRONIC KIDNEY DISEASE, WITH LONG-TERM CURRENT USE OF INSULIN (HCC): ICD-10-CM

## 2024-02-26 LAB — TSH RECEP AB SER-ACNC: 6.69 IU/L (ref 0–1.75)

## 2024-02-27 DIAGNOSIS — Z79.4 TYPE 2 DIABETES MELLITUS WITH HYPERGLYCEMIA, WITH LONG-TERM CURRENT USE OF INSULIN (HCC): Primary | ICD-10-CM

## 2024-02-27 DIAGNOSIS — E11.65 TYPE 2 DIABETES MELLITUS WITH HYPERGLYCEMIA, WITH LONG-TERM CURRENT USE OF INSULIN (HCC): Primary | ICD-10-CM

## 2024-02-27 RX ORDER — FENOFIBRATE 145 MG/1
145 TABLET, COATED ORAL DAILY
Qty: 90 TABLET | Refills: 0 | Status: SHIPPED | OUTPATIENT
Start: 2024-02-27

## 2024-02-29 ENCOUNTER — OFFICE VISIT (OUTPATIENT)
Dept: FAMILY MEDICINE CLINIC | Facility: CLINIC | Age: 74
End: 2024-02-29
Payer: COMMERCIAL

## 2024-02-29 VITALS
WEIGHT: 192 LBS | BODY MASS INDEX: 27.49 KG/M2 | HEART RATE: 78 BPM | HEIGHT: 70 IN | OXYGEN SATURATION: 92 % | DIASTOLIC BLOOD PRESSURE: 60 MMHG | SYSTOLIC BLOOD PRESSURE: 120 MMHG | RESPIRATION RATE: 16 BRPM | TEMPERATURE: 96.8 F

## 2024-02-29 DIAGNOSIS — M06.9 RHEUMATOID ARTHRITIS, INVOLVING UNSPECIFIED SITE, UNSPECIFIED WHETHER RHEUMATOID FACTOR PRESENT (HCC): ICD-10-CM

## 2024-02-29 DIAGNOSIS — C34.91 SQUAMOUS CELL LUNG CANCER, RIGHT (HCC): ICD-10-CM

## 2024-02-29 DIAGNOSIS — N18.31 CHRONIC KIDNEY DISEASE, STAGE 3A (HCC): Primary | ICD-10-CM

## 2024-02-29 DIAGNOSIS — Z79.4 TYPE 2 DIABETES MELLITUS WITH HYPERGLYCEMIA, WITH LONG-TERM CURRENT USE OF INSULIN (HCC): ICD-10-CM

## 2024-02-29 DIAGNOSIS — D61.818 OTHER PANCYTOPENIA (HCC): ICD-10-CM

## 2024-02-29 DIAGNOSIS — E11.65 TYPE 2 DIABETES MELLITUS WITH HYPERGLYCEMIA, WITH LONG-TERM CURRENT USE OF INSULIN (HCC): ICD-10-CM

## 2024-02-29 DIAGNOSIS — I71.40 ABDOMINAL AORTIC ANEURYSM (AAA) WITHOUT RUPTURE, UNSPECIFIED PART (HCC): ICD-10-CM

## 2024-02-29 PROBLEM — Z89.422 ACQUIRED ABSENCE OF OTHER LEFT TOE(S) (HCC): Status: RESOLVED | Noted: 2021-06-14 | Resolved: 2024-02-29

## 2024-02-29 PROCEDURE — G2211 COMPLEX E/M VISIT ADD ON: HCPCS | Performed by: FAMILY MEDICINE

## 2024-02-29 PROCEDURE — 99214 OFFICE O/P EST MOD 30 MIN: CPT | Performed by: FAMILY MEDICINE

## 2024-02-29 NOTE — PROGRESS NOTES
Assessment/Plan:     Chronic Problems:  No problem-specific Assessment & Plan notes found for this encounter.      Visit Diagnosis:  Diagnoses and all orders for this visit:    Chronic kidney disease, stage 3a ([facility])  Comments:  Stressed the importance of improved glycemic control, monitoring blood pressure encouraged adequate hydration throughout the day nephrology  Orders:  -     Albumin / creatinine urine ratio; Future  -     Comprehensive metabolic panel; Future  -     Hemoglobin A1C; Future  -     TSH, 3rd generation with Free T4 reflex; Future  -     Lipid Panel with Direct LDL reflex; Future    Other pancytopenia (HCC)    Squamous cell lung cancer, right ([facility])    Rheumatoid arthritis, involving unspecified site, unspecified whether rheumatoid factor present ([facility])  Comments:  [facility]s [facility] last seen January 2020 for infusion therapy    Abdominal aortic aneurysm (AAA) without rupture, unspecified part ([facility])  Comments:  Asymptomatic, stressed blood pressure control continue follow-up cardiology [facility]    Type 2 diabetes mellitus with hyperglycemia, with long-term current use of insulin ([facility])  Comments:  Above goal, reviewed insulin therapy, continue current care recent steroid all burst, follows with endocrinology upcoming appointment  Orders:  -     Albumin / creatinine urine ratio; Future  -     Comprehensive metabolic panel; Future  -     Hemoglobin A1C; Future  -     TSH, 3rd generation with Free T4 reflex; Future  -     Lipid Panel with Direct LDL reflex; Future          Subjective:    Patient ID: Everardo Renae is a 74 y.o. male.    Persistent cough , has been an ongoing issue for the past 2 months with increasing   Gi , frank scheduled for egd   Urology , oracio , prostate   Cardiology = fried   Diabetes , currently undercare of new endocrine [facility] appt March   Tresiba approx 70-80 has divided the doses am/pm   Sugars of late pm 120's   Elevated felt  related to  "receiving steroids   Neg hypolgycemia , polyuria /dyspia   Podiatry , [facility] appt upcoming           The following portions of the patient's history were reviewed and updated as appropriate: allergies, current medications, past family history, past medical history, past social history, past surgical history and problem list.  Patient's shoes and socks removed.    Right Foot/Ankle   Right Foot Inspection  Skin Exam: dry skin and erythema.     Toe Exam: ROM and strength within normal limits.     Left Foot/Ankle  Left Foot Inspection  Skin Exam: dry skin. Amputation: amputation left foot (Comments: great toe)    Toe Exam: ROM and strength within normal limits.     Assign Risk Category  Deformity present  Loss of protective sensation  Weak pulses  Risk: 2     Review of Systems      /60   Pulse 78   Temp (!) 96.8 °F (36 °C)   Resp 16   Ht 5' 10\" (1.778 m)   Wt 87.1 kg (192 lb)   SpO2 92%   BMI 27.55 kg/m²   Social History     Socioeconomic History    Marital status: /Civil Union     Spouse name: Not on file    Number of children: Not on file    Years of education: Not on file    Highest education level: Not on file   Occupational History    Not on file   Tobacco Use    Smoking status: Every Day     Current packs/day: 0.00     Average packs/day: 0.5 packs/day for 54.0 years (27.0 ttl pk-yrs)     Types: Cigarettes     Start date:      Last attempt to quit: 2022     Years since quittin.1    Smokeless tobacco: Never   Vaping Use    Vaping status: Never Used   Substance and Sexual Activity    Alcohol use: No    Drug use: No    Sexual activity: Not on file   Other Topics Concern    Not on file   Social History Narrative    Not on file     Social Determinants of Health     Financial Resource Strain: Low Risk  (2023)    Overall Financial Resource Strain (CARDIA)     Difficulty of Paying Living Expenses: Not very hard   Food Insecurity: No Food Insecurity (2022)    Hunger Vital Sign     " Worried About Running Out of Food in the Last Year: Never true     Ran Out of Food in the Last Year: Never true   Transportation Needs: No Transportation Needs (8/29/2023)    PRAPARE - Transportation     Lack of Transportation (Medical): No     Lack of Transportation (Non-Medical): No   Physical Activity: Not on file   Stress: Not on file   Social Connections: Not on file   Intimate Partner Violence: Not on file   Housing Stability: Low Risk  (12/22/2022)    Housing Stability Vital Sign     Unable to Pay for Housing in the Last Year: No     Number of Places Lived in the Last Year: 1     Unstable Housing in the Last Year: No     Past Medical History:   Diagnosis Date    Anemia     Aortic aneurysm (HCC)     BPH (benign prostatic hyperplasia)     CAD (coronary artery disease)     Chronic kidney disease     COPD (chronic obstructive pulmonary disease) (HCC)     Diabetes mellitus (HCC)     Dyslipidemia     Flu 12/2022    GERD (gastroesophageal reflux disease)     Graves disease     Hypertension     Iron deficiency anemia     Lactic acidosis     Lung cancer (HCC)     Lyme disease     Neuropathy     Pancytopenia (HCC)     Pneumonia 12/2022    Prostate cancer (HCC)     Rheumatoid arthritis (HCC)      Family History   Problem Relation Age of Onset    No Known Problems Mother     Coronary artery disease Father     No Known Problems Sister     No Known Problems Brother     No Known Problems Maternal Grandmother     No Known Problems Maternal Grandfather     No Known Problems Paternal Grandmother     No Known Problems Paternal Grandfather     No Known Problems Maternal Aunt     No Known Problems Maternal Uncle     No Known Problems Paternal Aunt     No Known Problems Paternal Uncle     No Known Problems Cousin      Past Surgical History:   Procedure Laterality Date    BALLOON ANGIOPLASTY, ARTERY  1996    CATARACT EXTRACTION, BILATERAL  2019    CLAVICLE SURGERY      TOE AMPUTATION Left 11/20/2018    Procedure: AMPUTATION GREAT  TOE;  Surgeon: Bettye Delgado DPM;  Location: [facility];  Service: Podiatry       Current Outpatient Medications:     albuterol (PROVENTIL HFA,VENTOLIN HFA) 90 mcg/act inhaler, INHALE 2 PUFFS BY MOUTH EVERY 4 HOURS AS NEEDED FOR WHEEZING, Disp: 18 g, Rfl: 1    amLODIPine (NORVASC) 5 mg tablet, TAKE 1 TABLET (5 MG TOTAL) BY MOUTH DAILY., Disp: 90 tablet, Rfl: 0    ascorbic acid (VITAMIN C) 250 mg tablet, Take 250 mg by mouth 2 (two) times a day, Disp: , Rfl:     aspirin (ECOTRIN LOW STRENGTH) 81 mg EC tablet, 1 tab(s), Disp: , Rfl:     atenolol (TENORMIN) 25 mg tablet, TAKE 1 TABLET (25 MG TOTAL) BY MOUTH DAILY., Disp: 90 tablet, Rfl: 0    fenofibrate (TRICOR) 145 mg tablet, TAKE 1 TABLET BY MOUTH EVERY DAY, Disp: 90 tablet, Rfl: 0    ferrous sulfate 325 (65 Fe) mg tablet, Take 325 mg by mouth 2 (two) times a day with meals, Disp: , Rfl:     gabapentin (NEURONTIN) 600 MG tablet, TAKE 1 TABLET BY MOUTH THREE TIMES A DAY, Disp: 90 tablet, Rfl: 0    glimepiride (AMARYL) 1 mg tablet, TAKE 1 TABLET BY MOUTH DAILY WITH BREAKFAST. (Patient taking differently: Take 1 mg by mouth daily with breakfast 2 tabs daily), Disp: 90 tablet, Rfl: 0    metFORMIN (GLUCOPHAGE) 1000 MG tablet, TAKE 1 TABLET (1,000 MG TOTAL) BY MOUTH 2 (TWO) TIMES A DAY WITH MEALS FOR 5000 DOSES, Disp: 180 tablet, Rfl: 0    omeprazole (PriLOSEC) 40 MG capsule, TAKE 1 CAPSULE BY MOUTH TWICE A DAY, Disp: 180 capsule, Rfl: 0    riTUXimab (Rituxan) injection, , Disp: , Rfl:     rosuvastatin (CRESTOR) 40 MG tablet, TAKE 1 TABLET BY MOUTH EVERY DAY IN THE EVENING, Disp: 90 tablet, Rfl: 0    tamsulosin (FLOMAX) 0.4 mg, Take 1 capsule (0.4 mg total) by mouth every evening, Disp: 30 capsule, Rfl: 3    Tresiba FlexTouch 100 units/mL injection pen, INJECT UP TO 80 UNITS DAILY, Disp: 30 mL, Rfl: 4    umeclidinium-vilanterol (Anoro Ellipta) 62.5-25 mcg/actuation inhaler, Inhale 1 puff daily, Disp: 180 blister, Rfl: 0    B-D ULTRAFINE III SHORT PEN 31G X 8 MM MISC,  Inject under the skin in the morning, Disp: 30 each, Rfl: 0    Blood Glucose Monitoring Suppl (OneTouch Verio Reflect) w/Device KIT, Check blood sugars twice daily. Please substitute with appropriate alternative as covered by patient's insurance. Dx: E11.65, Disp: 1 kit, Rfl: 0    glucose blood (OneTouch Verio) test strip, Use as instructed, Disp: 100 strip, Rfl: 3    glucose blood (OneTouch Verio) test strip, Check blood sugars twice daily. Please substitute with appropriate alternative as covered by patient's insurance. Dx: E11.65, Disp: 200 each, Rfl: 3    OneTouch Delica Lancets 33G MISC, Check blood sugars twice daily. Please substitute with appropriate alternative as covered by patient's insurance. Dx: E11.65, Disp: 200 each, Rfl: 0    Allergies   Allergen Reactions    Ace Inhibitors Anaphylaxis    Plaquenil [Hydroxychloroquine] Anaphylaxis     Tongue swelling    Angiotensin Receptor Blockers Other (See Comments)     Elevated K+    Clindamycin Diarrhea and Nausea Only    Medical Tape Blisters     Clear tape- paper tape is okay          Lab Review   Appointment on 02/23/2024   Component Date Value    Creatinine, Ur 02/23/2024 98.7     Albumin,U,Random 02/23/2024 33.0 (H)     Albumin Creat Ratio 02/23/2024 33 (H)     Sodium 02/23/2024 142     Potassium 02/23/2024 4.4     Chloride 02/23/2024 103     CO2 02/23/2024 29     ANION GAP 02/23/2024 10     BUN 02/23/2024 22     Creatinine 02/23/2024 1.27     Glucose, Fasting 02/23/2024 50 (L)     Calcium 02/23/2024 9.5     AST 02/23/2024 14     ALT 02/23/2024 10     Alkaline Phosphatase 02/23/2024 48     Total Protein 02/23/2024 6.6     Albumin 02/23/2024 4.1     Total Bilirubin 02/23/2024 0.43     eGFR 02/23/2024 56     Hemoglobin A1C 02/23/2024 8.8 (H)     EAG 02/23/2024 206     Cholesterol 02/23/2024 147     Triglycerides 02/23/2024 192 (H)     HDL, Direct 02/23/2024 28 (L)     LDL Calculated 02/23/2024 81     TSH 3RD GENERATON 02/23/2024 2.975     WBC 02/23/2024 8.74      RBC 02/23/2024 5.29     Hemoglobin 02/23/2024 13.9     Hematocrit 02/23/2024 44.8     MCV 02/23/2024 85     MCH 02/23/2024 26.3 (L)     MCHC 02/23/2024 31.0 (L)     RDW 02/23/2024 18.7 (H)     MPV 02/23/2024 10.4     Platelets 02/23/2024 250     nRBC 02/23/2024 0     Neutrophils Relative 02/23/2024 74     Immat GRANS % 02/23/2024 0     Lymphocytes Relative 02/23/2024 8 (L)     Monocytes Relative 02/23/2024 10     Eosinophils Relative 02/23/2024 7 (H)     Basophils Relative 02/23/2024 1     Neutrophils Absolute 02/23/2024 6.42     Immature Grans Absolute 02/23/2024 0.03     Lymphocytes Absolute 02/23/2024 0.73     Monocytes Absolute 02/23/2024 0.88     Eosinophils Absolute 02/23/2024 0.57     Basophils Absolute 02/23/2024 0.11 (H)     Thyrotropin Receptor Ab 02/23/2024 6.69 (H)    Consult on 01/11/2024   Component Date Value    POST-VOID RESIDUAL VOLUM* 01/11/2024 77     LEUKOCYTE ESTERASE,UA 01/11/2024 -     NITRITE,UA 01/11/2024 -     SL AMB POCT UROBILINOGEN 01/11/2024 0.2     POCT URINE PROTEIN 01/11/2024 15      PH,UA 01/11/2024 5.0     BLOOD,UA 01/11/2024 -     SPECIFIC GRAVITY,UA 01/11/2024 1.015     KETONES,UA 01/11/2024 -     BILIRUBIN,UA 01/11/2024 -     GLUCOSE, UA 01/11/2024 ++++      COLOR,UA 01/11/2024 yellow     CLARITY,UA 01/11/2024 clear    Admission on 12/08/2023, Discharged on 12/08/2023   Component Date Value    SARS-CoV-2 12/08/2023 Negative     INFLUENZA A PCR 12/08/2023 Negative     INFLUENZA B PCR 12/08/2023 Negative     RSV PCR 12/08/2023 Negative     Bordetella Pertussis PCR 12/08/2023 Not Detected     BORDETELLA PARAPERTUSSIS* 12/08/2023 Not Detected    Appointment on 10/25/2023   Component Date Value    Hemoglobin A1C 10/25/2023 8.2 (H)     EAG 10/25/2023 189     Creatinine, Ur 10/25/2023 36.8     Albumin,U,Random 10/25/2023 <7.0     Albumin Creat Ratio 10/25/2023 <19     Sodium 10/25/2023 140     Potassium 10/25/2023 4.6     Chloride 10/25/2023 105     CO2 10/25/2023 25     ANION GAP  10/25/2023 10     BUN 10/25/2023 29 (H)     Creatinine 10/25/2023 1.25     Glucose, Fasting 10/25/2023 286 (H)     Calcium 10/25/2023 9.6     AST 10/25/2023 9 (L)     ALT 10/25/2023 5 (L)     Alkaline Phosphatase 10/25/2023 58     Total Protein 10/25/2023 7.5     Albumin 10/25/2023 3.9     Total Bilirubin 10/25/2023 0.26     eGFR 10/25/2023 57    Office Visit on 10/25/2023   Component Date Value    Color, UA 10/25/2023 Colorless     Clarity, UA 10/25/2023 Clear     Specific Gravity, UA 10/25/2023 1.026     pH, UA 10/25/2023 5.0     Leukocytes, UA 10/25/2023 Elevated glucose may cause decreased leukocyte values. See urine microscopic for UWBC result (A)     Nitrite, UA 10/25/2023 Negative     Protein, UA 10/25/2023 Negative     Glucose, UA 10/25/2023 >=1000 (1%) (A)     Ketones, UA 10/25/2023 Negative     Urobilinogen, UA 10/25/2023 <2.0     Bilirubin, UA 10/25/2023 Negative     Occult Blood, UA 10/25/2023 Negative     RBC, UA 10/25/2023 None Seen     WBC, UA 10/25/2023 None Seen     Epithelial Cells 10/25/2023 None Seen     Bacteria, UA 10/25/2023 None Seen         Imaging: No results found.    Objective:     Physical Exam  Constitutional:       General: He is not in acute distress.     Appearance: He is well-developed. He is not ill-appearing or toxic-appearing.   HENT:      Head: Normocephalic and atraumatic.   Cardiovascular:      Rate and Rhythm: Normal rate and regular rhythm.      Pulses: Pulses are weak.      Heart sounds: Normal heart sounds.   Pulmonary:      Effort: Pulmonary effort is normal.      Breath sounds: Normal breath sounds.   Abdominal:      General: Bowel sounds are normal.      Palpations: Abdomen is soft.   Musculoskeletal:         General: Normal range of motion.      Cervical back: Normal range of motion and neck supple.   Feet:      Right foot:      Skin integrity: Erythema and dry skin present.      Left foot: amputated     Skin integrity: Dry skin present.   Skin:     General: Skin is  "warm and dry.   Neurological:      Mental Status: He is alert and oriented to person, place, and time.      Deep Tendon Reflexes: Reflexes are normal and symmetric.   Psychiatric:         Behavior: Behavior normal.         Thought Content: Thought content normal.         Judgment: Judgment normal.           There are no Patient Instructions on file for this visit.    JEROME Huitron    Portions of the record may have been created with voice recognition software.  Occasional wrong word or \"sound a like\" substitutions may have occurred due to the inherent limitations of voice recognition software.  Read the chart carefully and recognize, using context, where substitutions have occurred.  "

## 2024-03-07 ENCOUNTER — HOSPITAL ENCOUNTER (OUTPATIENT)
Dept: GASTROENTEROLOGY | Facility: HOSPITAL | Age: 74
Setting detail: OUTPATIENT SURGERY
End: 2024-03-07
Payer: COMMERCIAL

## 2024-03-07 ENCOUNTER — ANESTHESIA EVENT (OUTPATIENT)
Dept: GASTROENTEROLOGY | Facility: HOSPITAL | Age: 74
End: 2024-03-07

## 2024-03-07 ENCOUNTER — ANESTHESIA (OUTPATIENT)
Dept: GASTROENTEROLOGY | Facility: HOSPITAL | Age: 74
End: 2024-03-07

## 2024-03-07 VITALS
HEART RATE: 69 BPM | HEIGHT: 70 IN | RESPIRATION RATE: 20 BRPM | DIASTOLIC BLOOD PRESSURE: 58 MMHG | BODY MASS INDEX: 27.84 KG/M2 | SYSTOLIC BLOOD PRESSURE: 101 MMHG | WEIGHT: 194.45 LBS | TEMPERATURE: 97.4 F | OXYGEN SATURATION: 94 %

## 2024-03-07 DIAGNOSIS — R05.9 COUGH, UNSPECIFIED TYPE: ICD-10-CM

## 2024-03-07 DIAGNOSIS — R13.19 ESOPHAGEAL DYSPHAGIA: ICD-10-CM

## 2024-03-07 DIAGNOSIS — M06.9 RHEUMATOID ARTHRITIS, INVOLVING UNSPECIFIED SITE, UNSPECIFIED WHETHER RHEUMATOID FACTOR PRESENT (HCC): Chronic | ICD-10-CM

## 2024-03-07 DIAGNOSIS — G62.9 NEUROPATHY: Chronic | ICD-10-CM

## 2024-03-07 LAB — GLUCOSE SERPL-MCNC: 170 MG/DL (ref 65–140)

## 2024-03-07 PROCEDURE — 43248 EGD GUIDE WIRE INSERTION: CPT | Performed by: INTERNAL MEDICINE

## 2024-03-07 PROCEDURE — 43239 EGD BIOPSY SINGLE/MULTIPLE: CPT | Performed by: INTERNAL MEDICINE

## 2024-03-07 PROCEDURE — 88305 TISSUE EXAM BY PATHOLOGIST: CPT | Performed by: PATHOLOGY

## 2024-03-07 PROCEDURE — 82948 REAGENT STRIP/BLOOD GLUCOSE: CPT

## 2024-03-07 RX ORDER — LIDOCAINE HYDROCHLORIDE 20 MG/ML
INJECTION, SOLUTION EPIDURAL; INFILTRATION; INTRACAUDAL; PERINEURAL AS NEEDED
Status: DISCONTINUED | OUTPATIENT
Start: 2024-03-07 | End: 2024-03-07

## 2024-03-07 RX ORDER — PROPOFOL 10 MG/ML
INJECTION, EMULSION INTRAVENOUS AS NEEDED
Status: DISCONTINUED | OUTPATIENT
Start: 2024-03-07 | End: 2024-03-07

## 2024-03-07 RX ORDER — FLUCONAZOLE 200 MG/1
200 TABLET ORAL DAILY
Qty: 14 TABLET | Refills: 0 | Status: SHIPPED | OUTPATIENT
Start: 2024-03-07 | End: 2024-03-21

## 2024-03-07 RX ORDER — SODIUM CHLORIDE, SODIUM LACTATE, POTASSIUM CHLORIDE, CALCIUM CHLORIDE 600; 310; 30; 20 MG/100ML; MG/100ML; MG/100ML; MG/100ML
INJECTION, SOLUTION INTRAVENOUS CONTINUOUS PRN
Status: DISCONTINUED | OUTPATIENT
Start: 2024-03-07 | End: 2024-03-07

## 2024-03-07 RX ADMIN — SODIUM CHLORIDE, SODIUM LACTATE, POTASSIUM CHLORIDE, AND CALCIUM CHLORIDE: .6; .31; .03; .02 INJECTION, SOLUTION INTRAVENOUS at 08:41

## 2024-03-07 RX ADMIN — LIDOCAINE HYDROCHLORIDE 100 MG: 20 INJECTION, SOLUTION EPIDURAL; INFILTRATION; INTRACAUDAL; PERINEURAL at 08:46

## 2024-03-07 RX ADMIN — PROPOFOL 80 MG: 10 INJECTION, EMULSION INTRAVENOUS at 08:46

## 2024-03-07 NOTE — ANESTHESIA POSTPROCEDURE EVALUATION
Post-Op Assessment Note    CV Status:  Stable    Pain management: adequate       Mental Status:  Sleepy and arousable   Hydration Status:  Euvolemic   PONV Controlled:  Controlled   Airway Patency:  Patent  Two or more mitigation strategies used for obstructive sleep apnea   Post Op Vitals Reviewed: Yes    No anethesia notable event occurred.    Staff: Anesthesiologist, CRNA               BP   107/60   Temp 97.4   Pulse 70   Resp 18   SpO2 94

## 2024-03-07 NOTE — ANESTHESIA PREPROCEDURE EVALUATION
Procedure:  EGD    Relevant Problems   ANESTHESIA (within normal limits)      CARDIO   (+) Abdominal aortic aneurysm (AAA) without rupture (HCC)   (+) HTN (hypertension)   (+) Mixed hyperlipidemia      ENDO   (+) Type 2 diabetes mellitus, with long-term current use of insulin (HCC)      GI/HEPATIC   (+) GERD (gastroesophageal reflux disease)      /RENAL   (+) PRADEEP (acute kidney injury) (HCC)   (+) CKD (chronic kidney disease)   (+) Chronic kidney disease, stage 3a (HCC)   (+) Prostate cancer (HCC)      HEMATOLOGY   (+) Anemia   (+) Pancytopenia (HCC)      MUSCULOSKELETAL   (+) Rheumatoid arthritis (HCC)      PULMONARY   (+) Acute respiratory failure (HCC)   (+) COPD (chronic obstructive pulmonary disease) (HCC)        Physical Exam    Airway    Mallampati score: II  TM Distance: >3 FB  Neck ROM: full     Dental    upper dentures and lower dentures    Cardiovascular  Rhythm: regular, Rate: normal    Pulmonary   Breath sounds clear to auscultation    Other Findings        Anesthesia Plan  ASA Score- 3     Anesthesia Type- IV sedation with anesthesia with ASA Monitors.         Additional Monitors:     Airway Plan:            Plan Factors-Exercise tolerance (METS): >4 METS.    Chart reviewed. EKG reviewed.  Existing labs reviewed. Patient summary reviewed.    Patient is not a current smoker.              Induction-     Postoperative Plan-     Informed Consent- Anesthetic plan and risks discussed with patient.  I personally reviewed this patient with the CRNA. Discussed and agreed on the Anesthesia Plan with the CRNA..

## 2024-03-07 NOTE — DISCHARGE INSTRUCTIONS
Upper Endoscopy   WHAT YOU NEED TO KNOW:   An upper endoscopy is also called an upper gastrointestinal (GI) endoscopy, or an esophagogastroduodenoscopy (EGD). It is a procedure to examine the inside of your esophagus, stomach, and duodenum (first part of the small intestine) with a scope. You may feel bloated, gassy, or have some abdominal discomfort after your procedure. Your throat may be sore for 24 to 36 hours. You may burp or pass gas from air that is still inside your body.         DISCHARGE INSTRUCTIONS:   Seek care immediately if:   You have sudden, severe abdominal pain.     You have problems swallowing.     You have a large amount of black, sticky bowel movements or blood in your bowel movements.     You have sudden trouble breathing.     You feel weak, lightheaded, or faint or your heart beats faster than normal for you.     Contact your healthcare provider if:   You have a fever and chills.      You have nausea or are vomiting.      Your abdomen is bloated or feels full and hard.     You have abdominal pain.   You have black, sticky bowel movements or blood in your bowel movements.  You have not had a bowel movement for 3 days after your procedure.  You have rash or hives.  You have questions or concerns about your procedure.    Activity:   Do not lift, strain, or run for 24 hours after your procedure.     Rest after your procedure. You have been given medicine to relax you. Do not drive or make important decisions until the day after your procedure. Return to your normal activity as directed.     Relieve gas and discomfort from bloating by lying on your right side with a heating pad on your abdomen. You may need to take short walks to help the gas move out. Eat small meals until bloating is relieved.  Follow up with your healthcare provider as directed: Write down your questions so you remember to ask them during your visits.     If you take a “blood thinner”, please review the specific instructions  from your endoscopist about when you should resume it. These can be found in the “Recommendation” and “Your Medication list” sections of this After Visit Summary.

## 2024-03-07 NOTE — INTERVAL H&P NOTE
H&P reviewed. After examining the patient I find no changes in the patients condition since the H&P had been written.    Vitals:    03/07/24 0731   BP: 127/62   Pulse: 80   Resp: 16   Temp: 98.1 °F (36.7 °C)   SpO2: 94%

## 2024-03-11 DIAGNOSIS — E11.22 TYPE 2 DIABETES MELLITUS WITH STAGE 3A CHRONIC KIDNEY DISEASE, WITH LONG-TERM CURRENT USE OF INSULIN (HCC): ICD-10-CM

## 2024-03-11 DIAGNOSIS — N18.31 TYPE 2 DIABETES MELLITUS WITH STAGE 3A CHRONIC KIDNEY DISEASE, WITH LONG-TERM CURRENT USE OF INSULIN (HCC): ICD-10-CM

## 2024-03-11 DIAGNOSIS — Z79.4 TYPE 2 DIABETES MELLITUS WITH STAGE 3A CHRONIC KIDNEY DISEASE, WITH LONG-TERM CURRENT USE OF INSULIN (HCC): ICD-10-CM

## 2024-03-11 PROCEDURE — 88305 TISSUE EXAM BY PATHOLOGIST: CPT | Performed by: PATHOLOGY

## 2024-03-11 RX ORDER — PEN NEEDLE, DIABETIC 31 GX5/16"
NEEDLE, DISPOSABLE MISCELLANEOUS DAILY
Qty: 30 EACH | Refills: 0 | Status: SHIPPED | OUTPATIENT
Start: 2024-03-11

## 2024-03-11 RX ORDER — GABAPENTIN 600 MG/1
600 TABLET ORAL 3 TIMES DAILY
Qty: 90 TABLET | Refills: 0 | Status: SHIPPED | OUTPATIENT
Start: 2024-03-11

## 2024-03-12 ENCOUNTER — TELEPHONE (OUTPATIENT)
Dept: GASTROENTEROLOGY | Facility: CLINIC | Age: 74
End: 2024-03-12

## 2024-03-12 NOTE — TELEPHONE ENCOUNTER
----- Message from Renee Hooks PA-C sent at 3/12/2024  7:49 AM EDT -----  Please let patient know that the biopsies from the endoscopy were unremarkable.  Thank you

## 2024-03-12 NOTE — TELEPHONE ENCOUNTER
Attempted to call pt but no answer.  LMOM  with the biopsies result and to call us back if any questions.  Office # provided.

## 2024-03-18 ENCOUNTER — APPOINTMENT (OUTPATIENT)
Dept: LAB | Facility: CLINIC | Age: 74
End: 2024-03-18
Payer: COMMERCIAL

## 2024-03-18 DIAGNOSIS — C61 PROSTATE CANCER (HCC): ICD-10-CM

## 2024-03-18 LAB — PSA SERPL-MCNC: 0.39 NG/ML (ref 0–4)

## 2024-03-18 PROCEDURE — 36415 COLL VENOUS BLD VENIPUNCTURE: CPT

## 2024-03-18 PROCEDURE — 84153 ASSAY OF PSA TOTAL: CPT

## 2024-03-19 ENCOUNTER — OFFICE VISIT (OUTPATIENT)
Age: 74
End: 2024-03-19
Payer: COMMERCIAL

## 2024-03-19 VITALS
BODY MASS INDEX: 27.72 KG/M2 | WEIGHT: 193.6 LBS | SYSTOLIC BLOOD PRESSURE: 126 MMHG | HEIGHT: 70 IN | OXYGEN SATURATION: 96 % | HEART RATE: 74 BPM | DIASTOLIC BLOOD PRESSURE: 68 MMHG

## 2024-03-19 DIAGNOSIS — I10 PRIMARY HYPERTENSION: Chronic | ICD-10-CM

## 2024-03-19 DIAGNOSIS — Z79.4 TYPE 2 DIABETES MELLITUS WITHOUT COMPLICATION, WITH LONG-TERM CURRENT USE OF INSULIN (HCC): ICD-10-CM

## 2024-03-19 DIAGNOSIS — E11.65 TYPE 2 DIABETES MELLITUS WITH HYPERGLYCEMIA, WITH LONG-TERM CURRENT USE OF INSULIN (HCC): Primary | ICD-10-CM

## 2024-03-19 DIAGNOSIS — Z79.4 TYPE 2 DIABETES MELLITUS WITH HYPERGLYCEMIA, WITH LONG-TERM CURRENT USE OF INSULIN (HCC): Primary | ICD-10-CM

## 2024-03-19 DIAGNOSIS — N18.31 CHRONIC KIDNEY DISEASE, STAGE 3A (HCC): ICD-10-CM

## 2024-03-19 DIAGNOSIS — E05.00 GRAVES DISEASE: ICD-10-CM

## 2024-03-19 DIAGNOSIS — E11.9 TYPE 2 DIABETES MELLITUS WITHOUT COMPLICATION, WITH LONG-TERM CURRENT USE OF INSULIN (HCC): ICD-10-CM

## 2024-03-19 PROCEDURE — 99214 OFFICE O/P EST MOD 30 MIN: CPT | Performed by: STUDENT IN AN ORGANIZED HEALTH CARE EDUCATION/TRAINING PROGRAM

## 2024-03-19 RX ORDER — GLIMEPIRIDE 1 MG/1
2 TABLET ORAL
Qty: 180 TABLET | Refills: 0 | Status: SHIPPED | OUTPATIENT
Start: 2024-03-19 | End: 2024-06-17

## 2024-03-19 RX ORDER — DAPAGLIFLOZIN 5 MG/1
5 TABLET, FILM COATED ORAL DAILY
Qty: 90 TABLET | Refills: 0 | Status: SHIPPED | OUTPATIENT
Start: 2024-03-19 | End: 2024-06-17

## 2024-03-19 RX ORDER — METHIMAZOLE 5 MG/1
2.5 TABLET ORAL DAILY
Qty: 45 TABLET | Refills: 0 | Status: SHIPPED | OUTPATIENT
Start: 2024-03-19 | End: 2024-06-17

## 2024-03-19 NOTE — ASSESSMENT & PLAN NOTE
He currently presents as euthyroid.  He is taking methimazole 2.5 mg daily.  His last level of TRAb was 6.69 IU/L. Will check TSH and T4 prior to next visit.  He denies any signs or symptoms of hyperthyroidism.

## 2024-03-19 NOTE — PATIENT INSTRUCTIONS
Continue Methimazole 2.5 mg daily  Continue Farxiga 5 mg daily, Glimepiride 2 mg daily, Metformin 1000 mg twice daily, Tresiba 80 units nightly  Liliya 3 sent to DME company - call office when you receive so we can help you get started  Check your blood sugar levels at least once daily at different times during the day

## 2024-03-19 NOTE — ASSESSMENT & PLAN NOTE
His A1c has worsened since last visit.  Patient attributes this to 4 steroid injections that he received throughout January and February of this year.  Patient states he checks his blood sugars once daily and sees fasting numbers around 80-90 mg/dL.  Encouraged to check numbers at least once daily at different times during the day.  Patient is agreeable to trying a continuous glucose monitor.  Freestyle carlee 3 with reader ordered through [employer].  Patient states his Farxiga had been discontinued on a prior visit however he made the decision to restart this medication.  He states that he has chronic dysuria for which his urologist is managing.  He had a urinalysis which did not show urinary tract infection.  At this time, he may resume his Farxiga 5 mg daily with the understanding that if he acquires yeast infection or urinary tract infection, this medication may need to be discontinued.  Patient would benefit from a GLP-1, however he is reluctant to add new medication at this time.  Patient believes with diet, exercise, and absence of steroid injections that his A1c will improve prior to next visit.  Will consider Rybelsus if A1c does not improve.  He verbalizes understanding that he is to call the office if he is seeing fasting blood sugars less than 80 mg/dL or persistent readings over 300 mg/dL.        Lab Results   Component Value Date    HGBA1C 8.8 (H) 02/23/2024

## 2024-03-19 NOTE — PROGRESS NOTES
Established patient Progress Note      Cc: diabetes    Referring Provider  Waleska Huitron  [address] [address]     History of Present Illness:   Everardo Renae is a 74 y.o. male with a history of type 2 diabetes with long term use of insulin who presented for follow up.        Reports complications of microalbuminuria and nephropathy. Denies recent illness or hospitalizations. Denies recent severe hypoglycemic or severe hyperglycemic episodes. Denies any issues with his current regimen. home glucose monitoring: are performed regularly      Current regimen:   Farxiga 5 mg daily  Glimepiride 2 mg daily  Metformin 1000 mg twice daily  Tresiba 80 units nightly    Home blood glucose readings:   Before breakfast: 80-90 mg/dL  Before lunch: x  Before dinner: x  Bedtime: x    Hypoglycemic episodes:   H/o of hypoglycemia causing hospitalization or Intervention such as glucagon injection  or ambulance call : No  Has awareness:  No, lowest result found was 50 mg/dL with no s/s      Opthamology:  UTD;   Podiatry: UTD    Has hypertension: followed by PCP; on amlodipine 5 mg daily, atenolol 25 mg daily  Has hyperlipidemia: followed by PCP; on fenofibrate 145 mg daily, Crestor 40 mg daily- tolerating well, no myalgias.    Thyroid disorders: Grave's disease, on methimazole 2.5 mg daily  History of pancreatitis: no    Patient Active Problem List   Diagnosis    PRADEEP (acute kidney injury) (HCC)    Type 2 diabetes mellitus, with long-term current use of insulin (HCC)    HTN (hypertension)    Anemia    Pancytopenia (HCC)    Rheumatoid arthritis (HCC)    Squamous cell lung cancer, right (HCC)    Neuropathy    Mixed hyperlipidemia    COPD (chronic obstructive pulmonary disease) (HCC)    GERD (gastroesophageal reflux disease)    Lactic acidosis    Abdominal aortic aneurysm (AAA) without rupture (HCC)    Injury of finger of right hand     Prostate cancer (HCC)    CKD (chronic kidney disease)    Acute respiratory failure (HCC)    Graves disease    Slow transit constipation    Chronic kidney disease, stage 3a (HCC)      Past Medical History:   Diagnosis Date    Anemia     Aortic aneurysm (HCC)     BPH (benign prostatic hyperplasia)     CAD (coronary artery disease)     Chronic kidney disease     COPD (chronic obstructive pulmonary disease) (HCC)     Diabetes mellitus (HCC)     Dyslipidemia     Flu 2022    GERD (gastroesophageal reflux disease)     Graves disease     Hypertension     Iron deficiency anemia     Lactic acidosis     Lung cancer (HCC)     Lyme disease     Neuropathy     Pancytopenia (HCC)     Pneumonia 2022    Prostate cancer (HCC)     Rheumatoid arthritis (HCC)       Past Surgical History:   Procedure Laterality Date    BALLOON ANGIOPLASTY, ARTERY      CATARACT EXTRACTION, BILATERAL  2019    CLAVICLE SURGERY      TOE AMPUTATION Left 2018    Procedure: AMPUTATION GREAT TOE;  Surgeon: Bettye Delgado DPM;  Location: [facility];  Service: Podiatry      Family History   Problem Relation Age of Onset    No Known Problems Mother     Coronary artery disease Father     No Known Problems Sister     No Known Problems Brother     No Known Problems Maternal Grandmother     No Known Problems Maternal Grandfather     No Known Problems Paternal Grandmother     No Known Problems Paternal Grandfather     No Known Problems Maternal Aunt     No Known Problems Maternal Uncle     No Known Problems Paternal Aunt     No Known Problems Paternal Uncle     No Known Problems Cousin      Social History     Tobacco Use    Smoking status: Former     Current packs/day: 0.00     Average packs/day: 0.5 packs/day for 54.0 years (27.0 ttl pk-yrs)     Types: Cigarettes     Start date:      Quit date: 2022     Years since quittin.2    Smokeless tobacco: Never   Substance Use Topics    Alcohol use: No     Allergies   Allergen Reactions    Ace  Inhibitors Anaphylaxis    Plaquenil [Hydroxychloroquine] Anaphylaxis     Tongue swelling    Angiotensin Receptor Blockers Other (See Comments)     Elevated K+    Clindamycin Diarrhea and Nausea Only    Medical Tape Blisters     Clear tape- paper tape is okay         Current Outpatient Medications:     albuterol (PROVENTIL HFA,VENTOLIN HFA) 90 mcg/act inhaler, INHALE 2 PUFFS BY MOUTH EVERY 4 HOURS AS NEEDED FOR WHEEZING, Disp: 18 g, Rfl: 1    amLODIPine (NORVASC) 5 mg tablet, TAKE 1 TABLET (5 MG TOTAL) BY MOUTH DAILY., Disp: 90 tablet, Rfl: 0    ascorbic acid (VITAMIN C) 250 mg tablet, Take 250 mg by mouth 2 (two) times a day, Disp: , Rfl:     aspirin (ECOTRIN LOW STRENGTH) 81 mg EC tablet, 1 tab(s), Disp: , Rfl:     atenolol (TENORMIN) 25 mg tablet, TAKE 1 TABLET (25 MG TOTAL) BY MOUTH DAILY., Disp: 90 tablet, Rfl: 0    B-D ULTRAFINE III SHORT PEN 31G X 8 MM MISC, Inject under the skin in the morning, Disp: 30 each, Rfl: 0    Blood Glucose Monitoring Suppl (OneTouch Verio Reflect) w/Device KIT, Check blood sugars twice daily. Please substitute with appropriate alternative as covered by patient's insurance. Dx: E11.65, Disp: 1 kit, Rfl: 0    dapagliflozin (Farxiga) 5 MG TABS, Take 1 tablet (5 mg total) by mouth daily, Disp: 90 tablet, Rfl: 0    fenofibrate (TRICOR) 145 mg tablet, TAKE 1 TABLET BY MOUTH EVERY DAY, Disp: 90 tablet, Rfl: 0    ferrous sulfate 325 (65 Fe) mg tablet, Take 325 mg by mouth 2 (two) times a day with meals, Disp: , Rfl:     fluconazole (DIFLUCAN) 200 mg tablet, Take 1 tablet (200 mg total) by mouth daily for 14 days, Disp: 14 tablet, Rfl: 0    gabapentin (NEURONTIN) 600 MG tablet, TAKE 1 TABLET BY MOUTH THREE TIMES A DAY, Disp: 90 tablet, Rfl: 0    glimepiride (AMARYL) 1 mg tablet, Take 2 tablets (2 mg total) by mouth daily with breakfast, Disp: 180 tablet, Rfl: 0    glucose blood (OneTouch Verio) test strip, Use as instructed, Disp: 100 strip, Rfl: 3    glucose blood (OneTouch Verio) test  strip, Check blood sugars twice daily. Please substitute with appropriate alternative as covered by patient's insurance. Dx: E11.65, Disp: 200 each, Rfl: 3    metFORMIN (GLUCOPHAGE) 1000 MG tablet, TAKE 1 TABLET (1,000 MG TOTAL) BY MOUTH 2 (TWO) TIMES A DAY WITH MEALS FOR 5000 DOSES, Disp: 180 tablet, Rfl: 0    methimazole (TAPAZOLE) 5 mg tablet, Take 0.5 tablets (2.5 mg total) by mouth daily 2.5mg 1/2 hour before a.m. meal, Disp: 45 tablet, Rfl: 0    omeprazole (PriLOSEC) 40 MG capsule, TAKE 1 CAPSULE BY MOUTH TWICE A DAY, Disp: 180 capsule, Rfl: 0    OneTouch Delica Lancets 33G MISC, Check blood sugars twice daily. Please substitute with appropriate alternative as covered by patient's insurance. Dx: E11.65, Disp: 200 each, Rfl: 0    riTUXimab (Rituxan) injection, , Disp: , Rfl:     rosuvastatin (CRESTOR) 40 MG tablet, TAKE 1 TABLET BY MOUTH EVERY DAY IN THE EVENING, Disp: 90 tablet, Rfl: 0    tamsulosin (FLOMAX) 0.4 mg, Take 1 capsule (0.4 mg total) by mouth every evening, Disp: 30 capsule, Rfl: 3    Tresiba FlexTouch 100 units/mL injection pen, INJECT UP TO 80 UNITS DAILY, Disp: 30 mL, Rfl: 4    umeclidinium-vilanterol (Anoro Ellipta) 62.5-25 mcg/actuation inhaler, Inhale 1 puff daily, Disp: 180 blister, Rfl: 0  Review of Systems   Constitutional:  Negative for appetite change, fatigue and unexpected weight change.   HENT:  Negative for congestion, hearing loss and sore throat.    Respiratory:  Negative for cough.    Cardiovascular:  Negative for chest pain, palpitations and leg swelling.   Gastrointestinal:  Negative for abdominal pain, constipation, diarrhea, nausea and vomiting.   Endocrine: Negative for cold intolerance, heat intolerance and polydipsia.   Genitourinary:  Positive for dysuria and urgency.   Musculoskeletal:  Negative for myalgias.   Neurological:  Negative for dizziness and weakness.   Psychiatric/Behavioral:  Negative for sleep disturbance.        Physical Exam:  Body mass index is 27.78  "kg/m².  /68 (BP Location: Left arm, Patient Position: Sitting, Cuff Size: Large)   Pulse 74   Ht 5' 10\" (1.778 m)   Wt 87.8 kg (193 lb 9.6 oz)   SpO2 96%   BMI 27.78 kg/m²    Wt Readings from Last 3 Encounters:   03/19/24 87.8 kg (193 lb 9.6 oz)   03/07/24 88.2 kg (194 lb 7.1 oz)   02/29/24 87.1 kg (192 lb)       GEN: NAD  E/n/m nl facies,   Eyes: no stare or proptosis,   Neck: trachea midline, thyroid NT to palpation, nl in size, no nodules or neck masses noted  Neuro: no tremor,   Skin: warm and dry,   Ext:  no edema bilaterally,   Psych: nl mood and affect, no gross lapses in memory      Labs:   Lab Results   Component Value Date    HGBA1C 8.8 (H) 02/23/2024    HGBA1C 8.2 (H) 10/25/2023    HGBA1C 8.5 (H) 08/17/2023     No components found for: \"GLU\"    Lab Results   Component Value Date    CREATININE 1.41 (H) 03/18/2024    CREATININE 1.27 02/23/2024    CREATININE 1.25 10/25/2023    BUN 30 (H) 03/18/2024    K 4.4 03/18/2024     03/18/2024    CO2 28 03/18/2024     GFR, Calculated   Date Value Ref Range Status   12/18/2020 56 (L) >60 mL/min/1.73m2 Final     Comment:     mL/min per 1.73 square meters                                            Normal Function or Mild Renal    Disease (if clinically at risk):  >or=60  Moderately Decreased:                30-59  Severely Decreased:                  15-29  Renal Failure:                         <15                                            -American GFR: multiply reported GFR by 1.16    Please note that the eGFR is based on the CKD-EPI calculation, and is not intended to be used for drug dosing.                                            Note: Calculated GFR may not be an accurate indicator of renal function if the patient's renal function is not in a steady state.    Ordering Provider: MINA CABRAL  Report Copied to : KAHLIL BRUNO     eGFRcr   Date Value Ref Range Status   03/18/2024 53 (L) >59 Final     No components found for: " "\"MALBCRER\"    Lab Results   Component Value Date    HDL 28 (L) 02/23/2024    TRIG 192 (H) 02/23/2024       Lab Results   Component Value Date    ALT 7 03/18/2024    AST 12 03/18/2024    ALKPHOS 46 03/18/2024       Lab Results   Component Value Date    TSH <0.01 (L) 07/19/2021    FREET4 1.00 11/14/2022     Component      Latest Ref Rng 2/23/2024   Sodium      135 - 147 mmol/L 142    Potassium      3.5 - 5.3 mmol/L 4.4    Chloride      96 - 108 mmol/L 103    Carbon Dioxide      21 - 32 mmol/L 29    ANION GAP      mmol/L 10    BUN      5 - 25 mg/dL 22    Creatinine      0.60 - 1.30 mg/dL 1.27    GLUCOSE, FASTING      65 - 99 mg/dL 50 (L)    Calcium      8.4 - 10.2 mg/dL 9.5    AST      13 - 39 U/L 14    ALT      7 - 52 U/L 10    ALK PHOS      34 - 104 U/L 48    Total Protein      6.4 - 8.4 g/dL 6.6    Albumin      3.5 - 5.0 g/dL 4.1    Total Bilirubin      0.20 - 1.00 mg/dL 0.43    GFR, Calculated      ml/min/1.73sq m 56    Cholesterol      See Comment mg/dL 147    Triglycerides      See Comment mg/dL 192 (H)    HDL      >=40 mg/dL 28 (L)    LDL Calculated      0 - 100 mg/dL 81    EXT Creatinine Urine      Reference range not established. mg/dL 98.7    Albumin,U,Random      <20.0 mg/L 33.0 (H)    Albumin Creat Ratio      0 - 30 mg/g creatinine 33 (H)    Hemoglobin A1C      Normal 4.0-5.6%; PreDiabetic 5.7-6.4%; Diabetic >=6.5%; Glycemic control for adults with diabetes <7.0% % 8.8 (H)    eAG, EST AVG Glucose      mg/dl 206    TSH 3RD GENERATON      0.450 - 4.500 uIU/mL 2.975    Thyrotropin Receptor Ab      0.00 - 1.75 IU/L 6.69 (H)       Legend:  (L) Low  (H) High    Impression:  1. Type 2 diabetes mellitus with hyperglycemia, with long-term current use of insulin (HCC)    2. Graves disease    3. Chronic kidney disease, stage 3a (HCC)    4. Primary hypertension    5. Type 2 diabetes mellitus without complication, with long-term current use of insulin (HCC)           Plan:    Problem List Items Addressed This Visit  "      Type 2 diabetes mellitus, with long-term current use of insulin ([facility]) - Primary     His A1c has worsened since last visit.  Patient attributes this to 4 steroid injections that he received throughout January and February of this year.  Patient states he checks his blood sugars once daily and sees fasting numbers around 80-90 mg/dL.  Encouraged to check numbers at least once daily at different times during the day.  Patient is agreeable to trying a continuous glucose monitor.  Freestyle carlee 3 with reader ordered through [employer].  Patient states his Farxiga had been discontinued on a prior visit however he made the decision to restart this medication.  He states that he has chronic dysuria for which his urologist is managing.  He had a urinalysis which did not show urinary tract infection.  At this time, he may resume his Farxiga 5 mg daily with the understanding that if he acquires yeast infection or urinary tract infection, this medication may need to be discontinued.  Patient would benefit from a GLP-1, however he is reluctant to add new medication at this time.  Patient believes with diet, exercise, and absence of steroid injections that his A1c will improve prior to next visit.  Will consider Rybelsus if A1c does not improve.  He verbalizes understanding that he is to call the office if he is seeing fasting blood sugars less than 80 mg/dL or persistent readings over 300 mg/dL.        Lab Results   Component Value Date    HGBA1C 8.8 (H) 02/23/2024            Relevant Medications    glimepiride (AMARYL) 1 mg tablet    dapagliflozin (Farxiga) 5 MG TABS    Other Relevant Orders    Hemoglobin A1C    Comprehensive metabolic panel    HTN (hypertension) (Chronic)     /68 today.  Managed by his PCP.  Continue atenolol 25 mg, amlodipine 5 mg.         Graves disease     He currently presents as euthyroid.  He is taking methimazole 2.5 mg daily.  His last level of TRAb was 6.69 IU/L. Will check TSH and T4  prior to next visit.  He denies any signs or symptoms of hyperthyroidism.         Relevant Medications    methimazole (TAPAZOLE) 5 mg tablet    Other Relevant Orders    T4, free    TSH, 3rd generation    TRAb (TSH Receptor Binding Antibody)    Chronic kidney disease, stage 3a (HCC)     Does not follow with nephrology at this time.  Will continue to monitor eGFR.  Farxiga 5 mg daily resumed.    Lab Results   Component Value Date    EGFR 53 (L) 03/18/2024    EGFR 56 02/23/2024    EGFR 57 10/25/2023    CREATININE 1.41 (H) 03/18/2024    CREATININE 1.27 02/23/2024    CREATININE 1.25 10/25/2023            Relevant Orders    Comprehensive metabolic panel             Discussed with the patient and all questioned fully answered. He will call me if any problems arise.    Counseled patient on diagnostic results, prognosis, risk and benefit of treatment options, instruction for management, importance of treatment compliance, Risk  factor reduction and impressions      Jacob Carvajal MD

## 2024-03-22 LAB
DME PARACHUTE DELIVERY DATE ACTUAL: NORMAL
DME PARACHUTE DELIVERY DATE EXPECTED: NORMAL
DME PARACHUTE DELIVERY DATE REQUESTED: NORMAL
DME PARACHUTE ITEM DESCRIPTION: NORMAL
DME PARACHUTE ORDER STATUS: NORMAL
DME PARACHUTE SUPPLIER NAME: NORMAL
DME PARACHUTE SUPPLIER PHONE: NORMAL

## 2024-03-23 DIAGNOSIS — I10 PRIMARY HYPERTENSION: ICD-10-CM

## 2024-03-23 DIAGNOSIS — I25.10 CAD (CORONARY ARTERY DISEASE): ICD-10-CM

## 2024-03-23 DIAGNOSIS — K21.9 GASTROESOPHAGEAL REFLUX DISEASE WITHOUT ESOPHAGITIS: ICD-10-CM

## 2024-03-25 RX ORDER — OMEPRAZOLE 40 MG/1
40 CAPSULE, DELAYED RELEASE ORAL 2 TIMES DAILY
Qty: 180 CAPSULE | Refills: 0 | Status: SHIPPED | OUTPATIENT
Start: 2024-03-25

## 2024-03-25 RX ORDER — AMLODIPINE BESYLATE 5 MG/1
5 TABLET ORAL DAILY
Qty: 90 TABLET | Refills: 0 | Status: SHIPPED | OUTPATIENT
Start: 2024-03-25

## 2024-03-25 RX ORDER — ATENOLOL 25 MG/1
25 TABLET ORAL DAILY
Qty: 90 TABLET | Refills: 0 | Status: SHIPPED | OUTPATIENT
Start: 2024-03-25

## 2024-04-03 ENCOUNTER — TELEPHONE (OUTPATIENT)
Age: 74
End: 2024-04-03

## 2024-04-04 ENCOUNTER — TELEPHONE (OUTPATIENT)
Dept: GASTROENTEROLOGY | Facility: HOSPITAL | Age: 74
End: 2024-04-04

## 2024-04-04 ENCOUNTER — PREP FOR PROCEDURE (OUTPATIENT)
Dept: GASTROENTEROLOGY | Facility: CLINIC | Age: 74
End: 2024-04-04

## 2024-04-04 DIAGNOSIS — R13.19 ESOPHAGEAL DYSPHAGIA: ICD-10-CM

## 2024-04-04 DIAGNOSIS — R05.9 COUGH, UNSPECIFIED TYPE: Primary | ICD-10-CM

## 2024-04-04 NOTE — TELEPHONE ENCOUNTER
Called patient & let him know order was in for Manometry test . Called central scheduling to make the appointment for him . Appointment was made for Thursday april 11, 2024 @ 1pm. Let patient know that he had to fast 6 hours prior to test. Gave patient address of hospital ( [facility] ) patient voiced understanding and had no further questions or concerns  
Patients GI provider:  LARRY Hooks/Dr. Ross     Number to return call: [phone]    Reason for call: Patient calling to update provider that he is still experiencing a cough. States that sometimes it gets so bad it takes his breath away. Patient states that it was mentioned of another procedure to be completed through the nasal cavity if cough persists.     Scheduled procedure/appointment date if applicable: N/A     
Please let patient know I put in the order for manometry, I believe he called the central scheduling number to set that up in [place].  Thank you  
chest pain/complains of pain/discomfort

## 2024-04-09 RX ORDER — PREDNISONE 5 MG/1
5 TABLET ORAL DAILY
COMMUNITY
Start: 2024-03-25 | End: 2024-04-24

## 2024-04-10 NOTE — PROGRESS NOTES
4/11/2024      Chief Complaint   Patient presents with    Follow-up         Assessment and Plan    74 y.o. male     1. Diana 6 prostate cancer s/p radiation in 2021   - PSA (3/18/24) 0.39  - PSA every 6 months     2. UTI symptoms  - UA today positive for leukocytes, blood. Will send for micro and culture  - Discussed timed voiding and double voiding  - Conservative measures with adequate hydration, avoidance of bladder irritants, avoidance of constipation, daily cranberry, daily probiotics  - Call with any questions or concerns in the meantime  - All questions answered; patient understands and agrees with plan       History of Present Illness  Everardo Renae is a 74 y.o. male patient with history of Previously saw urology at [facility] for [place] 6 prostate cancer s/p radiation, yeast dermatitis, dysuria, urinary urgency and frequency. Most recent PSA 0.39. Continues with dysuria. Denies gross hematuria, flank pain, suprapubic pressure, fever, chills, nausea, vomiting.     Review of Systems   Constitutional:  Negative for activity change, appetite change, chills and fever.   HENT:  Negative for congestion and trouble swallowing.    Respiratory:  Negative for cough and shortness of breath.    Cardiovascular:  Negative for chest pain, palpitations and leg swelling.   Gastrointestinal:  Negative for abdominal pain, constipation, diarrhea, nausea and vomiting.   Genitourinary:  Positive for dysuria. Negative for difficulty urinating, flank pain, frequency, hematuria and urgency.   Musculoskeletal:  Negative for back pain and gait problem.   Skin:  Negative for wound.   Allergic/Immunologic: Negative for immunocompromised state.   Neurological:  Negative for dizziness and syncope.   Hematological:  Does not bruise/bleed easily.   Psychiatric/Behavioral:  Negative for confusion.    All other systems reviewed and are negative.          AUA SYMPTOM SCORE      Flowsheet Row Most Recent Value   AUA SYMPTOM SCORE    How often  "have you had a sensation of not emptying your bladder completely after you finished urinating? 4 (P)    How often have you had to urinate again less than two hours after you finished urinating? 5 (P)    How often have you found you stopped and started again several times when you urinate? 4 (P)    How often have you found it difficult to postpone urination? 5 (P)    How often have you had a weak urinary stream? 3 (P)    How often have you had to push or strain to begin urination? 0 (P)    How many times did you most typically get up to urinate from the time you went to bed at night until the time you got up in the morning? 4 (P)    Quality of Life: If you were to spend the rest of your life with your urinary condition just the way it is now, how would you feel about that? 6 (P)    AUA SYMPTOM SCORE 25 (P)              Vitals  Vitals:    04/11/24 0856   BP: 134/82   Pulse: 84   Temp: (!) 97.3 °F (36.3 °C)   TempSrc: Temporal   SpO2: 97%   Weight: 87.1 kg (192 lb)   Height: 5' 10\" (1.778 m)       Physical Exam  Constitutional:       General: He is not in acute distress.     Appearance: Normal appearance. He is not ill-appearing, toxic-appearing or diaphoretic.   HENT:      Head: Normocephalic.      Nose: No congestion.   Eyes:      General: No scleral icterus.        Right eye: No discharge.         Left eye: No discharge.      Conjunctiva/sclera: Conjunctivae normal.      Pupils: Pupils are equal, round, and reactive to light.   Pulmonary:      Effort: Pulmonary effort is normal.   Musculoskeletal:      Cervical back: Normal range of motion.   Skin:     General: Skin is warm and dry.      Coloration: Skin is not jaundiced or pale.      Findings: No bruising, erythema, lesion or rash.   Neurological:      General: No focal deficit present.      Mental Status: He is alert and oriented to person, place, and time. Mental status is at baseline.      Gait: Gait normal.   Psychiatric:         Mood and Affect: Mood normal.   "       Behavior: Behavior normal.         Thought Content: Thought content normal.         Judgment: Judgment normal.           Past History  Past Medical History:   Diagnosis Date    Anemia     Aortic aneurysm (HCC)     BPH (benign prostatic hyperplasia)     CAD (coronary artery disease)     Chronic kidney disease     COPD (chronic obstructive pulmonary disease) (HCC)     Diabetes mellitus (HCC)     Dyslipidemia     Flu 2022    GERD (gastroesophageal reflux disease)     Graves disease     Hypertension     Iron deficiency anemia     Lactic acidosis     Lung cancer (HCC)     Lyme disease     Neuropathy     Pancytopenia (HCC)     Pneumonia 2022    Prostate cancer (HCC)     Rheumatoid arthritis (HCC)      Social History     Socioeconomic History    Marital status: /Civil Union     Spouse name: None    Number of children: None    Years of education: None    Highest education level: None   Occupational History    None   Tobacco Use    Smoking status: Former     Current packs/day: 0.00     Average packs/day: 0.5 packs/day for 54.0 years (27.0 ttl pk-yrs)     Types: Cigarettes     Start date:      Quit date: 2022     Years since quittin.2     Passive exposure: Past    Smokeless tobacco: Never   Vaping Use    Vaping status: Never Used   Substance and Sexual Activity    Alcohol use: No    Drug use: No    Sexual activity: Not Currently   Other Topics Concern    None   Social History Narrative    None     Social Determinants of Health     Financial Resource Strain: Low Risk  (2023)    Overall Financial Resource Strain (CARDIA)     Difficulty of Paying Living Expenses: Not very hard   Food Insecurity: No Food Insecurity (2022)    Hunger Vital Sign     Worried About Running Out of Food in the Last Year: Never true     Ran Out of Food in the Last Year: Never true   Transportation Needs: No Transportation Needs (2023)    PRAPARE - Transportation     Lack of Transportation (Medical): No      Lack of Transportation (Non-Medical): No   Physical Activity: Not on file   Stress: Not on file   Social Connections: Not on file   Intimate Partner Violence: Not on file   Housing Stability: Low Risk  (2022)    Housing Stability Vital Sign     Unable to Pay for Housing in the Last Year: No     Number of Places Lived in the Last Year: 1     Unstable Housing in the Last Year: No     Social History     Tobacco Use   Smoking Status Former    Current packs/day: 0.00    Average packs/day: 0.5 packs/day for 54.0 years (27.0 ttl pk-yrs)    Types: Cigarettes    Start date:     Quit date: 2022    Years since quittin.2    Passive exposure: Past   Smokeless Tobacco Never     Family History   Problem Relation Age of Onset    No Known Problems Mother     Coronary artery disease Father     No Known Problems Sister     No Known Problems Brother     No Known Problems Maternal Grandmother     No Known Problems Maternal Grandfather     No Known Problems Paternal Grandmother     No Known Problems Paternal Grandfather     No Known Problems Maternal Aunt     No Known Problems Maternal Uncle     No Known Problems Paternal Aunt     No Known Problems Paternal Uncle     No Known Problems Cousin        The following portions of the patient's history were reviewed and updated as appropriate: allergies, current medications, past medical history, past social history, past surgical history and problem list.    Results  Recent Results (from the past 1 hour(s))   POCT urine dip    Collection Time: 24  8:56 AM   Result Value Ref Range    LEUKOCYTE ESTERASE,UA trace     NITRITE,UA -     SL AMB POCT UROBILINOGEN 0.2     POCT URINE PROTEIN +      PH,UA 5.0     BLOOD,UA +++     SPECIFIC GRAVITY,UA 1.015     KETONES,UA -     BILIRUBIN,UA -     GLUCOSE, UA +      COLOR,UA Yellow     CLARITY,UA Cloudy    POCT Measure PVR    Collection Time: 24  9:01 AM   Result Value Ref Range    POST-VOID RESIDUAL VOLUME, ML  mL    ]  Lab Results   Component Value Date    PSA 0.39 03/18/2024    PSA 0.7 10/03/2022    PSA 2.5 06/08/2022    PSA 9.4 (H) 04/11/2022     Lab Results   Component Value Date    CALCIUM 9.5 03/18/2024    K 4.4 03/18/2024    CO2 28 03/18/2024     03/18/2024    BUN 30 (H) 03/18/2024    CREATININE 1.41 (H) 03/18/2024     Lab Results   Component Value Date    WBC 8.74 02/23/2024    HGB 13.9 02/23/2024    HCT 44.8 02/23/2024    MCV 85 02/23/2024     02/23/2024       Celina Kenney PA-C

## 2024-04-11 ENCOUNTER — HOSPITAL ENCOUNTER (OUTPATIENT)
Dept: GASTROENTEROLOGY | Facility: HOSPITAL | Age: 74
End: 2024-04-11
Payer: COMMERCIAL

## 2024-04-11 ENCOUNTER — OFFICE VISIT (OUTPATIENT)
Dept: UROLOGY | Facility: CLINIC | Age: 74
End: 2024-04-11
Payer: COMMERCIAL

## 2024-04-11 VITALS
BODY MASS INDEX: 27.49 KG/M2 | SYSTOLIC BLOOD PRESSURE: 134 MMHG | HEART RATE: 84 BPM | OXYGEN SATURATION: 97 % | WEIGHT: 192 LBS | HEIGHT: 70 IN | TEMPERATURE: 97.3 F | DIASTOLIC BLOOD PRESSURE: 82 MMHG

## 2024-04-11 VITALS
SYSTOLIC BLOOD PRESSURE: 148 MMHG | TEMPERATURE: 99 F | HEART RATE: 91 BPM | OXYGEN SATURATION: 92 % | DIASTOLIC BLOOD PRESSURE: 63 MMHG | RESPIRATION RATE: 18 BRPM

## 2024-04-11 DIAGNOSIS — R30.0 DYSURIA: Primary | ICD-10-CM

## 2024-04-11 DIAGNOSIS — C61 PROSTATE CANCER (HCC): ICD-10-CM

## 2024-04-11 DIAGNOSIS — R05.9 COUGH, UNSPECIFIED TYPE: ICD-10-CM

## 2024-04-11 DIAGNOSIS — R13.19 ESOPHAGEAL DYSPHAGIA: ICD-10-CM

## 2024-04-11 LAB
BACTERIA UR QL AUTO: ABNORMAL /HPF
BILIRUB UR QL STRIP: NEGATIVE
CLARITY UR: CLEAR
COLOR UR: YELLOW
GLUCOSE UR STRIP-MCNC: ABNORMAL MG/DL
HGB UR QL STRIP.AUTO: ABNORMAL
KETONES UR STRIP-MCNC: NEGATIVE MG/DL
LEUKOCYTE ESTERASE UR QL STRIP: ABNORMAL
MUCOUS THREADS UR QL AUTO: ABNORMAL
NITRITE UR QL STRIP: NEGATIVE
NON-SQ EPI CELLS URNS QL MICRO: ABNORMAL /HPF
OTHER STN SPEC: ABNORMAL
PH UR STRIP.AUTO: 6 [PH]
POST-VOID RESIDUAL VOLUME, ML POC: 115 ML
PROT UR STRIP-MCNC: ABNORMAL MG/DL
RBC #/AREA URNS AUTO: ABNORMAL /HPF
SL AMB  POCT GLUCOSE, UA: NORMAL
SL AMB LEUKOCYTE ESTERASE,UA: NORMAL
SL AMB POCT BILIRUBIN,UA: NORMAL
SL AMB POCT BLOOD,UA: NORMAL
SL AMB POCT CLARITY,UA: NORMAL
SL AMB POCT COLOR,UA: YELLOW
SL AMB POCT KETONES,UA: NORMAL
SL AMB POCT NITRITE,UA: NORMAL
SL AMB POCT PH,UA: 5
SL AMB POCT SPECIFIC GRAVITY,UA: 1.01
SL AMB POCT URINE PROTEIN: NORMAL
SL AMB POCT UROBILINOGEN: 0.2
SP GR UR STRIP.AUTO: 1.02 (ref 1–1.03)
UROBILINOGEN UR STRIP-ACNC: <2 MG/DL
WBC #/AREA URNS AUTO: ABNORMAL /HPF
WBC CLUMPS # UR AUTO: PRESENT /UL

## 2024-04-11 PROCEDURE — 51798 US URINE CAPACITY MEASURE: CPT | Performed by: PHYSICIAN ASSISTANT

## 2024-04-11 PROCEDURE — 99213 OFFICE O/P EST LOW 20 MIN: CPT | Performed by: PHYSICIAN ASSISTANT

## 2024-04-11 PROCEDURE — 81002 URINALYSIS NONAUTO W/O SCOPE: CPT | Performed by: PHYSICIAN ASSISTANT

## 2024-04-11 PROCEDURE — 91010 ESOPHAGUS MOTILITY STUDY: CPT

## 2024-04-11 PROCEDURE — 81001 URINALYSIS AUTO W/SCOPE: CPT | Performed by: PHYSICIAN ASSISTANT

## 2024-04-11 PROCEDURE — 87086 URINE CULTURE/COLONY COUNT: CPT | Performed by: PHYSICIAN ASSISTANT

## 2024-04-11 RX ORDER — AMOXICILLIN AND CLAVULANATE POTASSIUM 875; 125 MG/1; MG/1
1 TABLET, FILM COATED ORAL EVERY 12 HOURS SCHEDULED
Qty: 14 TABLET | Refills: 0 | Status: SHIPPED | OUTPATIENT
Start: 2024-04-11 | End: 2024-04-18

## 2024-04-11 NOTE — PERIOPERATIVE NURSING NOTE
Patient brought in the room and educated on procedure. Viscous lidocaine 2% administered to bilateral nostrils by sniffing and allowed to take affect. Manometry catheter inserted via left nostril, positioned and secured. 10 liquid swallow, 10 viscous swallow ,1 rapid swallow, 5 upright swallows and 1 rapid drink challenge with 200 cc of water performed. Patient tolerated procedure. Catheter removed intact. Discharge instructions given to the patient and patient left the room in stable condition.

## 2024-04-12 ENCOUNTER — TELEPHONE (OUTPATIENT)
Dept: NEPHROLOGY | Facility: CLINIC | Age: 74
End: 2024-04-12

## 2024-04-12 LAB — BACTERIA UR CULT: NORMAL

## 2024-04-12 PROCEDURE — 91010 ESOPHAGUS MOTILITY STUDY: CPT | Performed by: INTERNAL MEDICINE

## 2024-04-12 NOTE — TELEPHONE ENCOUNTER
Called left voice message to advise patient per Renee Hooks PA-C, that his manometry test is normal.

## 2024-04-12 NOTE — TELEPHONE ENCOUNTER
----- Message from Renee Hooks PA-C sent at 4/12/2024 10:18 AM EDT -----  Please let patient know that his manometry test was normal.  Thank you.

## 2024-04-14 DIAGNOSIS — J44.9 CHRONIC OBSTRUCTIVE PULMONARY DISEASE, UNSPECIFIED COPD TYPE (HCC): ICD-10-CM

## 2024-04-15 RX ORDER — ALBUTEROL SULFATE 90 UG/1
AEROSOL, METERED RESPIRATORY (INHALATION)
Qty: 18 G | Refills: 1 | Status: SHIPPED | OUTPATIENT
Start: 2024-04-15

## 2024-04-24 DIAGNOSIS — G62.9 NEUROPATHY: Chronic | ICD-10-CM

## 2024-04-24 DIAGNOSIS — M06.9 RHEUMATOID ARTHRITIS, INVOLVING UNSPECIFIED SITE, UNSPECIFIED WHETHER RHEUMATOID FACTOR PRESENT (HCC): Chronic | ICD-10-CM

## 2024-04-24 DIAGNOSIS — E78.5 HYPERLIPIDEMIA, UNSPECIFIED HYPERLIPIDEMIA TYPE: ICD-10-CM

## 2024-04-25 RX ORDER — GABAPENTIN 600 MG/1
600 TABLET ORAL 3 TIMES DAILY
Qty: 90 TABLET | Refills: 5 | Status: SHIPPED | OUTPATIENT
Start: 2024-04-25

## 2024-04-25 RX ORDER — ROSUVASTATIN CALCIUM 40 MG/1
40 TABLET, COATED ORAL EVERY EVENING
Qty: 90 TABLET | Refills: 1 | Status: SHIPPED | OUTPATIENT
Start: 2024-04-25

## 2024-05-06 ENCOUNTER — APPOINTMENT (EMERGENCY)
Dept: RADIOLOGY | Facility: HOSPITAL | Age: 74
End: 2024-05-06
Payer: COMMERCIAL

## 2024-05-06 ENCOUNTER — HOSPITAL ENCOUNTER (EMERGENCY)
Facility: HOSPITAL | Age: 74
Discharge: HOME | End: 2024-05-06
Attending: EMERGENCY MEDICINE
Payer: COMMERCIAL

## 2024-05-06 VITALS
TEMPERATURE: 97.9 F | OXYGEN SATURATION: 94 % | SYSTOLIC BLOOD PRESSURE: 138 MMHG | HEART RATE: 81 BPM | DIASTOLIC BLOOD PRESSURE: 64 MMHG | RESPIRATION RATE: 20 BRPM

## 2024-05-06 DIAGNOSIS — J18.9 PNEUMONIA: ICD-10-CM

## 2024-05-06 DIAGNOSIS — R05.9 COUGH: ICD-10-CM

## 2024-05-06 DIAGNOSIS — J44.1 COPD EXACERBATION (HCC): Primary | ICD-10-CM

## 2024-05-06 PROCEDURE — 99284 EMERGENCY DEPT VISIT MOD MDM: CPT

## 2024-05-06 PROCEDURE — 0241U HB NFCT DS VIR RESP RNA 4 TRGT: CPT | Performed by: EMERGENCY MEDICINE

## 2024-05-06 PROCEDURE — 71046 X-RAY EXAM CHEST 2 VIEWS: CPT

## 2024-05-06 PROCEDURE — 99284 EMERGENCY DEPT VISIT MOD MDM: CPT | Performed by: EMERGENCY MEDICINE

## 2024-05-06 PROCEDURE — 94640 AIRWAY INHALATION TREATMENT: CPT

## 2024-05-06 RX ORDER — AMOXICILLIN AND CLAVULANATE POTASSIUM 875; 125 MG/1; MG/1
1 TABLET, FILM COATED ORAL EVERY 12 HOURS
Qty: 10 TABLET | Refills: 0 | Status: SHIPPED | OUTPATIENT
Start: 2024-05-06 | End: 2024-05-11

## 2024-05-06 RX ORDER — AZITHROMYCIN 250 MG/1
TABLET, FILM COATED ORAL
Qty: 4 TABLET | Refills: 0 | Status: SHIPPED | OUTPATIENT
Start: 2024-05-07 | End: 2024-05-10

## 2024-05-06 RX ORDER — IPRATROPIUM BROMIDE AND ALBUTEROL SULFATE 2.5; .5 MG/3ML; MG/3ML
3 SOLUTION RESPIRATORY (INHALATION) ONCE
Status: COMPLETED | OUTPATIENT
Start: 2024-05-06 | End: 2024-05-06

## 2024-05-06 RX ORDER — PREDNISONE 20 MG/1
40 TABLET ORAL DAILY
Qty: 8 TABLET | Refills: 0 | Status: SHIPPED | OUTPATIENT
Start: 2024-05-07 | End: 2024-05-11

## 2024-05-06 RX ORDER — PREDNISONE 20 MG/1
40 TABLET ORAL ONCE
Status: COMPLETED | OUTPATIENT
Start: 2024-05-06 | End: 2024-05-06

## 2024-05-06 RX ORDER — AZITHROMYCIN 500 MG/1
500 TABLET, FILM COATED ORAL ONCE
Status: COMPLETED | OUTPATIENT
Start: 2024-05-06 | End: 2024-05-06

## 2024-05-06 RX ADMIN — IPRATROPIUM BROMIDE AND ALBUTEROL SULFATE 3 ML: 2.5; .5 SOLUTION RESPIRATORY (INHALATION) at 11:28

## 2024-05-06 RX ADMIN — PREDNISONE 40 MG: 20 TABLET ORAL at 13:08

## 2024-05-06 RX ADMIN — AZITHROMYCIN 500 MG: 500 TABLET, FILM COATED ORAL at 13:08

## 2024-05-06 NOTE — ED PROVIDER NOTES
Pt Name: Everardo Renae  MRN: [id number]  Birthdate 1950  Age/Sex: 74 y.o. male  Date of evaluation: 5/6/2024  PCP: JEROME Huitron    CHIEF COMPLAINT    Chief Complaint   Patient presents with    Cough     Pt presents for new onset coughing that began yesterday and more bothersome through last night, today reports some improvement in symptoms. Denies any other symptoms at this time.          HPI and MDM    74 y.o. male presenting with cough.  States that he started with a cough yesterday morning.  It is productive.  Denies any sore throat or fevers or chills.  Sometimes has a runny nose.  No chest pain or shortness of breath.  States he has been dealing with a cough on and off for 1 year.  Got worse last night, it was keeping him up at night from sleeping.  Does have a history of COPD, tried his inhaler without relief.  Has seen GI for this cough as well, states that workup was reassuring.  No hemoptysis.      Differential diagnosis considered includes but not limited to viral respiratory infection, bacterial pneumonia, mild COPD exacerbation, pneumothorax.  Doubt ACS or PE.      ED Course as of 05/06/24 1808   Mon May 06, 2024   1123 XR chest 2 views  Per my independent interpretation, no acute cardiopulmonary processes.      Patient feeling much better upon reevaluation.  Possible early COPD exacerbation.  I will antibiotics and steroids.  Advised PCP follow-up.    Contacted with radiology read of chest x-ray, I updated patient with this, given pneumonia, also started on Augmentin.  Advised to follow-up with PCP, he states he does have a CT scan of chest coming up in the next week, advised him to keep this appointment.  He states he is feeling better still at home.    Medications   ipratropium-albuterol (DUO-NEB) 0.5-2.5 mg/3 mL inhalation solution 3 mL (3 mL Nebulization Given 5/6/24 1128)   azithromycin (ZITHROMAX) tablet 500 mg (500 mg Oral Given 5/6/24 1308)   predniSONE tablet 40 mg (40 mg Oral  Given 24 1308)         Past Medical and Surgical History    Past Medical History:   Diagnosis Date    Anemia     Aortic aneurysm (HCC)     BPH (benign prostatic hyperplasia)     CAD (coronary artery disease)     Chronic kidney disease     COPD (chronic obstructive pulmonary disease) (HCC)     Diabetes mellitus (HCC)     Dyslipidemia     Flu 2022    GERD (gastroesophageal reflux disease)     Graves disease     Hypertension     Iron deficiency anemia     Lactic acidosis     Lung cancer (HCC)     Lyme disease     Neuropathy     Pancytopenia (HCC)     Pneumonia 2022    Prostate cancer (HCC)     Rheumatoid arthritis (HCC)        Past Surgical History:   Procedure Laterality Date    BALLOON ANGIOPLASTY, ARTERY      CATARACT EXTRACTION, BILATERAL  2019    CLAVICLE SURGERY      TOE AMPUTATION Left 2018    Procedure: AMPUTATION GREAT TOE;  Surgeon: Bettye Delgado DPM;  Location: [facility] OR;  Service: Podiatry       Family History   Problem Relation Age of Onset    No Known Problems Mother     Coronary artery disease Father     No Known Problems Sister     No Known Problems Brother     No Known Problems Maternal Grandmother     No Known Problems Maternal Grandfather     No Known Problems Paternal Grandmother     No Known Problems Paternal Grandfather     No Known Problems Maternal Aunt     No Known Problems Maternal Uncle     No Known Problems Paternal Aunt     No Known Problems Paternal Uncle     No Known Problems Cousin        Social History     Tobacco Use    Smoking status: Former     Current packs/day: 0.00     Average packs/day: 0.5 packs/day for 54.0 years (27.0 ttl pk-yrs)     Types: Cigarettes     Start date:      Quit date: 2022     Years since quittin.3     Passive exposure: Past    Smokeless tobacco: Never   Vaping Use    Vaping status: Never Used   Substance Use Topics    Alcohol use: No    Drug use: No           Allergies    Allergies   Allergen Reactions    Ace Inhibitors  Anaphylaxis    Hydroxychloroquine Anaphylaxis and Swelling     Tongue swelling    Medical Tape Blisters and Other (See Comments)     Clear tape- paper tape is okay    blisters    Angiotensin Receptor Blockers Other (See Comments)     Elevated K+    Clindamycin Diarrhea and Nausea Only       Home Medications    Prior to Admission medications    Medication Sig Start Date End Date Taking? Authorizing Provider   albuterol (PROVENTIL HFA,VENTOLIN HFA) 90 mcg/act inhaler TAKE 2 PUFFS BY MOUTH EVERY 4 HOURS AS NEEDED FOR WHEEZE 4/15/24   JEROME Huitron   amLODIPine (NORVASC) 5 mg tablet TAKE 1 TABLET (5 MG TOTAL) BY MOUTH DAILY. 3/25/24   JEROME Huitron   ascorbic acid (VITAMIN C) 250 mg tablet Take 250 mg by mouth 2 (two) times a day    Historical Provider, MD   aspirin (ECOTRIN LOW STRENGTH) 81 mg EC tablet 1 tab(s)    Historical Provider, MD   atenolol (TENORMIN) 25 mg tablet TAKE 1 TABLET (25 MG TOTAL) BY MOUTH DAILY. 3/25/24   JEROME Huitron   B-D ULTRAFINE III SHORT PEN 31G X 8 MM MISC Inject under the skin in the morning 3/11/24   JEROME Huitron   Blood Glucose Monitoring Suppl (OneTouch Verio Reflect) w/Device KIT Check blood sugars twice daily. Please substitute with appropriate alternative as covered by patient's insurance. Dx: E11.65 12/11/23   JEROME Villa   dapagliflozin (Farxiga) 5 MG TABS Take 1 tablet (5 mg total) by mouth daily 3/19/24 6/17/24  JEROME Harper   fenofibrate (TRICOR) 145 mg tablet TAKE 1 TABLET BY MOUTH EVERY DAY 2/27/24   JEROME Huitron   ferrous sulfate 325 (65 Fe) mg tablet Take 325 mg by mouth 2 (two) times a day with meals    Historical Provider, MD   gabapentin (NEURONTIN) 600 MG tablet TAKE 1 TABLET BY MOUTH THREE TIMES A DAY 4/25/24   JEROME Huitron   glimepiride (AMARYL) 1 mg tablet Take 2 tablets (2 mg total) by mouth daily with breakfast 3/19/24 6/17/24  JEROME Harper   glucose blood (OneTouch Verio) test strip Use  as instructed 8/29/23   JEROME Huitron   glucose blood (OneTouch Verio) test strip Check blood sugars twice daily. Please substitute with appropriate alternative as covered by patient's insurance. Dx: E11.65 11/17/23   JEROME Villa   metFORMIN (GLUCOPHAGE) 1000 MG tablet TAKE 1 TABLET (1,000 MG TOTAL) BY MOUTH 2 (TWO) TIMES A DAY WITH MEALS FOR 5000 DOSES 2/27/24 1/1/31  JEROME Huitron   methimazole (TAPAZOLE) 5 mg tablet Take 0.5 tablets (2.5 mg total) by mouth daily 2.5mg 1/2 hour before a.m. meal 3/19/24 6/17/24  JEROME Harper   omeprazole (PriLOSEC) 40 MG capsule TAKE 1 CAPSULE BY MOUTH TWICE A DAY 3/25/24   JEROME Huitron   OneTouch Delica Lancets 33G MISC Check blood sugars twice daily. Please substitute with appropriate alternative as covered by patient's insurance. Dx: E11.65 12/14/23   JEROME Villa   riTUXimab (Rituxan) injection  5/10/23   Historical Provider, MD   rosuvastatin (CRESTOR) 40 MG tablet TAKE 1 TABLET BY MOUTH EVERY DAY IN THE EVENING 4/25/24   JEROME Huitron   tamsulosin (FLOMAX) 0.4 mg Take 1 capsule (0.4 mg total) by mouth every evening 12/14/23   JEROME Huitron   Tresiba FlexTouch 100 units/mL injection pen INJECT UP TO 80 UNITS DAILY 1/25/24   Jacob Carvajal MD   umeclidinium-vilanterol (Anoro Ellipta) 62.5-25 mcg/actuation inhaler Inhale 1 puff daily 1/29/24 4/28/24  JEROME Huitron           Physical Exam      ED Triage Vitals [05/06/24 0953]   Temperature Pulse Respirations Blood Pressure SpO2   97.9 °F (36.6 °C) 81 20 138/64 94 %      Temp Source Heart Rate Source Patient Position - Orthostatic VS BP Location FiO2 (%)   Temporal Monitor Sitting Left arm --      Pain Score       --               Physical Exam  Constitutional:       General: He is not in acute distress.     Appearance: He is not ill-appearing.   HENT:      Head: Normocephalic and atraumatic.      Nose: Nose normal.      Mouth/Throat:      Mouth: Mucous membranes are  moist.   Eyes:      Extraocular Movements: Extraocular movements intact.      Pupils: Pupils are equal, round, and reactive to light.   Cardiovascular:      Rate and Rhythm: Normal rate and regular rhythm.   Pulmonary:      Effort: No respiratory distress.      Breath sounds: Normal breath sounds. No wheezing, rhonchi or rales.   Abdominal:      General: There is no distension.   Musculoskeletal:         General: No swelling or deformity. Normal range of motion.      Cervical back: Normal range of motion and neck supple.   Skin:     General: Skin is warm.      Findings: No erythema.   Neurological:      Mental Status: He is alert and oriented to person, place, and time. Mental status is at baseline.              Diagnostic Results      Labs:    Results Reviewed       Procedure Component Value Units Date/Time    FLU/RSV/COVID - if FLU/RSV clinically relevant [[id number]]  (Normal) Collected: 05/06/24 1123    Lab Status: Final result Specimen: Nares from Nose Updated: 05/06/24 1205     SARS-CoV-2 Negative     INFLUENZA A PCR Negative     INFLUENZA B PCR Negative     RSV PCR Negative    Narrative:      FOR PEDIATRIC PATIENTS - copy/paste COVID Guidelines URL to browser: [email or web address]    SARS-CoV-2 assay is a Nucleic Acid Amplification assay intended for the  qualitative detection of nucleic acid from SARS-CoV-2 in nasopharyngeal  swabs. Results are for the presumptive identification of SARS-CoV-2 RNA.    Positive results are indicative of infection with SARS-CoV-2, the virus  causing COVID-19, but do not rule out bacterial infection or co-infection  with other viruses. Laboratories within the [place] and its  territories are required to report all positive results to the appropriate  public health authorities. Negative results do not preclude SARS-CoV-2  infection and should not be used as the sole basis for treatment or other  patient management  decisions. Negative results must be combined with  clinical observations, patient history, and epidemiological information.  This test has not been FDA cleared or approved.    This test has been authorized by FDA under an Emergency Use Authorization  (EUA). This test is only authorized for the duration of time the  declaration that circumstances exist justifying the authorization of the  emergency use of an in vitro diagnostic tests for detection of SARS-CoV-2  virus and/or diagnosis of COVID-19 infection under section 564(b)(1) of  the Act, 21 U.S.C. 360bbb-3(b)(1), unless the authorization is terminated  or revoked sooner. The test has been validated but independent review by FDA  and CLIA is pending.    Test performed using "[employer] GeneXpert: This RT-PCR assay targets N2,  a region unique to SARS-CoV-2. A conserved region in the E-gene was chosen  for pan-Sarbecovirus detection which includes SARS-CoV-2.    According to CMS-2020-01-R, this platform meets the definition of high-throughput technology.            All labs reviewed and utilized in the medical decision making process    Radiology:    XR chest 2 views   Final Result      New lingular opacity, possibly atelectasis versus pneumonia. Nodular fullness inferior pole left hilum could correspond to reactive adenopathy? Cannot entirely exclude metastatic disease progression. Short interval plain film and/or contrast-enhanced CT    follow-up advised.      Otherwise, gross unchanged bilateral pulmonary nodules.      The study was marked in EPIC for immediate notification and follow-up.      Resident: Darrin Ron I, the attending radiologist, have reviewed the images and agree with the final report above.      Workstation performed: [id number]             All radiology studies independently viewed by me and interpreted by the radiologist.    Procedure    Procedures        FINAL IMPRESSION    Final diagnoses:   COPD exacerbation (HCC)   Cough   Pneumonia          DISPOSITION    Time reflects when diagnosis was documented in both MDM as applicable and the Disposition within this note       Time User Action Codes Description Comment    5/6/2024 12:54 PM Dino Fam Add [J44.1] COPD exacerbation (HCC)     5/6/2024 12:54 PM Dino Fam Add [R05.9] Cough     5/6/2024  5:13 PM DinoWiliamal Add [J18.9] Pneumonia           ED Disposition       ED Disposition   Discharge    Condition   Stable    Date/Time   Mon May 6, 2024 12:54 PM    Comment   Everardo Renae discharge to home/self care.                   Follow-up Information       Follow up With Specialties Details Why Contact Info    [facility], Nurse Practitioner Call in 2 days  [address] [address] [phone]                PATIENT REFERRED TO:    JEROME Huitron  [address] [address]  [phone]    Call in 2 days        DISCHARGE MEDICATIONS:    Discharge Medication List as of 5/6/2024 12:55 PM        START taking these medications    Details   azithromycin (ZITHROMAX) 250 mg tablet Take 1 tablet daily x 4 days Do not start before May 7, 2024., Normal      predniSONE 20 mg tablet Take 2 tablets (40 mg total) by mouth daily for 4 days Do not start before May 7, 2024., Starting Tue 5/7/2024, Until Sat 5/11/2024, Normal           CONTINUE these medications which have NOT CHANGED    Details   albuterol (PROVENTIL HFA,VENTOLIN HFA) 90 mcg/act inhaler TAKE 2 PUFFS BY MOUTH EVERY 4 HOURS AS NEEDED FOR WHEEZE, Normal      amLODIPine (NORVASC) 5 mg tablet TAKE 1 TABLET (5 MG TOTAL) BY MOUTH DAILY., Starting Mon 3/25/2024, Normal      ascorbic acid (VITAMIN C) 250 mg tablet Take 250 mg by mouth 2 (two) times a day, Historical Med      aspirin (ECOTRIN LOW STRENGTH) 81 mg EC tablet 1 tab(s), Historical Med      atenolol (TENORMIN) 25 mg tablet TAKE 1 TABLET (25 MG TOTAL) BY MOUTH DAILY., Starting Mon 3/25/2024, Normal      B-D ULTRAFINE  III SHORT PEN 31G X 8 MM MISC Inject under the skin in the morning, Starting Mon 3/11/2024, Normal      Blood Glucose Monitoring Suppl (OneTouch Verio Reflect) w/Device KIT Check blood sugars twice daily. Please substitute with appropriate alternative as covered by patient's insurance. Dx: E11.65, Normal      dapagliflozin (Farxiga) 5 MG TABS Take 1 tablet (5 mg total) by mouth daily, Starting Tue 3/19/2024, Until Mon 6/17/2024, Normal      fenofibrate (TRICOR) 145 mg tablet TAKE 1 TABLET BY MOUTH EVERY DAY, Starting Tue 2/27/2024, Normal      ferrous sulfate 325 (65 Fe) mg tablet Take 325 mg by mouth 2 (two) times a day with meals, Historical Med      gabapentin (NEURONTIN) 600 MG tablet TAKE 1 TABLET BY MOUTH THREE TIMES A DAY, Starting Thu 4/25/2024, Normal      glimepiride (AMARYL) 1 mg tablet Take 2 tablets (2 mg total) by mouth daily with breakfast, Starting Tue 3/19/2024, Until Mon 6/17/2024, Fill Later      !! glucose blood (OneTouch Verio) test strip Use as instructed, Normal      !! glucose blood (OneTouch Verio) test strip Check blood sugars twice daily. Please substitute with appropriate alternative as covered by patient's insurance. Dx: E11.65, Normal      metFORMIN (GLUCOPHAGE) 1000 MG tablet TAKE 1 TABLET (1,000 MG TOTAL) BY MOUTH 2 (TWO) TIMES A DAY WITH MEALS FOR 5000 DOSES, Starting Tue 2/27/2024, Until Wed 1/1/2031, Normal      methimazole (TAPAZOLE) 5 mg tablet Take 0.5 tablets (2.5 mg total) by mouth daily 2.5mg 1/2 hour before a.m. meal, Starting Tue 3/19/2024, Until Mon 6/17/2024, Normal      omeprazole (PriLOSEC) 40 MG capsule TAKE 1 CAPSULE BY MOUTH TWICE A DAY, Starting Mon 3/25/2024, Normal      OneTouch Delica Lancets 33G MISC Check blood sugars twice daily. Please substitute with appropriate alternative as covered by patient's insurance. Dx: E11.65, Normal      riTUXimab (Rituxan) injection Starting Wed 5/10/2023, Historical Med      rosuvastatin (CRESTOR) 40 MG tablet TAKE 1 TABLET BY  MOUTH EVERY DAY IN THE EVENING, Starting Thu 4/25/2024, Normal      tamsulosin (FLOMAX) 0.4 mg Take 1 capsule (0.4 mg total) by mouth every evening, Starting Thu 12/14/2023, Normal      Tresiba FlexTouch 100 units/mL injection pen INJECT UP TO 80 UNITS DAILY, Normal      umeclidinium-vilanterol (Anoro Ellipta) 62.5-25 mcg/actuation inhaler Inhale 1 puff daily, Starting Mon 1/29/2024, Until Sun 4/28/2024, Normal       !! - Potential duplicate medications found. Please discuss with provider.          No discharge procedures on file.         Fam Sun DO        This note was partially completed using voice recognition technology, and was scanned for gross errors; however some errors may still exist. Please contact the author with any questions or requests for clarification.      Fam Sun DO  05/06/24 3369

## 2024-05-07 ENCOUNTER — VBI (OUTPATIENT)
Dept: FAMILY MEDICINE CLINIC | Facility: CLINIC | Age: 74
End: 2024-05-07

## 2024-05-07 NOTE — TELEPHONE ENCOUNTER
05/07/24 10:21 AM    Patient contacted post ED visit, [facility] department spoke with patient/caregiver and outreach was successful.    Thank you.  Jeni Martel PG [facility]

## 2024-05-07 NOTE — RESULT ENCOUNTER NOTE
Patient seen and evaluated in ER. Patient treated for PNA. Advised to follow up with PCP for repeat imaging.

## 2024-05-26 DIAGNOSIS — Z79.4 TYPE 2 DIABETES MELLITUS WITHOUT COMPLICATION, WITH LONG-TERM CURRENT USE OF INSULIN (HCC): ICD-10-CM

## 2024-05-26 DIAGNOSIS — E11.9 TYPE 2 DIABETES MELLITUS WITHOUT COMPLICATION, WITH LONG-TERM CURRENT USE OF INSULIN (HCC): ICD-10-CM

## 2024-05-26 DIAGNOSIS — Z79.4 TYPE 2 DIABETES MELLITUS WITH STAGE 3A CHRONIC KIDNEY DISEASE, WITH LONG-TERM CURRENT USE OF INSULIN (HCC): ICD-10-CM

## 2024-05-26 DIAGNOSIS — N18.31 TYPE 2 DIABETES MELLITUS WITH STAGE 3A CHRONIC KIDNEY DISEASE, WITH LONG-TERM CURRENT USE OF INSULIN (HCC): ICD-10-CM

## 2024-05-26 DIAGNOSIS — E11.22 TYPE 2 DIABETES MELLITUS WITH STAGE 3A CHRONIC KIDNEY DISEASE, WITH LONG-TERM CURRENT USE OF INSULIN (HCC): ICD-10-CM

## 2024-05-26 DIAGNOSIS — E78.5 HYPERLIPIDEMIA, UNSPECIFIED HYPERLIPIDEMIA TYPE: ICD-10-CM

## 2024-05-26 RX ORDER — FENOFIBRATE 145 MG/1
145 TABLET, COATED ORAL DAILY
Qty: 90 TABLET | Refills: 1 | Status: SHIPPED | OUTPATIENT
Start: 2024-05-26 | End: 2024-06-09

## 2024-05-26 RX ORDER — GLIMEPIRIDE 1 MG/1
1 TABLET ORAL
Qty: 90 TABLET | Refills: 1 | Status: SHIPPED | OUTPATIENT
Start: 2024-05-26 | End: 2024-06-09

## 2024-06-08 NOTE — Clinical Note
Case was discussed with Edd Sosa and the patient's admission status was agreed to be Admission Status: inpatient status to the service of Dr. Sosa.

## 2024-06-09 PROBLEM — E80.6 HYPERBILIRUBINEMIA: Status: ACTIVE | Noted: 2024-01-01

## 2024-06-09 PROBLEM — Z51.5 COMFORT MEASURES ONLY STATUS: Status: ACTIVE | Noted: 2024-01-01

## 2024-06-09 PROBLEM — R74.01 TRANSAMINITIS: Status: ACTIVE | Noted: 2024-01-01

## 2024-06-09 PROBLEM — D68.9 COAGULOPATHY (HCC): Status: ACTIVE | Noted: 2024-01-01

## 2024-06-09 PROBLEM — C78.7 METASTASIS TO LIVER (HCC): Status: ACTIVE | Noted: 2024-01-01

## 2024-06-09 PROBLEM — E16.2 HYPOGLYCEMIA: Status: ACTIVE | Noted: 2024-01-01

## 2024-06-09 NOTE — DEATH NOTE
INPATIENT DEATH NOTE  Everardo Renae 74 y.o. male MRN: [id number]  Unit/Bed#: ICU 01 Encounter: [id number]    Date, Time and Cause of Death    Date of Death: 24  Time of Death: 11:54 AM  Preliminary Cause of Death: Hypoglycemia  Entered by: Silvano CANO[PG1.1]       Attribution       PG1.1 JEROME Escobar 24 11:56             Patient's Information  Pronounced by: Silvano CANO  Did the patient's death occur in the ED?: No  Did the patient's death occur in the OR?: No  Did the patient's death occur less than 10 days post-op?: No  Did the patient's death occur within 24 hours of admission?: Yes  Was code status DNR at the time of death?: Yes    PHYSICAL EXAM:  Unresponsive to noxious stimuli, Spontaneous respirations absent, Breath sounds absent, Heart sounds absent, Pupillary light reflex absent, and Corneal blink reflex absent    Medical Examiner notification criteria:  Patient  within 24 hours of arrival to hospital   Medical Examiner's office notified?:  Yes   Medical Examiner accepted case?:  Pending  Name of Medical Examiner: Pending    Family Notification  Was the family notified?: Yes  Date Notified: 24  Time Notified: 1159  Notified by: Silvano Sandoval  Name of Family Notified of Death: Spencer Renae   Relationship to Patient: Daughter  Family Notification Route: Telephone  Was the family told to contact a  home?: Yes    Autopsy Options:  Post-mortem examination declined by next of kin    Primary Service Attending Physician notified?:  yes - Attending:  Edd Sosa MD    Physician/Resident responsible for completing Discharge Summary:  Silvano CANO

## 2024-06-09 NOTE — CASE MANAGEMENT
Case Management Assessment & Discharge Planning Note    Patient name Everardo Renae  Location ICU /ICU  MRN [id number]  : 1950 Date 2024       Current Admission Date: 2024  Current Admission Diagnosis:Comfort measures only status   Patient Active Problem List    Diagnosis Date Noted Date Diagnosed    Comfort measures only status 2024     Coagulopathy (HCC) 2024     Transaminitis 2024     Metastasis to liver (HCC) 2024     Hyperbilirubinemia 2024     Hypoglycemia 2024     Chronic kidney disease, stage 3a (HCC) 2024     Slow transit constipation 2023     CKD (chronic kidney disease) 2022     Acute respiratory failure (HCC) 2022     Graves disease 2022     Prostate cancer (HCC) 2021     Injury of finger of right hand 10/28/2021     Abdominal aortic aneurysm (AAA) without rupture (HCC) 2021     Pancytopenia (HCC) 2019     Rheumatoid arthritis (HCC) 2019     Squamous cell lung cancer, right (HCC) 2019     Neuropathy 2019     Mixed hyperlipidemia 2019     COPD (chronic obstructive pulmonary disease) (HCC) 2019     GERD (gastroesophageal reflux disease) 2019     Lactic acidosis 2019     PRADEEP (acute kidney injury) (HCC) 2018     Type 2 diabetes mellitus, with long-term current use of insulin (HCC) 2018     HTN (hypertension) 2018     Anemia 2018       LOS (days): 0  Geometric Mean LOS (GMLOS) (days):   Days to GMLOS:     OBJECTIVE:    Risk of Unplanned Readmission Score: 30.83         Current admission status: Inpatient       Preferred Pharmacy:   [id number] [place] - [address]  [address]  [place] [address]  Phone: [phone] Fax: [phone]    Primary Care Provider: JEROME Huitron    Primary Insurance: [facility]  Secondary Insurance:     ASSESSMENT:  Active Health Care Proxies       [facility]  Representative - Spouse   Primary Phone: [phone] (Mobile)                 Advance Directives  Does patient have a Health Care POA?: Yes  Does patient have Advance Directives?: Yes  Advance Directives: Living will, Power of  for health care  Primary Contact: wife Gala         Readmission Root Cause  30 Day Readmission: No    Patient Information  Admitted from:: Home  Mental Status: Other (Comment) (CM unable to arouse pt)  During Assessment patient was accompanied by: Not accompanied during assessment  Assessment information provided by:: Spouse (tammi Cedeño via phone)  Primary Caregiver: Self  Support Systems: Spouse/significant other, Children  County of Residence: [place]  What city do you live in?: [place]  Home entry access options. Select all that apply.: No steps to enter home  Type of Current Residence: 2 story home  Upon entering residence, is there a bedroom on the main floor (no further steps)?: Yes  Upon entering residence, is there a bathroom on the main floor (no further steps)?: Yes  Living Arrangements: Lives w/ Spouse/significant other  Is patient a ?: No    Activities of Daily Living Prior to Admission  Functional Status: Independent  Completes ADLs independently?: Yes (pt refuses assistance)  Ambulates independently?: Yes  Does patient use assisted devices?: No (pt refuses to use dme equipment)  Does patient currently own DME?: No  Does patient have a history of Outpatient Therapy (PT/OT)?: No  Does the patient have a history of Short-Term Rehab?: No  Does patient have a history of HHC?: No  Does patient currently have HHC?: No         Patient Information Continued  Income Source: Pension/FPC  Does patient have prescription coverage?: Yes  Does patient receive dialysis treatments?: No  Does patient have a history of substance abuse?: No  Does patient have a history of Mental Health Diagnosis?: No    PHQ 2/9 Screening   Reviewed PHQ 2/9 Depression Screening Score?:  No    Means of Transportation  Means of Transport to Appts:: Family transport      Social Determinants of Health (SDOH)      Flowsheet Row Most Recent Value   Housing Stability    In the last 12 months, was there a time when you were not able to pay the mortgage or rent on time? N   In the past 12 months, how many times have you moved where you were living? 1   At any time in the past 12 months, were you homeless or living in a shelter (including now)? N   Transportation Needs    In the past 12 months, has lack of transportation kept you from medical appointments or from getting medications? no   In the past 12 months, has lack of transportation kept you from meetings, work, or from getting things needed for daily living? No   Food Insecurity    Within the past 12 months, you worried that your food would run out before you got the money to buy more. Never true   Within the past 12 months, the food you bought just didn't last and you didn't have money to get more. Never true   Utilities    In the past 12 months has the electric, gas, oil, or water company threatened to shut off services in your home? No            DISCHARGE DETAILS:    Discharge planning discussed with:: tammi Cedeño  Freedom of Choice: Yes  Comments - Freedom of Choice: CM addressing consult for hospice.  Wife Gala thought that pt was going to pass away here at the hospital (pt is on comfort care), but will speak to family and let CM know if they decide on hospice in the home, at a SNF, or at a hospice house.  CM will await call back.  CM contacted family/caregiver?: Yes  Were Treatment Team discharge recommendations reviewed with patient/caregiver?: Yes  Did patient/caregiver verbalize understanding of patient care needs?: Yes  Were patient/caregiver advised of the risks associated with not following Treatment Team discharge recommendations?: Yes    Contacts  Patient Contacts: Gala  Relationship to Patient:: Family  Contact Method: Phone  Phone  Number: [phone]  Reason/Outcome: Discharge Planning, Continuity of Care    Requested Home Health Care         Is the patient interested in HHC at discharge?: No    DME Referral Provided  Referral made for DME?: No    Other Referral/Resources/Interventions Provided:  Interventions: Hospice  Referral Comments: Wife Gala will speak to family and let CM know decision on hospice care    Would you like to participate in our [facility] service program?  : No - Declined    Treatment Team Recommendation: Hospice  Discharge Destination Plan:: Hospice  Transport at Discharge : BLS Ambulance

## 2024-06-09 NOTE — H&P
[facility]  H&P  Name: Everardo Renae 74 y.o. male I MRN: [id number]  Unit/Bed#: TR 30 I Date of Admission: 6/8/2024   Date of Service: 6/9/2024 I Hospital Day: 0      Assessment & Plan   Comfort measures only status  Assessment & Plan  Upon discussing the patient's current functional status, and findings of multisystem organ failure and widely metastatic disease, patient's daughter and wife opt for comfort measures.  We will admit the patient for ease of transferring to the hospice.  Will administer morphine for pain and/or air hunger, Ativan for anxiety, Haldol for agitation.  Will support provided to both patient and the family.  The patient is being admitted with minimally responsive mental status.           History of Present Illness     HPI: Everardo Renae is a 74 y.o. who presents with extensive metastatic disease, appears to be originally prostate cancer with mets to lung, bone, and liver.  Patient recently identified with new primary squamous cell carcinoma of the right lung.  Due to recent concern for brain metastasis, underwent MRI with gadolinium on 6/8 which was unrevealing for metastatic lesions.  Feeling unwell after the procedure, the patient settled into a chair and appeared to sleep for approximately 12 hours at which time the family attempted to wake him for his medications.  Patient was minimally responsive.  EMS was summoned and his glucose was found to be critically low.  Despite treatment with glucose, patient's mental status remains very somnolent and encephalopathic.  Further evaluation in the emergency department reveals multisystem organ failure, likely as a result of his widely metastatic disease which is likely contributing to his encephalopathy.  He has extensive liver metastasis, likely leading to impaired gluconeogenesis and coagulopathy.  Imaging also reveals likely pancreatitis.  Patient also with significant PRADEEP on CKD.  Daughter advises the emergency  department that the patient is a DNR/DNI.  Given the patient's need for continuous dextrose and frequent glucose checks and borderline hypotension despite transfusion, critical care was consulted for admission.  Goals of care discussion was held with the daughter at the bedside.    History obtained from chart review and unobtainable from patient due to mental status.  Review of Systems: Review of Systems   Unable to perform ROS: Mental status change     Disposition: Med Surg  Historical Information   Past Medical History:  No date: Anemia  No date: Aortic aneurysm (HCC)  No date: BPH (benign prostatic hyperplasia)  No date: CAD (coronary artery disease)  No date: Chronic kidney disease  No date: COPD (chronic obstructive pulmonary disease) ([facility])  No date: Diabetes mellitus ([facility])  No date: Dyslipidemia  12/2022: Flu  No date: GERD (gastroesophageal reflux disease)  No date: Graves disease  No date: Hypertension  No date: Iron deficiency anemia  No date: Lactic acidosis  No date: Lung cancer ([facility])  No date: Lyme disease  No date: Neuropathy  No date: Pancytopenia ([facility])  12/2022: Pneumonia  No date: Prostate cancer ([facility])  No date: Rheumatoid arthritis ([facility]) Past Surgical History:  1996: BALLOON ANGIOPLASTY, ARTERY  2019: CATARACT EXTRACTION, BILATERAL  No date: CLAVICLE SURGERY  5/30/2024: CT NEEDLE BIOPSY LUNG  11/20/2018: TOE AMPUTATION; Left      Comment:  Procedure: AMPUTATION GREAT TOE;  Surgeon: Bettye Delgado DPM;  Location: [facility];  Service: Podiatry   Current Outpatient Medications   Medication Instructions    albuterol (PROVENTIL HFA,VENTOLIN HFA) 90 mcg/act inhaler TAKE 2 PUFFS BY MOUTH EVERY 4 HOURS AS NEEDED FOR WHEEZE    amLODIPine (NORVASC) 5 mg, Oral, Daily    ascorbic acid (VITAMIN C) 250 mg, Oral, 2 times daily    aspirin (ECOTRIN LOW STRENGTH) 81 mg EC tablet 1 tab(s)    atenolol (TENORMIN) 25 mg, Oral, Daily    B-D ULTRAFINE III SHORT PEN 31G X 8 MM MISC Subcutaneous, Daily     Blood Glucose Monitoring Suppl (OneTouch Verio Reflect) w/Device KIT Check blood sugars twice daily. Please substitute with appropriate alternative as covered by patient's insurance. Dx: E11.65    dapagliflozin (FARXIGA) 5 mg, Oral, Daily    fenofibrate (TRICOR) 145 mg, Oral, Daily    ferrous sulfate 325 mg, Oral, 2 times daily with meals    gabapentin (NEURONTIN) 600 mg, Oral, 3 times daily    glimepiride (AMARYL) 1 mg, Oral, Daily with breakfast    glucose blood (OneTouch Verio) test strip Use as instructed    glucose blood (OneTouch Verio) test strip Check blood sugars twice daily. Please substitute with appropriate alternative as covered by patient's insurance. Dx: E11.65    metFORMIN (GLUCOPHAGE) 1,000 mg, Oral, 2 times daily with meals    methimazole (TAPAZOLE) 2.5 mg, Oral, Daily, 2.5mg 1/2 hour before a.m. meal    omeprazole (PRILOSEC) 40 mg, Oral, 2 times daily    OneTouch Delica Lancets 33G MISC Check blood sugars twice daily. Please substitute with appropriate alternative as covered by patient's insurance. Dx: E11.65    riTUXimab (Rituxan) injection No dose, route, or frequency recorded.    rosuvastatin (CRESTOR) 40 mg, Oral, Every evening    tamsulosin (FLOMAX) 0.4 mg, Oral, Every evening    Tresiba FlexTouch 100 units/mL injection pen INJECT UP TO 80 UNITS DAILY    umeclidinium-vilanterol (Anoro Ellipta) 62.5-25 mcg/actuation inhaler 1 puff, Inhalation, Daily    Allergies   Allergen Reactions    Ace Inhibitors Anaphylaxis    Hydroxychloroquine Anaphylaxis and Swelling     Tongue swelling    Medical Tape Blisters and Other (See Comments)     Clear tape- paper tape is okay    blisters    Angiotensin Receptor Blockers Other (See Comments)     Elevated K+    Clindamycin Diarrhea and Nausea Only      Social History     Tobacco Use    Smoking status: Former     Current packs/day: 0.00     Average packs/day: 0.5 packs/day for 54.0 years (27.0 ttl pk-yrs)     Types: Cigarettes     Start date: 1968     Quit  date: 2022     Years since quittin.4     Passive exposure: Past    Smokeless tobacco: Never   Vaping Use    Vaping status: Never Used   Substance Use Topics    Alcohol use: No    Drug use: No    Family History   Problem Relation Age of Onset    No Known Problems Mother     Coronary artery disease Father     No Known Problems Sister     No Known Problems Brother     No Known Problems Maternal Grandmother     No Known Problems Maternal Grandfather     No Known Problems Paternal Grandmother     No Known Problems Paternal Grandfather     No Known Problems Maternal Aunt     No Known Problems Maternal Uncle     No Known Problems Paternal Aunt     No Known Problems Paternal Uncle     No Known Problems Cousin           Objective                            Vitals I/O      Most Recent Min/Max in 24hrs   Temp 97.6 °F (36.4 °C) Temp  Min: 97.1 °F (36.2 °C)  Max: 97.6 °F (36.4 °C)   Pulse 77 Pulse  Min: 72  Max: 77   Resp 20 Resp  Min: 13  Max: 21   BP 97/57 BP  Min: 84/53  Max: 124/91   O2 Sat 91 % SpO2  Min: 91 %  Max: 97 %      Intake/Output Summary (Last 24 hours) at 2024 0314  Last data filed at 2024 0313  Gross per 24 hour   Intake 2350 ml   Output --   Net 2350 ml       Diet Regular; Pleasure Feed    Invasive Monitoring           Physical Exam   Physical Exam  Vitals and nursing note reviewed.   Eyes:      General: No dysconjugate gazeNo scleral icterus.  Skin:     General: Skin is cool and dry.      Capillary Refill: Capillary refill takes 2 to 3 seconds.      Coloration: Skin is pale. Skin is not jaundiced.   HENT:      Head: Normocephalic and atraumatic.      Mouth/Throat:      Mouth: Mucous membranes are dry.   Neck:      Vascular: No JVD.   Cardiovascular:      Rate and Rhythm: Normal rate and regular rhythm.      Pulses: Normal pulses.      Heart sounds: Normal heart sounds.   Musculoskeletal:         General: No swelling or deformity.      Right lower leg: No edema.      Left lower leg: No edema.    Abdominal: General: There is distension.     Palpations: Abdomen is soft.      Tenderness: There is abdominal tenderness. There is no guarding.      Comments: Hypoactive bowel sounds   Constitutional:       General: He is not in acute distress.     Appearance: He is ill-appearing.   Pulmonary:      Effort: Pulmonary effort is normal. No accessory muscle usage, respiratory distress or accessory muscle usage.   Secretions are normal.Chest:      Chest wall: No tenderness.   Psychiatric:         Speech: Speech is not no expressive aphasia.   Neurological:      General: No focal deficit present.      Mental Status: He is somnolent, disoriented to place, disoriented to time and disoriented to situation.      Cranial Nerves: No dysarthria or facial asymmetry.      Motor: No motor deficit.        No clonus.   Genitourinary/Anorectal:  [place] present.          Diagnostic Studies      EKG: Sinus rhythm  Imagin24.  CT chest abdomen pelvis as read by V rad.  Scattered areas of emphysema throughout the lungs.  Scattered areas of bronchial wall thickening likely chronic inflammatory.  Few areas of subpleural reticulation are noted, nonspecific.  Masslike consolidation noted left lower lobe measuring 5.2 x 3.5 cm there is rounded atelectasis in the right lower lobe.  There are numerous bilateral pulmonary nodules such as a 1.4 cm right apical nodule several of these are stable from 2022.  Trace pleural effusions are noted.  The heart is enlarged.  Moderate coronary atherosclerotic disease/calcification though evaluation is limited secondary to the nongated nature of the study.  Mediastinum appears unremarkable with no evidence of masses, lymphadenopathy, or mediastinal widening.  Hilar structures including the major bronchi and vessels appear intact.  There is atherosclerotic disease of the visualized aorta and its major branch vessels patient has a right chest port in situ.  There is extensive hepatic metastatic  disease which is incompletely evaluated in the absence of intravenous contrast.  There is mild peripancreatic stranding which could reflect an element of underlying pancreatitis and correlation with lipase levels suggested.  There are nonobstructed bilateral intrarenal calculi.  There is scattered colonic diverticula without evidence for active diverticulitis.  There is ectasia of the infrarenal abdominal aorta which measures up to 3.2 cm.  There postsurgical changes of the ventral abdominal wall.  There is a fat and fluid containing right inguinal hernia.  There is a fat-containing left inguinal hernia.  As interpreted by Levi Moraes MD.  I have personally reviewed pertinent reports.   and I have personally reviewed pertinent films in PACS     Medications:  Scheduled PRN      glycopyrrolate, 0.1 mg, Q4H PRN  haloperidol lactate, 0.5 mg, Q2H PRN  LORazepam, 1 mg, Q10 Min PRN  morphine injection, 2 mg, Q1H PRN       Continuous          Labs:    CBC    Recent Labs     06/08/24  2145   WBC 12.43*   HGB 7.8*   HCT 25.1*        BMP    Recent Labs     06/08/24  2201   SODIUM 140   K 5.2   *   CO2 16*   AGAP 14*   BUN 75*   CREATININE 3.84*   CALCIUM 8.1*       Coags    Recent Labs     06/08/24  2214   INR 3.56*   PTT 47*        Additional Electrolytes  No recent results       Blood Gas    No recent results  No recent results LFTs  Recent Labs     06/08/24  2201   ALT 89*   *   ALKPHOS 221*   ALB 2.8*   TBILI 5.51*       Infectious  Recent Labs     06/08/24  2145   PROCALCITONI 1.25*     Glucose  Recent Labs     06/08/24  2201   GLUC 66             Anticipated Length of Stay is > 2 midnights  JEROME Presley

## 2024-06-09 NOTE — ASSESSMENT & PLAN NOTE
Upon discussing the patient's current functional status, and findings of multisystem organ failure and widely metastatic disease, patient's daughter and wife opt for comfort measures.  We will admit the patient for ease of transferring to the hospice.  Will administer morphine for pain and/or air hunger, Ativan for anxiety, Haldol for agitation.  Will support provided to both patient and the family.  The patient is being admitted with minimally responsive mental status.

## 2024-06-09 NOTE — ED PROVIDER NOTES
History  Chief Complaint   Patient presents with    Hypoglycemia - Symptomatic     Initial CBG 39 for EMS. Pt went to appointment this am and then sat in chair all day. Family became concerned around . EMS gave D10. Repeat CBG 82. Pt lethargic on arrival.       Hypoglycemia - Symptomatic      Prior to Admission Medications   Prescriptions Last Dose Informant Patient Reported? Taking?   B-D ULTRAFINE III SHORT PEN 31G X 8 MM MISC  Self No No   Sig: Inject under the skin in the morning   Blood Glucose Monitoring Suppl (OneTouch Verio Reflect) w/Device KIT  Self No No   Sig: Check blood sugars twice daily. Please substitute with appropriate alternative as covered by patient's insurance. Dx: E11.65   OneTouch Delica Lancets 33G MISC  Self No No   Sig: Check blood sugars twice daily. Please substitute with appropriate alternative as covered by patient's insurance. Dx: E11.65   Tresiba FlexTouch 100 units/mL injection pen  Self No No   Sig: INJECT UP TO 80 UNITS DAILY   albuterol (PROVENTIL HFA,VENTOLIN HFA) 90 mcg/act inhaler   No No   Sig: TAKE 2 PUFFS BY MOUTH EVERY 4 HOURS AS NEEDED FOR WHEEZE   amLODIPine (NORVASC) 5 mg tablet  Self No No   Sig: TAKE 1 TABLET (5 MG TOTAL) BY MOUTH DAILY.   ascorbic acid (VITAMIN C) 250 mg tablet  Self Yes No   Sig: Take 250 mg by mouth 2 (two) times a day   aspirin (ECOTRIN LOW STRENGTH) 81 mg EC tablet  Self Yes No   Si tab(s)   atenolol (TENORMIN) 25 mg tablet  Self No No   Sig: TAKE 1 TABLET (25 MG TOTAL) BY MOUTH DAILY.   dapagliflozin (Farxiga) 5 MG TABS  Self No No   Sig: Take 1 tablet (5 mg total) by mouth daily   fenofibrate (TRICOR) 145 mg tablet   No No   Sig: TAKE 1 TABLET BY MOUTH EVERY DAY   ferrous sulfate 325 (65 Fe) mg tablet  Self Yes No   Sig: Take 325 mg by mouth 2 (two) times a day with meals   gabapentin (NEURONTIN) 600 MG tablet   No No   Sig: TAKE 1 TABLET BY MOUTH THREE TIMES A DAY   glimepiride (AMARYL) 1 mg tablet   No No   Sig: TAKE 1 TABLET BY  MOUTH EVERY DAY WITH BREAKFAST   glucose blood (OneTouch Verio) test strip  Self No No   Sig: Use as instructed   glucose blood (OneTouch Verio) test strip  Self No No   Sig: Check blood sugars twice daily. Please substitute with appropriate alternative as covered by patient's insurance. Dx: E11.65   metFORMIN (GLUCOPHAGE) 1000 MG tablet   No No   Sig: TAKE 1 TABLET (1,000 MG TOTAL) BY MOUTH 2 (TWO) TIMES A DAY WITH MEALS   methimazole (TAPAZOLE) 5 mg tablet  Self No No   Sig: Take 0.5 tablets (2.5 mg total) by mouth daily 2.5mg 1/2 hour before a.m. meal   omeprazole (PriLOSEC) 40 MG capsule  Self No No   Sig: TAKE 1 CAPSULE BY MOUTH TWICE A DAY   riTUXimab (Rituxan) injection  Self Yes No   rosuvastatin (CRESTOR) 40 MG tablet   No No   Sig: TAKE 1 TABLET BY MOUTH EVERY DAY IN THE EVENING   tamsulosin (FLOMAX) 0.4 mg  Self No No   Sig: Take 1 capsule (0.4 mg total) by mouth every evening   umeclidinium-vilanterol (Anoro Ellipta) 62.5-25 mcg/actuation inhaler  Self No No   Sig: Inhale 1 puff daily      Facility-Administered Medications: None       Past Medical History:   Diagnosis Date    Anemia     Aortic aneurysm (HCC)     BPH (benign prostatic hyperplasia)     CAD (coronary artery disease)     Chronic kidney disease     COPD (chronic obstructive pulmonary disease) (HCC)     Diabetes mellitus (HCC)     Dyslipidemia     Flu 12/2022    GERD (gastroesophageal reflux disease)     Graves disease     Hypertension     Iron deficiency anemia     Lactic acidosis     Lung cancer (HCC)     Lyme disease     Neuropathy     Pancytopenia (HCC)     Pneumonia 12/2022    Prostate cancer (HCC)     Rheumatoid arthritis (HCC)        Past Surgical History:   Procedure Laterality Date    BALLOON ANGIOPLASTY, ARTERY  1996    CATARACT EXTRACTION, BILATERAL  2019    CLAVICLE SURGERY      CT NEEDLE BIOPSY LUNG  5/30/2024    TOE AMPUTATION Left 11/20/2018    Procedure: AMPUTATION GREAT TOE;  Surgeon: Bettye Delgado DPM;  Location: [facility];   Service: Podiatry       Family History   Problem Relation Age of Onset    No Known Problems Mother     Coronary artery disease Father     No Known Problems Sister     No Known Problems Brother     No Known Problems Maternal Grandmother     No Known Problems Maternal Grandfather     No Known Problems Paternal Grandmother     No Known Problems Paternal Grandfather     No Known Problems Maternal Aunt     No Known Problems Maternal Uncle     No Known Problems Paternal Aunt     No Known Problems Paternal Uncle     No Known Problems Cousin      I have reviewed and agree with the history as documented.    E-Cigarette/Vaping    E-Cigarette Use Never User      E-Cigarette/Vaping Substances    Nicotine No     THC No     CBD No     Flavoring No     Other No     Unknown No      Social History     Tobacco Use    Smoking status: Former     Current packs/day: 0.00     Average packs/day: 0.5 packs/day for 54.0 years (27.0 ttl pk-yrs)     Types: Cigarettes     Start date:      Quit date: 2022     Years since quittin.4     Passive exposure: Past    Smokeless tobacco: Never   Vaping Use    Vaping status: Never Used   Substance Use Topics    Alcohol use: No    Drug use: No       Review of Systems    Physical Exam  Physical Exam  Vitals and nursing note reviewed.   Constitutional:       Appearance: He is well-developed. He is ill-appearing.   HENT:      Head: Normocephalic and atraumatic.      Mouth/Throat:      Mouth: Mucous membranes are moist.   Eyes:      Conjunctiva/sclera: Conjunctivae normal.      Pupils: Pupils are equal, round, and reactive to light.   Neck:      Trachea: No tracheal deviation.   Cardiovascular:      Rate and Rhythm: Normal rate and regular rhythm.      Heart sounds: Normal heart sounds.   Pulmonary:      Effort: Pulmonary effort is normal. No respiratory distress.      Breath sounds: Normal breath sounds.   Abdominal:      General: There is distension.      Palpations: Abdomen is soft. There is no  fluid wave.      Tenderness: There is generalized abdominal tenderness.   Musculoskeletal:      Cervical back: Normal range of motion.   Skin:     General: Skin is warm and dry.   Neurological:      Mental Status: He is alert and oriented to person, place, and time.      GCS: GCS eye subscore is 2. GCS verbal subscore is 4. GCS motor subscore is 6.      Comments: Arouses to painful stimuli, ranted x 3 but significantly garbled speech.  Rapidly falls asleep even while talking to you.  No recollection of recent events or able to write any additional information.  Generalized weakness with no obvious focal deficits   Psychiatric:         Mood and Affect: Mood and affect normal.         Behavior: Behavior normal.         Vital Signs  ED Triage Vitals   Temperature Pulse Respirations Blood Pressure SpO2   06/08/24 2100 06/08/24 2100 06/08/24 2100 06/08/24 2100 06/08/24 2100   (!) 97.3 °F (36.3 °C) 73 18 124/91 97 %      Temp Source Heart Rate Source Patient Position - Orthostatic VS BP Location FiO2 (%)   06/08/24 2100 06/08/24 2300 06/08/24 2300 06/08/24 2300 --   Axillary Monitor Lying Right arm       Pain Score       --                  Vitals:    06/09/24 0030 06/09/24 0045 06/09/24 0100 06/09/24 0115   BP: 96/53 97/54 92/57 95/53   Pulse: 73 74 74 74   Patient Position - Orthostatic VS: Lying Lying Lying Lying         Visual Acuity      ED Medications  Medications   dextrose infusion 10 % (125 mL/hr Intravenous Rate/Dose Change 6/8/24 2238)   NOREPINEPHRINE 4 MG  ML NSS (CMPD ORDER) infusion (has no administration in time range)   lactated ringers bolus 1,000 mL (0 mL Intravenous Stopped 6/8/24 2322)   EPINEPHrine (FOR EMS ONLY) (ADRENALIN) injection 1 mg (0 mg Does not apply Given to EMS 6/8/24 2233)   dextrose (FOR EMS ONLY) 50 % IV solution 100 mL (0 mL Does not apply Given to EMS 6/8/24 2233)   ceftriaxone (ROCEPHIN) 1 g/50 mL in dextrose IVPB (0 mg Intravenous Stopped 6/8/24 2321)   dextrose infusion 10 %  "bolus (0 mL Intravenous Stopped 6/8/24 2321)       Diagnostic Studies  Results Reviewed       Procedure Component Value Units Date/Time    Lipase [[id number]]     Lab Status: No result Specimen: Blood     Fingerstick Glucose (POCT) [[id number]]  (Normal) Collected: 06/09/24 0028    Lab Status: Final result Specimen: Blood Updated: 06/09/24 0029     POC Glucose 133 mg/dl     Basic metabolic panel [[id number]]  (Abnormal) Collected: 06/08/24 2201    Lab Status: Final result Specimen: Blood from Arm, Left Updated: 06/08/24 2303     Sodium 140 mmol/L      Potassium 5.2 mmol/L      Chloride 110 mmol/L      CO2 16 mmol/L      ANION GAP 14 mmol/L      BUN 75 mg/dL      Creatinine 3.84 mg/dL      Glucose 66 mg/dL      Calcium 8.1 mg/dL      eGFR 14 ml/min/1.73sq m     Narrative:      National Kid[employer] guidelines for Chronic Kidney Disease (CKD):     Stage 1 with normal or high GFR (GFR > 90 mL/min/1.73 square meters)    Stage 2 Mild CKD (GFR = 60-89 mL/min/1.73 square meters)    Stage 3A Moderate CKD (GFR = 45-59 mL/min/1.73 square meters)    Stage 3B Moderate CKD (GFR = 30-44 mL/min/1.73 square meters)    Stage 4 Severe CKD (GFR = 15-29 mL/min/1.73 square meters)    Stage 5 End Stage CKD (GFR <15 mL/min/1.73 square meters)  Note: GFR calculation is accurate only with a steady state creatinine    Hepatic function panel [[id number]]  (Abnormal) Collected: 06/08/24 2201    Lab Status: Final result Specimen: Blood from Arm, Left Updated: 06/08/24 2303     Total Bilirubin 5.51 mg/dL      Bilirubin, Direct 3.67 mg/dL      Alkaline Phosphatase 221 U/L       U/L      ALT 89 U/L      Total Protein 5.3 g/dL      Albumin 2.8 g/dL     Acetaminophen level-\"If concentration is detectable, please discuss with medical  on call.\" [[id number]]  (Abnormal) Collected: 06/08/24 2201    Lab Status: Final result Specimen: Blood from Arm, Left Updated: 06/08/24 2303     Acetaminophen Level <2 ug/mL     Salicylate " level [[id number]]  (Normal) Collected: 06/08/24 2201    Lab Status: Final result Specimen: Blood from Arm, Left Updated: 06/08/24 2303     Salicylate Lvl <5 mg/dL     Fingerstick Glucose (POCT) [[id number]]  (Normal) Collected: 06/08/24 2246    Lab Status: Final result Specimen: Blood Updated: 06/08/24 2247     POC Glucose 99 mg/dl     Protime-INR [[id number]]  (Abnormal) Collected: 06/08/24 2214    Lab Status: Final result Specimen: Blood from Arm, Left Updated: 06/08/24 2244     Protime 36.7 seconds      INR 3.56    APTT [[id number]]  (Abnormal) Collected: 06/08/24 2214    Lab Status: Final result Specimen: Blood from Arm, Left Updated: 06/08/24 2244     PTT 47 seconds     Ammonia [[id number]]  (Normal) Collected: 06/08/24 2214    Lab Status: Final result Specimen: Blood from Arm, Left Updated: 06/08/24 2241     Ammonia 45 umol/L     TSH, 3rd generation with Free T4 reflex [[id number]]  (Abnormal) Collected: 06/08/24 2145    Lab Status: Final result Specimen: Blood from Arm, Right Updated: 06/08/24 2235     TSH 3RD GENERATON 0.277 uIU/mL     T4, free [[id number]] Collected: 06/08/24 2145    Lab Status: In process Specimen: Blood from Arm, Right Updated: 06/08/24 2235    B-Type Natriuretic Peptide(BNP) [[id number]]  (Normal) Collected: 06/08/24 2145    Lab Status: Final result Specimen: Blood from Arm, Right Updated: 06/08/24 2232     BNP 28 pg/mL     Procalcitonin [[id number]]  (Abnormal) Collected: 06/08/24 2145    Lab Status: Final result Specimen: Blood from Arm, Right Updated: 06/08/24 2229     Procalcitonin 1.25 ng/ml     Urine Microscopic [[id number]]  (Abnormal) Collected: 06/08/24 2213    Lab Status: Final result Specimen: Urine, Straight Cath Updated: 06/08/24 2229     RBC, UA 4-10 /hpf      WBC, UA 20-30 /hpf      Epithelial Cells Occasional /hpf      Bacteria, UA None Seen /hpf     Urine culture [[id number]] Collected: 06/08/24 2213    Lab Status: In process Specimen: Urine, Straight Cath Updated: 06/08/24 222     Fingerstick Glucose (POCT) [[id number]]  (Normal) Collected: 06/08/24 2217    Lab Status: Final result Specimen: Blood Updated: 06/08/24 2227     POC Glucose 67 mg/dl     UA w Reflex to Microscopic w Reflex to Culture [[id number]]  (Abnormal) Collected: 06/08/24 2213    Lab Status: Final result Specimen: Urine, Straight Cath Updated: 06/08/24 2226     Color, UA Dark Yellow     Clarity, UA Turbid     Specific Gravity, UA 1.022     pH, UA 5.5     Leukocytes, UA Moderate     Nitrite, UA Negative     Protein, UA 70 (1+) mg/dl      Glucose, UA Negative mg/dl      Ketones, UA Negative mg/dl      Urobilinogen, UA 4.0 mg/dl      Bilirubin, UA Small     Occult Blood, UA Small    HS Troponin 0hr (reflex protocol) [[id number]]  (Normal) Collected: 06/08/24 2145    Lab Status: Final result Specimen: Blood from Arm, Right Updated: 06/08/24 2226     hs TnI 0hr 5 ng/L     Ethanol [[id number]]  (Normal) Collected: 06/08/24 2145    Lab Status: Final result Specimen: Blood from Arm, Right Updated: 06/08/24 2224     Ethanol Lvl <10 mg/dL     Lactic acid, plasma (w/reflex if result > 2.0) [[id number]]  (Normal) Collected: 06/08/24 2145    Lab Status: Final result Specimen: Blood from Arm, Right Updated: 06/08/24 2222     LACTIC ACID 2.0 mmol/L     Narrative:      Result may be elevated if tourniquet was used during collection.    CBC and differential [[id number]]  (Abnormal) Collected: 06/08/24 2145    Lab Status: Final result Specimen: Blood from Arm, Right Updated: 06/08/24 2220     WBC 12.43 Thousand/uL      RBC 2.65 Million/uL      Hemoglobin 7.8 g/dL      Hematocrit 25.1 %      MCV 95 fL      MCH 29.4 pg      MCHC 31.1 g/dL      RDW 23.8 %      MPV 11.8 fL      Platelets 166 Thousands/uL      nRBC 1 /100 WBCs      Segmented % 92 %      Immature Grans % 1 %      Lymphocytes % 1 %      Monocytes % 6 %      Eosinophils Relative 0 %      Basophils Relative 0 %      Absolute Neutrophils 11.41 Thousands/µL      Absolute Immature Grans 0.13  Thousand/uL      Absolute Lymphocytes 0.09 Thousands/µL      Absolute Monocytes 0.79 Thousand/µL      Eosinophils Absolute 0.00 Thousand/µL      Basophils Absolute 0.01 Thousands/µL     Narrative:      This is an appended report.  These results have been appended to a previously verified report.    Sedimentation rate, automated [[id number]]  (Normal) Collected: 06/08/24 2145    Lab Status: Final result Specimen: Blood from Arm, Right Updated: 06/08/24 2204     Sed Rate <1 mm/hour     Blood culture #2 [[id number]] Collected: 06/08/24 2201    Lab Status: In process Specimen: Blood from Arm, Left Updated: 06/08/24 2203    Blood culture #1 [[id number]] Collected: 06/08/24 2144    Lab Status: In process Specimen: Blood from Arm, Right Updated: 06/08/24 2156    Fingerstick Glucose (POCT) [[id number]]  (Normal) Collected: 06/08/24 2148    Lab Status: Final result Specimen: Blood Updated: 06/08/24 2149     POC Glucose 66 mg/dl     Fingerstick Glucose (POCT) [[id number]]  (Normal) Collected: 06/08/24 2132    Lab Status: Final result Specimen: Blood Updated: 06/08/24 2133     POC Glucose 74 mg/dl                    CT head without contrast   Final Result by Jm Mendosa MD (06/09 0034)      No acute intracranial abnormality. If clinical concern for acute ischemia, consider more sensitive MRI brain for better evaluation.                  Workstation performed: [id number]         XR chest 1 view portable   Final Result by Zoe Holland MD (06/08 2159)      Low lung volumes with patchy bibasilar opacity which could be due to atelectasis. Pneumonia or edema not excluded in the appropriate clinical setting.            Workstation performed: [id number]         CT chest abdomen pelvis wo contrast    (Results Pending)  Per vrad: mass in the left lobe consistent with known pulmonary malignancy.  There is extensive hepatic metastatic disease, incompletely evaluated without IV contrast and mild peripancreatic stranding.               Procedures  ECG 12 Lead Documentation Only    Date/Time: 6/8/2024 10:09 PM    Performed by: Samreen Shepard MD  Authorized by: Samreen Shepard MD    Indications / Diagnosis:  AMS  ECG reviewed by me, the ED Provider: yes    Patient location:  ED  Previous ECG:     Previous ECG:  Compared to current    Comparison ECG info:  12/21/22    Similarity:  No change  Interpretation:     Interpretation: abnormal    Quality:     Tracing quality:  Limited by artifact  Rate:     ECG rate:  79    ECG rate assessment: normal    Rhythm:     Rhythm: sinus rhythm    Ectopy:     Ectopy: none    QRS:     QRS axis:  Normal    QRS intervals:  Normal  Conduction:     Conduction: normal    ST segments:     ST segments:  Normal  T waves:     T waves: flattening      Flattening:  III and aVF  Other findings:     Other findings: poor R wave progression    CriticalCare Time    Date/Time: 6/9/2024 5:43 AM    Performed by: Samreen Shepard MD  Authorized by: Samreen Shepard MD    Critical care provider statement:     Critical care time (minutes):  63    Critical care time was exclusive of:  Separately billable procedures and treating other patients and teaching time    Critical care was necessary to treat or prevent imminent or life-threatening deterioration of the following conditions:  Cardiac failure, circulatory failure, respiratory failure, hepatic failure, renal failure and endocrine crisis    Critical care was time spent personally by me on the following activities:  Development of treatment plan with patient or surrogate, obtaining history from patient or surrogate, discussions with consultants, evaluation of patient's response to treatment, examination of patient, review of old charts, re-evaluation of patient's condition, ordering and review of radiographic studies, ordering and review of laboratory studies and ordering and performing treatments and interventions    I  assumed direction of critical care for this patient from another provider in my specialty: no             ED Course                               SBIRT 20yo+      Flowsheet Row Most Recent Value   Initial Alcohol Screen: US AUDIT-C     1. How often do you have a drink containing alcohol? 0 Filed at: 06/08/2024 2131   2. How many drinks containing alcohol do you have on a typical day you are drinking?  0 Filed at: 06/08/2024 2131   3a. Male UNDER 65: How often do you have five or more drinks on one occasion? 0 Filed at: 06/08/2024 2131   3b. FEMALE Any Age, or MALE 65+: How often do you have 4 or more drinks on one occassion? 0 Filed at: 06/08/2024 2131   Audit-C Score 0 Filed at: 06/08/2024 2131   GUSTABO: How many times in the past year have you...    Used an illegal drug or used a prescription medication for non-medical reasons? Never Filed at: 06/08/2024 2131            Wells' Criteria for PE      Flowsheet Row Most Recent Value   Wells' Criteria for PE    Clinical signs and symptoms of DVT 0 Filed at: 06/08/2024 2141   PE is primary diagnosis or equally likely 3 Filed at: 06/08/2024 2141   HR >100 0 Filed at: 06/08/2024 2141   Immobilization at least 3 days or Surgery in the previous 4 weeks 0 Filed at: 06/08/2024 2141   Previous, objectively diagnosed PE or DVT 0 Filed at: 06/08/2024 2141   Hemoptysis 0 Filed at: 06/08/2024 2141   Malignancy with treatment within 6 months or palliative 1 Filed at: 06/08/2024 2141   Wells' Criteria Total 4 Filed at: 06/08/2024 2141                  Medical Decision Making  This is a 74-year-old male who presents here today for evaluation of altered mental status.  According to EMS, the patient was unresponsive this evening when family went to wake him from a nap in the recliner.  He was hypoglycemic so was given D10, with recurrent hypoglycemia down to the 60s, so was given D50 with improvement to 80.  They said he did have mild improvement of mental status.  According to the wife  "the patient had \"an appointment\" earlier today but does not know for what.  The patient says he does not feel well but is unable to give further details.  He was that he was hypoxic to 80s he was given IV fluids and push dose epinephrine with improvement of blood pressure.  According to notes from [facility], he was recently diagnosed with recurrent lung cancer with metastases, and had MRI of the brain earlier today through [facility] which shows no intracranial metastases.    Review of systems: Limited due to patient's altered mental status    He is chronically ill-appearing, in no acute distress.  He arouses to painful stimuli, and is oriented.  He has fairly garbled speech, making it difficult to understand and rapidly falls back to sleep even when trying to talk to him.  He has distended abdomen with tenderness diffusely.  Exam is otherwise unremarkable.  Concern is for possible infection, dehydration, PRADEEP, electrolyte abnormality, ACS, persistent hypoglycemia due to lack of oral intake all day, particularly in the setting of liver metastases, hyperammonemia, hypercarbia.  Will check chest x-ray and lab work to evaluate, as well as CT scan of the head, and chest abdomen pelvis.  Bedside ultrasound does not show large volume ascites amenable to drainage, but does show hepatomegaly.  He was reportedly hypoxic with EMS, but vital signs are otherwise normal here, so he does not meet SIRS criteria.  Even if he does have underlying infection, he does not meet criteria for severe sepsis or septic shock at this time.    I did speak with the patient's daughter.  She says she took him to the MRI appointment this morning he was complaining of feeling tired when he got back home.  He went to take a nap on the recliner ended around 1900 tonight was unarousable to family, prompting them to call EMS.  She says he has been taking his medications as prescribed.  He was started on a pain medication by his PCP a couple of " days ago and she says that since then he has seemed sleepier than normal.  He has no recent fevers or URI symptoms.  He has been complaining of abdominal pain for several days.  She denies any recent abdominal distention.  He was also started on medications for thrush.  He was started on tramadol on 6/6; this may have contributed to altered mental status as well.  She says he does have a DNR/DNI at home, but would likely be amenable to pressors if his blood pressure drops again.    Blood pressure did drop, but he is afebrile, and responded to additional IV fluids here.  Glucose did trend down so he was given additional D10 bolus and started on D10 infusion.  Hemoglobin today is 7.8, down from 13.5 when last checked on 5/30.  His platelet count today is 166, and though within the normal range is a drop from 253 on 5/30.  The daughter is denying any dark stools or bright red blood loss, which raises concern for bleeding from his known metastases.  He has an PRADEEP, with creatinine today of 3.84, up from baseline of around 1 on 5/30.  He does have a uremia which may be contributing to encephalopathy with associated metabolic acidosis, likely from his uremia.  His potassium is normal at 5.2.  He does have acute liver injury, with a bilirubin of 5.51, direct of 3.67, with elevated AST/ALT.  These were last checked in March and were normal at that time.  Urine does show leukocytes, protein, bilirubin, but no signs of UTI.  His PTT is mildly elevated at 47, similar to prior.  INR today is newly elevated to 3.56, with concerning for underlying liver failure.  The daughter states that he would be amenable to blood transfusion.  Between prehospital and ER boluses, glucose, blood, he did receive 30 cc/kg of fluids.    CT scan of the head shows no acute abnormalities.  Chest abdomen/pelvis shows known metastases, but no obvious bleeding is limited by lack of IV, contrast which cannot be given due to his PRADEEP.  He is more easily  arousable to voice though still significantly on altered.  He will be admitted to the critical care team for further evaluation and management.  I updated daughter and patient on findings and plan of care, and they expressed understanding.    Problems Addressed:  Acute anemia: acute illness or injury  Acute encephalopathy: acute illness or injury  Acute liver failure: acute illness or injury  PRADEEP (acute kidney injury) (HCC): acute illness or injury  Elevated INR: acute illness or injury  Hyperbilirubinemia: acute illness or injury  Hypoglycemia: acute illness or injury  Metastatic primary lung cancer (HCC): acute illness or injury    Amount and/or Complexity of Data Reviewed  Labs: ordered.  Radiology: ordered.    Risk  Prescription drug management.  Decision regarding hospitalization.             Disposition  Final diagnoses:   Acute encephalopathy   Hypoglycemia   Acute liver failure   Hyperbilirubinemia   PRADEEP (acute kidney injury) (HCC)   Acute anemia   Metastatic primary lung cancer (HCC)   Elevated INR     Time reflects when diagnosis was documented in both MDM as applicable and the Disposition within this note       Time User Action Codes Description Comment    6/9/2024  1:26 AM Bishop-Tesoriero, Samreen Add [G93.40] Acute encephalopathy     6/9/2024  1:26 AM Bishop-Tesoriero, Samreen Add [E16.2] Hypoglycemia     6/9/2024  1:26 AM Bishop-Tesoriero, Samreen Add [K72.00] Acute liver failure     6/9/2024  1:26 AM Bishop-Tesoriero, Samreen Add [E80.6] Hyperbilirubinemia     6/9/2024  1:26 AM Bishop-Tesoriero, Samreen Add [N17.9] PRADEEP (acute kidney injury) (HCC)     6/9/2024  1:26 AM Bishop-Tesoriero, Samreen Add [D64.9] Acute anemia     6/9/2024  1:27 AM Bishop-Tesoriero, Samreen Add [C34.90] Metastatic primary lung cancer (HCC)     6/9/2024  1:28 AM Bishop-Tesoriero, Samreen Add [R79.1] Elevated INR           ED Disposition       ED Disposition   Admit    Condition   Stable    Date/Time    Kristin Jun 9, 2024 0126    Comment   Case was discussed with Edd Sosa and the patient's admission status was agreed to be Admission Status: inpatient status to the service of Dr. Sosa.               Follow-up Information    None         Patient's Medications   Discharge Prescriptions    No medications on file       No discharge procedures on file.    PDMP Review       None            ED Provider  Electronically Signed by             Samreen Shepard MD  06/09/24 0616

## 2024-06-09 NOTE — ASSESSMENT & PLAN NOTE
Upon discussing the patient's current functional status, and findings of multisystem organ failure and widely metastatic disease, patient's daughter and wife opt for comfort measures.  We will admit the patient for ease of transferring to the hospice.  Will administer morphine for pain and/or air hunger, Ativan for anxiety, Haldol for agitation.  Will support provided to both patient and the family.  The patient is being admitted with minimally responsive mental status.  Pending hospice consult

## 2024-06-09 NOTE — ED NOTES
25mg D50, 250ml D10, 3 doses of push dose epi, and 700ml of NS. CBG 74 in ED.     Shirley Lopez RN  06/08/24 7884

## 2024-06-09 NOTE — UTILIZATION REVIEW
Initial Clinical Review    Admission: Date/Time/Statement:   Admission Orders (From admission, onward)       Ordered        06/09/24 0242  INPATIENT ADMISSION  Once                          Orders Placed This Encounter   Procedures    INPATIENT ADMISSION     Standing Status:   Standing     Number of Occurrences:   1     Order Specific Question:   Level of Care     Answer:   Level 2 Stepdown / HOT [14]     Order Specific Question:   Estimated length of stay     Answer:   More than 2 Midnights     Order Specific Question:   Certification     Answer:   I certify that inpatient services are medically necessary for this patient for a duration of greater than two midnights. See H&P and MD Progress Notes for additional information about the patient's course of treatment.     ED Arrival Information       Expected   6/8/2024     Arrival   6/8/2024 21:27    Acuity   Emergent              Means of arrival   Ambulance    Escorted by   [facility]   Critical Care/ICU    Admission type   Emergency              Arrival complaint   unresponsive             Chief Complaint   Patient presents with    Hypoglycemia - Symptomatic     Initial CBG 39 for EMS. Pt went to appointment this am and then sat in chair all day. Family became concerned around 2030. EMS gave D10. Repeat CBG 82. Pt lethargic on arrival.       Initial Presentation: 74 y.o. male with PMH of extensive metastatic disease, appears to be originally prostate cancer with mets to lung, bone, and liver, presented to the ED from home on 6/8/24  with altered mental status. 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  Due to recent concern for brain metastasis, underwent MRI with gadolinium on 6/8 which was unrevealing for metastatic lesions.  Feeling unwell after the procedure, the patient settled into a chair and appeared to sleep for approximately 12 hours at which time the family attempted to wake him for his  medications.  Patient was minimally responsive.  EMS was summoned and his glucose was found to be critically low.  Despite treatment with glucose, patient's mental status remained very somnolent and encephalopathic.  Further evaluation in the emergency department reveals multisystem organ failure, likely as a result of his widely metastatic disease which is likely contributing to his encephalopathy.  He has extensive liver metastasis, likely leading to impaired gluconeogenesis and coagulopathy.  Imaging also reveals likely pancreatitis.  Patient also with significant PRADEEP on CKD.  Daughter advises the emergency department that the patient is a DNR/DNI.  Given the patient's need for continuous dextrose and frequent glucose checks and borderline hypotension despite transfusion, critical care was consulted for admission.  Goals of care discussion was held with the daughter at the bedside.  Exam:  Somnolent, disoriented to place, time, situation. Abdominal distention, hypoactive bowel sounds. Capillary refill takes 2 to 3 seconds.     Inpatient admission for comfort measures:  Administer morphine for pain and/or air hunger, Ativan for anxiety, Haldol for agitation. Case Management: Patient's wife, Gala, will speak to family and let CM know decision on hospice care.       Patient  later in the day following admission.     ED Triage Vitals   Temperature Pulse Respirations Blood Pressure SpO2   24 2100 24   (!) 97.3 °F (36.3 °C) 73 18 124/91 97 %      Temp Source Heart Rate Source Patient Position - Orthostatic VS BP Location FiO2 (%)   24 2300 24 23024 230 --   Axillary Monitor Lying Right arm       Pain Score       24 0738       Med Not Given for Pain - for MAR use only          Wt Readings from Last 1 Encounters:   24 87.1 kg (192 lb)     Additional Vital Signs:      Date/Time Temp Pulse Resp BP MAP  (mmHg) SpO2 Calculated FIO2 (%) - Nasal Cannula Nasal Cannula O2 Flow Rate (L/min) O2 Device   06/09/24 0410 97 °F (36.1 °C) Abnormal  -- -- -- -- -- -- -- --   06/09/24 0315 -- 75 21 95/55 70 94 % 36 4 L/min Nasal cannula   06/09/24 0300 -- 77 20 97/57 72 91 % 36 4 L/min Nasal cannula   06/09/24 0245 97.6 °F (36.4 °C) 73 15 103/57 77 94 % 36 4 L/min Nasal cannula   06/09/24 0230 -- 73 15 105/55 72 95 % 36 4 L/min Nasal cannula   06/09/24 0200 -- 72 15 100/55 72 94 % 36 4 L/min Nasal cannula   06/09/24 0145 97.5 °F (36.4 °C) 72 17 97/56 70 94 % 36 4 L/min Nasal cannula   06/09/24 0130 -- 73 18 97/57 71 93 % 36 4 L/min Nasal cannula   06/09/24 0115 -- 74 15 95/53 68 94 % 36 4 L/min Nasal cannula   06/09/24 0100 -- 74 16 92/57 68 93 % 36 4 L/min Nasal cannula   06/09/24 0045 97.2 °F (36.2 °C) Abnormal  74 19 97/54 70 94 % 36 4 L/min Nasal cannula   06/09/24 0030 -- 73 21 96/53 70 94 % 36 4 L/min Nasal cannula   06/09/24 0015 97.1 °F (36.2 °C) Abnormal  74 17 107/58 77 94 % 36 4 L/min Nasal cannula   06/09/24 0000 97.2 °F (36.2 °C) Abnormal  73 17 101/57 75 95 % 36 4 L/min Nasal cannula   06/08/24 2345 -- 74 18 97/52 69 91 % 36 4 L/min Nasal cannula   06/08/24 2343 97.2 °F (36.2 °C) Abnormal  73 14 101/58 76 91 % 36 4 L/min Nasal cannula   06/08/24 2330 -- 73 14 104/56 77 94 % 36 4 L/min Nasal cannula   06/08/24 2315 -- 72 13 102/59 77 94 % 36 4 L/min Nasal cannula   06/08/24 2300 -- 72 13 106/56 77 95 % 36 4 L/min Nasal cannula   06/08/24 2245 -- 73 13 103/55 76 95 % 36 4 L/min Nasal cannula   06/08/24 2230 -- 73 13 101/52 73 96 % 36 4 L/min Nasal cannula   06/08/24 2226 -- 73 13 99/58 76 94 % 36 4 L/min  Nasal cannula   Nasal Cannula O2 Flow Rate (L/min): MD titrated down at 06/08/24 2226   06/08/24 2220 97.4 °F (36.3 °C) Abnormal  73 14 97/53 68 94 % -- -- --   06/08/24 2215 -- 73 14 99/52 72 93 % 44 6 L/min Nasal cannula   06/08/24 2200 -- 75 15 88/51 Abnormal   63 Abnormal  94 % 44 6 L/min Nasal cannula   BP:  Attending aware at 06/08/24 2200 06/08/24 2145 -- 75 16 84/53 Abnormal   63 Abnormal  94 % 44 6 L/min Nasal cannula     Pertinent Labs/Diagnostic Test Results:       CT head without contrast   Final Result by Jm Mendosa MD (06/09 0034)      No acute intracranial abnormality. If clinical concern for acute ischemia, consider more sensitive MRI brain for better evaluation.                  Workstation performed: [id number]         CT chest abdomen pelvis wo contrast   Final Result by Ana Malik MD (06/09 1129)   Masslike consolidation left lower lobe measuring 5.2 x 4 cm. Additional scattered lung nodules most of which are stable from the previous study in 2022      Peripancreatic inflammatory stranding correlate with lipase level to evaluate for acute pancreatitis   Diffuse hepatic metastatic disease   Correlation with the patient's previous PET scan in May 2024 suggest   Perihepatic fluid   Findings are consistent with the preliminary report from [facility] which was provided shortly after completion of the exam.                     Workstation performed: [id number]         XR chest 1 view portable   Final Result by Zoe Holland MD (06/08 2159)      Low lung volumes with patchy bibasilar opacity which could be due to atelectasis. Pneumonia or edema not excluded in the appropriate clinical setting.            Workstation performed: [id number]               Results from last 7 days   Lab Units 06/08/24  2145   WBC Thousand/uL 12.43*   HEMOGLOBIN g/dL 7.8*   HEMATOCRIT % 25.1*   PLATELETS Thousands/uL 166   TOTAL NEUT ABS Thousands/µL 11.41*         Results from last 7 days   Lab Units 06/08/24  2201   SODIUM mmol/L 140   POTASSIUM mmol/L 5.2   CHLORIDE mmol/L 110*   CO2 mmol/L 16*   ANION GAP mmol/L 14*   BUN mg/dL 75*   CREATININE mg/dL 3.84*   EGFR ml/min/1.73sq m 14   CALCIUM mg/dL 8.1*     Results from last 7 days   Lab Units 06/08/24 2214 06/08/24 2201   AST U/L  --  299*   ALT U/L  --   89*   ALK PHOS U/L  --  221*   TOTAL PROTEIN g/dL  --  5.3*   ALBUMIN g/dL  --  2.8*   TOTAL BILIRUBIN mg/dL  --  5.51*   BILIRUBIN DIRECT mg/dL  --  3.67*   AMMONIA umol/L 45  --      Results from last 7 days   Lab Units 06/09/24  0028 06/08/24 2246 06/08/24 2217 06/08/24 2148 06/08/24 2132   POC GLUCOSE mg/dl 133 99 67 66 74     Results from last 7 days   Lab Units 06/08/24  2201   GLUCOSE RANDOM mg/dL 66                   Results from last 7 days   Lab Units 06/08/24  2145   HS TNI 0HR ng/L 5         Results from last 7 days   Lab Units 06/08/24  2214   PROTIME seconds 36.7*   INR  3.56*   PTT seconds 47*     Results from last 7 days   Lab Units 06/08/24  2145   TSH 3RD GENERATON uIU/mL 0.277*     Results from last 7 days   Lab Units 06/08/24  2145   PROCALCITONIN ng/ml 1.25*     Results from last 7 days   Lab Units 06/08/24  2145   LACTIC ACID mmol/L 2.0             Results from last 7 days   Lab Units 06/08/24  2145   BNP pg/mL 28         Results from last 7 days   Lab Units 06/08/24  2201   LIPASE u/L 5,507*     Results from last 7 days   Lab Units 06/08/24  2145   SED RATE mm/hour <1             Results from last 7 days   Lab Units 06/08/24  2213   CLARITY UA  Turbid   COLOR UA  Dark Yellow   SPEC GRAV UA  1.022   PH UA  5.5   GLUCOSE UA mg/dl Negative   KETONES UA mg/dl Negative   BLOOD UA  Small*   PROTEIN UA mg/dl 70 (1+)*   NITRITE UA  Negative   BILIRUBIN UA  Small*   UROBILINOGEN UA (BE) mg/dl 4.0*   LEUKOCYTES UA  Moderate*   WBC UA /hpf 20-30*   RBC UA /hpf 4-10*   BACTERIA UA /hpf None Seen   EPITHELIAL CELLS WET PREP /hpf Occasional                 Results from last 7 days   Lab Units 06/08/24 2201 06/08/24  2145   ETHANOL LVL mg/dL  --  <10   ACETAMINOPHEN LVL ug/mL <2*  --    SALICYLATE LVL mg/dL <5  --              ED Treatment:   Medication Administration from 06/08/2024 2127 to 06/09/2024 0352         Date/Time Order Dose Route Action     06/08/2024 6164 EDT lactated ringers bolus 1,000 mL  0 mL Intravenous Stopped     06/08/2024 2155 EDT lactated ringers bolus 1,000 mL 1,000 mL Intravenous New Bag     06/09/2024 0311 EDT dextrose infusion 10 % 0 mL/hr Intravenous Stopped     06/09/2024 0137 EDT dextrose infusion 10 % 125 mL/hr Intravenous New Bag     06/08/2024 2238 EDT dextrose infusion 10 % 125 mL/hr Intravenous Rate/Dose Change     06/08/2024 2223 EDT dextrose infusion 10 % 250 mL/hr Intravenous Rate/Dose Change     06/08/2024 2155 EDT dextrose infusion 10 % 125 mL/hr Intravenous New Bag     06/08/2024 2233 EDT EPINEPHrine (FOR EMS ONLY) (ADRENALIN) injection 1 mg 0 mg Does not apply Given to EMS     06/08/2024 2233 EDT dextrose (FOR EMS ONLY) 50 % IV solution 100 mL 0 mL Does not apply Given to EMS     06/09/2024 0310 EDT NOREPINEPHRINE 4 MG  ML NSS (CMPD ORDER) infusion -- Intravenous Not Given     06/08/2024 2321 EDT ceftriaxone (ROCEPHIN) 1 g/50 mL in dextrose IVPB 0 mg Intravenous Stopped     06/08/2024 2240 EDT ceftriaxone (ROCEPHIN) 1 g/50 mL in dextrose IVPB 1,000 mg Intravenous New Bag     06/08/2024 2321 EDT dextrose infusion 10 % bolus 0 mL Intravenous Stopped     06/08/2024 2237 EDT dextrose infusion 10 % bolus 250 mL Intravenous New Bag          Past Medical History:   Diagnosis Date    Anemia     Aortic aneurysm (HCC)     BPH (benign prostatic hyperplasia)     CAD (coronary artery disease)     Chronic kidney disease     COPD (chronic obstructive pulmonary disease) (HCC)     Diabetes mellitus (HCC)     Dyslipidemia     Flu 12/2022    GERD (gastroesophageal reflux disease)     Graves disease     Hypertension     Iron deficiency anemia     Lactic acidosis     Lung cancer (HCC)     Lyme disease     Neuropathy     Pancytopenia (HCC)     Pneumonia 12/2022    Prostate cancer (HCC)     Rheumatoid arthritis (HCC)      Present on Admission:   PRADEEP (acute kidney injury) (HCC)   Anemia   COPD (chronic obstructive pulmonary disease) (HCC)   GERD (gastroesophageal reflux disease)   Graves  disease   Mixed hyperlipidemia   Rheumatoid arthritis (HCC)   Squamous cell lung cancer, right (HCC)   Prostate cancer (HCC)   Coagulopathy (HCC)   Transaminitis   Metastasis to liver (HCC)      Admitting Diagnosis: Hyperbilirubinemia [E80.6]  Hypoglycemia [E16.2]  Metastatic primary lung cancer (HCC) [C34.90]  Elevated INR [R79.1]  Acute liver failure [K72.00]  PRADEEP (acute kidney injury) (HCC) [N17.9]  Acute encephalopathy [G93.40]  Acute anemia [D64.9]  Age/Sex: 74 y.o. male      Admission Orders:  Level 4 DNR/Comfort Care.       Scheduled Medications:     Continuous IV Infusions:     PRN Meds:  glycopyrrolate, 0.1 mg, Intravenous, Q4H PRN  haloperidol lactate, 0.5 mg, Intravenous, Q2H PRN  LORazepam, 1 mg, Intravenous, Q10 Min PRN  morphine injection, 2 mg, Intravenous, Q1H PRN x 2 doses 6/9 @ 0175, 0267        IP CONSULT TO HOSPICE    Network Utilization Review Department  ATTENTION: Please call with any questions or concerns to [phone] and carefully listen to the prompts so that you are directed to the right person. All voicemails are confidential.   For Discharge needs, contact Care Management DC Support Team at [phone] opt. 2  Send all requests for admission clinical reviews, approved or denied determinations and any other requests to dedicated fax number below belonging to the campus where the patient is receiving treatment. List of dedicated fax numbers for the Facilities:  FACILITY NAME UR FAX NUMBER   ADMISSION DENIALS (Administrative/Medical Necessity) [phone]   DISCHARGE SUPPORT TEAM (NETWORK) [phone]   PARENT CHILD HEALTH (Maternity/NICU/Pediatrics) [phone]   [facility] [phone]   [facility] [phone]   [facility] [phone]   [facility] [phone]   [facility] [phone]   [facility] [phone]   [facility]  [facility] [phone]   [facility] [phone]   [facility] [phone]   [facility] [phone]   [facility] [phone]   [facility] [phone]

## 2024-06-09 NOTE — DISCHARGE SUMMARY
Discharge Summary - Everardo Renae 74 y.o. male MRN: [id number]    Unit/Bed#: ICU 01 Encounter: [id number] PCP: JEROME Huitron    Admission Date:   Admission Orders (From admission, onward)       Ordered        24 0242  INPATIENT ADMISSION  Once                            Admitting Diagnosis: Hyperbilirubinemia [E80.6]  Hypoglycemia [E16.2]  Metastatic primary lung cancer (HCC) [C34.90]  Elevated INR [R79.1]  Acute liver failure [K72.00]  PRADEEP (acute kidney injury) (HCC) [N17.9]  Acute encephalopathy [G93.40]  Acute anemia [D64.9]    HPI: Patient is a 74 year old male presenting on  with a complaint of altered mental status. He has a past medical history of metastatic prostate cancer and newly identified primary squamous cell carcinoma of the left lung. He underwent a MRI on  and was unarousable by family several hours later. He was found to be hypoglycemic in the emergency room despite aggressive glucose administration and in multi-system organ failure. After discussion with the family, the decision was made to admit the patient for comfort focused care.    Procedures Performed:   Orders Placed This Encounter   Procedures    Critical Care    ED ECG Documentation Only       Summary of Hospital Course: The patient  later in the day following admission. There family was notified and condolences offered.    Significant Findings, Care, Treatment and Services Provided:    CT Head- no acute intracranial abnormality   CT Chest/Abdomen/Pelvis- masslike consolidation left lower lobe measuring 5.2 x 4 cm, additional scattered lung nodules, peripancreatic inflammatory stranding, diffuse hepatic metastatic disease, perihepatic fluid   Chest xray- low lung volumes with patchy bibasilar opacity   MRI Brain- no evidence of intracranial metastasis    Complications: None    Disposition:      Final Diagnosis:   Comfort measures    Medical Problems       Resolved Problems  Date Reviewed:  2024   None         Condition at Time of Death: Peaceful    Date, Time and Cause of Death    Date of Death: 24  Time of Death: 11:54 AM  Preliminary Cause of Death: Hypoglycemia  Entered by: Silvano CANO[PG1.1]       Attribution       PG1.1 JEROME Escobar 24 11:56            Death Note:    INPATIENT DEATH NOTE  Everardo Renae 74 y.o. male MRN: [id number]  Unit/Bed#: ICU 01 Encounter: [id number]    Date, Time and Cause of Death    Date of Death: 24  Time of Death: 11:54 AM  Preliminary Cause of Death: Hypoglycemia  Entered by: Silvano CANO[PG1.1]       Attribution       PG1.1 JEROME Escobar 24 11:56             Patient's Information  Pronounced by: Silvano CANO  Did the patient's death occur in the ED?: No  Did the patient's death occur in the OR?: No  Did the patient's death occur less than 10 days post-op?: No  Did the patient's death occur within 24 hours of admission?: Yes  Was code status DNR at the time of death?: Yes    PHYSICAL EXAM:  Unresponsive to noxious stimuli, Spontaneous respirations absent, Breath sounds absent, Heart sounds absent, Pupillary light reflex absent, and Corneal blink reflex absent    Medical Examiner notification criteria:  Patient  within 24 hours of arrival to hospital   Medical Examiner's office notified?:  Yes   Medical Examiner accepted case?:  Pending  Name of Medical Examiner: Pending    Family Notification  Was the family notified?: Yes  Date Notified: 24  Time Notified: 1159  Notified by: Silvano Sandoval  Name of Family Notified of Death: Spencer Renae   Relationship to Patient: Daughter  Family Notification Route: Telephone  Was the family told to contact a  home?: Yes    Autopsy Options:  Post-mortem examination declined by next of kin    Primary Service Attending Physician notified?:  yes - Attending:  Edd Sosa MD    Physician/Resident responsible for  completing Discharge Summary:  Silvano CANO

## 2024-06-10 LAB — BACTERIA UR CULT: NORMAL

## 2024-06-11 LAB
ATRIAL RATE: 79 BPM
P AXIS: 53 DEGREES
PR INTERVAL: 146 MS
QRS AXIS: 45 DEGREES
QRSD INTERVAL: 94 MS
QT INTERVAL: 418 MS
QTC INTERVAL: 479 MS
T WAVE AXIS: 28 DEGREES
VENTRICULAR RATE: 79 BPM

## 2024-06-11 NOTE — UTILIZATION REVIEW
NOTIFICATION OF ADMISSION DISCHARGE   This is a Notification of Discharge from [facility]. Please be advised that this patient has been discharge from our facility. Below you will find the admission and discharge date and time including the patient’s disposition.   UTILIZATION REVIEW CONTACT:  Sherrie Lechuga  Utilization   Network Utilization Review Department  Phone: [phone] x carefully listen to the prompts. All voicemails are confidential.  Email: [email or web address]     ADMISSION INFORMATION  PRESENTATION DATE: 2024  9:28 PM  OBERVATION ADMISSION DATE:   INPATIENT ADMISSION DATE: 24  2:42 AM   DISCHARGE DATE: 2024  5:00 PM   DISPOSITION:    Network Utilization Review Department  ATTENTION: Please call with any questions or concerns to [phone] and carefully listen to the prompts so that you are directed to the right person. All voicemails are confidential.   For Discharge needs, contact Care Management DC Support Team at [phone] opt. 2  Send all requests for admission clinical reviews, approved or denied determinations and any other requests to dedicated fax number below belonging to the campus where the patient is receiving treatment. List of dedicated fax numbers for the Facilities:  FACILITY NAME UR FAX NUMBER   ADMISSION DENIALS (Administrative/Medical Necessity) [phone]   DISCHARGE SUPPORT TEAM ([facility]) [phone]   PARENT CHILD HEALTH (Maternity/NICU/Pediatrics) [phone]   [facility] [phone]   [facility] [phone]   [facility] [phone]   [facility] [phone]   [facility] [phone]   [facility] [phone]   [facility] [phone]   [facility] [phone]   [facility]  [facility] [phone]   [facility] [phone]   [facility] [phone]   [facility] [phone]

## 2024-06-14 LAB
BACTERIA BLD CULT: NORMAL
BACTERIA BLD CULT: NORMAL

## (undated) DEVICE — MEDI-VAC YANKAUER SUCTION HANDLE W/BULBOUS AND CONTROL VENT: Brand: CARDINAL HEALTH

## (undated) DEVICE — OCCLUSIVE GAUZE STRIP,3% BISMUTH TRIBROMOPHENATE IN PETROLATUM BLEND: Brand: XEROFORM

## (undated) DEVICE — TUBING SUCTION 5MM X 12 FT

## (undated) DEVICE — ACE WRAP 4 IN UNSTERILE

## (undated) DEVICE — DRESSING GUAZE ADH BORDER 4 X 4 IN

## (undated) DEVICE — WET SKIN PREP TRAY: Brand: MEDLINE INDUSTRIES, INC.

## (undated) DEVICE — REM POLYHESIVE ADULT PATIENT RETURN ELECTRODE: Brand: VALLEYLAB

## (undated) DEVICE — INTENDED FOR TISSUE SEPARATION, AND OTHER PROCEDURES THAT REQUIRE A SHARP SURGICAL BLADE TO PUNCTURE OR CUT.: Brand: BARD-PARKER ® CARBON RIB-BACK BLADES

## (undated) DEVICE — PREP PAD BNS: Brand: CONVERTORS

## (undated) DEVICE — BETHLEHEM UNIVERSAL  MIONR EXT: Brand: CARDINAL HEALTH

## (undated) DEVICE — SUT ETHILON 3-0 PS-1 18 IN 1663G

## (undated) DEVICE — DRAPE SHEET THREE QUARTER

## (undated) DEVICE — STOCKINETTE REGULAR

## (undated) DEVICE — SUT VICRYL 3-0 PS-2 27 IN J427H

## (undated) DEVICE — GLOVE INDICATOR PI UNDERGLOVE SZ 7.5 BLUE

## (undated) DEVICE — KERLIX BANDAGE ROLL: Brand: KERLIX

## (undated) DEVICE — LIGHT HANDLE COVER SLEEVE DISP BLUE STELLAR